# Patient Record
Sex: FEMALE | Race: OTHER | Employment: OTHER | URBAN - METROPOLITAN AREA
[De-identification: names, ages, dates, MRNs, and addresses within clinical notes are randomized per-mention and may not be internally consistent; named-entity substitution may affect disease eponyms.]

---

## 2017-02-02 ENCOUNTER — APPOINTMENT (OUTPATIENT)
Dept: GENERAL RADIOLOGY | Age: 82
End: 2017-02-02
Attending: PHYSICIAN ASSISTANT
Payer: MEDICARE

## 2017-02-02 ENCOUNTER — HOSPITAL ENCOUNTER (EMERGENCY)
Age: 82
Discharge: HOME | End: 2017-02-02
Attending: EMERGENCY MEDICINE
Payer: MEDICARE

## 2017-02-02 VITALS
RESPIRATION RATE: 18 BRPM | OXYGEN SATURATION: 98 % | SYSTOLIC BLOOD PRESSURE: 167 MMHG | HEART RATE: 62 BPM | HEIGHT: 61 IN | WEIGHT: 120 LBS | TEMPERATURE: 97.8 F | DIASTOLIC BLOOD PRESSURE: 74 MMHG | BODY MASS INDEX: 22.66 KG/M2

## 2017-02-02 DIAGNOSIS — J06.9 ACUTE UPPER RESPIRATORY INFECTION: Primary | ICD-10-CM

## 2017-02-02 LAB
ALBUMIN SERPL BCP-MCNC: 3.1 G/DL (ref 3.5–5)
ALBUMIN/GLOB SERPL: 0.8 {RATIO} (ref 1.1–2.2)
ALP SERPL-CCNC: 95 U/L (ref 45–117)
ALT SERPL-CCNC: 23 U/L (ref 12–78)
ANION GAP BLD CALC-SCNC: 7 MMOL/L (ref 5–15)
APPEARANCE UR: CLEAR
AST SERPL W P-5'-P-CCNC: 23 U/L (ref 15–37)
BACTERIA URNS QL MICRO: NEGATIVE /HPF
BASOPHILS # BLD AUTO: 0 K/UL (ref 0–0.1)
BASOPHILS # BLD: 1 % (ref 0–1)
BILIRUB SERPL-MCNC: 0.3 MG/DL (ref 0.2–1)
BILIRUB UR QL: NEGATIVE
BUN SERPL-MCNC: 15 MG/DL (ref 6–20)
BUN/CREAT SERPL: 18 (ref 12–20)
CALCIUM SERPL-MCNC: 10.2 MG/DL (ref 8.5–10.1)
CHLORIDE SERPL-SCNC: 105 MMOL/L (ref 97–108)
CO2 SERPL-SCNC: 28 MMOL/L (ref 21–32)
COLOR UR: ABNORMAL
CREAT SERPL-MCNC: 0.85 MG/DL (ref 0.55–1.02)
EOSINOPHIL # BLD: 0.1 K/UL (ref 0–0.4)
EOSINOPHIL NFR BLD: 1 % (ref 0–7)
EPITH CASTS URNS QL MICRO: ABNORMAL /LPF
ERYTHROCYTE [DISTWIDTH] IN BLOOD BY AUTOMATED COUNT: 13.8 % (ref 11.5–14.5)
FLUAV AG NPH QL IA: NEGATIVE
FLUBV AG NOSE QL IA: NEGATIVE
GLOBULIN SER CALC-MCNC: 4.1 G/DL (ref 2–4)
GLUCOSE SERPL-MCNC: 101 MG/DL (ref 65–100)
GLUCOSE UR STRIP.AUTO-MCNC: NEGATIVE MG/DL
HCT VFR BLD AUTO: 41.2 % (ref 35–47)
HGB BLD-MCNC: 12.8 G/DL (ref 11.5–16)
HGB UR QL STRIP: ABNORMAL
HYALINE CASTS URNS QL MICRO: ABNORMAL /LPF (ref 0–5)
KETONES UR QL STRIP.AUTO: NEGATIVE MG/DL
LEUKOCYTE ESTERASE UR QL STRIP.AUTO: NEGATIVE
LYMPHOCYTES # BLD AUTO: 35 % (ref 12–49)
LYMPHOCYTES # BLD: 1.4 K/UL (ref 0.8–3.5)
MCH RBC QN AUTO: 27 PG (ref 26–34)
MCHC RBC AUTO-ENTMCNC: 31.1 G/DL (ref 30–36.5)
MCV RBC AUTO: 86.9 FL (ref 80–99)
MONOCYTES # BLD: 0.5 K/UL (ref 0–1)
MONOCYTES NFR BLD AUTO: 11 % (ref 5–13)
NEUTS SEG # BLD: 2.2 K/UL (ref 1.8–8)
NEUTS SEG NFR BLD AUTO: 52 % (ref 32–75)
NITRITE UR QL STRIP.AUTO: NEGATIVE
PH UR STRIP: 7 [PH] (ref 5–8)
PLATELET # BLD AUTO: 122 K/UL (ref 150–400)
POTASSIUM SERPL-SCNC: 3.6 MMOL/L (ref 3.5–5.1)
PROT SERPL-MCNC: 7.2 G/DL (ref 6.4–8.2)
PROT UR STRIP-MCNC: NEGATIVE MG/DL
RBC # BLD AUTO: 4.74 M/UL (ref 3.8–5.2)
RBC #/AREA URNS HPF: ABNORMAL /HPF (ref 0–5)
SODIUM SERPL-SCNC: 140 MMOL/L (ref 136–145)
SP GR UR REFRACTOMETRY: 1.01 (ref 1–1.03)
UA: UC IF INDICATED,UAUC: ABNORMAL
UROBILINOGEN UR QL STRIP.AUTO: 0.2 EU/DL (ref 0.2–1)
WBC # BLD AUTO: 4.2 K/UL (ref 3.6–11)
WBC URNS QL MICRO: ABNORMAL /HPF (ref 0–4)

## 2017-02-02 PROCEDURE — 96361 HYDRATE IV INFUSION ADD-ON: CPT

## 2017-02-02 PROCEDURE — 81001 URINALYSIS AUTO W/SCOPE: CPT | Performed by: EMERGENCY MEDICINE

## 2017-02-02 PROCEDURE — 87804 INFLUENZA ASSAY W/OPTIC: CPT | Performed by: EMERGENCY MEDICINE

## 2017-02-02 PROCEDURE — 85025 COMPLETE CBC W/AUTO DIFF WBC: CPT | Performed by: PHYSICIAN ASSISTANT

## 2017-02-02 PROCEDURE — 74011250636 HC RX REV CODE- 250/636: Performed by: PHYSICIAN ASSISTANT

## 2017-02-02 PROCEDURE — 36415 COLL VENOUS BLD VENIPUNCTURE: CPT | Performed by: PHYSICIAN ASSISTANT

## 2017-02-02 PROCEDURE — 71020 XR CHEST PA LAT: CPT

## 2017-02-02 PROCEDURE — 80053 COMPREHEN METABOLIC PANEL: CPT | Performed by: PHYSICIAN ASSISTANT

## 2017-02-02 PROCEDURE — 96374 THER/PROPH/DIAG INJ IV PUSH: CPT

## 2017-02-02 PROCEDURE — 99284 EMERGENCY DEPT VISIT MOD MDM: CPT

## 2017-02-02 RX ORDER — KETOROLAC TROMETHAMINE 30 MG/ML
10 INJECTION, SOLUTION INTRAMUSCULAR; INTRAVENOUS
Status: COMPLETED | OUTPATIENT
Start: 2017-02-02 | End: 2017-02-02

## 2017-02-02 RX ORDER — SODIUM CHLORIDE 9 MG/ML
1000 INJECTION, SOLUTION INTRAVENOUS ONCE
Status: COMPLETED | OUTPATIENT
Start: 2017-02-02 | End: 2017-02-02

## 2017-02-02 RX ADMIN — KETOROLAC TROMETHAMINE 10 MG: 30 INJECTION, SOLUTION INTRAMUSCULAR at 13:55

## 2017-02-02 RX ADMIN — SODIUM CHLORIDE 1000 ML/HR: 900 INJECTION, SOLUTION INTRAVENOUS at 12:20

## 2017-02-02 NOTE — ED PROVIDER NOTES
HPI Comments: 80 y.o. female with past medical history significant for stroke, diabetes, rheumatoid arthritis, HTN, joint swelling, cherry angioma, and vertigo who presents with chief complaint of cough. For the past 4 days, patient has complained of fever, generalized body aches, cough productive of phlegm, rhinorrhea, and \"runny eyes. \"  Secondary to cough, patient has also complained of a \"lump\" in her throat. Per daughter, patient has been taking DM cough syrup and ASA while drinking hot tea, but without any relief. After contacting her PCP this morning, patient was advised to be seen in the ED. Per daughter, patient exhibits swelling to the left shoulder due to h/o rheumatoid arthritis -- \"it's been really bad this season. \"  Patient is not currently on any pain medications or immunosuppressants for her rheumatoid arthritis. Daughter denies any recent travel. Patient has received her flu shot this year. Patient denies any vomiting or diarrhea. There are no other acute medical concerns at this time. Social hx: denies tobacco smoking; no known ill contacts  PCP: Nik Myers MD    Note written by Dario Eden, as dictated by Amberly Emmanuel PA-C 11:37 AM    The history is provided by the patient and a relative. Past Medical History:   Diagnosis Date    Bunion     Cherry angioma     Diabetes ([address]     Diabetes mellitus ([address]     HTN (hypertension)     Joint pain     Joint swelling     Rheumatoid arthritis(714.0) ([address]     Stroke ([address]     Thyroid disorder      problems    Vertigo      BPPV       Past Surgical History:   Procedure Laterality Date    Hx orthopaedic       bunions removed x 2    Hx  section           Family History:   Problem Relation Age of Onset    Stroke Father        Social History     Social History    Marital status:      Spouse name: N/A    Number of children: N/A    Years of education: N/A     Occupational History    Not on file. Social History Main Topics    Smoking status: Never Smoker    Smokeless tobacco: Never Used    Alcohol use No    Drug use: No    Sexual activity: No     Other Topics Concern    Not on file     Social History Narrative         ALLERGIES: Shellfish containing products    Review of Systems   Constitutional: Positive for fever. Negative for chills and fatigue. HENT: Positive for congestion, rhinorrhea and sore throat. Negative for ear pain, facial swelling and sneezing. Eyes: Positive for discharge. Negative for pain and itching. Respiratory: Positive for cough. Negative for chest tightness and shortness of breath. Cardiovascular: Negative. Negative for chest pain and leg swelling. Gastrointestinal: Negative. Negative for abdominal distention, abdominal pain, constipation, diarrhea, nausea and vomiting. Genitourinary: Negative for difficulty urinating, frequency and urgency. Musculoskeletal: Positive for arthralgias and myalgias. Negative for back pain, joint swelling, neck pain and neck stiffness. Skin: Negative for color change and rash. Neurological: Negative for dizziness, numbness and headaches. Psychiatric/Behavioral: Negative for confusion and decreased concentration. All other systems reviewed and are negative. Vitals:    02/02/17 1118   BP: 180/73   Pulse: 71   Resp: 16   Temp: 97.8 °F (36.6 °C)   SpO2: 100%   Weight: 54.4 kg (120 lb)   Height: 5' 1\" (1.549 m)            Physical Exam   Constitutional: She is oriented to person, place, and time. She appears well-developed and well-nourished. No distress. Elderly  female in NAD   HENT:   Head: Normocephalic and atraumatic. Right Ear: Tympanic membrane, external ear and ear canal normal.   Left Ear: Tympanic membrane, external ear and ear canal normal.   Nose: Nose normal.   Mouth/Throat: Uvula is midline, oropharynx is clear and moist and mucous membranes are normal. She has dentures. No oropharyngeal exudate. Eyes: Conjunctivae and EOM are normal. Pupils are equal, round, and reactive to light. Right eye exhibits no discharge. Left eye exhibits no discharge. No scleral icterus. Neck: Normal range of motion. Neck supple. Cardiovascular: Normal rate and regular rhythm. Exam reveals no gallop and no friction rub. No murmur heard. Pulmonary/Chest: Effort normal and breath sounds normal. She has no wheezes. She has no rales. Abdominal: Soft. Bowel sounds are normal. She exhibits no distension. There is no tenderness. There is no rebound and no guarding. Lymphadenopathy:     She has cervical adenopathy (shotty anterior rt >lt). Neurological: She is alert and oriented to person, place, and time. No cranial nerve deficit. Coordination normal.   Skin: Skin is warm and dry. She is not diaphoretic. Psychiatric: She has a normal mood and affect. Her behavior is normal.   Nursing note and vitals reviewed. MDM  Number of Diagnoses or Management Options  Acute upper respiratory infection:   Diagnosis management comments: 79 yo female with cough, runny nose, eye drainage, myalgias x 5 days. Appears non-toxic on exam. Afebrile with clear lungs on auscultation. Will check CBC, CMP, rapid flu, UA and xray chest. [place], [place]         Amount and/or Complexity of Data Reviewed  Clinical lab tests: ordered and reviewed  Tests in the radiology section of CPT®: ordered and reviewed      ED Course       Procedures  Progress note    Labs and imaging reviewed. Pt feeling slightly better. Suspect viral resp illness. [place], [place]    Patient's results have been reviewed with them. Patient and/or family have verbally conveyed their understanding and agreement of the patient's signs, symptoms, diagnosis, treatment and prognosis and additionally agree to follow up as recommended or return to the Emergency Room should their condition change prior to follow-up.   Discharge instructions have also been provided to the patient with some educational information regarding their diagnosis as well a list of reasons why they would want to return to the ER prior to their follow-up appointment should their condition change. [facility], [address]    A/P  URI: Push fluids. Rest. Tylenol 1 gram every 8 hrs for body aches. Benadryl 25 mg every 6 hrs for cough and runny eyes. Return for any new or worsening.  Amberly DUNBAR [facility], [address]

## 2017-02-02 NOTE — DISCHARGE INSTRUCTIONS
We hope that we have addressed all of your medical concerns. The examination and treatment you received in the Emergency Department were for an emergent problem and were not intended as complete care. It is important that you follow up with your healthcare provider(s) for ongoing care. If your symptoms worsen or do not improve as expected, and you are unable to reach your usual health care provider(s), you should return to the Emergency Department. Today's healthcare is undergoing tremendous change, and patient satisfaction surveys are one of the many tools to assess the quality of medical care. You may receive a survey from the [employer] regarding your experience in the Emergency Department. I hope that your experience has been completely positive, particularly the medical care that I provided. As such, please participate in the survey; anything less than excellent does not meet my expectations or intentions. [facility] and [facility] participate in nationally recognized quality of care measures. If your blood pressure is greater than 120/80, as reported below, we urge that you seek medical care to address the potential of high blood pressure, commonly known as hypertension. Hypertension can be hereditary or can be caused by certain medical conditions, pain, stress, or \"white coat syndrome. \"       Please make an appointment with your health care provider(s) for follow up of your Emergency Department visit. VITALS:   Patient Vitals for the past 8 hrs:   Temp Pulse Resp BP SpO2   02/02/17 1345 - - - 159/74 99 %   02/02/17 1330 - - - 174/68 98 %   02/02/17 1300 - - - 168/65 99 %   02/02/17 1230 - - - 159/59 98 %   02/02/17 1215 - - - - 98 %   02/02/17 1145 - - - 146/64 98 %   02/02/17 1131 - - - 163/67 99 %   02/02/17 1118 97.8 °F (36.6 °C) 71 16 180/73 100 %          Thank you for allowing us to provide you with medical care today. We realize that you have many choices for your emergency care needs. Please choose us in the future for any continued health care needs. Regards,           Amberly DUNBAR. [facility], [address] [facility].   Office: [phone]            Recent Results (from the past 24 hour(s))   CBC WITH AUTOMATED DIFF    Collection Time: 02/02/17 11:52 AM   Result Value Ref Range    WBC 4.2 3.6 - 11.0 K/uL    RBC 4.74 3.80 - 5.20 M/uL    HGB 12.8 11.5 - 16.0 g/dL    HCT 41.2 35.0 - 47.0 %    MCV 86.9 80.0 - 99.0 FL    MCH 27.0 26.0 - 34.0 PG    MCHC 31.1 30.0 - 36.5 g/dL    RDW 13.8 11.5 - 14.5 %    PLATELET 489 (L) 880 - 400 K/uL    NEUTROPHILS 52 32 - 75 %    LYMPHOCYTES 35 12 - 49 %    MONOCYTES 11 5 - 13 %    EOSINOPHILS 1 0 - 7 %    BASOPHILS 1 0 - 1 %    ABS. NEUTROPHILS 2.2 1.8 - 8.0 K/UL    ABS. LYMPHOCYTES 1.4 0.8 - 3.5 K/UL    ABS. MONOCYTES 0.5 0.0 - 1.0 K/UL    ABS. EOSINOPHILS 0.1 0.0 - 0.4 K/UL    ABS. BASOPHILS 0.0 0.0 - 0.1 K/UL   METABOLIC PANEL, COMPREHENSIVE    Collection Time: 02/02/17 11:52 AM   Result Value Ref Range    Sodium 140 136 - 145 mmol/L    Potassium 3.6 3.5 - 5.1 mmol/L    Chloride 105 97 - 108 mmol/L    CO2 28 21 - 32 mmol/L    Anion gap 7 5 - 15 mmol/L    Glucose 101 (H) 65 - 100 mg/dL    BUN 15 6 - 20 MG/DL    Creatinine 0.85 0.55 - 1.02 MG/DL    BUN/Creatinine ratio 18 12 - 20      GFR est AA >60 >60 ml/min/1.73m2    GFR est non-AA >60 >60 ml/min/1.73m2    Calcium 10.2 (H) 8.5 - 10.1 MG/DL    Bilirubin, total 0.3 0.2 - 1.0 MG/DL    ALT (SGPT) 23 12 - 78 U/L    AST (SGOT) 23 15 - 37 U/L    Alk.  phosphatase 95 45 - 117 U/L    Protein, total 7.2 6.4 - 8.2 g/dL    Albumin 3.1 (L) 3.5 - 5.0 g/dL    Globulin 4.1 (H) 2.0 - 4.0 g/dL    A-G Ratio 0.8 (L) 1.1 - 2.2     INFLUENZA A & B AG (RAPID TEST)    Collection Time: 02/02/17 11:56 AM   Result Value Ref Range    Influenza A Antigen NEGATIVE  NEG      Influenza B Antigen NEGATIVE  NEG     URINALYSIS W/ REFLEX CULTURE    Collection Time: 02/02/17  1:44 PM   Result Value Ref Range    Color YELLOW/STRAW      Appearance CLEAR CLEAR      Specific gravity 1.007 1.003 - 1.030      pH (UA) 7.0 5.0 - 8.0      Protein NEGATIVE  NEG mg/dL    Glucose NEGATIVE  NEG mg/dL    Ketone NEGATIVE  NEG mg/dL    Bilirubin NEGATIVE  NEG      Blood TRACE (A) NEG      Urobilinogen 0.2 0.2 - 1.0 EU/dL    Nitrites NEGATIVE  NEG      Leukocyte Esterase NEGATIVE  NEG      WBC 0-4 0 - 4 /hpf    RBC 0-5 0 - 5 /hpf    Epithelial cells FEW FEW /lpf    Bacteria NEGATIVE  NEG /hpf    UA:UC IF INDICATED CULTURE NOT INDICATED BY UA RESULT CNI      Hyaline cast 0-2 0 - 5 /lpf       Xr Chest Pa Lat    Result Date: 2/2/2017  Exam:  2 view chest Indication: Cough and body aches, possible flu. COMPARISON: 7/12/2016 PA and lateral views demonstrate normal heart size. There is atherosclerotic change of the aorta. There is no acute process in the lung fields. The osseous structures are unremarkable. Impression: No acute process. No pneumonia            Upper Respiratory Infection (Cold): Care Instructions  Your Care Instructions    An upper respiratory infection, or URI, is an infection of the nose, sinuses, or throat. URIs are spread by coughs, sneezes, and direct contact. The common cold is the most frequent kind of URI. The flu and sinus infections are other kinds of URIs. Almost all URIs are caused by viruses. Antibiotics won't cure them. But you can treat most infections with home care. This may include drinking lots of fluids and taking over-the-counter pain medicine. You will probably feel better in 4 to 10 days. The doctor has checked you carefully, but problems can develop later. If you notice any problems or new symptoms, get medical treatment right away. Follow-up care is a key part of your treatment and safety. Be sure to make and go to all appointments, and call your doctor if you are having problems.  It's also a good idea to know your test results and keep a list of the medicines you take. How can you care for yourself at home? · To prevent dehydration, drink plenty of fluids, enough so that your urine is light yellow or clear like water. Choose water and other caffeine-free clear liquids until you feel better. If you have kidney, heart, or liver disease and have to limit fluids, talk with your doctor before you increase the amount of fluids you drink. · Take an over-the-counter pain medicine, such as acetaminophen (Tylenol), ibuprofen (Advil, Motrin), or naproxen (Aleve). Read and follow all instructions on the label. · Before you use cough and cold medicines, check the label. These medicines may not be safe for young children or for people with certain health problems. · Be careful when taking over-the-counter cold or flu medicines and Tylenol at the same time. Many of these medicines have acetaminophen, which is Tylenol. Read the labels to make sure that you are not taking more than the recommended dose. Too much acetaminophen (Tylenol) can be harmful. · Get plenty of rest.  · Do not smoke or allow others to smoke around you. If you need help quitting, talk to your doctor about stop-smoking programs and medicines. These can increase your chances of quitting for good. When should you call for help? Call 911 anytime you think you may need emergency care. For example, call if:  · You have severe trouble breathing. Call your doctor now or seek immediate medical care if:  · You seem to be getting much sicker. · You have new or worse trouble breathing. · You have a new or higher fever. · You have a new rash. Watch closely for changes in your health, and be sure to contact your doctor if:  · You have a new symptom, such as a sore throat, an earache, or sinus pain. · You cough more deeply or more often, especially if you notice more mucus or a change in the color of your mucus. · You do not get better as expected. Where can you learn more?   Go to http://jennifer-pablo.info/. Enter N853 in the search box to learn more about \"Upper Respiratory Infection (Cold): Care Instructions. \"  Current as of: June 30, 2016  Content Version: 11.1  © 7376-2484 [employer]. Care instructions adapted under license by [employer] (which disclaims liability or warranty for this information). If you have questions about a medical condition or this instruction, always ask your healthcare professional. [address] any warranty or liability for your use of this information.

## 2017-02-02 NOTE — ED NOTES
Assumed care of pt. Bed locked and in low position with call bell within reach. Using AIDET-Introduced self as Primary RN and plan discussed with pt with understanding was verbalized. Pt denies additional complaints at this time. White board updated with this nurse's name. Patient advised that medical information will be discussed and it is their own responsibility to tell nurse if such conversation should not take place in the presence of visitors. Pt verbalizes understanding. Pt's daughter at bedside.

## 2017-02-02 NOTE — ED NOTES
Pt discharged by provider. Pt escorted off the unit with a w/c and in NAD. Home with her daughter who will be driving.

## 2017-02-11 ENCOUNTER — APPOINTMENT (OUTPATIENT)
Dept: GENERAL RADIOLOGY | Age: 82
End: 2017-02-11
Attending: NURSE PRACTITIONER
Payer: MEDICARE

## 2017-02-11 ENCOUNTER — HOSPITAL ENCOUNTER (EMERGENCY)
Age: 82
Discharge: HOME | End: 2017-02-11
Attending: EMERGENCY MEDICINE
Payer: MEDICARE

## 2017-02-11 VITALS
TEMPERATURE: 98 F | OXYGEN SATURATION: 99 % | RESPIRATION RATE: 16 BRPM | SYSTOLIC BLOOD PRESSURE: 188 MMHG | BODY MASS INDEX: 22.66 KG/M2 | HEART RATE: 70 BPM | DIASTOLIC BLOOD PRESSURE: 71 MMHG | WEIGHT: 120 LBS | HEIGHT: 61 IN

## 2017-02-11 DIAGNOSIS — J20.9 ACUTE BRONCHITIS, UNSPECIFIED ORGANISM: Primary | ICD-10-CM

## 2017-02-11 DIAGNOSIS — R05.9 COUGH: ICD-10-CM

## 2017-02-11 LAB
ALBUMIN SERPL BCP-MCNC: 3.2 G/DL (ref 3.5–5)
ALBUMIN/GLOB SERPL: 0.8 {RATIO} (ref 1.1–2.2)
ALP SERPL-CCNC: 98 U/L (ref 45–117)
ALT SERPL-CCNC: 22 U/L (ref 12–78)
ANION GAP BLD CALC-SCNC: 6 MMOL/L (ref 5–15)
APPEARANCE UR: CLEAR
AST SERPL W P-5'-P-CCNC: 22 U/L (ref 15–37)
BACTERIA URNS QL MICRO: NEGATIVE /HPF
BASOPHILS # BLD AUTO: 0 K/UL (ref 0–0.1)
BASOPHILS # BLD: 0 % (ref 0–1)
BILIRUB SERPL-MCNC: 0.4 MG/DL (ref 0.2–1)
BILIRUB UR QL: NEGATIVE
BUN SERPL-MCNC: 22 MG/DL (ref 6–20)
BUN/CREAT SERPL: 24 (ref 12–20)
CALCIUM SERPL-MCNC: 9.6 MG/DL (ref 8.5–10.1)
CHLORIDE SERPL-SCNC: 104 MMOL/L (ref 97–108)
CO2 SERPL-SCNC: 29 MMOL/L (ref 21–32)
COLOR UR: ABNORMAL
CREAT SERPL-MCNC: 0.9 MG/DL (ref 0.55–1.02)
EOSINOPHIL # BLD: 0.1 K/UL (ref 0–0.4)
EOSINOPHIL NFR BLD: 2 % (ref 0–7)
EPITH CASTS URNS QL MICRO: ABNORMAL /LPF
ERYTHROCYTE [DISTWIDTH] IN BLOOD BY AUTOMATED COUNT: 13.7 % (ref 11.5–14.5)
FLUAV AG NPH QL IA: NEGATIVE
FLUBV AG NOSE QL IA: NEGATIVE
GLOBULIN SER CALC-MCNC: 4 G/DL (ref 2–4)
GLUCOSE SERPL-MCNC: 100 MG/DL (ref 65–100)
GLUCOSE UR STRIP.AUTO-MCNC: NEGATIVE MG/DL
HCT VFR BLD AUTO: 39.5 % (ref 35–47)
HGB BLD-MCNC: 12.5 G/DL (ref 11.5–16)
HGB UR QL STRIP: ABNORMAL
KETONES UR QL STRIP.AUTO: NEGATIVE MG/DL
LACTATE SERPL-SCNC: 1.3 MMOL/L (ref 0.4–2)
LEUKOCYTE ESTERASE UR QL STRIP.AUTO: NEGATIVE
LYMPHOCYTES # BLD AUTO: 18 % (ref 12–49)
LYMPHOCYTES # BLD: 1 K/UL (ref 0.8–3.5)
MCH RBC QN AUTO: 27.2 PG (ref 26–34)
MCHC RBC AUTO-ENTMCNC: 31.6 G/DL (ref 30–36.5)
MCV RBC AUTO: 85.9 FL (ref 80–99)
MONOCYTES # BLD: 0.4 K/UL (ref 0–1)
MONOCYTES NFR BLD AUTO: 6 % (ref 5–13)
NEUTS SEG # BLD: 4 K/UL (ref 1.8–8)
NEUTS SEG NFR BLD AUTO: 74 % (ref 32–75)
NITRITE UR QL STRIP.AUTO: NEGATIVE
PH UR STRIP: 7 [PH] (ref 5–8)
PLATELET # BLD AUTO: 128 K/UL (ref 150–400)
POTASSIUM SERPL-SCNC: 3.7 MMOL/L (ref 3.5–5.1)
PROT SERPL-MCNC: 7.2 G/DL (ref 6.4–8.2)
PROT UR STRIP-MCNC: NEGATIVE MG/DL
RBC # BLD AUTO: 4.6 M/UL (ref 3.8–5.2)
RBC #/AREA URNS HPF: ABNORMAL /HPF (ref 0–5)
SODIUM SERPL-SCNC: 139 MMOL/L (ref 136–145)
SP GR UR REFRACTOMETRY: 1.01 (ref 1–1.03)
UROBILINOGEN UR QL STRIP.AUTO: 0.2 EU/DL (ref 0.2–1)
WBC # BLD AUTO: 5.4 K/UL (ref 3.6–11)
WBC URNS QL MICRO: ABNORMAL /HPF (ref 0–4)

## 2017-02-11 PROCEDURE — 80053 COMPREHEN METABOLIC PANEL: CPT | Performed by: NURSE PRACTITIONER

## 2017-02-11 PROCEDURE — 99284 EMERGENCY DEPT VISIT MOD MDM: CPT

## 2017-02-11 PROCEDURE — 81001 URINALYSIS AUTO W/SCOPE: CPT | Performed by: NURSE PRACTITIONER

## 2017-02-11 PROCEDURE — 74011250636 HC RX REV CODE- 250/636: Performed by: NURSE PRACTITIONER

## 2017-02-11 PROCEDURE — 36415 COLL VENOUS BLD VENIPUNCTURE: CPT | Performed by: NURSE PRACTITIONER

## 2017-02-11 PROCEDURE — 87804 INFLUENZA ASSAY W/OPTIC: CPT | Performed by: NURSE PRACTITIONER

## 2017-02-11 PROCEDURE — 85025 COMPLETE CBC W/AUTO DIFF WBC: CPT | Performed by: NURSE PRACTITIONER

## 2017-02-11 PROCEDURE — 71020 XR CHEST PA LAT: CPT

## 2017-02-11 PROCEDURE — 83605 ASSAY OF LACTIC ACID: CPT | Performed by: NURSE PRACTITIONER

## 2017-02-11 PROCEDURE — 96360 HYDRATION IV INFUSION INIT: CPT

## 2017-02-11 RX ORDER — SODIUM CHLORIDE 0.9 % (FLUSH) 0.9 %
5-10 SYRINGE (ML) INJECTION AS NEEDED
Status: DISCONTINUED | OUTPATIENT
Start: 2017-02-11 | End: 2017-02-11 | Stop reason: HOSPADM

## 2017-02-11 RX ORDER — CODEINE PHOSPHATE AND GUAIFENESIN 10; 100 MG/5ML; MG/5ML
5 SOLUTION ORAL
Qty: 118 ML | Refills: 0 | Status: SHIPPED | OUTPATIENT
Start: 2017-02-11 | End: 2019-08-14

## 2017-02-11 RX ORDER — ALBUTEROL SULFATE 90 UG/1
1 AEROSOL, METERED RESPIRATORY (INHALATION)
Qty: 1 INHALER | Refills: 0 | Status: SHIPPED | OUTPATIENT
Start: 2017-02-11 | End: 2020-11-30

## 2017-02-11 RX ORDER — PREDNISONE 50 MG/1
50 TABLET ORAL DAILY
Qty: 5 TAB | Refills: 0 | Status: SHIPPED | OUTPATIENT
Start: 2017-02-11 | End: 2017-02-16

## 2017-02-11 RX ORDER — DOXYCYCLINE HYCLATE 100 MG
100 TABLET ORAL 2 TIMES DAILY
Qty: 14 TAB | Refills: 0 | Status: SHIPPED | OUTPATIENT
Start: 2017-02-11 | End: 2017-02-18

## 2017-02-11 RX ORDER — SODIUM CHLORIDE 0.9 % (FLUSH) 0.9 %
5-10 SYRINGE (ML) INJECTION EVERY 8 HOURS
Status: DISCONTINUED | OUTPATIENT
Start: 2017-02-11 | End: 2017-02-11 | Stop reason: HOSPADM

## 2017-02-11 RX ADMIN — Medication 10 ML: at 10:43

## 2017-02-11 RX ADMIN — SODIUM CHLORIDE 1000 ML: 900 INJECTION, SOLUTION INTRAVENOUS at 10:43

## 2017-02-11 NOTE — ED TRIAGE NOTES
Patient arrived with c/o productive cough with white phelgm and fever, was here last week and diagnosed with URI.   Tylenol given 8 am.

## 2017-02-11 NOTE — DISCHARGE INSTRUCTIONS
Bronchitis: Care Instructions  Your Care Instructions    Bronchitis is inflammation of the bronchial tubes, which carry air to the lungs. The tubes swell and produce mucus, or phlegm. The mucus and inflamed bronchial tubes make you cough. You may have trouble breathing. Most cases of bronchitis are caused by viruses like those that cause colds. Antibiotics usually do not help and they may be harmful. Bronchitis usually develops rapidly and lasts about 2 to 3 weeks in otherwise healthy people. Follow-up care is a key part of your treatment and safety. Be sure to make and go to all appointments, and call your doctor if you are having problems. It's also a good idea to know your test results and keep a list of the medicines you take. How can you care for yourself at home? · Take all medicines exactly as prescribed. Call your doctor if you think you are having a problem with your medicine. · Get some extra rest.  · Take an over-the-counter pain medicine, such as acetaminophen (Tylenol), ibuprofen (Advil, Motrin), or naproxen (Aleve) to reduce fever and relieve body aches. Read and follow all instructions on the label. · Do not take two or more pain medicines at the same time unless the doctor told you to. Many pain medicines have acetaminophen, which is Tylenol. Too much acetaminophen (Tylenol) can be harmful. · Take an over-the-counter cough medicine that contains dextromethorphan to help quiet a dry, hacking cough so that you can sleep. Avoid cough medicines that have more than one active ingredient. Read and follow all instructions on the label. · Breathe moist air from a humidifier, hot shower, or sink filled with hot water. The heat and moisture will thin mucus so you can cough it out. · Do not smoke. Smoking can make bronchitis worse. If you need help quitting, talk to your doctor about stop-smoking programs and medicines. These can increase your chances of quitting for good.   When should you call for help? Call 911 anytime you think you may need emergency care. For example, call if:  · You have severe trouble breathing. Call your doctor now or seek immediate medical care if:  · You have new or worse trouble breathing. · You cough up dark brown or bloody mucus (sputum). · You have a new or higher fever. · You have a new rash. Watch closely for changes in your health, and be sure to contact your doctor if:  · You cough more deeply or more often, especially if you notice more mucus or a change in the color of your mucus. · You are not getting better as expected. Where can you learn more? Go to http://jennifer-pablo.info/. Enter H333 in the search box to learn more about \"Bronchitis: Care Instructions. \"  Current as of: May 23, 2016  Content Version: 11.1  © 2290-3646 [employer]. Care instructions adapted under license by [employer] (which disclaims liability or warranty for this information). If you have questions about a medical condition or this instruction, always ask your healthcare professional. [address] any warranty or liability for your use of this information. We hope that we have addressed all of your medical concerns. The examination and treatment you received in the Emergency Department were for an emergent problem and were not intended as complete care. It is important that you follow up with your healthcare provider(s) for ongoing care. If your symptoms worsen or do not improve as expected, and you are unable to reach your usual health care provider(s), you should return to the Emergency Department. Today's healthcare is undergoing tremendous change, and patient satisfaction surveys are one of the many tools to assess the quality of medical care. You may receive a survey from the [employer] regarding your experience in the Emergency Department.   I hope that your experience has been completely positive, particularly the medical care that I provided. As such, please participate in the survey; anything less than excellent does not meet my expectations or intentions. [address] [place] [address] and [employer] participate in nationally recognized quality of care measures. If your blood pressure is greater than 120/80, as reported below, we urge that you seek medical care to address the potential of high blood pressure, commonly known as hypertension. Hypertension can be hereditary or can be caused by certain medical conditions, pain, stress, or \"white coat syndrome. \"       Please make an appointment with your health care provider(s) for follow up of your Emergency Department visit. VITALS:   Patient Vitals for the past 8 hrs:   Temp Pulse Resp BP SpO2   02/11/17 1130 - - - 188/71 99 %   02/11/17 1046 - - - 168/72 99 %   02/11/17 0950 98 °F (36.7 °C) 70 16 165/76 99 %          Thank you for allowing us to provide you with medical care today. We realize that you have many choices for your emergency care needs. Please choose us in the future for any continued health care needs. Azeb Cervantes NP    [address] [facility].   Office: [phone]            Recent Results (from the past 24 hour(s))   CBC WITH AUTOMATED DIFF    Collection Time: 02/11/17 10:09 AM   Result Value Ref Range    WBC 5.4 3.6 - 11.0 K/uL    RBC 4.60 3.80 - 5.20 M/uL    HGB 12.5 11.5 - 16.0 g/dL    HCT 39.5 35.0 - 47.0 %    MCV 85.9 80.0 - 99.0 FL    MCH 27.2 26.0 - 34.0 PG    MCHC 31.6 30.0 - 36.5 g/dL    RDW 13.7 11.5 - 14.5 %    PLATELET 009 (L) 319 - 400 K/uL    NEUTROPHILS 74 32 - 75 %    LYMPHOCYTES 18 12 - 49 %    MONOCYTES 6 5 - 13 %    EOSINOPHILS 2 0 - 7 %    BASOPHILS 0 0 - 1 %    ABS. NEUTROPHILS 4.0 1.8 - 8.0 K/UL    ABS. LYMPHOCYTES 1.0 0.8 - 3.5 K/UL    ABS. MONOCYTES 0.4 0.0 - 1.0 K/UL    ABS. EOSINOPHILS 0.1 0.0 - 0.4 K/UL    ABS.  BASOPHILS 0.0 0.0 - 0.1 K/UL   METABOLIC PANEL, COMPREHENSIVE    Collection Time: 02/11/17 10:09 AM   Result Value Ref Range    Sodium 139 136 - 145 mmol/L    Potassium 3.7 3.5 - 5.1 mmol/L    Chloride 104 97 - 108 mmol/L    CO2 29 21 - 32 mmol/L    Anion gap 6 5 - 15 mmol/L    Glucose 100 65 - 100 mg/dL    BUN 22 (H) 6 - 20 MG/DL    Creatinine 0.90 0.55 - 1.02 MG/DL    BUN/Creatinine ratio 24 (H) 12 - 20      GFR est AA >60 >60 ml/min/1.73m2    GFR est non-AA 59 (L) >60 ml/min/1.73m2    Calcium 9.6 8.5 - 10.1 MG/DL    Bilirubin, total 0.4 0.2 - 1.0 MG/DL    ALT (SGPT) 22 12 - 78 U/L    AST (SGOT) 22 15 - 37 U/L    Alk. phosphatase 98 45 - 117 U/L    Protein, total 7.2 6.4 - 8.2 g/dL    Albumin 3.2 (L) 3.5 - 5.0 g/dL    Globulin 4.0 2.0 - 4.0 g/dL    A-G Ratio 0.8 (L) 1.1 - 2.2     LACTIC ACID, PLASMA    Collection Time: 02/11/17 10:09 AM   Result Value Ref Range    Lactic acid 1.3 0.4 - 2.0 MMOL/L   INFLUENZA A & B AG (RAPID TEST)    Collection Time: 02/11/17 10:12 AM   Result Value Ref Range    Influenza A Antigen NEGATIVE  NEG      Influenza B Antigen NEGATIVE  NEG     URINALYSIS W/MICROSCOPIC    Collection Time: 02/11/17 11:12 AM   Result Value Ref Range    Color YELLOW/STRAW      Appearance CLEAR CLEAR      Specific gravity 1.006 1.003 - 1.030      pH (UA) 7.0 5.0 - 8.0      Protein NEGATIVE  NEG mg/dL    Glucose NEGATIVE  NEG mg/dL    Ketone NEGATIVE  NEG mg/dL    Bilirubin NEGATIVE  NEG      Blood TRACE (A) NEG      Urobilinogen 0.2 0.2 - 1.0 EU/dL    Nitrites NEGATIVE  NEG      Leukocyte Esterase NEGATIVE  NEG      WBC 0-4 0 - 4 /hpf    RBC 0-5 0 - 5 /hpf    Epithelial cells FEW FEW /lpf    Bacteria NEGATIVE  NEG /hpf       Xr Chest Pa Lat    Result Date: 2/11/2017  EXAM:  XR CHEST PA LAT INDICATION:  fever cough COMPARISON:  2/2/2017 FINDINGS: PA and lateral radiographs of the chest demonstrate clear lungs. There is borderline cardiomegaly with left ventricular prominence and an uncoiled, atherosclerotic aorta. There is no vascular congestion or pleural fluid. Osteopenia is present in the spine. IMPRESSION: No acute process.

## 2017-02-11 NOTE — ED PROVIDER NOTES
HPI Comments: 80 y.o. female with past medical history significant for diabetes, arthralgias, RA, bunion, cherry angioma, stroke, vertigo, HTN, scoliosis, and thyroid problems who presents from home with chief complaint of cough. Pt reports to the ED c/o a productive cough that has persisted for ~2 weeks. Pt was seen on 02/02/2017, at which time she had an unremarkable UA was diagnosed with an URI based on CXR and discharged home with no medications. Pt was initially bringing up green/yellow sputum, but her condition has worsened over the past 24 hours and began bringing up clear/white sputum. Pt's daughter reports that her temperature was 101 this morning, but it dropped back down to 98 after giving pt some Tylenol. Pt has also been experiencing diaphoresis resulting in chills, congestion, rhinorrhea, sleep disturbance, and vertigo (has Hx of chronically intermittent vertigo). Pt received her flu shot and has been taking a diabetic OTC cough syrup. Pt has been seen by her PCP recently and has been taking her regular medications as directed. Pt is primarily independent although she does live with her daughter, who is not currently sick. Pt has no Hx of asthma. Pt denies experiencing any CP, nausea, vomiting, diarrhea, loss of appetite, dysuria, hematuria, or other pleuritic pain. There are no other acute medical concerns at this time. PCP: To Adam MD    Note written by Jermaine Khan, as dictated by Marty Conte NP 10:00 AM      The history is provided by the patient and a relative. The history is limited by a language barrier. A  was used (Daughter translated).         Past Medical History:   Diagnosis Date    Bunion     Cherry angioma     Diabetes ([facility]     Diabetes mellitus ([facility]     HTN (hypertension)     Joint pain     Joint swelling     Rheumatoid arthritis(714.0)     Scoliosis     Stroke (HCC)     Thyroid disorder      problems    Vertigo      BPPV Past Surgical History:   Procedure Laterality Date    Hx orthopaedic       bunions removed x 2    Hx  section           Family History:   Problem Relation Age of Onset    Stroke Father        Social History     Social History    Marital status:      Spouse name: N/A    Number of children: N/A    Years of education: N/A     Occupational History    Not on file. Social History Main Topics    Smoking status: Never Smoker    Smokeless tobacco: Never Used    Alcohol use No    Drug use: No    Sexual activity: No     Other Topics Concern    Not on file     Social History Narrative         ALLERGIES: Shellfish containing products    Review of Systems   Constitutional: Positive for chills, diaphoresis and fever. Negative for appetite change. HENT: Positive for congestion and rhinorrhea. Negative for ear pain and sore throat. Eyes: Negative for photophobia and pain. Respiratory: Positive for cough. Negative for chest tightness and shortness of breath. Cardiovascular: Negative for chest pain and leg swelling. Gastrointestinal: Negative for abdominal pain, diarrhea, nausea and vomiting. Genitourinary: Negative for dysuria, flank pain and hematuria. Musculoskeletal: Negative for back pain and neck pain. Skin: Negative for rash and wound. Neurological: Positive for dizziness. Negative for light-headedness and headaches. Psychiatric/Behavioral: Positive for sleep disturbance. All other systems reviewed and are negative. Vitals:    17 0950   BP: 165/76   Pulse: 70   Resp: 16   Temp: 98 °F (36.7 °C)   SpO2: 99%   Weight: 54.4 kg (120 lb)   Height: 5' 1\" (1.549 m)            Physical Exam   Constitutional: She is oriented to person, place, and time. She appears well-developed and well-nourished. No distress. HENT:   Head: Normocephalic.    Right Ear: External ear normal.   Left Ear: External ear normal.   Mouth/Throat: Oropharynx is clear and moist. No oropharyngeal exudate. Eyes: Conjunctivae and EOM are normal. Pupils are equal, round, and reactive to light. Right eye exhibits no discharge. Left eye exhibits no discharge. No scleral icterus. Neck: Normal range of motion. Neck supple. No JVD present. No tracheal deviation present. No thyromegaly present. Cardiovascular: Normal rate, regular rhythm, normal heart sounds, intact distal pulses and normal pulses. PMI is not displaced. Exam reveals no gallop and no friction rub. No murmur heard. Pulmonary/Chest: Effort normal and breath sounds normal. No accessory muscle usage or stridor. No respiratory distress. She has no decreased breath sounds. She has no wheezes. She has no rhonchi. She has no rales. She exhibits no tenderness. Abdominal: Soft. Bowel sounds are normal. She exhibits no distension and no mass. There is no hepatosplenomegaly. There is no tenderness. There is no rigidity, no rebound, no guarding, no CVA tenderness, no tenderness at McBurney's point and negative Arce's sign. Musculoskeletal: Normal range of motion. She exhibits no edema or tenderness. Lymphadenopathy:     She has no cervical adenopathy. Neurological: She is alert and oriented to person, place, and time. She has normal strength. She displays normal reflexes. No cranial nerve deficit or sensory deficit. Coordination normal. GCS eye subscore is 4. GCS verbal subscore is 5. GCS motor subscore is 6. Skin: Skin is warm and dry. No rash noted. She is not diaphoretic. No erythema. No pallor. Psychiatric: She has a normal mood and affect. Her behavior is normal. Judgment and thought content normal.   Nursing note and vitals reviewed.        MDM  Number of Diagnoses or Management Options  Acute bronchitis, unspecified organism:   Cough:   Diagnosis management comments:    * Routine laboratory data and UA    * IVF   * CXR       Amount and/or Complexity of Data Reviewed  Clinical lab tests: ordered and reviewed  Tests in the radiology section of CPT®: ordered and reviewed  Discussion of test results with the performing providers: yes  Review and summarize past medical records: yes  Discuss the patient with other providers: yes    Risk of Complications, Morbidity, and/or Mortality  General comments:    - stable, ambulatory pt in NAD    Patient Progress  Patient progress: stable    ED Course       Procedures      1200 PM  Pt has been reevaluated. There are no new complaints, changes, or physical findings at this time. Medications have been reviewed w/ pt and/or family. Pt and/or family's questions have been answered. Pt and/or family expressed good understanding of the dx/tx/rx and is in agreement with plan of care. Pt instructed and agreed to f/u w/ PCP and to return to ED upon further deterioration. Pt is ready for discharge. LABORATORY TESTS:  Recent Results (from the past 12 hour(s))   CBC WITH AUTOMATED DIFF    Collection Time: 02/11/17 10:09 AM   Result Value Ref Range    WBC 5.4 3.6 - 11.0 K/uL    RBC 4.60 3.80 - 5.20 M/uL    HGB 12.5 11.5 - 16.0 g/dL    HCT 39.5 35.0 - 47.0 %    MCV 85.9 80.0 - 99.0 FL    MCH 27.2 26.0 - 34.0 PG    MCHC 31.6 30.0 - 36.5 g/dL    RDW 13.7 11.5 - 14.5 %    PLATELET 959 (L) 324 - 400 K/uL    NEUTROPHILS 74 32 - 75 %    LYMPHOCYTES 18 12 - 49 %    MONOCYTES 6 5 - 13 %    EOSINOPHILS 2 0 - 7 %    BASOPHILS 0 0 - 1 %    ABS. NEUTROPHILS 4.0 1.8 - 8.0 K/UL    ABS. LYMPHOCYTES 1.0 0.8 - 3.5 K/UL    ABS. MONOCYTES 0.4 0.0 - 1.0 K/UL    ABS. EOSINOPHILS 0.1 0.0 - 0.4 K/UL    ABS.  BASOPHILS 0.0 0.0 - 0.1 K/UL   METABOLIC PANEL, COMPREHENSIVE    Collection Time: 02/11/17 10:09 AM   Result Value Ref Range    Sodium 139 136 - 145 mmol/L    Potassium 3.7 3.5 - 5.1 mmol/L    Chloride 104 97 - 108 mmol/L    CO2 29 21 - 32 mmol/L    Anion gap 6 5 - 15 mmol/L    Glucose 100 65 - 100 mg/dL    BUN 22 (H) 6 - 20 MG/DL    Creatinine 0.90 0.55 - 1.02 MG/DL    BUN/Creatinine ratio 24 (H) 12 - 20      GFR est AA >60 >60 ml/min/1.73m2    GFR est non-AA 59 (L) >60 ml/min/1.73m2    Calcium 9.6 8.5 - 10.1 MG/DL    Bilirubin, total 0.4 0.2 - 1.0 MG/DL    ALT (SGPT) 22 12 - 78 U/L    AST (SGOT) 22 15 - 37 U/L    Alk. phosphatase 98 45 - 117 U/L    Protein, total 7.2 6.4 - 8.2 g/dL    Albumin 3.2 (L) 3.5 - 5.0 g/dL    Globulin 4.0 2.0 - 4.0 g/dL    A-G Ratio 0.8 (L) 1.1 - 2.2     LACTIC ACID, PLASMA    Collection Time: 02/11/17 10:09 AM   Result Value Ref Range    Lactic acid 1.3 0.4 - 2.0 MMOL/L   INFLUENZA A & B AG (RAPID TEST)    Collection Time: 02/11/17 10:12 AM   Result Value Ref Range    Influenza A Antigen NEGATIVE  NEG      Influenza B Antigen NEGATIVE  NEG     URINALYSIS W/MICROSCOPIC    Collection Time: 02/11/17 11:12 AM   Result Value Ref Range    Color YELLOW/STRAW      Appearance CLEAR CLEAR      Specific gravity 1.006 1.003 - 1.030      pH (UA) 7.0 5.0 - 8.0      Protein NEGATIVE  NEG mg/dL    Glucose NEGATIVE  NEG mg/dL    Ketone NEGATIVE  NEG mg/dL    Bilirubin NEGATIVE  NEG      Blood TRACE (A) NEG      Urobilinogen 0.2 0.2 - 1.0 EU/dL    Nitrites NEGATIVE  NEG      Leukocyte Esterase NEGATIVE  NEG      WBC 0-4 0 - 4 /hpf    RBC 0-5 0 - 5 /hpf    Epithelial cells FEW FEW /lpf    Bacteria NEGATIVE  NEG /hpf       IMAGING RESULTS:  XR CHEST PA LAT   Final Result        Xr Chest Pa Lat    Result Date: 2/11/2017  EXAM:  XR CHEST PA LAT INDICATION:  fever cough COMPARISON:  2/2/2017 FINDINGS: PA and lateral radiographs of the chest demonstrate clear lungs. There is borderline cardiomegaly with left ventricular prominence and an uncoiled, atherosclerotic aorta. There is no vascular congestion or pleural fluid. Osteopenia is present in the spine. IMPRESSION: No acute process.          MEDICATIONS GIVEN:  Medications   sodium chloride (NS) flush 5-10 mL ( IntraVENous Canceled Entry 2/11/17 1400)   sodium chloride (NS) flush 5-10 mL (10 mL IntraVENous Given 2/11/17 1043)   sodium chloride 0.9 % bolus infusion 1,000 mL (0 mL IntraVENous IV Completed 2/11/17 5474)       IMPRESSION:  1. Acute bronchitis, unspecified organism    2. Cough        PLAN:  1. Current Discharge Medication List      START taking these medications    Details   doxycycline (VIBRA-TABS) 100 mg tablet Take 1 Tab by mouth two (2) times a day for 7 days. Qty: 14 Tab, Refills: 0      predniSONE (DELTASONE) 50 mg tablet Take 1 Tab by mouth daily for 5 days. Qty: 5 Tab, Refills: 0      guaiFENesin-codeine (ROBITUSSIN AC) 100-10 mg/5 mL solution Take 5 mL by mouth nightly as needed for Cough. Max Daily Amount: 5 mL. Qty: 118 mL, Refills: 0      albuterol (PROVENTIL HFA, VENTOLIN HFA, PROAIR HFA) 90 mcg/actuation inhaler Take 1 Puff by inhalation every four (4) hours as needed for Wheezing. Qty: 1 Inhaler, Refills: 0         CONTINUE these medications which have NOT CHANGED    Details   traMADol (ULTRAM) 50 mg tablet Take 1 Tab by mouth every six (6) hours as needed for Pain. Max Daily Amount: 200 mg. Qty: 10 Tab, Refills: 0      losartan (COZAAR) 50 mg tablet Take 50 mg by mouth daily. Cholecalciferol, Vitamin D3, 3,000 unit tab Take 3,000 Units by mouth daily. LORazepam (ATIVAN) 0.5 mg tablet Take 0.5 mg by mouth nightly. levothyroxine (SYNTHROID) 88 mcg tablet TAKE ONE TABLET BY MOUTH EVERY DAY  Qty: 30 tablet, Refills: 0      pantoprazole (PROTONIX) 40 mg tablet Take 1 Tab by mouth daily. Qty: 30 Tab, Refills: 1    Associated Diagnoses: Elevated cholesterol      Lancets (ONE TOUCH DELICA) misc by Does Not Apply route three (3) times daily. Qty: 1 Package, Refills: 11    Associated Diagnoses: Diabetes mellitus ([address]      glucose blood VI test strips (ONE TOUCH ULTRA TEST) strip by Does Not Apply route three (3) times daily. Qty: 1 Package, Refills: 11    Associated Diagnoses: Diabetes mellitus ([address]      aspirin 81 mg chewable tablet Take 81 mg by mouth daily.              2.   Follow-up Information     Follow up With Details Comments Contact Giuseppe Francois MD In 2 days  [address] [facility] [address]  [phone]          3.  Supportive care     Return to ED if worse       Jeanette Barrett NP  1:13 PM

## 2017-03-21 ENCOUNTER — HOSPITAL ENCOUNTER (EMERGENCY)
Age: 82
Discharge: HOME | End: 2017-03-21
Attending: EMERGENCY MEDICINE
Payer: MEDICARE

## 2017-03-21 ENCOUNTER — APPOINTMENT (OUTPATIENT)
Dept: GENERAL RADIOLOGY | Age: 82
End: 2017-03-21
Attending: EMERGENCY MEDICINE
Payer: MEDICARE

## 2017-03-21 VITALS
OXYGEN SATURATION: 98 % | HEART RATE: 71 BPM | HEIGHT: 61 IN | WEIGHT: 117 LBS | BODY MASS INDEX: 22.09 KG/M2 | RESPIRATION RATE: 18 BRPM | SYSTOLIC BLOOD PRESSURE: 198 MMHG | TEMPERATURE: 98.5 F | DIASTOLIC BLOOD PRESSURE: 85 MMHG

## 2017-03-21 DIAGNOSIS — M25.562 ACUTE PAIN OF LEFT KNEE: ICD-10-CM

## 2017-03-21 DIAGNOSIS — I82.5Z2 CHRONIC DEEP VEIN THROMBOSIS (DVT) OF DISTAL VEIN OF LEFT LOWER EXTREMITY (HCC): Primary | ICD-10-CM

## 2017-03-21 PROCEDURE — 73562 X-RAY EXAM OF KNEE 3: CPT

## 2017-03-21 PROCEDURE — 74011250637 HC RX REV CODE- 250/637: Performed by: EMERGENCY MEDICINE

## 2017-03-21 PROCEDURE — 99283 EMERGENCY DEPT VISIT LOW MDM: CPT

## 2017-03-21 PROCEDURE — 93971 EXTREMITY STUDY: CPT

## 2017-03-21 RX ORDER — IBUPROFEN 600 MG/1
600 TABLET ORAL
Status: COMPLETED | OUTPATIENT
Start: 2017-03-21 | End: 2017-03-21

## 2017-03-21 RX ADMIN — IBUPROFEN 600 MG: 600 TABLET, FILM COATED ORAL at 18:29

## 2017-03-21 NOTE — PROGRESS NOTES
Left LE venous duplex completed. Verbal results given to Dr. Blake Thomas at 0033. He acknowledged results. Final results to follow.

## 2017-03-21 NOTE — ED PROVIDER NOTES
HPI Comments: 80 y.o. female with past medical history significant for stroke, RA, DM, thyroid disorder, cherry angioma, HTN, scoliosis, vertigo, joint swelling and joint pain who presents from home accompanied by her daughter with chief complaint of leg swelling. Pt states outer left knee pain and swelling onset 3 days ago. Pt states she took half of a Motrin with some pain relief. Pt denies injuring the knee. Pt denies foot pain, fever, chills, nausea, vomiting, diarrhea, and constipation. There are no other acute medical concerns at this time. Social hx: denies tobacco use; denies EtOH use; denies illicit drug use  PCP: Genoveva Buckley MD    Note written by Dario Harris, as dictated by Ryan Moore MD 6:09 PM      The history is provided by the patient and a relative. Past Medical History:   Diagnosis Date    Bunion     Cherry angioma     Diabetes ([address]     Diabetes mellitus ([address]     HTN (hypertension)     Joint pain     Joint swelling     Rheumatoid arthritis(714.0)     Scoliosis     Stroke ([address]     Thyroid disorder     problems    Vertigo     BPPV       Past Surgical History:   Procedure Laterality Date    HX  SECTION      HX ORTHOPAEDIC      bunions removed x 2         Family History:   Problem Relation Age of Onset    Stroke Father        Social History     Social History    Marital status:      Spouse name: N/A    Number of children: N/A    Years of education: N/A     Occupational History    Not on file. Social History Main Topics    Smoking status: Never Smoker    Smokeless tobacco: Never Used    Alcohol use No    Drug use: No    Sexual activity: No     Other Topics Concern    Not on file     Social History Narrative         ALLERGIES: Shellfish containing products    Review of Systems   Constitutional: Negative for chills and fever. Gastrointestinal: Negative for constipation, diarrhea, nausea and vomiting.    Musculoskeletal:        Left knee pain   All other systems reviewed and are negative. Vitals:    03/21/17 1750   BP: 198/85   Pulse: 71   Resp: 18   Temp: 98.5 °F (36.9 °C)   SpO2: 98%   Weight: 53.1 kg (117 lb)   Height: 5' 1\" (1.549 m)            Physical Exam   Constitutional: She is oriented to person, place, and time. She appears well-developed and well-nourished. NAD, AxOx4, speaking in complete  Pakistani sentences, daughter translating     HENT:   Head: Normocephalic and atraumatic. Nose: Nose normal.   Mouth/Throat: Oropharynx is clear and moist.   Eyes: Conjunctivae are normal. Pupils are equal, round, and reactive to light. Right eye exhibits no discharge. No scleral icterus. Neck: Normal range of motion. Neck supple. No JVD present. No tracheal deviation present. Cardiovascular: Normal rate, regular rhythm, normal heart sounds and intact distal pulses. Exam reveals no gallop and no friction rub. No murmur heard. Pulmonary/Chest: Effort normal and breath sounds normal. No respiratory distress. She has no wheezes. She has no rales. She exhibits no tenderness. Abdominal: Soft. Bowel sounds are normal. There is no tenderness. There is no rebound and no guarding. nttp     Genitourinary: No vaginal discharge found. Genitourinary Comments: Pt denies urinary/ vaginal complaints   Musculoskeletal: Normal range of motion. She exhibits tenderness. She exhibits no edema or deformity. L knee - noted skin intact/ no swelling/ redness/ no joint laxity w/ ROM/ min ttp lat aspect; pt has distal motor/ CV/ Sensation grossly intact    Neurological: She is alert and oriented to person, place, and time. She has normal reflexes. No cranial nerve deficit. She exhibits normal muscle tone. Coordination normal.   Skin: Skin is warm and dry. No rash noted. No erythema. No pallor. Psychiatric: She has a normal mood and affect. Her behavior is normal. Thought content normal.   Nursing note and vitals reviewed.        MDM  ED Course Procedures         Pt/ daughter told of chronic dvt/ on asa already/ agree w/ plans; Flora Castillo's  results have been reviewed with her. She has been counseled regarding her diagnosis. She verbally conveys understanding and agreement of the signs, symptoms, diagnosis, treatment and prognosis and additionally agrees to Call/ Arrange follow up as recommended with Dr. Levon Lucas MD in 24 - 48 hours. She also agrees with the care-plan and conveys that all of her questions have been answered. I have also put together some discharge instructions for her that include: 1) educational information regarding their diagnosis, 2) how to care for their diagnosis at home, as well a 3) list of reasons why they would want to return to the ED prior to their follow-up appointment, should their condition change or for concerns.

## 2017-03-21 NOTE — ED NOTES
Bedside shift change report given to Tatiana Hyman (oncoming nurse) by Santhosh Childress (offgoing nurse). Report included the following information SBAR, Kardex, ED Summary and MAR.

## 2017-03-21 NOTE — Clinical Note
Thank you for allowing us to provide you with medical care today. We realize that you have many choices for your emergency care needs. We thank you for choosing [facility]. Please choose us in the future for any continued health care needs. We hope we addressed all of your medical concerns. We strive to provide excellent quality care in the Emergency Department. Anything less than excellent does not meet our expectations. The exam and treatment you received in the Emergency Department  were for an emergent problem and are not intended as complete care. It is important that you follow up with a doctor, nurse practitioner, or [address] [place] [address] assistant for ongoing care. If your symptoms worsen or you do not improve as expected and you are un able to reach your usual health care provider, you should return to the Emergency Department. We are available 24 hours a day. Take this sheet with you when you go to your follow-up visit. If you have any problem arranging the follow-up visit, co ntact the Emergency Department immediately. Make an appointment your family doctor for follow up of this visit. Return to the ER if you are unable to be seen in a timely manner.

## 2017-03-21 NOTE — ED NOTES
Assumed care of patient from [place] and [place], [facility]. Introduced self to patient and daughter. Call light placed within reach. Pt denies further needs at this time.

## 2017-03-21 NOTE — ED TRIAGE NOTES
Pt noted for the last 2-3 days having left leg swelling. Points to pain just below knee that she states radiates down leg. No injury. No recent periods of travel. No redness visualized.

## 2017-03-21 NOTE — DISCHARGE INSTRUCTIONS
Knee Pain or Injury: Care Instructions  Your Care Instructions    Injuries are a common cause of knee problems. Sudden (acute) injuries may be caused by a direct blow to the knee. They can also be caused by abnormal twisting, bending, or falling on the knee. Pain, bruising, or swelling may be severe, and may start within minutes of the injury. Overuse is another cause of knee pain. Other causes are climbing stairs, kneeling, and other activities that use the knee. Everyday wear and tear, especially as you get older, also can cause knee pain. Rest, along with home treatment, often relieves pain and allows your knee to heal. If you have a serious knee injury, you may need tests and treatment. Follow-up care is a key part of your treatment and safety. Be sure to make and go to all appointments, and call your doctor if you are having problems. It's also a good idea to know your test results and keep a list of the medicines you take. How can you care for yourself at home? · Be safe with medicines. Read and follow all instructions on the label. ¨ If the doctor gave you a prescription medicine for pain, take it as prescribed. ¨ If you are not taking a prescription pain medicine, ask your doctor if you can take an over-the-counter medicine. · Rest and protect your knee. Take a break from any activity that may cause pain. · Put ice or a cold pack on your knee for 10 to 20 minutes at a time. Put a thin cloth between the ice and your skin. · Prop up a sore knee on a pillow when you ice it or anytime you sit or lie down for the next 3 days. Try to keep it above the level of your heart. This will help reduce swelling. · If your knee is not swollen, you can put moist heat, a heating pad, or a warm cloth on your knee. · If your doctor recommends an elastic bandage, sleeve, or other type of support for your knee, wear it as directed.   · Follow your doctor's instructions about how much weight you can put on your leg. Use a cane, crutches, or a walker as instructed. · Follow your doctor's instructions about activity during your healing process. If you can do mild exercise, slowly increase your activity. · Reach and stay at a healthy weight. Extra weight can strain the joints, especially the knees and hips, and make the pain worse. Losing even a few pounds may help. When should you call for help? Call 911 anytime you think you may need emergency care. For example, call if:  · You have symptoms of a blood clot in your lung (called a pulmonary embolism). These may include:  ¨ Sudden chest pain. ¨ Trouble breathing. ¨ Coughing up blood. Call your doctor now or seek immediate medical care if:  · You have severe or increasing pain. · Your leg or foot turns cold or changes color. · You cannot stand or put weight on your knee. · Your knee looks twisted or bent out of shape. · You cannot move your knee. · You have signs of infection, such as:  ¨ Increased pain, swelling, warmth, or redness. ¨ Red streaks leading from the knee. ¨ Pus draining from a place on your knee. ¨ A fever. · You have signs of a blood clot in your leg (called a deep vein thrombosis), such as:  ¨ Pain in your calf, back of the knee, thigh, or groin. ¨ Redness and swelling in your leg or groin. Watch closely for changes in your health, and be sure to contact your doctor if:  · You have tingling, weakness, or numbness in your knee. · You have any new symptoms, such as swelling. · You have bruises from a knee injury that last longer than 2 weeks. · You do not get better as expected. Where can you learn more? Go to http://jennifer-pablo.info/. Enter K195 in the search box to learn more about \"Knee Pain or Injury: Care Instructions. \"  Current as of: May 27, 2016  Content Version: 11.1  © 4589-8864 [employer].  Care instructions adapted under license by [employer] (which disclaims liability or warranty for this information). If you have questions about a medical condition or this instruction, always ask your healthcare professional. [address] any warranty or liability for your use of this information. Learning About Deep Vein Thrombosis  What is deep vein thrombosis? A deep vein thrombosis (DVT) is a blood clot in certain veins of the legs, pelvis, or arms. The clot is usually in the legs. DVT may damage the vein and cause the area to ache, swell, and change color. DVT also can lead to sores. DVT in these veins needs to be treated because the clots can get bigger, break loose, and travel through the bloodstream to the lungs. A blood clot in a lung can cause death. Blood clots can form in the veins when you are not active for a long period of time. For example, they can form if you need to stay in bed because of a health problem or must sit for a long time on an airplane or in a car. Surgery or an injury can damage your blood vessels and cause a clot to form. Cancer also can cause DVT. And some people have blood that clots too easily, which is a problem that may run in families. A risk factor is something that makes you more likely to develop a disease. Here are some major risk factors for DVT:  · You have surgery. · You have to stay in bed for more than 3 days (such as in the hospital). · Your blood is likely to clot because of an injury, cancer, or inherited condition. Here are some minor risk factors for DVT:  · You take birth control hormones. · You are pregnant. · You are in a car or airplane for a long trip. What are the symptoms? Symptoms of DVT may include:  · Swelling in the affected area. · Redness and warmth in the affected area. · Pain or tenderness. You may have pain only when you touch the affected area or when you stand or walk. If your doctor thinks you may have DVT, you will probably have an ultrasound test. You may have other tests as well.   How can you prevent DVT? · Exercise your lower leg muscles to help blood flow in your legs. Point your toes up toward your head so the calves of your legs are stretched, then relax and repeat. This is a good exercise to do when you are sitting for long periods of time. · Get out of bed as soon as you can after an illness or surgery. If you need to stay in bed, do the leg exercise noted above every hour when you are awake. · Use special stockings called compression stockings. These stockings are tight at the feet with a gradually looser fit on the leg. Many doctors recommend that you wear compression stockings during a journey longer than 8 hours. · Take breaks when you are on long trips. Stop the car and walk around. On long airplane flights, walk up and down the aisle hourly, flex and point your feet every 20 minutes while sitting, and drink plenty of water. · Take blood-thinning medicines after some types of surgery if your doctor recommends it. Blood thinners also may be used if you are likely to develop clots. How is DVT treated? Treatment for DVT usually involves taking blood thinners. These medicines are given through a vein (intravenously, or IV) or as a pill. Talk with your doctor about which medicine is right for you. Your doctor also may suggest that you prop up or elevate your leg when possible, take walks, and wear compression stockings. These measures may help reduce the pain and swelling that can happen with DVT. Follow-up care is a key part of your treatment and safety. Be sure to make and go to all appointments, and call your doctor if you are having problems. It's also a good idea to know your test results and keep a list of the medicines you take. Where can you learn more? Go to http://jennifer-pablo.info/. Enter I839 in the search box to learn more about \"Learning About Deep Vein Thrombosis. \"  Current as of: June 4, 2016  Content Version: 11.1  © 2783-3003 [employer]. Care instructions adapted under license by [employer] (which disclaims liability or warranty for this information). If you have questions about a medical condition or this instruction, always ask your healthcare professional. [address] any warranty or liability for your use of this information. We hope that we have addressed all of your medical concerns. The examination and treatment you received in the Emergency Department were for an emergent problem and were not intended as complete care. It is important that you follow up with your healthcare provider(s) for ongoing care. If your symptoms worsen or do not improve as expected, and you are unable to reach your usual health care provider(s), you should return to the Emergency Department. Today's healthcare is undergoing tremendous change, and patient satisfaction surveys are one of the many tools to assess the quality of medical care. You may receive a survey from the [facility] regarding your experience in the Emergency Department. I hope that your experience has been completely positive, particularly the medical care that I provided. As such, please participate in the survey; anything less than excellent does not meet my expectations or intentions. [address] [facility] and [address] [place] [address] participate in nationally recognized quality of care measures. If your blood pressure is greater than 120/80, as reported below, we urge that you seek medical care to address the potential of high blood pressure, commonly known as hypertension. Hypertension can be hereditary or can be caused by certain medical conditions, pain, stress, or \"white coat syndrome. \"       Please make an appointment with your health care provider(s) for follow up of your Emergency Department visit.        VITALS:   Patient Vitals for the past 8 hrs:   Temp Pulse Resp BP SpO2   03/21/17 1750 98.5 °F (36.9 °C) 71 18 198/85 98 %          Thank you for allowing us to provide you with medical care today. We realize that you have many choices for your emergency care needs. Please choose us in the future for any continued health care needs. Gina Ruiz, [address] [facility] [address]: [phone]            No results found for this or any previous visit (from the past 24 hour(s)). Xr Knee Lt 3 V    Result Date: 3/21/2017  EXAM:  XR KNEE LT 3 V INDICATION:   Trauma. Additional history: 2-3 days of left leg swelling and pain below the knee which radiates down the leg. No trauma. COMPARISON: None. FINDINGS: Three views of the left knee demonstrate no visible fracture or malalignment. Chondrocalcinosis in both medial and lateral compartments. Degenerative change in the patellofemoral compartment. There is no effusion. IMPRESSION:  1. No visible fracture. 2. Chondrocalcinosis and degenerative change in the patellofemoral compartment.

## 2017-03-22 NOTE — PROCEDURES
[facility]risa [facility]  *** FINAL REPORT ***    Name: Les Coppola  MRN: [id number]    Outpatient  : 12 Oct 1930  HIS Order #: [id number]  [address] [facility] Visit #: [id number]  Date: 21 Mar 2017    TYPE OF TEST: Peripheral Venous Testing    REASON FOR TEST  Pain in limb    Left Leg:-  Deep venous thrombosis:           Yes  Proximal extent of thrombus:      Peroneal  Superficial venous thrombosis:    No  Deep venous insufficiency:        No  Superficial venous insufficiency: No      INTERPRETATION/FINDINGS  PROCEDURE:  LEFT LOWER EXTREMITY VENOUS DUPLEX . Evaluation of lower  extremity veins with ultrasound (B-mode imaging, pulsed Doppler, color   Doppler). Includes the common femoral, deep femoral, femoral,  popliteal, posterior tibial, peroneal, and great saphenous veins. Other veins, for example the gastrocnemius and soleal veins, may also  be visualized. FINDINGS: In the left lower extremity 1 of 2 paired peroneal veins  were seen with partial compression and abnormal color fill. Thrombus  appears chronic. Gray scale and color flow duplex images of the  remaining veins in the left lower extremity demonstrate normal  compressibility, spontaneous and augmented flow profiles, and absence  of filling defects throughout the deep and superficial veins in the  left lower extremity. CONCLUSION: Left lower extremity venous duplex positive for chronic  deep vein thrombosis involving 1 of 2 paired peroneal veins. Right  common femoral vein is thrombus free. ADDITIONAL COMMENTS    I have personally reviewed the data relevant to the interpretation of  this  study. TECHNOLOGIST: Laurence Veliz. Josiah  Signed: 2017 08:07 PM    PHYSICIAN: Idalia Faustin.  Kayden Acevedo MD  Signed: 2017 08:41 AM

## 2017-10-08 ENCOUNTER — APPOINTMENT (OUTPATIENT)
Dept: GENERAL RADIOLOGY | Age: 82
End: 2017-10-08
Attending: EMERGENCY MEDICINE
Payer: MEDICARE

## 2017-10-08 ENCOUNTER — HOSPITAL ENCOUNTER (EMERGENCY)
Age: 82
Discharge: HOME | End: 2017-10-08
Attending: EMERGENCY MEDICINE
Payer: MEDICARE

## 2017-10-08 VITALS
BODY MASS INDEX: 20.77 KG/M2 | TEMPERATURE: 98 F | HEART RATE: 64 BPM | WEIGHT: 110 LBS | OXYGEN SATURATION: 96 % | RESPIRATION RATE: 18 BRPM | DIASTOLIC BLOOD PRESSURE: 66 MMHG | SYSTOLIC BLOOD PRESSURE: 148 MMHG | HEIGHT: 61 IN

## 2017-10-08 DIAGNOSIS — M79.672 LEFT FOOT PAIN: Primary | ICD-10-CM

## 2017-10-08 DIAGNOSIS — L03.116 CELLULITIS OF LEFT LOWER EXTREMITY: ICD-10-CM

## 2017-10-08 LAB
GLUCOSE BLD STRIP.AUTO-MCNC: 94 MG/DL (ref 65–100)
SERVICE CMNT-IMP: NORMAL

## 2017-10-08 PROCEDURE — 77030036687 HC SHOE PSTOP S2SG -A

## 2017-10-08 PROCEDURE — 82962 GLUCOSE BLOOD TEST: CPT

## 2017-10-08 PROCEDURE — 74011250637 HC RX REV CODE- 250/637: Performed by: EMERGENCY MEDICINE

## 2017-10-08 PROCEDURE — 99284 EMERGENCY DEPT VISIT MOD MDM: CPT

## 2017-10-08 PROCEDURE — 73630 X-RAY EXAM OF FOOT: CPT

## 2017-10-08 RX ORDER — HYDROCODONE BITARTRATE AND ACETAMINOPHEN 5; 325 MG/1; MG/1
1 TABLET ORAL
Status: COMPLETED | OUTPATIENT
Start: 2017-10-08 | End: 2017-10-08

## 2017-10-08 RX ORDER — HYDROCODONE BITARTRATE AND ACETAMINOPHEN 5; 325 MG/1; MG/1
0.5 TABLET ORAL
Qty: 6 TAB | Refills: 0 | Status: SHIPPED | OUTPATIENT
Start: 2017-10-08 | End: 2019-08-14

## 2017-10-08 RX ORDER — CEPHALEXIN 500 MG/1
500 CAPSULE ORAL 4 TIMES DAILY
Qty: 20 CAP | Refills: 0 | Status: SHIPPED | OUTPATIENT
Start: 2017-10-08 | End: 2017-10-13

## 2017-10-08 RX ADMIN — HYDROCODONE BITARTRATE AND ACETAMINOPHEN 1 TABLET: 5; 325 TABLET ORAL at 11:17

## 2017-10-08 NOTE — ED TRIAGE NOTES
Pt c/o left foot pain with redness and swelling x 1 week. States felt worse  yesterday with difficulty walking due to pain. Pt denies any known trauma or injury.

## 2017-10-08 NOTE — DISCHARGE INSTRUCTIONS
Foot Pain: Care Instructions  Your Care Instructions  Foot injuries that cause pain and swelling are fairly common. Almost all sports or home repair projects can cause a misstep that ends up as foot pain. Normal wear and tear, especially as you get older, also can cause foot pain. Most minor foot injuries will heal on their own, and home treatment is usually all you need to do. If you have a severe injury, you may need tests and treatment. Follow-up care is a key part of your treatment and safety. Be sure to make and go to all appointments, and call your doctor if you are having problems. Its also a good idea to know your test results and keep a list of the medicines you take. How can you care for yourself at home? · Take pain medicines exactly as directed. ¨ If the doctor gave you a prescription medicine for pain, take it as prescribed. ¨ If you are not taking a prescription pain medicine, ask your doctor if you can take an over-the-counter medicine. · Rest and protect your foot. Take a break from any activity that may cause pain. · Put ice or a cold pack on your foot for 10 to 20 minutes at a time. Put a thin cloth between the ice and your skin. · Prop up the sore foot on a pillow when you ice it or anytime you sit or lie down during the next 3 days. Try to keep it above the level of your heart. This will help reduce swelling. · Your doctor may recommend that you wrap your foot with an elastic bandage. Keep your foot wrapped for as long as your doctor advises. · If your doctor recommends crutches, use them as directed. · Wear roomy footwear. · As soon as pain and swelling end, begin gentle exercises of your foot. Your doctor can tell you which exercises will help. When should you call for help? Call 911 anytime you think you may need emergency care. For example, call if:  · Your foot turns pale, white, blue, or cold.   Call your doctor now or seek immediate medical care if:  · You cannot move or stand on your foot. · Your foot looks twisted or out of its normal position. · Your foot is not stable when you step down. · You have signs of infection, such as:  ¨ Increased pain, swelling, warmth, or redness. ¨ Red streaks leading from the sore area. ¨ Pus draining from a place on your foot. ¨ A fever. · Your foot is numb or tingly. Watch closely for changes in your health, and be sure to contact your doctor if:  · You do not get better as expected. · You have bruises from an injury that last longer than 2 weeks. Where can you learn more? Go to http://jennifer-pablo.info/. Enter R412 in the search box to learn more about \"Foot Pain: Care Instructions. \"  Current as of: March 21, 2017  Content Version: 11.3  © 8253-8822 [employer]. Care instructions adapted under license by [employer] (which disclaims liability or warranty for this information). If you have questions about a medical condition or this instruction, always ask your healthcare professional. [place] [address] any warranty or liability for your use of this information. Cellulitis: Care Instructions  Your Care Instructions    Cellulitis is a skin infection. It often occurs after a break in the skin from a scrape, cut, bite, or puncture, or after a rash. The doctor has checked you carefully, but problems can develop later. If you notice any problems or new symptoms, get medical treatment right away. Follow-up care is a key part of your treatment and safety. Be sure to make and go to all appointments, and call your doctor if you are having problems. It's also a good idea to know your test results and keep a list of the medicines you take. How can you care for yourself at home? · Take your antibiotics as directed. Do not stop taking them just because you feel better. You need to take the full course of antibiotics.   · Prop up the infected area on pillows to reduce pain and swelling. Try to keep the area above the level of your heart as often as you can. · If your doctor told you how to care for your wound, follow your doctor's instructions. If you did not get instructions, follow this general advice:  ¨ Wash the wound with clean water 2 times a day. Don't use hydrogen peroxide or alcohol, which can slow healing. ¨ You may cover the wound with a thin layer of petroleum jelly, such as Vaseline, and a nonstick bandage. ¨ Apply more petroleum jelly and replace the bandage as needed. · Be safe with medicines. Take pain medicines exactly as directed. ¨ If the doctor gave you a prescription medicine for pain, take it as prescribed. ¨ If you are not taking a prescription pain medicine, ask your doctor if you can take an over-the-counter medicine. To prevent cellulitis in the future  · Try to prevent cuts, scrapes, or other injuries to your skin. Cellulitis most often occurs where there is a break in the skin. · If you get a scrape, cut, mild burn, or bite, wash the wound with clean water as soon as you can to help avoid infection. Don't use hydrogen peroxide or alcohol, which can slow healing. · If you have swelling in your legs (edema), support stockings and good skin care may help prevent leg sores and cellulitis. · Take care of your feet, especially if you have diabetes or other conditions that increase the risk of infection. Wear shoes and socks. Do not go barefoot. If you have athlete's foot or other skin problems on your feet, talk to your doctor about how to treat them. When should you call for help? Call your doctor now or seek immediate medical care if:  · You have signs that your infection is getting worse, such as:  ¨ Increased pain, swelling, warmth, or redness. ¨ Red streaks leading from the area. ¨ Pus draining from the area. ¨ A fever. · You get a rash.   Watch closely for changes in your health, and be sure to contact your doctor if:  · You are not getting better after 1 day (24 hours). · You do not get better as expected. Where can you learn more? Go to http://jennifer-pablo.info/. Damian Stallings in the search box to learn more about \"Cellulitis: Care Instructions. \"  Current as of: October 13, 2016  Content Version: 11.3  © 2356-7444 [employer]. Care instructions adapted under license by [employer] (which disclaims liability or warranty for this information). If you have questions about a medical condition or this instruction, always ask your healthcare professional. [place] [address] any warranty or liability for your use of this information.

## 2017-10-08 NOTE — ED PROVIDER NOTES
HPI Comments: This is an 80-year-old female who presents with left foot pain for approximately a week. Denies injury. States his symptoms have been ongoing for the past week but for the past day has an unable to bear weight on her left foot. She denies fever, chills, malaise. She complains of pain mostly over the arch of her foot. She denies any nausea, vomiting. She also complains of pain to the left wrist which been ongoing for several months. She denies falls or injuries to the wrist.    Patient is a 80 y.o. female presenting with foot pain. The history is provided by the patient (daughter). Foot Pain    This is a new problem. The current episode started more than 1 week ago. Pertinent negatives include no back pain. Past Medical History:   Diagnosis Date    Bunion     Cherry angioma     Diabetes ([address]     Diabetes mellitus ([address]     HTN (hypertension)     Joint pain     Joint swelling     Rheumatoid arthritis(714.0) ([address]     Scoliosis     [facility]     Thyroid disorder     problems    Vertigo     BPPV       Past Surgical History:   Procedure Laterality Date    HX  SECTION      HX ORTHOPAEDIC      bunions removed x 2         Family History:   Problem Relation Age of Onset    Stroke Father        Social History     Social History    Marital status:      Spouse name: N/A    Number of children: N/A    Years of education: N/A     Occupational History    Not on file. Social History Main Topics    Smoking status: Never Smoker    Smokeless tobacco: Never Used    Alcohol use No    Drug use: No    Sexual activity: No     Other Topics Concern    Not on file     Social History Narrative         ALLERGIES: Shellfish containing products    Review of Systems   Constitutional: Negative for chills and fever. HENT: Negative for ear pain and sore throat. Eyes: Negative for pain. Respiratory: Negative for chest tightness and shortness of breath.     Cardiovascular: Negative for chest pain and leg swelling. Gastrointestinal: Negative for abdominal pain, nausea and vomiting. Genitourinary: Negative for dysuria and flank pain. Musculoskeletal: Negative for back pain. Skin: Negative for rash. Neurological: Negative for headaches. All other systems reviewed and are negative. Vitals:    10/08/17 1059   BP: 148/66   Pulse: 64   Resp: 18   Temp: 98 °F (36.7 °C)   SpO2: 96%   Weight: 49.9 kg (110 lb)   Height: 5' 1\" (1.549 m)            Physical Exam   Constitutional: She appears well-developed and well-nourished. HENT:   Head: Normocephalic and atraumatic. Mouth/Throat: Oropharynx is clear and moist.   Eyes: Conjunctivae and EOM are normal. Pupils are equal, round, and reactive to light. No scleral icterus. Neck: Neck supple. No tracheal deviation present. Cardiovascular: Normal rate, regular rhythm, normal heart sounds and intact distal pulses. Pulmonary/Chest: Effort normal and breath sounds normal. No respiratory distress. Abdominal: Soft. She exhibits no distension. There is no tenderness. There is no rebound and no guarding. Genitourinary:   Genitourinary Comments: deferred   Musculoskeletal: She exhibits no edema. Tenderness over plantar surface of left foot, mild swelling to dorsum   Neurological: She is alert. Skin: Skin is warm and dry. There is erythema (mild left foot). Psychiatric: She has a normal mood and affect. Nursing note and vitals reviewed. MDM  Number of Diagnoses or Management Options  Diagnosis management comments: Differential diagnosis includes stress fracture, plantar fasciitis, early cellulitis, less likely DVT. ED Course       Bedside US  Date/Time: 10/8/2017 11:47 AM  Consent: Verbal consent obtained. Consent given by: patient  Type of Procedure: 2 point Ultrasound for DVT. Indication: rule out DVT. Images obtained: common femoral vein, popliteal vein. Findings: all veins compressable.   Confirmation Study: Not Indicated        1:20 PM  The patient has been reevaluated. The patient is ready for discharge. The patient's signs, symptoms, diagnosis, and discharge instructions have been discussed and the patient/ family has conveyed their understanding. The patient is to follow up as recommended or return to the ED should their symptoms worsen. Plan has been discussed and the patient is in agreement. LABORATORY TESTS:  Recent Results (from the past 12 hour(s))   GLUCOSE, POC    Collection Time: 10/08/17 12:06 PM   Result Value Ref Range    Glucose (POC) 94 65 - 100 mg/dL    Performed by [facility]        IMAGING RESULTS:  XR FOOT LT MIN 3 V   Final Result        Xr Foot Lt Min 3 V    Result Date: 10/8/2017  EXAM:  XR FOOT LT MIN 3 V INDICATION:   Left foot pain with redness and swelling. COMPARISON:  None. FINDINGS:  Three views of the left foot. The bones are osteopenic. The patient has 2 screws in the proximal first metatarsal. An area of protuberance calcification from the lateral aspect of the mid first metatarsal shaft may be related to heterotopic ossification from prior surgery versus a small osteochondroma. No acute fracture or dislocation. No evidence of osteomyelitis. There are calcaneal plantar and Achilles enthesophytes. The soft tissues appear unremarkable. IMPRESSION:  No acute abnormality. MEDICATIONS GIVEN:  Medications   HYDROcodone-acetaminophen (NORCO) 5-325 mg per tablet 1 Tab (1 Tab Oral Given 10/8/17 1117)       IMPRESSION:  1. Left foot pain    2. Cellulitis of left lower extremity        PLAN:  1. Current Discharge Medication List      START taking these medications    Details   cephALEXin (KEFLEX) 500 mg capsule Take 1 Cap by mouth four (4) times daily for 5 days. Qty: 20 Cap, Refills: 0      HYDROcodone-acetaminophen (NORCO) 5-325 mg per tablet Take 0.5 Tabs by mouth every four (4) hours as needed for Pain. Max Daily Amount: 3 Tabs.   Qty: 6 Tab, Refills: 0 2.   Follow-up Information     Follow up With Details Comments Michael Bolden MD Call in 1 day  [address]  [phone]      [address] [facility] EMERGENCY DEPT  If symptoms (swelling, pain)  [address] [facility]  [phone]        3. Post-op shoe, walker    Return to ED for new or worsening symptoms       Arnaud Davey MD

## 2017-10-08 NOTE — ED NOTES
MD reviewed discharge instructions and options with patient; patient verbalized understanding. Pt. Ambulated to exit without difficulty, using walker as instructed, and in no signs of acute distress. Patient was counseled on medications prescribed at discharge and will follow up as discussed.

## 2018-04-01 ENCOUNTER — HOSPITAL ENCOUNTER (EMERGENCY)
Age: 83
Discharge: HOME | End: 2018-04-01
Attending: EMERGENCY MEDICINE
Payer: MEDICARE

## 2018-04-01 ENCOUNTER — APPOINTMENT (OUTPATIENT)
Dept: GENERAL RADIOLOGY | Age: 83
End: 2018-04-01
Attending: NURSE PRACTITIONER
Payer: MEDICARE

## 2018-04-01 VITALS
WEIGHT: 110 LBS | HEIGHT: 62 IN | RESPIRATION RATE: 20 BRPM | DIASTOLIC BLOOD PRESSURE: 74 MMHG | BODY MASS INDEX: 20.24 KG/M2 | HEART RATE: 57 BPM | TEMPERATURE: 98 F | SYSTOLIC BLOOD PRESSURE: 154 MMHG | OXYGEN SATURATION: 100 %

## 2018-04-01 DIAGNOSIS — N30.00 ACUTE CYSTITIS WITHOUT HEMATURIA: Primary | ICD-10-CM

## 2018-04-01 LAB
APPEARANCE UR: CLEAR
BACTERIA URNS QL MICRO: NEGATIVE /HPF
BILIRUB UR QL: NEGATIVE
COLOR UR: ABNORMAL
EPITH CASTS URNS QL MICRO: ABNORMAL /LPF
GLUCOSE UR STRIP.AUTO-MCNC: NEGATIVE MG/DL
HGB UR QL STRIP: ABNORMAL
HYALINE CASTS URNS QL MICRO: ABNORMAL /LPF (ref 0–5)
KETONES UR QL STRIP.AUTO: NEGATIVE MG/DL
LEUKOCYTE ESTERASE UR QL STRIP.AUTO: ABNORMAL
NITRITE UR QL STRIP.AUTO: NEGATIVE
PH UR STRIP: 7 [PH] (ref 5–8)
PROT UR STRIP-MCNC: NEGATIVE MG/DL
RBC #/AREA URNS HPF: ABNORMAL /HPF (ref 0–5)
SP GR UR REFRACTOMETRY: 1.01 (ref 1–1.03)
UR CULT HOLD, URHOLD: NORMAL
UROBILINOGEN UR QL STRIP.AUTO: 0.2 EU/DL (ref 0.2–1)
WBC URNS QL MICRO: ABNORMAL /HPF (ref 0–4)

## 2018-04-01 PROCEDURE — 99283 EMERGENCY DEPT VISIT LOW MDM: CPT

## 2018-04-01 PROCEDURE — 71046 X-RAY EXAM CHEST 2 VIEWS: CPT

## 2018-04-01 PROCEDURE — 81001 URINALYSIS AUTO W/SCOPE: CPT | Performed by: NURSE PRACTITIONER

## 2018-04-01 RX ORDER — VALSARTAN AND HYDROCHLOROTHIAZIDE 160; 12.5 MG/1; MG/1
1 TABLET, FILM COATED ORAL DAILY
COMMUNITY
End: 2019-08-14

## 2018-04-01 RX ORDER — CEPHALEXIN 500 MG/1
500 CAPSULE ORAL 4 TIMES DAILY
Qty: 28 CAP | Refills: 0 | Status: SHIPPED | OUTPATIENT
Start: 2018-04-01 | End: 2018-04-08

## 2018-04-01 NOTE — DISCHARGE INSTRUCTIONS
Urinary Tract Infection in Women: Care Instructions  Your Care Instructions    A urinary tract infection, or UTI, is a general term for an infection anywhere between the kidneys and the urethra (where urine comes out). Most UTIs are bladder infections. They often cause pain or burning when you urinate. UTIs are caused by bacteria and can be cured with antibiotics. Be sure to complete your treatment so that the infection goes away. Follow-up care is a key part of your treatment and safety. Be sure to make and go to all appointments, and call your doctor if you are having problems. It's also a good idea to know your test results and keep a list of the medicines you take. How can you care for yourself at home? · Take your antibiotics as directed. Do not stop taking them just because you feel better. You need to take the full course of antibiotics. · Drink extra water and other fluids for the next day or two. This may help wash out the bacteria that are causing the infection. (If you have kidney, heart, or liver disease and have to limit fluids, talk with your doctor before you increase your fluid intake.)  · Avoid drinks that are carbonated or have caffeine. They can irritate the bladder. · Urinate often. Try to empty your bladder each time. · To relieve pain, take a hot bath or lay a heating pad set on low over your lower belly or genital area. Never go to sleep with a heating pad in place. To prevent UTIs  · Drink plenty of water each day. This helps you urinate often, which clears bacteria from your system. (If you have kidney, heart, or liver disease and have to limit fluids, talk with your doctor before you increase your fluid intake.)  · Urinate when you need to. · Urinate right after you have sex. · Change sanitary pads often. · Avoid douches, bubble baths, feminine hygiene sprays, and other feminine hygiene products that have deodorants.   · After going to the bathroom, wipe from front to back.  When should you call for help? Call your doctor now or seek immediate medical care if:  ? · Symptoms such as fever, chills, nausea, or vomiting get worse or appear for the first time. ? · You have new pain in your back just below your rib cage. This is called flank pain. ? · There is new blood or pus in your urine. ? · You have any problems with your antibiotic medicine. ? Watch closely for changes in your health, and be sure to contact your doctor if:  ? · You are not getting better after taking an antibiotic for 2 days. ? · Your symptoms go away but then come back. Where can you learn more? Go to http://jennifer-pablo.info/. Enter Z708 in the search box to learn more about \"Urinary Tract Infection in Women: Care Instructions. \"  Current as of: May 12, 2017  Content Version: 11.4  © 7954-9089 [employer]. Care instructions adapted under license by [employer] (which disclaims liability or warranty for this information). If you have questions about a medical condition or this instruction, always ask your healthcare professional. [address] any warranty or liability for your use of this information. We hope that we have addressed all of your medical concerns. The examination and treatment you received in the Emergency Department were for an emergent problem and were not intended as complete care. It is important that you follow up with your healthcare provider(s) for ongoing care. If your symptoms worsen or do not improve as expected, and you are unable to reach your usual health care provider(s), you should return to the Emergency Department. Today's healthcare is undergoing tremendous change, and patient satisfaction surveys are one of the many tools to assess the quality of medical care. You may receive a survey from the [employer] regarding your experience in the Emergency Department.   I hope that your experience has been completely positive, particularly the medical care that I provided. As such, please participate in the survey; anything less than excellent does not meet my expectations or intentions. [facility] participate in nationally recognized quality of care measures. If your blood pressure is greater than 120/80, as reported below, we urge that you seek medical care to address the potential of high blood pressure, commonly known as hypertension. Hypertension can be hereditary or can be caused by certain medical conditions, pain, stress, or \"white coat syndrome. \"       Please make an appointment with your health care provider(s) for follow up of your Emergency Department visit. VITALS:   Patient Vitals for the past 8 hrs:   Temp Pulse Resp BP SpO2   04/01/18 1306 - (!) 57 - 154/74 100 %   04/01/18 1208 - - - - 97 %   04/01/18 1107 98 °F (36.7 °C) 61 20 130/67 97 %          Thank you for allowing us to provide you with medical care today. We realize that you have many choices for your emergency care needs. Please choose us in the future for any continued health care needs. Severiano Nevins Page, NP [address]: [phone]            Recent Results (from the past 24 hour(s))   URINALYSIS W/MICROSCOPIC    Collection Time: 04/01/18 12:05 PM   Result Value Ref Range    Color YELLOW/STRAW      Appearance CLEAR CLEAR      Specific gravity 1.012 1.003 - 1.030      pH (UA) 7.0 5.0 - 8.0      Protein NEGATIVE  NEG mg/dL    Glucose NEGATIVE  NEG mg/dL    Ketone NEGATIVE  NEG mg/dL    Bilirubin NEGATIVE  NEG      Blood TRACE (A) NEG      Urobilinogen 0.2 0.2 - 1.0 EU/dL    Nitrites NEGATIVE  NEG      Leukocyte Esterase MODERATE (A) NEG      WBC 10-20 0 - 4 /hpf    RBC 0-5 0 - 5 /hpf    Epithelial cells FEW FEW /lpf    Bacteria NEGATIVE  NEG /hpf    Hyaline cast 0-2 0 - 5 /lpf   URINE CULTURE HOLD SAMPLE    Collection Time: 04/01/18 12:05 PM   Result Value Ref Range    Urine culture hold        URINE ON HOLD IN MICROBIOLOGY DEPT FOR 3 DAYS. IF UNPRESERVED URINE IS SUBMITTED, IT CANNOT BE USED FOR ADDITIONAL TESTING AFTER 24 HRS, RECOLLECTION WILL BE REQUIRED. Xr Chest Pa Lat    Result Date: 4/1/2018  INDICATION:  cough and congestion x 3 days. SOB this am. Exam: Chest 2 views. Comparison: February 11, 2017. Findings: Cardiomediastinal silhouette is normal. Pulmonary vasculature is not engorged. No focal parenchymal opacities, effusions, or pneumothorax. Bony thorax is intact. Impression: 1.  No acute cardiopulmonary disease

## 2018-04-01 NOTE — ED PROVIDER NOTES
HPI Comments: 80-year-old female patient comes to the emergency room today with her daughter with a chief complaint of lower back pain and cough. The patient's daughter states that this morning the patient on a she complained of some lower back pain and a cough and requested to come to the emergency room to be evaluated. The daughter says that she has had nasal congestion for the past 2 weeks and an occasional cough. Patient states that she is slightly short of breath but she attributes this to being nasally congested. Patient denies any fever, chills, chest pain or abdominal pain. Patient states normal bowel and urinary habits. Rowdy Wayne MD    Past Medical History:  No date: Bunion  No date: Cherry angioma  No date: Diabetes [facility])  No date: Diabetes mellitus ([facility]  No date: HTN (hypertension)  No date: Joint pain  No date: Joint swelling  No date: Rheumatoid arthritis(714.0)  No date: Scoliosis  No date: Stroke [facility])  No date: Thyroid disorder      Comment: problems  No date: Vertigo      Comment: BPPV      The history is provided by the patient. No  was used. Past Medical History:   Diagnosis Date    Bunion     Cherry angioma     Diabetes ([address]     Diabetes mellitus ([facility]     HTN (hypertension)     Joint pain     Joint swelling     Rheumatoid arthritis(714.0)     Scoliosis     Stroke ([facility]     Thyroid disorder     problems    Vertigo     BPPV       Past Surgical History:   Procedure Laterality Date    HX  SECTION      HX ORTHOPAEDIC      bunions removed x 2         Family History:   Problem Relation Age of Onset    Stroke Father        Social History     Social History    Marital status:      Spouse name: N/A    Number of children: N/A    Years of education: N/A     Occupational History    Not on file.      Social History Main Topics    Smoking status: Never Smoker    Smokeless tobacco: Never Used    Alcohol use No    Drug use: No    Sexual activity: No     Other Topics Concern    Not on file     Social History Narrative         ALLERGIES: Shellfish containing products    Review of Systems   Constitutional: Negative. Negative for chills, diaphoresis and fever. HENT: Positive for congestion. Negative for rhinorrhea and trouble swallowing. Eyes: Negative. Respiratory: Positive for cough. Negative for shortness of breath. Cardiovascular: Negative. Gastrointestinal: Negative. Negative for abdominal pain, nausea and vomiting. Endocrine: Negative. Musculoskeletal: Positive for back pain. Negative for arthralgias, myalgias, neck pain and neck stiffness. Skin: Negative. Negative for rash. Allergic/Immunologic: Negative. Neurological: Negative. Negative for dizziness, syncope, weakness and headaches. Hematological: Negative. Psychiatric/Behavioral: Negative. Vitals:    04/01/18 1107 04/01/18 1208 04/01/18 1306   BP: 130/67  154/74   Pulse: 61  (!) 57   Resp: 20     Temp: 98 °F (36.7 °C)     SpO2: 97% 97% 100%   Weight: 49.9 kg (110 lb)     Height: 5' 2\" (1.575 m)              Physical Exam   Constitutional: She is oriented to person, place, and time. Vital signs are normal. She appears well-developed and well-nourished. Non-toxic appearance. She does not have a sickly appearance. She does not appear ill. HENT:   Head: Normocephalic and atraumatic. Eyes: Conjunctivae, EOM and lids are normal. Pupils are equal, round, and reactive to light. Neck: Trachea normal, normal range of motion and full passive range of motion without pain. Neck supple. Cardiovascular: Normal rate, regular rhythm, normal heart sounds and normal pulses. Pulmonary/Chest: Effort normal and breath sounds normal. She has no decreased breath sounds. She has no wheezes. She has no rhonchi. She has no rales. Abdominal: Soft. Normal appearance and bowel sounds are normal. There is no tenderness. There is no CVA tenderness. No hernia. Musculoskeletal: Normal range of motion. Lumbar back: She exhibits pain. Back:    Neurological: She is alert and oriented to person, place, and time. She has normal strength. GCS eye subscore is 4. GCS verbal subscore is 5. GCS motor subscore is 6. Skin: Skin is warm, dry and intact. Psychiatric: She has a normal mood and affect. Her speech is normal and behavior is normal. Judgment and thought content normal. Cognition and memory are normal.   Nursing note and vitals reviewed. MDM  Number of Diagnoses or Management Options  Acute cystitis without hematuria: new and requires workup     Amount and/or Complexity of Data Reviewed  Tests in the radiology section of CPT®: ordered  Discuss the patient with other providers: yes [facility])    Risk of Complications, Morbidity, and/or Mortality  Presenting problems: moderate  Diagnostic procedures: low  Management options: low    Patient Progress  Patient progress: improved        ED Course       Procedures    LABORATORY TESTS:  Recent Results (from the past 12 hour(s))   URINALYSIS W/MICROSCOPIC    Collection Time: 04/01/18 12:05 PM   Result Value Ref Range    Color YELLOW/STRAW      Appearance CLEAR CLEAR      Specific gravity 1.012 1.003 - 1.030      pH (UA) 7.0 5.0 - 8.0      Protein NEGATIVE  NEG mg/dL    Glucose NEGATIVE  NEG mg/dL    Ketone NEGATIVE  NEG mg/dL    Bilirubin NEGATIVE  NEG      Blood TRACE (A) NEG      Urobilinogen 0.2 0.2 - 1.0 EU/dL    Nitrites NEGATIVE  NEG      Leukocyte Esterase MODERATE (A) NEG      WBC 10-20 0 - 4 /hpf    RBC 0-5 0 - 5 /hpf    Epithelial cells FEW FEW /lpf    Bacteria NEGATIVE  NEG /hpf    Hyaline cast 0-2 0 - 5 /lpf   URINE CULTURE HOLD SAMPLE    Collection Time: 04/01/18 12:05 PM   Result Value Ref Range    Urine culture hold        URINE ON HOLD IN MICROBIOLOGY DEPT FOR 3 DAYS. IF UNPRESERVED URINE IS SUBMITTED, IT CANNOT BE USED FOR ADDITIONAL TESTING AFTER 24 HRS, RECOLLECTION WILL BE REQUIRED. IMAGING RESULTS:    CT Results  (Last 48 hours)    None        PFT Results  (Last 48 hours)    None        Echo Results  (Last 48 hours)    None        CXR Results  (Last 48 hours)               04/01/18 1205  XR CHEST PA LAT Final result    Impression:  Impression:   1. No acute cardiopulmonary disease           Narrative:  INDICATION:  cough and congestion x 3 days. SOB this am.        Exam: Chest 2 views. Comparison: February 11, 2017. Findings: Cardiomediastinal silhouette is normal. Pulmonary vasculature is not   engorged. No focal parenchymal opacities, effusions, or pneumothorax. Bony   thorax is intact. VENOUS DOPPLER results  (Last 48 hours)    None          1:17 PM  I have independently reviewed the patient's xray and have compared my findings with the interpretation from the radiologist.          MEDICATIONS GIVEN:  Medications - No data to display    IMPRESSION:  1. Acute cystitis without hematuria        PLAN:  1. Keflex 500 mg PO   2. F/U with PCP  Return to ED if worse    Discharge Note  1:10 PM  The patient is ready for discharge. The patient's signs, symptoms, diagnosis, and discharge instructions have been discussed and the patient has conveyed their understanding. The patient is to follow up as recommended or return to the ER should their symptoms worsen. Plan has been discussed and the patient is in agreement. Cristiane Stark Pagé FNP-BC.

## 2018-04-01 NOTE — ED TRIAGE NOTES
\"This morning we were ready to go to [facility] and she said her back hurt and it hurts to breathe. \" Patient says she is a little short of breath. She has had nasal congestion for a couple of weeks with intermittent coughing. Denies fever.

## 2018-05-17 ENCOUNTER — HOSPITAL ENCOUNTER (OUTPATIENT)
Dept: MAMMOGRAPHY | Age: 83
Discharge: HOME | End: 2018-05-17
Attending: INTERNAL MEDICINE
Payer: MEDICARE

## 2018-05-17 DIAGNOSIS — M81.0 OSTEOPOROSIS: ICD-10-CM

## 2018-05-17 PROCEDURE — 77080 DXA BONE DENSITY AXIAL: CPT

## 2018-11-25 ENCOUNTER — HOSPITAL ENCOUNTER (EMERGENCY)
Age: 83
Discharge: HOME | End: 2018-11-25
Attending: EMERGENCY MEDICINE
Payer: MEDICARE

## 2018-11-25 ENCOUNTER — APPOINTMENT (OUTPATIENT)
Dept: GENERAL RADIOLOGY | Age: 83
End: 2018-11-25
Attending: EMERGENCY MEDICINE
Payer: MEDICARE

## 2018-11-25 VITALS
SYSTOLIC BLOOD PRESSURE: 162 MMHG | HEIGHT: 61 IN | OXYGEN SATURATION: 100 % | WEIGHT: 125 LBS | BODY MASS INDEX: 23.6 KG/M2 | RESPIRATION RATE: 18 BRPM | TEMPERATURE: 98.6 F | HEART RATE: 74 BPM | DIASTOLIC BLOOD PRESSURE: 64 MMHG

## 2018-11-25 DIAGNOSIS — R68.83 CHILLS (WITHOUT FEVER): Primary | ICD-10-CM

## 2018-11-25 LAB
ALBUMIN SERPL-MCNC: 3.7 G/DL (ref 3.5–5)
ALBUMIN/GLOB SERPL: 0.8 {RATIO} (ref 1.1–2.2)
ALP SERPL-CCNC: 105 U/L (ref 45–117)
ALT SERPL-CCNC: 16 U/L (ref 12–78)
ANION GAP SERPL CALC-SCNC: 11 MMOL/L (ref 5–15)
APPEARANCE UR: CLEAR
AST SERPL-CCNC: 25 U/L (ref 15–37)
BACTERIA URNS QL MICRO: NEGATIVE /HPF
BASOPHILS # BLD: 0 K/UL (ref 0–0.1)
BASOPHILS NFR BLD: 0 % (ref 0–1)
BILIRUB SERPL-MCNC: 0.5 MG/DL (ref 0.2–1)
BILIRUB UR QL: NEGATIVE
BUN SERPL-MCNC: 22 MG/DL (ref 6–20)
BUN/CREAT SERPL: 22 (ref 12–20)
CALCIUM SERPL-MCNC: 10.5 MG/DL (ref 8.5–10.1)
CHLORIDE SERPL-SCNC: 99 MMOL/L (ref 97–108)
CO2 SERPL-SCNC: 29 MMOL/L (ref 21–32)
COLOR UR: NORMAL
CREAT SERPL-MCNC: 1 MG/DL (ref 0.55–1.02)
DIFFERENTIAL METHOD BLD: ABNORMAL
EOSINOPHIL # BLD: 0 K/UL (ref 0–0.4)
EOSINOPHIL NFR BLD: 0 % (ref 0–7)
EPITH CASTS URNS QL MICRO: NORMAL /LPF
ERYTHROCYTE [DISTWIDTH] IN BLOOD BY AUTOMATED COUNT: 13.2 % (ref 11.5–14.5)
FLUAV AG NPH QL IA: NEGATIVE
FLUBV AG NOSE QL IA: NEGATIVE
GLOBULIN SER CALC-MCNC: 4.4 G/DL (ref 2–4)
GLUCOSE SERPL-MCNC: 112 MG/DL (ref 65–100)
GLUCOSE UR STRIP.AUTO-MCNC: NEGATIVE MG/DL
HCT VFR BLD AUTO: 39.3 % (ref 35–47)
HGB BLD-MCNC: 12.7 G/DL (ref 11.5–16)
HGB UR QL STRIP: NEGATIVE
IMM GRANULOCYTES # BLD: 0 K/UL (ref 0–0.04)
IMM GRANULOCYTES NFR BLD AUTO: 0 % (ref 0–0.5)
KETONES UR QL STRIP.AUTO: NEGATIVE MG/DL
LACTATE BLD-SCNC: 1.1 MMOL/L (ref 0.4–2)
LEUKOCYTE ESTERASE UR QL STRIP.AUTO: NEGATIVE
LYMPHOCYTES # BLD: 1.2 K/UL (ref 0.8–3.5)
LYMPHOCYTES NFR BLD: 10 % (ref 12–49)
MCH RBC QN AUTO: 27.9 PG (ref 26–34)
MCHC RBC AUTO-ENTMCNC: 32.3 G/DL (ref 30–36.5)
MCV RBC AUTO: 86.4 FL (ref 80–99)
MONOCYTES # BLD: 0.9 K/UL (ref 0–1)
MONOCYTES NFR BLD: 8 % (ref 5–13)
NEUTS SEG # BLD: 9.3 K/UL (ref 1.8–8)
NEUTS SEG NFR BLD: 81 % (ref 32–75)
NITRITE UR QL STRIP.AUTO: NEGATIVE
NRBC # BLD: 0 K/UL (ref 0–0.01)
NRBC BLD-RTO: 0 PER 100 WBC
PH UR STRIP: 7.5 [PH] (ref 5–8)
PLATELET # BLD AUTO: 142 K/UL (ref 150–400)
PMV BLD AUTO: 10.6 FL (ref 8.9–12.9)
POTASSIUM SERPL-SCNC: 3.9 MMOL/L (ref 3.5–5.1)
PROT SERPL-MCNC: 8.1 G/DL (ref 6.4–8.2)
PROT UR STRIP-MCNC: NEGATIVE MG/DL
RBC # BLD AUTO: 4.55 M/UL (ref 3.8–5.2)
RBC #/AREA URNS HPF: NORMAL /HPF (ref 0–5)
SODIUM SERPL-SCNC: 139 MMOL/L (ref 136–145)
SP GR UR REFRACTOMETRY: 1.02 (ref 1–1.03)
UR CULT HOLD, URHOLD: NORMAL
UROBILINOGEN UR QL STRIP.AUTO: 0.2 EU/DL (ref 0.2–1)
WBC # BLD AUTO: 11.5 K/UL (ref 3.6–11)
WBC URNS QL MICRO: NORMAL /HPF (ref 0–4)

## 2018-11-25 PROCEDURE — 81001 URINALYSIS AUTO W/SCOPE: CPT

## 2018-11-25 PROCEDURE — 80053 COMPREHEN METABOLIC PANEL: CPT

## 2018-11-25 PROCEDURE — 36415 COLL VENOUS BLD VENIPUNCTURE: CPT

## 2018-11-25 PROCEDURE — 83605 ASSAY OF LACTIC ACID: CPT

## 2018-11-25 PROCEDURE — 87804 INFLUENZA ASSAY W/OPTIC: CPT

## 2018-11-25 PROCEDURE — 93005 ELECTROCARDIOGRAM TRACING: CPT

## 2018-11-25 PROCEDURE — 71046 X-RAY EXAM CHEST 2 VIEWS: CPT

## 2018-11-25 PROCEDURE — 85025 COMPLETE CBC W/AUTO DIFF WBC: CPT

## 2018-11-25 PROCEDURE — 99283 EMERGENCY DEPT VISIT LOW MDM: CPT

## 2018-11-25 NOTE — ED PROVIDER NOTES
80 y.o. female with past medical history significant for DM, rheumatoid arthritis, HTN, and stroke who presents with chief complaint of body aches. Pt complains of shaking in her hands that started about 1 hour ago with associated generalized weakness, sore throat, and feeling cold all over. Pt denies any recent illness. Pt denies chest pain, nausea, vomiting, diarrhea, rash, cough, or SOB. There are no other acute medical concerns at this time. Social hx: Never Smoker. Denies EtOH Use. PCP: Elver Booker MD 
 
Note written by Kendal Wayne. Oneil Oppenheim, as dictated by Soco Timmons MD 3:10 PM 
 
 
 
The history is provided by the patient and a relative. Past Medical History:  
Diagnosis Date  Bunion  Cherry angioma  Diabetes ([address]  Diabetes mellitus ([address]  HTN (hypertension)  Joint pain  Joint swelling  Rheumatoid arthritis(714.0)  Scoliosis  Stroke ([address]  Thyroid disorder   
 problems  Vertigo BPPV Past Surgical History:  
Procedure Laterality Date  HX  SECTION    
 HX ORTHOPAEDIC    
 bunions removed x 2 Family History:  
Problem Relation Age of Onset  Stroke Father Social History Socioeconomic History  Marital status:  Spouse name: Not on file  Number of children: Not on file  Years of education: Not on file  Highest education level: Not on file Social Needs  Financial resource strain: Not on file  Food insecurity - worry: Not on file  Food insecurity - inability: Not on file  Transportation needs - medical: Not on file  Transportation needs - non-medical: Not on file Occupational History  Not on file Tobacco Use  Smoking status: Never Smoker  Smokeless tobacco: Never Used Substance and Sexual Activity  Alcohol use: No  
 Drug use: No  
 Sexual activity: No  
Other Topics Concern  Not on file Social History Narrative  Not on file ALLERGIES: Shellfish containing products Review of Systems Constitutional: Positive for chills. Negative for activity change, appetite change and fever. HENT: Positive for sore throat. Negative for congestion and rhinorrhea. Respiratory: Negative for shortness of breath. Cardiovascular: Negative for chest pain and leg swelling. Gastrointestinal: Negative for abdominal pain, diarrhea, nausea and vomiting. Genitourinary: Negative for dysuria, vaginal bleeding and vaginal discharge. Musculoskeletal: Positive for myalgias. Negative for arthralgias. Neurological: Positive for weakness. Negative for dizziness. Psychiatric/Behavioral: Negative for confusion. All other systems reviewed and are negative. There were no vitals filed for this visit. Physical Exam  
Constitutional: She is oriented to person, place, and time. She appears well-developed. HENT:  
Head: Normocephalic and atraumatic. Right Ear: External ear normal.  
Left Ear: External ear normal.  
Nose: Nose normal.  
Mouth/Throat: Oropharynx is clear and moist. No oropharyngeal exudate. Eyes: Conjunctivae, EOM and lids are normal. Right eye exhibits no discharge. Left eye exhibits no discharge. Neck: Normal range of motion. No tracheal deviation present. No thyromegaly present. Cardiovascular: Normal rate, regular rhythm, normal heart sounds and intact distal pulses. Pulmonary/Chest: Effort normal and breath sounds normal.  
Abdominal: Soft. Normal appearance and bowel sounds are normal.  
Musculoskeletal: Normal range of motion. Neurological: She is alert and oriented to person, place, and time. Skin: Skin is warm and dry. Psychiatric: She has a normal mood and affect. Judgment normal.  
  
 
MDM Number of Diagnoses or Management Options Chills (without fever):  
Diagnosis management comments: Assesment/Plan- 80 y.o. Patient presents with: 
Chills differential includes: flu, viral illness, UTI. Labs and imaging reviewed with no acute findings. Patient is well appearing, afebrile and tolerating PO. Raul Martinez Recommend PCP follow up. Patient educated on reasons to return to the ED. Procedures

## 2018-11-25 NOTE — DISCHARGE INSTRUCTIONS
We hope that we have addressed all of your medical concerns. The examination and treatment you received in the Emergency Department were for an emergent problem and were not intended as complete care. It is important that you follow up with your healthcare provider(s) for ongoing care. If your symptoms worsen or do not improve as expected, and you are unable to reach your usual health care provider(s), you should return to the Emergency Department. Today's healthcare is undergoing tremendous change, and patient satisfaction surveys are one of the many tools to assess the quality of medical care. You may receive a survey from the [employer] regarding your experience in the Emergency Department. I hope that your experience has been completely positive, particularly the medical care that I provided. As such, please participate in the survey; anything less than excellent does not meet my expectations or intentions. [facility] and [address] [place] [address] participate in nationally recognized quality of care measures. If your blood pressure is greater than 120/80, as reported below, we urge that you seek medical care to address the potential of high blood pressure, commonly known as hypertension. Hypertension can be hereditary or can be caused by certain medical conditions, pain, stress, or \"white coat syndrome. \"       Please make an appointment with your health care provider(s) for follow up of your Emergency Department visit. VITALS:   Patient Vitals for the past 8 hrs:   Temp Pulse Resp BP SpO2   11/25/18 1512 98.6 °F (37 °C) 99 18 170/72 100 %          Thank you for allowing us to provide you with medical care today. We realize that you have many choices for your emergency care needs. Please choose us in the future for any continued health care needs. Di Amador  [address] [place] [address], [address] [place] [address].   Office: [phone]            Recent Results (from the past 24 hour(s))   EKG, 12 LEAD, INITIAL    Collection Time: 11/25/18  3:21 PM   Result Value Ref Range    Ventricular Rate 94 BPM    Atrial Rate 94 BPM    P-R Interval 228 ms    QRS Duration 78 ms    Q-T Interval 372 ms    QTC Calculation (Bezet) 465 ms    Calculated P Axis 38 degrees    Calculated R Axis -9 degrees    Calculated T Axis -4 degrees    Diagnosis       Sinus rhythm with 1st degree AV block  Otherwise normal ECG  When compared with ECG of 12-JUL-2016 07:25,  No significant change was found     CBC WITH AUTOMATED DIFF    Collection Time: 11/25/18  3:35 PM   Result Value Ref Range    WBC 11.5 (H) 3.6 - 11.0 K/uL    RBC 4.55 3.80 - 5.20 M/uL    HGB 12.7 11.5 - 16.0 g/dL    HCT 39.3 35.0 - 47.0 %    MCV 86.4 80.0 - 99.0 FL    MCH 27.9 26.0 - 34.0 PG    MCHC 32.3 30.0 - 36.5 g/dL    RDW 13.2 11.5 - 14.5 %    PLATELET 860 (L) 431 - 400 K/uL    MPV 10.6 8.9 - 12.9 FL    NRBC 0.0 0  WBC    ABSOLUTE NRBC 0.00 0.00 - 0.01 K/uL    NEUTROPHILS 81 (H) 32 - 75 %    LYMPHOCYTES 10 (L) 12 - 49 %    MONOCYTES 8 5 - 13 %    EOSINOPHILS 0 0 - 7 %    BASOPHILS 0 0 - 1 %    IMMATURE GRANULOCYTES 0 0.0 - 0.5 %    ABS. NEUTROPHILS 9.3 (H) 1.8 - 8.0 K/UL    ABS. LYMPHOCYTES 1.2 0.8 - 3.5 K/UL    ABS. MONOCYTES 0.9 0.0 - 1.0 K/UL    ABS. EOSINOPHILS 0.0 0.0 - 0.4 K/UL    ABS. BASOPHILS 0.0 0.0 - 0.1 K/UL    ABS. IMM.  GRANS. 0.0 0.00 - 0.04 K/UL    DF AUTOMATED     METABOLIC PANEL, COMPREHENSIVE    Collection Time: 11/25/18  3:35 PM   Result Value Ref Range    Sodium 139 136 - 145 mmol/L    Potassium 3.9 3.5 - 5.1 mmol/L    Chloride 99 97 - 108 mmol/L    CO2 29 21 - 32 mmol/L    Anion gap 11 5 - 15 mmol/L    Glucose 112 (H) 65 - 100 mg/dL    BUN 22 (H) 6 - 20 MG/DL    Creatinine 1.00 0.55 - 1.02 MG/DL    BUN/Creatinine ratio 22 (H) 12 - 20      GFR est AA >60 >60 ml/min/1.73m2    GFR est non-AA 52 (L) >60 ml/min/1.73m2    Calcium 10.5 (H) 8.5 - 10.1 MG/DL    Bilirubin, total 0.5 0.2 - 1.0 MG/DL    ALT (SGPT) 16 12 - 78 U/L    AST (SGOT) 25 15 - 37 U/L    Alk. phosphatase 105 45 - 117 U/L    Protein, total 8.1 6.4 - 8.2 g/dL    Albumin 3.7 3.5 - 5.0 g/dL    Globulin 4.4 (H) 2.0 - 4.0 g/dL    A-G Ratio 0.8 (L) 1.1 - 2.2     URINALYSIS W/MICROSCOPIC    Collection Time: 11/25/18  3:35 PM   Result Value Ref Range    Color YELLOW/STRAW      Appearance CLEAR CLEAR      Specific gravity 1.017 1.003 - 1.030      pH (UA) 7.5 5.0 - 8.0      Protein NEGATIVE  NEG mg/dL    Glucose NEGATIVE  NEG mg/dL    Ketone NEGATIVE  NEG mg/dL    Bilirubin NEGATIVE  NEG      Blood NEGATIVE  NEG      Urobilinogen 0.2 0.2 - 1.0 EU/dL    Nitrites NEGATIVE  NEG      Leukocyte Esterase NEGATIVE  NEG      WBC 0-4 0 - 4 /hpf    RBC 0-5 0 - 5 /hpf    Epithelial cells FEW FEW /lpf    Bacteria NEGATIVE  NEG /hpf   URINE CULTURE HOLD SAMPLE    Collection Time: 11/25/18  3:35 PM   Result Value Ref Range    Urine culture hold        URINE ON HOLD IN MICROBIOLOGY DEPT FOR 3 DAYS. IF UNPRESERVED URINE IS SUBMITTED, IT CANNOT BE USED FOR ADDITIONAL TESTING AFTER 24 HRS, RECOLLECTION WILL BE REQUIRED. POC LACTIC ACID    Collection Time: 11/25/18  3:36 PM   Result Value Ref Range    Lactic Acid (POC) 1.10 0.40 - 2.00 mmol/L   INFLUENZA A & B AG (RAPID TEST)    Collection Time: 11/25/18  4:22 PM   Result Value Ref Range    Influenza A Antigen NEGATIVE  NEG      Influenza B Antigen NEGATIVE  NEG         Xr Chest Pa Lat    Result Date: 11/25/2018  Chest PA and lateral History: Congestion Comparison: 4/1/2018 Findings: The lungs are mildly hyperexpanded. No focal consolidation, pleural effusion, or pneumothorax. The cardiomediastinal silhouette is unremarkable. The bones are osteopenic. Mild multilevel spondylosis is seen in the thoracic spine. Degenerative changes are seen in the shoulders. Impression: No acute cardiopulmonary process.

## 2018-11-26 LAB
ATRIAL RATE: 94 BPM
CALCULATED P AXIS, ECG09: 38 DEGREES
CALCULATED R AXIS, ECG10: -9 DEGREES
CALCULATED T AXIS, ECG11: -4 DEGREES
DIAGNOSIS, 93000: NORMAL
P-R INTERVAL, ECG05: 228 MS
Q-T INTERVAL, ECG07: 372 MS
QRS DURATION, ECG06: 78 MS
QTC CALCULATION (BEZET), ECG08: 465 MS
VENTRICULAR RATE, ECG03: 94 BPM

## 2018-12-19 ENCOUNTER — HOSPITAL ENCOUNTER (EMERGENCY)
Age: 83
Discharge: HOME | End: 2018-12-19
Attending: EMERGENCY MEDICINE | Admitting: EMERGENCY MEDICINE
Payer: MEDICARE

## 2018-12-19 ENCOUNTER — APPOINTMENT (OUTPATIENT)
Dept: GENERAL RADIOLOGY | Age: 83
End: 2018-12-19
Attending: EMERGENCY MEDICINE
Payer: MEDICARE

## 2018-12-19 VITALS
OXYGEN SATURATION: 99 % | DIASTOLIC BLOOD PRESSURE: 84 MMHG | HEART RATE: 77 BPM | SYSTOLIC BLOOD PRESSURE: 195 MMHG | HEIGHT: 62 IN | TEMPERATURE: 97.5 F | BODY MASS INDEX: 20.8 KG/M2 | RESPIRATION RATE: 20 BRPM | WEIGHT: 113 LBS

## 2018-12-19 DIAGNOSIS — M25.512 ACUTE PAIN OF LEFT SHOULDER: Primary | ICD-10-CM

## 2018-12-19 PROCEDURE — 73030 X-RAY EXAM OF SHOULDER: CPT

## 2018-12-19 PROCEDURE — A4565 SLINGS: HCPCS

## 2018-12-19 PROCEDURE — 93005 ELECTROCARDIOGRAM TRACING: CPT

## 2018-12-19 PROCEDURE — 74011250637 HC RX REV CODE- 250/637: Performed by: EMERGENCY MEDICINE

## 2018-12-19 PROCEDURE — 99283 EMERGENCY DEPT VISIT LOW MDM: CPT

## 2018-12-19 RX ORDER — TRAMADOL HYDROCHLORIDE 50 MG/1
50 TABLET ORAL
Status: COMPLETED | OUTPATIENT
Start: 2018-12-19 | End: 2018-12-19

## 2018-12-19 RX ORDER — HYDROCODONE BITARTRATE AND ACETAMINOPHEN 5; 325 MG/1; MG/1
0.5 TABLET ORAL
Qty: 10 TAB | Refills: 0 | Status: SHIPPED | OUTPATIENT
Start: 2018-12-19 | End: 2019-08-14

## 2018-12-19 RX ORDER — IBUPROFEN 400 MG/1
400 TABLET ORAL
Status: COMPLETED | OUTPATIENT
Start: 2018-12-19 | End: 2018-12-19

## 2018-12-19 RX ORDER — IBUPROFEN 400 MG/1
400 TABLET ORAL
Qty: 20 TAB | Refills: 0 | Status: SHIPPED | OUTPATIENT
Start: 2018-12-19 | End: 2019-08-16

## 2018-12-19 RX ADMIN — TRAMADOL HYDROCHLORIDE 50 MG: 50 TABLET, FILM COATED ORAL at 16:18

## 2018-12-19 RX ADMIN — IBUPROFEN 400 MG: 400 TABLET, FILM COATED ORAL at 16:18

## 2018-12-19 NOTE — DISCHARGE INSTRUCTIONS
We hope that we have addressed all of your medical concerns. The examination and treatment you received in the Emergency Department were for an emergent problem and were not intended as complete care. It is important that you follow up with your healthcare provider(s) for ongoing care. If your symptoms worsen or do not improve as expected, and you are unable to reach your usual health care provider(s), you should return to the Emergency Department. Today's healthcare is undergoing tremendous change, and patient satisfaction surveys are one of the many tools to assess the quality of medical care. You may receive a survey from the [employer] regarding your experience in the Emergency Department. I hope that your experience has been completely positive, particularly the medical care that I provided. As such, please participate in the survey; anything less than excellent does not meet my expectations or intentions. [facility] and [address] [place] [address] participate in nationally recognized quality of care measures. If your blood pressure is greater than 120/80, as reported below, we urge that you seek medical care to address the potential of high blood pressure, commonly known as hypertension. Hypertension can be hereditary or can be caused by certain medical conditions, pain, stress, or \"white coat syndrome. \"       Please make an appointment with your health care provider(s) for follow up of your Emergency Department visit. VITALS:   Patient Vitals for the past 8 hrs:   Temp Pulse Resp BP SpO2   12/19/18 1602 97.5 °F (36.4 °C) 77 20 195/84 99 %          Thank you for allowing us to provide you with medical care today. We realize that you have many choices for your emergency care needs. Please choose us in the future for any continued health care needs. Elsy Colbert, [facility]. Office: [phone]            Recent Results (from the past 24 hour(s))   EKG, 12 LEAD, INITIAL    Collection Time: 12/19/18  4:11 PM   Result Value Ref Range    Ventricular Rate 79 BPM    Atrial Rate 79 BPM    P-R Interval 228 ms    QRS Duration 84 ms    Q-T Interval 408 ms    QTC Calculation (Bezet) 467 ms    Calculated P Axis 73 degrees    Calculated R Axis -19 degrees    Calculated T Axis -4 degrees    Diagnosis       Sinus rhythm with 1st degree AV block with premature atrial complexes in a   pattern of bigeminy  Otherwise normal ECG  When compared with ECG of 25-NOV-2018 15:21,  premature atrial complexes are now present         Xr Shoulder Lt Ap/lat Min 2 V    Result Date: 12/19/2018  EXAM: XR SHOULDER LT AP/LAT MIN 2 V INDICATION: left arm pain. COMPARISON: None. FINDINGS: Three views of the left shoulder demonstrate no fracture, dislocation or other acute abnormality. There is a elevation of the humeral head, severe DJD of the [facility] joint, spurring and narrowing of the scapulohumeral joint, chronic deformity of the tuberosity views adjacent soft tissue swelling likely joint effusion. Mild osteopenia. IMPRESSION: No acute abnormality. Prominent DJD          Musculoskeletal Pain: Care Instructions  Your Care Instructions  Different problems with the bones, muscles, nerves, ligaments, and tendons in the body can cause pain. One or more areas of your body may ache or burn. Or they may feel tired, stiff, or sore. The medical term for this type of pain is musculoskeletal pain. It can have many different causes. Sometimes the pain is caused by an injury such as a strain or sprain. Or you might have pain from using one part of your body in the same way over and over again. This is called overuse. In some cases, the cause of the pain is another health problem such as arthritis or fibromyalgia. The doctor will examine you and ask you questions about your health to help find the cause of your pain.  Blood tests or imaging tests like an X-ray may also be helpful. But sometimes doctors can't find a cause of the pain. Treatment depends on your symptoms and the cause of the pain, if known. The doctor has checked you carefully, but problems can develop later. If you notice any problems or new symptoms, get medical treatment right away. Follow-up care is a key part of your treatment and safety. Be sure to make and go to all appointments, and call your doctor if you are having problems. It's also a good idea to know your test results and keep a list of the medicines you take. How can you care for yourself at home? · Rest until you feel better. · Do not do anything that makes the pain worse. Return to exercise gradually if you feel better and your doctor says it's okay. · Be safe with medicines. Read and follow all instructions on the label. ¨ If the doctor gave you a prescription medicine for pain, take it as prescribed. ¨ If you are not taking a prescription pain medicine, ask your doctor if you can take an over-the-counter medicine. · Put ice or a cold pack on the area for 10 to 20 minutes at a time to ease pain. Put a thin cloth between the ice and your skin. When should you call for help? Call your doctor now or seek immediate medical care if:  · You have new pain, or your pain gets worse. · You have new symptoms such as a fever, a rash, or chills. Watch closely for changes in your health, and be sure to contact your doctor if:  · You do not get better as expected. Where can you learn more? Go to [employer]be  Enter Q624 in the search box to learn more about \"Musculoskeletal Pain: Care Instructions. \"   © 1912-0792 [employer]. Care instructions adapted under license by [facility] [employer] (which disclaims liability or warranty for this information).  This care instruction is for use with your licensed healthcare professional. If you have questions about a medical condition or this instruction, always ask your healthcare professional. [address] any warranty or liability for your use of this information. Content Version: 81.3.891463; Current as of: November 20, 2015             Shoulder Pain: Care Instructions  Your Care Instructions    You can hurt your shoulder by using it too much during an activity, such as fishing or baseball. It can also happen as part of the everyday wear and tear of getting older. Shoulder injuries can be slow to heal, but your shoulder should get better with time. Your doctor may recommend a sling to rest your shoulder. If you have injured your shoulder, you may need testing and treatment. Follow-up care is a key part of your treatment and safety. Be sure to make and go to all appointments, and call your doctor if you are having problems. It's also a good idea to know your test results and keep a list of the medicines you take. How can you care for yourself at home? · Take pain medicines exactly as directed. ? If the doctor gave you a prescription medicine for pain, take it as prescribed. ? If you are not taking a prescription pain medicine, ask your doctor if you can take an over-the-counter medicine. ? Do not take two or more pain medicines at the same time unless the doctor told you to. Many pain medicines contain acetaminophen, which is Tylenol. Too much acetaminophen (Tylenol) can be harmful. · If your doctor recommends that you wear a sling, use it as directed. Do not take it off before your doctor tells you to. · Put ice or a cold pack on the sore area for 10 to 20 minutes at a time. Put a thin cloth between the ice and your skin. · If there is no swelling, you can put moist heat, a heating pad, or a warm cloth on your shoulder. Some doctors suggest alternating between hot and cold. · Rest your shoulder for a few days.  If your doctor recommends it, you can then begin gentle exercise of the shoulder, but do not lift anything heavy. When should you call for help? Call 911 anytime you think you may need emergency care. For example, call if:    · You have chest pain or pressure. This may occur with:  ? Sweating. ? Shortness of breath. ? Nausea or vomiting. ? Pain that spreads from the chest to the neck, jaw, or one or both shoulders or arms. ? Dizziness or lightheadedness. ? A fast or uneven pulse. After calling 911, chew 1 adult-strength aspirin. Wait for an ambulance. Do not try to drive yourself.     · Your arm or hand is cool or pale or changes color.    Call your doctor now or seek immediate medical care if:    · You have signs of infection, such as:  ? Increased pain, swelling, warmth, or redness in your shoulder. ? Red streaks leading from a place on your shoulder. ? Pus draining from an area of your shoulder. ? Swollen lymph nodes in your neck, armpits, or groin. ? A fever.    Watch closely for changes in your health, and be sure to contact your doctor if:    · You cannot use your shoulder.     · Your shoulder does not get better as expected. Where can you learn more? Go to http://jennifer-pablo.info/. Enter F065 in the search box to learn more about \"Shoulder Pain: Care Instructions. \"  Current as of: November 29, 2017  Content Version: 11.8  © 9910-9692 [employer]. Care instructions adapted under license by [employer] (which disclaims liability or warranty for this information). If you have questions about a medical condition or this instruction, always ask your healthcare professional. [place] [address] any warranty or liability for your use of this information.

## 2018-12-19 NOTE — ED NOTES
Sling applied and tightened to patient comfort level. Discharge instructions reviewed by provider. Patient discharged ambulatory in care of daughter.

## 2018-12-19 NOTE — ED TRIAGE NOTES
Patient arrives with severe pain to L arm after flu shot today; states it radiates to neck and down arm.

## 2018-12-19 NOTE — ED PROVIDER NOTES
80 y.o. female with past medical history significant for DM, joint pain, thyroid disorder, RA, bunion, cherry angioma, stroke, HTN, vertigo, and scoliosis who presents from private vehicle with chief complaint of arm pain. Pt reports severe left arm pain and neck pain since receiving a flu vaccination at 1200 today. There are no other acute medical concerns at this time. PCP: Jorge Painter MD    Note written by Dario Jonas, as dictated by Darleen Crockett, DO 4:05 PM                 Past Medical History:   Diagnosis Date    Bunion     Cherry angioma     [facility]     Diabetes mellitus ([facility]     HTN (hypertension)     Joint pain     Joint swelling     Rheumatoid arthritis(714.0)     Scoliosis     Stroke ([facility]     Thyroid disorder     problems    Vertigo     BPPV       Past Surgical History:   Procedure Laterality Date    HX  SECTION      HX ORTHOPAEDIC      bunions removed x 2         Family History:   Problem Relation Age of Onset    Stroke Father        Social History     Socioeconomic History    Marital status:      Spouse name: Not on file    Number of children: Not on file    Years of education: Not on file    Highest education level: Not on file   Social Needs    Financial resource strain: Not on file    Food insecurity - worry: Not on file    Food insecurity - inability: Not on file   [employer] needs - medical: Not on file   [employer] needs - non-medical: Not on file   Occupational History    Not on file   Tobacco Use    Smoking status: Never Smoker    Smokeless tobacco: Never Used   Substance and Sexual Activity    Alcohol use: No    Drug use: No    Sexual activity: No   Other Topics Concern    Not on file   Social History Narrative    Not on file     ALLERGIES: Shellfish containing products    Review of Systems   Constitutional: Negative for appetite change, chills, fever and unexpected weight change.    HENT: Negative for ear pain, hearing loss, rhinorrhea and trouble swallowing. Eyes: Negative for pain and visual disturbance. Respiratory: Negative for cough, chest tightness and shortness of breath. Cardiovascular: Negative for chest pain and palpitations. Gastrointestinal: Negative for abdominal distention, abdominal pain, blood in stool and vomiting. Genitourinary: Negative for dysuria, hematuria and urgency. Musculoskeletal: Positive for myalgias and neck pain. Negative for back pain. Skin: Negative for rash. Neurological: Negative for dizziness, syncope, weakness and numbness. Psychiatric/Behavioral: Negative for confusion and suicidal ideas. All other systems reviewed and are negative. Vitals:    12/19/18 1602   BP: 195/84   Pulse: 77   Resp: 20   Temp: 97.5 °F (36.4 °C)   SpO2: 99%   Weight: 51.3 kg (113 lb)   Height: 5' 2\" (1.575 m)            Physical Exam   Constitutional: She is oriented to person, place, and time. She appears well-developed and well-nourished. No distress. HENT:   Head: Normocephalic and atraumatic. Right Ear: External ear normal.   Left Ear: External ear normal.   Nose: Nose normal.   Mouth/Throat: Oropharynx is clear and moist. No oropharyngeal exudate. Eyes: Conjunctivae and EOM are normal. Pupils are equal, round, and reactive to light. Right eye exhibits no discharge. Left eye exhibits no discharge. No scleral icterus. Neck: Normal range of motion. Neck supple. No JVD present. No tracheal deviation present. Cardiovascular: Normal rate, regular rhythm, normal heart sounds and intact distal pulses. Exam reveals no gallop and no friction rub. No murmur heard. Pulmonary/Chest: Effort normal and breath sounds normal. No stridor. No respiratory distress. She has no decreased breath sounds. She has no wheezes. She has no rhonchi. She has no rales. She exhibits no tenderness. Abdominal: Soft. Bowel sounds are normal. She exhibits no distension. There is no tenderness. There is no rebound and no guarding. Musculoskeletal: Normal range of motion. She exhibits tenderness. She exhibits no edema. Left shoulder: She exhibits tenderness. Left anterior shoulder tenderness. No evidence of cellulitis, erythema or edema. Neurological: She is alert and oriented to person, place, and time. She has normal strength and normal reflexes. She displays normal reflexes. No cranial nerve deficit or sensory deficit. She exhibits normal muscle tone. Coordination normal. GCS eye subscore is 4. GCS verbal subscore is 5. GCS motor subscore is 6. Skin: Skin is warm and dry. Capillary refill takes less than 2 seconds. No rash noted. She is not diaphoretic. No erythema. No pallor. Psychiatric: She has a normal mood and affect. Her behavior is normal. Judgment and thought content normal.   Nursing note and vitals reviewed. Note written by Dario Wilder, as dictated by No att. providers found 4:05 PM    MDM  Number of Diagnoses or Management Options  Acute pain of left shoulder:      Amount and/or Complexity of Data Reviewed  Tests in the radiology section of CPT®: ordered and reviewed  Tests in the medicine section of CPT®: ordered and reviewed  Independent visualization of images, tracings, or specimens: yes (ekg)    Risk of Complications, Morbidity, and/or Mortality  Presenting problems: moderate  Diagnostic procedures: low  Management options: moderate    Patient Progress  Patient progress: stable         Procedures    Chief Complaint   Patient presents with    Arm Pain       The patient's presenting problems have been discussed, and they are in agreement with the care plan formulated and outlined with them. I have encouraged them to ask questions as they arise throughout their visit.     MEDICATIONS GIVEN:  Medications   ibuprofen (MOTRIN) tablet 400 mg (400 mg Oral Given 12/19/18 1618)   traMADol (ULTRAM) tablet 50 mg (50 mg Oral Given 12/19/18 1618)       LABS REVIEWED:  Recent Results (from the past 24 hour(s))   EKG, 12 LEAD, INITIAL    Collection Time: 12/19/18  4:11 PM   Result Value Ref Range    Ventricular Rate 79 BPM    Atrial Rate 79 BPM    P-R Interval 228 ms    QRS Duration 84 ms    Q-T Interval 408 ms    QTC Calculation (Bezet) 467 ms    Calculated P Axis 73 degrees    Calculated R Axis -19 degrees    Calculated T Axis -4 degrees    Diagnosis       Sinus rhythm with 1st degree AV block with premature atrial complexes in a   pattern of bigeminy  Otherwise normal ECG  When compared with ECG of 25-NOV-2018 15:21,  premature atrial complexes are now present         VITAL SIGNS:  Patient Vitals for the past 12 hrs:   Temp Pulse Resp BP SpO2   12/19/18 1602 97.5 °F (36.4 °C) 77 20 195/84 99 %       RADIOLOGY RESULTS:  The following have been ordered and reviewed:  Xr Shoulder Lt Ap/lat Min 2 V    Result Date: 12/19/2018  EXAM: XR SHOULDER LT AP/LAT MIN 2 V INDICATION: left arm pain. COMPARISON: None. FINDINGS: Three views of the left shoulder demonstrate no fracture, dislocation or other acute abnormality. There is a elevation of the humeral head, severe DJD of the [facility] joint, spurring and narrowing of the scapulohumeral joint, chronic deformity of the tuberosity views adjacent soft tissue swelling likely joint effusion. Mild osteopenia. IMPRESSION: No acute abnormality. Prominent DJD      ED EKG interpretation:  Rhythm: normal sinus rhythm, 1st degree block and PAC's; and regular . Rate (approx.): 79; Axis: normal; P wave: normal; QRS interval: normal ; ST/T wave: normal; Other findings: borderline ekg. This EKG was interpreted by Jono Vee DO, ED Provider. PROGRESS NOTES:  Discussed results and plan with patient and family. Patient will be discharged home with PCP follow up. Patient instructed to return to the emergency room for any worsening symptoms or any other concerns. DIAGNOSIS:    1.  Acute pain of left shoulder        PLAN:  Follow-up Information Follow up With Specialties Details Why Prosper Hartman MD Internal Medicine Schedule an appointment as soon as possible for a visit  [place] [id number]  [phone]      [facility] EMERGENCY DEPT Emergency Medicine  If symptoms worsen [address] [facility]  [phone]        Discharge Medication List as of 12/19/2018  5:28 PM      START taking these medications    Details   !! HYDROcodone-acetaminophen (NORCO) 5-325 mg per tablet Take 0.5 Tabs by mouth every six (6) hours as needed for Pain. Max Daily Amount: 2 Tabs., Print, Disp-10 Tab, R-0      ibuprofen (MOTRIN) 400 mg tablet Take 1 Tab by mouth every six (6) hours as needed for Pain., Print, Disp-20 Tab, R-0       !! - Potential duplicate medications found. Please discuss with provider. CONTINUE these medications which have NOT CHANGED    Details   valsartan-hydroCHLOROthiazide (DIOVAN-HCT) 160-12.5 mg per tablet Take 1 Tab by mouth daily. , Historical Med      !! HYDROcodone-acetaminophen (NORCO) 5-325 mg per tablet Take 0.5 Tabs by mouth every four (4) hours as needed for Pain. Max Daily Amount: 3 Tabs., Print, Disp-6 Tab, R-0      guaiFENesin-codeine (ROBITUSSIN AC) 100-10 mg/5 mL solution Take 5 mL by mouth nightly as needed for Cough. Max Daily Amount: 5 mL. , Print, Disp-118 mL, R-0      albuterol (PROVENTIL HFA, VENTOLIN HFA, PROAIR HFA) 90 mcg/actuation inhaler Take 1 Puff by inhalation every four (4) hours as needed for Wheezing., Print, Disp-1 Inhaler, R-0      traMADol (ULTRAM) 50 mg tablet Take 1 Tab by mouth every six (6) hours as needed for Pain. Max Daily Amount: 200 mg., Print, Disp-10 Tab, R-0      losartan (COZAAR) 50 mg tablet Take 50 mg by mouth daily. , Historical Med      Cholecalciferol, Vitamin D3, 3,000 unit tab Take 3,000 Units by mouth daily. , Historical Med      LORazepam (ATIVAN) 0.5 mg tablet Take 0.5 mg by mouth nightly., Historical Med levothyroxine (SYNTHROID) 88 mcg tablet TAKE ONE TABLET BY MOUTH EVERY DAY, Normal, Disp-30 tablet, R-0      pantoprazole (PROTONIX) 40 mg tablet Take 1 Tab by mouth daily. , Normal, Disp-30 Tab, R-1      Lancets (ONE TOUCH DELICA) misc by Does Not Apply route three (3) times daily. , Normal, Disp-1 Package, R-11      glucose blood VI test strips (ONE TOUCH ULTRA TEST) strip by Does Not Apply route three (3) times daily. , Normal, Disp-1 Package, R-11      aspirin 81 mg chewable tablet Take 81 mg by mouth daily. , Historical Med       !! - Potential duplicate medications found. Please discuss with provider. ED COURSE: The patient's hospital course has been uncomplicated.

## 2018-12-20 LAB
ATRIAL RATE: 79 BPM
CALCULATED P AXIS, ECG09: 73 DEGREES
CALCULATED R AXIS, ECG10: -19 DEGREES
CALCULATED T AXIS, ECG11: -4 DEGREES
DIAGNOSIS, 93000: NORMAL
P-R INTERVAL, ECG05: 228 MS
Q-T INTERVAL, ECG07: 408 MS
QRS DURATION, ECG06: 84 MS
QTC CALCULATION (BEZET), ECG08: 467 MS
VENTRICULAR RATE, ECG03: 79 BPM

## 2019-01-29 ENCOUNTER — HOSPITAL ENCOUNTER (OUTPATIENT)
Dept: MAMMOGRAPHY | Age: 84
Discharge: HOME | End: 2019-01-29
Attending: INTERNAL MEDICINE

## 2019-01-29 DIAGNOSIS — M81.0 OSTEOPOROSIS: ICD-10-CM

## 2019-08-14 ENCOUNTER — APPOINTMENT (OUTPATIENT)
Dept: MRI IMAGING | Age: 84
DRG: 065 | End: 2019-08-14
Attending: INTERNAL MEDICINE
Payer: MEDICARE

## 2019-08-14 ENCOUNTER — APPOINTMENT (OUTPATIENT)
Dept: CT IMAGING | Age: 84
DRG: 065 | End: 2019-08-14
Attending: EMERGENCY MEDICINE
Payer: MEDICARE

## 2019-08-14 ENCOUNTER — APPOINTMENT (OUTPATIENT)
Dept: GENERAL RADIOLOGY | Age: 84
DRG: 065 | End: 2019-08-14
Attending: EMERGENCY MEDICINE
Payer: MEDICARE

## 2019-08-14 ENCOUNTER — HOSPITAL ENCOUNTER (INPATIENT)
Age: 84
LOS: 1 days | Discharge: HOME HEALTH | DRG: 065 | End: 2019-08-16
Attending: EMERGENCY MEDICINE | Admitting: INTERNAL MEDICINE
Payer: MEDICARE

## 2019-08-14 DIAGNOSIS — I63.9 ACUTE ISCHEMIC STROKE (HCC): Primary | ICD-10-CM

## 2019-08-14 DIAGNOSIS — I63.30 CEREBROVASCULAR ACCIDENT (CVA) DUE TO THROMBOSIS OF CEREBRAL ARTERY (HCC): ICD-10-CM

## 2019-08-14 PROBLEM — R53.1 LEFT-SIDED WEAKNESS: Status: ACTIVE | Noted: 2019-08-14

## 2019-08-14 PROBLEM — E87.6 HYPOKALEMIA: Status: ACTIVE | Noted: 2019-08-14

## 2019-08-14 PROBLEM — R77.8 ELEVATED TROPONIN: Status: ACTIVE | Noted: 2019-08-14

## 2019-08-14 LAB
ALBUMIN SERPL-MCNC: 3.4 G/DL (ref 3.5–5)
ALBUMIN/GLOB SERPL: 0.8 {RATIO} (ref 1.1–2.2)
ALP SERPL-CCNC: 136 U/L (ref 45–117)
ALT SERPL-CCNC: 20 U/L (ref 12–78)
ANION GAP SERPL CALC-SCNC: 5 MMOL/L (ref 5–15)
APTT PPP: 23.9 SEC (ref 22.1–32)
AST SERPL-CCNC: 23 U/L (ref 15–37)
BASOPHILS # BLD: 0 K/UL (ref 0–0.1)
BASOPHILS NFR BLD: 0 % (ref 0–1)
BILIRUB SERPL-MCNC: 0.2 MG/DL (ref 0.2–1)
BUN SERPL-MCNC: 25 MG/DL (ref 6–20)
BUN/CREAT SERPL: 25 (ref 12–20)
CALCIUM SERPL-MCNC: 10.2 MG/DL (ref 8.5–10.1)
CHLORIDE SERPL-SCNC: 103 MMOL/L (ref 97–108)
CK SERPL-CCNC: 111 U/L (ref 26–192)
CO2 SERPL-SCNC: 33 MMOL/L (ref 21–32)
COMMENT, HOLDF: NORMAL
CREAT SERPL-MCNC: 1.01 MG/DL (ref 0.55–1.02)
DIFFERENTIAL METHOD BLD: ABNORMAL
EOSINOPHIL # BLD: 0.1 K/UL (ref 0–0.4)
EOSINOPHIL NFR BLD: 2 % (ref 0–7)
ERYTHROCYTE [DISTWIDTH] IN BLOOD BY AUTOMATED COUNT: 14.3 % (ref 11.5–14.5)
GLOBULIN SER CALC-MCNC: 4.2 G/DL (ref 2–4)
GLUCOSE BLD STRIP.AUTO-MCNC: 120 MG/DL (ref 65–100)
GLUCOSE BLD STRIP.AUTO-MCNC: 138 MG/DL (ref 65–100)
GLUCOSE SERPL-MCNC: 110 MG/DL (ref 65–100)
HCT VFR BLD AUTO: 41.8 % (ref 35–47)
HGB BLD-MCNC: 13.1 G/DL (ref 11.5–16)
IMM GRANULOCYTES # BLD AUTO: 0 K/UL (ref 0–0.04)
IMM GRANULOCYTES NFR BLD AUTO: 0 % (ref 0–0.5)
INR PPP: 1.1 (ref 0.9–1.1)
LYMPHOCYTES # BLD: 2.1 K/UL (ref 0.8–3.5)
LYMPHOCYTES NFR BLD: 39 % (ref 12–49)
MCH RBC QN AUTO: 27.3 PG (ref 26–34)
MCHC RBC AUTO-ENTMCNC: 31.3 G/DL (ref 30–36.5)
MCV RBC AUTO: 87.1 FL (ref 80–99)
MONOCYTES # BLD: 0.6 K/UL (ref 0–1)
MONOCYTES NFR BLD: 12 % (ref 5–13)
NEUTS SEG # BLD: 2.5 K/UL (ref 1.8–8)
NEUTS SEG NFR BLD: 47 % (ref 32–75)
NRBC # BLD: 0 K/UL (ref 0–0.01)
NRBC BLD-RTO: 0 PER 100 WBC
PLATELET # BLD AUTO: 141 K/UL (ref 150–400)
PMV BLD AUTO: 10.1 FL (ref 8.9–12.9)
POTASSIUM SERPL-SCNC: 3.2 MMOL/L (ref 3.5–5.1)
PROT SERPL-MCNC: 7.6 G/DL (ref 6.4–8.2)
PROTHROMBIN TIME: 11 SEC (ref 9–11.1)
RBC # BLD AUTO: 4.8 M/UL (ref 3.8–5.2)
SAMPLES BEING HELD,HOLD: NORMAL
SERVICE CMNT-IMP: ABNORMAL
SERVICE CMNT-IMP: ABNORMAL
SODIUM SERPL-SCNC: 141 MMOL/L (ref 136–145)
THERAPEUTIC RANGE,PTTT: NORMAL SECS (ref 58–77)
TROPONIN I SERPL-MCNC: 0.35 NG/ML
WBC # BLD AUTO: 5.4 K/UL (ref 3.6–11)

## 2019-08-14 PROCEDURE — 71045 X-RAY EXAM CHEST 1 VIEW: CPT

## 2019-08-14 PROCEDURE — 74011636320 HC RX REV CODE- 636/320: Performed by: RADIOLOGY

## 2019-08-14 PROCEDURE — 85025 COMPLETE CBC W/AUTO DIFF WBC: CPT

## 2019-08-14 PROCEDURE — 70450 CT HEAD/BRAIN W/O DYE: CPT

## 2019-08-14 PROCEDURE — 70551 MRI BRAIN STEM W/O DYE: CPT

## 2019-08-14 PROCEDURE — 84484 ASSAY OF TROPONIN QUANT: CPT

## 2019-08-14 PROCEDURE — 94761 N-INVAS EAR/PLS OXIMETRY MLT: CPT

## 2019-08-14 PROCEDURE — 83036 HEMOGLOBIN GLYCOSYLATED A1C: CPT

## 2019-08-14 PROCEDURE — 99218 HC RM OBSERVATION: CPT

## 2019-08-14 PROCEDURE — 82550 ASSAY OF CK (CPK): CPT

## 2019-08-14 PROCEDURE — 82962 GLUCOSE BLOOD TEST: CPT

## 2019-08-14 PROCEDURE — 96374 THER/PROPH/DIAG INJ IV PUSH: CPT

## 2019-08-14 PROCEDURE — 74011250636 HC RX REV CODE- 250/636: Performed by: EMERGENCY MEDICINE

## 2019-08-14 PROCEDURE — 74011250637 HC RX REV CODE- 250/637: Performed by: EMERGENCY MEDICINE

## 2019-08-14 PROCEDURE — 74011250636 HC RX REV CODE- 250/636: Performed by: INTERNAL MEDICINE

## 2019-08-14 PROCEDURE — 36415 COLL VENOUS BLD VENIPUNCTURE: CPT

## 2019-08-14 PROCEDURE — 85610 PROTHROMBIN TIME: CPT

## 2019-08-14 PROCEDURE — 85730 THROMBOPLASTIN TIME PARTIAL: CPT

## 2019-08-14 PROCEDURE — 80053 COMPREHEN METABOLIC PANEL: CPT

## 2019-08-14 PROCEDURE — 93005 ELECTROCARDIOGRAM TRACING: CPT

## 2019-08-14 PROCEDURE — 70496 CT ANGIOGRAPHY HEAD: CPT

## 2019-08-14 PROCEDURE — 80061 LIPID PANEL: CPT

## 2019-08-14 PROCEDURE — 99285 EMERGENCY DEPT VISIT HI MDM: CPT

## 2019-08-14 RX ORDER — LOSARTAN POTASSIUM AND HYDROCHLOROTHIAZIDE 25; 100 MG/1; MG/1
1 TABLET ORAL DAILY
COMMUNITY
End: 2019-08-16

## 2019-08-14 RX ORDER — SODIUM CHLORIDE AND POTASSIUM CHLORIDE .9; .15 G/100ML; G/100ML
SOLUTION INTRAVENOUS CONTINUOUS
Status: DISCONTINUED | OUTPATIENT
Start: 2019-08-14 | End: 2019-08-16

## 2019-08-14 RX ORDER — ACETAMINOPHEN 650 MG/1
650 SUPPOSITORY RECTAL
Status: DISCONTINUED | OUTPATIENT
Start: 2019-08-14 | End: 2019-08-16 | Stop reason: HOSPADM

## 2019-08-14 RX ORDER — LABETALOL HCL 20 MG/4 ML
10 SYRINGE (ML) INTRAVENOUS
Status: COMPLETED | OUTPATIENT
Start: 2019-08-14 | End: 2019-08-14

## 2019-08-14 RX ORDER — MELATONIN
3000 DAILY
Status: DISCONTINUED | OUTPATIENT
Start: 2019-08-15 | End: 2019-08-16 | Stop reason: HOSPADM

## 2019-08-14 RX ORDER — HYDROXYZINE HYDROCHLORIDE 10 MG/1
10 TABLET, FILM COATED ORAL
COMMUNITY
End: 2020-11-30

## 2019-08-14 RX ORDER — ACETAMINOPHEN 325 MG/1
650 TABLET ORAL
Status: DISCONTINUED | OUTPATIENT
Start: 2019-08-14 | End: 2019-08-16 | Stop reason: HOSPADM

## 2019-08-14 RX ORDER — SODIUM CHLORIDE 0.9 % (FLUSH) 0.9 %
5-40 SYRINGE (ML) INJECTION AS NEEDED
Status: DISCONTINUED | OUTPATIENT
Start: 2019-08-14 | End: 2019-08-16 | Stop reason: HOSPADM

## 2019-08-14 RX ORDER — GUAIFENESIN 100 MG/5ML
325 LIQUID (ML) ORAL
Status: COMPLETED | OUTPATIENT
Start: 2019-08-14 | End: 2019-08-14

## 2019-08-14 RX ORDER — SODIUM CHLORIDE 0.9 % (FLUSH) 0.9 %
5-40 SYRINGE (ML) INJECTION EVERY 8 HOURS
Status: DISCONTINUED | OUTPATIENT
Start: 2019-08-14 | End: 2019-08-16 | Stop reason: HOSPADM

## 2019-08-14 RX ORDER — HYDROCHLOROTHIAZIDE 25 MG/1
25 TABLET ORAL DAILY
Status: DISCONTINUED | OUTPATIENT
Start: 2019-08-15 | End: 2019-08-15

## 2019-08-14 RX ORDER — HYDROXYZINE HYDROCHLORIDE 10 MG/1
10 TABLET, FILM COATED ORAL
Status: DISCONTINUED | OUTPATIENT
Start: 2019-08-14 | End: 2019-08-16 | Stop reason: HOSPADM

## 2019-08-14 RX ORDER — DICLOFENAC SODIUM 10 MG/G
2 GEL TOPICAL
COMMUNITY
End: 2020-02-22 | Stop reason: SDUPTHER

## 2019-08-14 RX ORDER — DEXTROSE MONOHYDRATE 100 MG/ML
125-250 INJECTION, SOLUTION INTRAVENOUS AS NEEDED
Status: DISCONTINUED | OUTPATIENT
Start: 2019-08-14 | End: 2019-08-16 | Stop reason: HOSPADM

## 2019-08-14 RX ORDER — LABETALOL HCL 20 MG/4 ML
10 SYRINGE (ML) INTRAVENOUS
Status: DISCONTINUED | OUTPATIENT
Start: 2019-08-14 | End: 2019-08-15

## 2019-08-14 RX ORDER — LOSARTAN POTASSIUM 50 MG/1
100 TABLET ORAL DAILY
Status: DISCONTINUED | OUTPATIENT
Start: 2019-08-15 | End: 2019-08-16 | Stop reason: HOSPADM

## 2019-08-14 RX ORDER — DICLOFENAC SODIUM 10 MG/G
2 GEL TOPICAL
Status: DISCONTINUED | OUTPATIENT
Start: 2019-08-14 | End: 2019-08-16 | Stop reason: HOSPADM

## 2019-08-14 RX ORDER — GUAIFENESIN 100 MG/5ML
81 LIQUID (ML) ORAL DAILY
Status: DISCONTINUED | OUTPATIENT
Start: 2019-08-15 | End: 2019-08-16 | Stop reason: HOSPADM

## 2019-08-14 RX ORDER — ONDANSETRON 2 MG/ML
4 INJECTION INTRAMUSCULAR; INTRAVENOUS
Status: DISCONTINUED | OUTPATIENT
Start: 2019-08-14 | End: 2019-08-16 | Stop reason: HOSPADM

## 2019-08-14 RX ORDER — MAGNESIUM SULFATE 100 %
4 CRYSTALS MISCELLANEOUS AS NEEDED
Status: DISCONTINUED | OUTPATIENT
Start: 2019-08-14 | End: 2019-08-16 | Stop reason: HOSPADM

## 2019-08-14 RX ORDER — LEVOTHYROXINE SODIUM 88 UG/1
88 TABLET ORAL
Status: DISCONTINUED | OUTPATIENT
Start: 2019-08-15 | End: 2019-08-16 | Stop reason: HOSPADM

## 2019-08-14 RX ORDER — PANTOPRAZOLE SODIUM 40 MG/1
40 TABLET, DELAYED RELEASE ORAL DAILY
Status: DISCONTINUED | OUTPATIENT
Start: 2019-08-15 | End: 2019-08-16 | Stop reason: HOSPADM

## 2019-08-14 RX ORDER — SODIUM CHLORIDE 9 MG/ML
50 INJECTION, SOLUTION INTRAVENOUS ONCE
Status: DISPENSED | OUTPATIENT
Start: 2019-08-14 | End: 2019-08-15

## 2019-08-14 RX ORDER — ALBUTEROL SULFATE 1.25 MG/3ML
1.25 SOLUTION RESPIRATORY (INHALATION)
Status: DISCONTINUED | OUTPATIENT
Start: 2019-08-14 | End: 2019-08-16 | Stop reason: HOSPADM

## 2019-08-14 RX ORDER — INSULIN LISPRO 100 [IU]/ML
INJECTION, SOLUTION INTRAVENOUS; SUBCUTANEOUS
Status: DISCONTINUED | OUTPATIENT
Start: 2019-08-14 | End: 2019-08-16 | Stop reason: HOSPADM

## 2019-08-14 RX ADMIN — SODIUM CHLORIDE AND POTASSIUM CHLORIDE: 9; 1.49 INJECTION, SOLUTION INTRAVENOUS at 23:15

## 2019-08-14 RX ADMIN — Medication 10 ML: at 23:16

## 2019-08-14 RX ADMIN — LABETALOL 20 MG/4 ML (5 MG/ML) INTRAVENOUS SYRINGE 10 MG: at 20:12

## 2019-08-14 RX ADMIN — IOPAMIDOL 100 ML: 755 INJECTION, SOLUTION INTRAVENOUS at 20:32

## 2019-08-14 RX ADMIN — ASPIRIN 81 MG 324 MG: 81 TABLET ORAL at 23:15

## 2019-08-14 NOTE — ED PROVIDER NOTES
80 y.o. female with past medical history significant for HTN, DM, and stroke who presents via private vehicle from a park accompanied by family members with chief complaint of extremity weakness. Per family members, patient was at the park ~20 minutes PTA and got into the car with them to leave when she c/o acute onset left-sided weakness. No prolonged heat exposure. Patient presents with left-sided deficits, slurred speech, and is drooling. There are no other acute medical concerns at this time.     Social hx: Never tobacco smoker; Denies EtOH use; Denies illicit drug use  PCP: Drew Ma MD    Note written by Dario Zhu, as dictated by Carloz Bailey MD 7:55 PM           Past Medical History:   Diagnosis Date    Bunion     Cherry angioma     Diabetes ([address]     Diabetes mellitus ([address]     HTN (hypertension)     Joint pain     Joint swelling     Rheumatoid arthritis(714.0)     Scoliosis     Stroke ([address]     Thyroid disorder     problems    Vertigo     BPPV       Past Surgical History:   Procedure Laterality Date    HX  SECTION      HX ORTHOPAEDIC      bunions removed x 2         Family History:   Problem Relation Age of Onset    Stroke Father        Social History     Socioeconomic History    Marital status:      Spouse name: Not on file    Number of children: Not on file    Years of education: Not on file    Highest education level: Not on file   Occupational History    Not on file   Social Needs    Financial resource strain: Not on file    Food insecurity:     Worry: Not on file     Inability: Not on file    Transportation needs:     Medical: Not on file     Non-medical: Not on file   Tobacco Use    Smoking status: Never Smoker    Smokeless tobacco: Never Used   Substance and Sexual Activity    Alcohol use: No    Drug use: No    Sexual activity: Never   Lifestyle    Physical activity:     Days per week: Not on file     Minutes per session: Not on file    Stress: Not on file   Relationships    Social connections:     Talks on phone: Not on file     Gets together: Not on file     Attends Islam service: Not on file     Active member of club or organization: Not on file     Attends meetings of clubs or organizations: Not on file     Relationship status: Not on file    Intimate partner violence:     Fear of current or ex partner: Not on file     Emotionally abused: Not on file     Physically abused: Not on file     Forced sexual activity: Not on file   Other Topics Concern    Not on file   Social History Narrative    Not on file         ALLERGIES: Shellfish containing products    Review of Systems   Constitutional: Negative for chills and fever. HENT: Negative for rhinorrhea and sore throat. Respiratory: Negative for cough and shortness of breath. Cardiovascular: Negative for chest pain. Gastrointestinal: Negative for abdominal pain, diarrhea, nausea and vomiting. Genitourinary: Negative for dysuria and urgency. Musculoskeletal: Negative for arthralgias and back pain. Skin: Negative for rash. Neurological: Positive for speech difficulty and weakness (left-sided). Negative for dizziness and light-headedness. All other systems reviewed and are negative. There were no vitals filed for this visit. Physical Exam     Vital signs reviewed. Nursing notes reviewed.     Const:  No acute distress, well developed, well nourished  Head:  Atraumatic, normocephalic  Eyes:  PERRL, conjunctiva normal, no scleral icterus  Neck:  Supple, trachea midline  Cardiovascular:  RRR, no murmurs, no gallops, no rubs  Resp:  No resp distress, no increased work of breathing, no wheezes, no rhonchi, no rales,  Abd:  Soft, non-tender, non-distended, no rebound, no guarding, no CVA tenderness  MSK:  No pedal edema, normal ROM  Neuro:  Left sided facial droop, 3 out of 5 strength in left upper extremity and left lower extremity, 5 out of 5 right-sided strength, slurred speech, no visual field preference  Skin:  Warm, dry, intact  Psych: normal mood and affect, behavior is normal, judgement and thought content is normal    Note written by Dario Mendieta, as dictated by Bella Morillo MD7:55 PM    MDM  Number of Diagnoses or Management Options  Acute ischemic stroke [facility]):      Amount and/or Complexity of Data Reviewed  Clinical lab tests: ordered and reviewed  Tests in the radiology section of CPT®: reviewed and ordered  Review and summarize past medical records: yes    Critical Care  Total time providing critical care: 30-74 minutes (35 minutes)    Patient Progress  Patient progress: stable          Patient is an 49-year-old female who comes in with symptoms concerning for acute stroke. She presented with near flaccid left side. However, she improved significantly in the ER. By the time the neurologist saw her, her left side was almost back to baseline. She continued to have left-sided facial droop. Neurology did not recommend TPA based on her rapid improvement of symptoms.   Patient to be evaluated for admission by the hospitalist.      Procedures

## 2019-08-14 NOTE — ED TRIAGE NOTES
RN to vehicle to assess pt. Pt weak and unable to stand. Pt has left sided deficits and is drooling. Onset 20 min prior to arrival.  in triage. Code S level 1 code called from triage.

## 2019-08-15 ENCOUNTER — APPOINTMENT (OUTPATIENT)
Dept: NON INVASIVE DIAGNOSTICS | Age: 84
DRG: 065 | End: 2019-08-15
Attending: INTERNAL MEDICINE
Payer: MEDICARE

## 2019-08-15 LAB
ANION GAP SERPL CALC-SCNC: 7 MMOL/L (ref 5–15)
ATRIAL RATE: 73 BPM
BASOPHILS # BLD: 0 K/UL (ref 0–0.1)
BASOPHILS NFR BLD: 1 % (ref 0–1)
BUN SERPL-MCNC: 20 MG/DL (ref 6–20)
BUN/CREAT SERPL: 24 (ref 12–20)
CALCIUM SERPL-MCNC: 9.8 MG/DL (ref 8.5–10.1)
CALCULATED P AXIS, ECG09: 75 DEGREES
CALCULATED R AXIS, ECG10: -20 DEGREES
CALCULATED T AXIS, ECG11: 0 DEGREES
CHLORIDE SERPL-SCNC: 104 MMOL/L (ref 97–108)
CHOLEST SERPL-MCNC: 174 MG/DL
CK SERPL-CCNC: 83 U/L (ref 26–192)
CK SERPL-CCNC: 86 U/L (ref 26–192)
CO2 SERPL-SCNC: 29 MMOL/L (ref 21–32)
CREAT SERPL-MCNC: 0.85 MG/DL (ref 0.55–1.02)
DIAGNOSIS, 93000: NORMAL
DIFFERENTIAL METHOD BLD: ABNORMAL
ECHO AO ARCH DIAM: 2.02 CM
ECHO AO ASC DIAM: 3.19 CM
ECHO AO ROOT DIAM: 2.87 CM
ECHO AV AREA PEAK VELOCITY: 0.9 CM2
ECHO AV AREA/BSA PEAK VELOCITY: 0.6 CM2/M2
ECHO AV PEAK GRADIENT: 15.4 MMHG
ECHO AV PEAK VELOCITY: 196.41 CM/S
ECHO AV REGURGITANT PHT: 1220.4 CM
ECHO IVC SNIFF: 1.13 CM
ECHO LA AREA 4C: 17.7 CM2
ECHO LA MAJOR AXIS: 3.52 CM
ECHO LA TO AORTIC ROOT RATIO: 1.23
ECHO LA VOL 2C: 56.77 ML (ref 22–52)
ECHO LA VOL 4C: 42.67 ML (ref 22–52)
ECHO LA VOL BP: 49.7 ML (ref 22–52)
ECHO LA VOL/BSA BIPLANE: 33.13 ML/M2 (ref 16–28)
ECHO LA VOLUME INDEX A2C: 37.84 ML/M2 (ref 16–28)
ECHO LA VOLUME INDEX A4C: 28.44 ML/M2 (ref 16–28)
ECHO LV E' LATERAL VELOCITY: 5.21 CENTIMETER/SECOND
ECHO LV E' SEPTAL VELOCITY: 4.57 CENTIMETER/SECOND
ECHO LV INTERNAL DIMENSION DIASTOLIC: 3.36 CM (ref 3.9–5.3)
ECHO LV INTERNAL DIMENSION SYSTOLIC: 1.82 CM
ECHO LV IVSD: 1.08 CM (ref 0.6–0.9)
ECHO LV MASS 2D: 98.4 G (ref 67–162)
ECHO LV MASS INDEX 2D: 65.6 G/M2 (ref 43–95)
ECHO LV POSTERIOR WALL DIASTOLIC: 0.82 CM (ref 0.6–0.9)
ECHO LVOT DIAM: 1.43 CM
ECHO LVOT PEAK GRADIENT: 5.4 MMHG
ECHO LVOT PEAK VELOCITY: 116.28 CM/S
ECHO MV A VELOCITY: 106.18 CM/S
ECHO MV AREA PHT: 1.5 CM2
ECHO MV E DECELERATION TIME (DT): 464.4 MS
ECHO MV E VELOCITY: 119.16 CM/S
ECHO MV E/A RATIO: 1.12
ECHO MV MAX VELOCITY: 143.64 CM/S
ECHO MV MEAN GRADIENT: 3.3 MMHG
ECHO MV PEAK GRADIENT: 8.3 MMHG
ECHO MV PRESSURE HALF TIME (PHT): 145.1 MS
ECHO MV VTI: 54.62 CM
ECHO PV MAX VELOCITY: 100.1 CM/S
ECHO PV PEAK GRADIENT: 4 MMHG
ECHO RV INTERNAL DIMENSION: 3.06 CM
ECHO RV TAPSE: 2.83 CM (ref 1.5–2)
ECHO TV REGURGITANT MAX VELOCITY: 275.79 CM/S
ECHO TV REGURGITANT PEAK GRADIENT: 30.4 MMHG
EOSINOPHIL # BLD: 0.1 K/UL (ref 0–0.4)
EOSINOPHIL NFR BLD: 1 % (ref 0–7)
ERYTHROCYTE [DISTWIDTH] IN BLOOD BY AUTOMATED COUNT: 14.2 % (ref 11.5–14.5)
EST. AVERAGE GLUCOSE BLD GHB EST-MCNC: 134 MG/DL
GLUCOSE BLD STRIP.AUTO-MCNC: 122 MG/DL (ref 65–100)
GLUCOSE BLD STRIP.AUTO-MCNC: 130 MG/DL (ref 65–100)
GLUCOSE BLD STRIP.AUTO-MCNC: 93 MG/DL (ref 65–100)
GLUCOSE BLD STRIP.AUTO-MCNC: 94 MG/DL (ref 65–100)
GLUCOSE SERPL-MCNC: 106 MG/DL (ref 65–100)
HBA1C MFR BLD: 6.3 % (ref 4.2–6.3)
HCT VFR BLD AUTO: 40.6 % (ref 35–47)
HDLC SERPL-MCNC: 72 MG/DL
HDLC SERPL: 2.4 {RATIO} (ref 0–5)
HGB BLD-MCNC: 12.6 G/DL (ref 11.5–16)
IMM GRANULOCYTES # BLD AUTO: 0 K/UL (ref 0–0.04)
IMM GRANULOCYTES NFR BLD AUTO: 0 % (ref 0–0.5)
LDLC SERPL CALC-MCNC: 88.2 MG/DL (ref 0–100)
LIPID PROFILE,FLP: NORMAL
LYMPHOCYTES # BLD: 2.4 K/UL (ref 0.8–3.5)
LYMPHOCYTES NFR BLD: 38 % (ref 12–49)
MCH RBC QN AUTO: 26.5 PG (ref 26–34)
MCHC RBC AUTO-ENTMCNC: 31 G/DL (ref 30–36.5)
MCV RBC AUTO: 85.3 FL (ref 80–99)
MONOCYTES # BLD: 0.5 K/UL (ref 0–1)
MONOCYTES NFR BLD: 8 % (ref 5–13)
NEUTS SEG # BLD: 3.3 K/UL (ref 1.8–8)
NEUTS SEG NFR BLD: 52 % (ref 32–75)
NRBC # BLD: 0 K/UL (ref 0–0.01)
NRBC BLD-RTO: 0 PER 100 WBC
P-R INTERVAL, ECG05: 258 MS
PISA AR MAX VEL: 447.34 CM/S
PLATELET # BLD AUTO: 131 K/UL (ref 150–400)
PMV BLD AUTO: 10.7 FL (ref 8.9–12.9)
POTASSIUM SERPL-SCNC: 3.3 MMOL/L (ref 3.5–5.1)
Q-T INTERVAL, ECG07: 424 MS
QRS DURATION, ECG06: 84 MS
QTC CALCULATION (BEZET), ECG08: 467 MS
RBC # BLD AUTO: 4.76 M/UL (ref 3.8–5.2)
SERVICE CMNT-IMP: ABNORMAL
SERVICE CMNT-IMP: ABNORMAL
SERVICE CMNT-IMP: NORMAL
SERVICE CMNT-IMP: NORMAL
SODIUM SERPL-SCNC: 140 MMOL/L (ref 136–145)
TRIGL SERPL-MCNC: 69 MG/DL (ref ?–150)
TROPONIN I SERPL-MCNC: 0.15 NG/ML
TROPONIN I SERPL-MCNC: 0.24 NG/ML
VENTRICULAR RATE, ECG03: 73 BPM
VLDLC SERPL CALC-MCNC: 13.8 MG/DL
WBC # BLD AUTO: 6.4 K/UL (ref 3.6–11)

## 2019-08-15 PROCEDURE — 84484 ASSAY OF TROPONIN QUANT: CPT

## 2019-08-15 PROCEDURE — 82962 GLUCOSE BLOOD TEST: CPT

## 2019-08-15 PROCEDURE — 97116 GAIT TRAINING THERAPY: CPT

## 2019-08-15 PROCEDURE — 97165 OT EVAL LOW COMPLEX 30 MIN: CPT

## 2019-08-15 PROCEDURE — 93306 TTE W/DOPPLER COMPLETE: CPT

## 2019-08-15 PROCEDURE — 80061 LIPID PANEL: CPT

## 2019-08-15 PROCEDURE — 74011250636 HC RX REV CODE- 250/636: Performed by: INTERNAL MEDICINE

## 2019-08-15 PROCEDURE — 74011250637 HC RX REV CODE- 250/637: Performed by: INTERNAL MEDICINE

## 2019-08-15 PROCEDURE — 74011250637 HC RX REV CODE- 250/637: Performed by: PSYCHIATRY & NEUROLOGY

## 2019-08-15 PROCEDURE — 80048 BASIC METABOLIC PNL TOTAL CA: CPT

## 2019-08-15 PROCEDURE — 82550 ASSAY OF CK (CPK): CPT

## 2019-08-15 PROCEDURE — 85025 COMPLETE CBC W/AUTO DIFF WBC: CPT

## 2019-08-15 PROCEDURE — 65660000000 HC RM CCU STEPDOWN

## 2019-08-15 PROCEDURE — 99218 HC RM OBSERVATION: CPT

## 2019-08-15 PROCEDURE — 92610 EVALUATE SWALLOWING FUNCTION: CPT

## 2019-08-15 PROCEDURE — 97535 SELF CARE MNGMENT TRAINING: CPT

## 2019-08-15 PROCEDURE — 36415 COLL VENOUS BLD VENIPUNCTURE: CPT

## 2019-08-15 RX ORDER — ATORVASTATIN CALCIUM 10 MG/1
10 TABLET, FILM COATED ORAL
Status: DISCONTINUED | OUTPATIENT
Start: 2019-08-15 | End: 2019-08-16 | Stop reason: HOSPADM

## 2019-08-15 RX ORDER — HYDRALAZINE HYDROCHLORIDE 20 MG/ML
10 INJECTION INTRAMUSCULAR; INTRAVENOUS
Status: DISCONTINUED | OUTPATIENT
Start: 2019-08-15 | End: 2019-08-16 | Stop reason: HOSPADM

## 2019-08-15 RX ORDER — HYDRALAZINE HYDROCHLORIDE 20 MG/ML
10 INJECTION INTRAMUSCULAR; INTRAVENOUS
Status: DISCONTINUED | OUTPATIENT
Start: 2019-08-15 | End: 2019-08-15

## 2019-08-15 RX ADMIN — Medication 10 ML: at 06:05

## 2019-08-15 RX ADMIN — SODIUM CHLORIDE AND POTASSIUM CHLORIDE: 9; 1.49 INJECTION, SOLUTION INTRAVENOUS at 14:35

## 2019-08-15 RX ADMIN — HYDRALAZINE HYDROCHLORIDE 10 MG: 20 INJECTION INTRAMUSCULAR; INTRAVENOUS at 11:55

## 2019-08-15 RX ADMIN — ATORVASTATIN CALCIUM 10 MG: 10 TABLET, FILM COATED ORAL at 21:25

## 2019-08-15 RX ADMIN — LOSARTAN POTASSIUM 100 MG: 50 TABLET, FILM COATED ORAL at 09:54

## 2019-08-15 RX ADMIN — Medication 10 ML: at 21:28

## 2019-08-15 RX ADMIN — ASPIRIN 81 MG 81 MG: 81 TABLET ORAL at 09:54

## 2019-08-15 RX ADMIN — VITAMIN D, TAB 1000IU (100/BT) 3000 UNITS: 25 TAB at 09:53

## 2019-08-15 RX ADMIN — PANTOPRAZOLE SODIUM 40 MG: 40 TABLET, DELAYED RELEASE ORAL at 09:54

## 2019-08-15 RX ADMIN — HYDRALAZINE HYDROCHLORIDE 10 MG: 20 INJECTION INTRAMUSCULAR; INTRAVENOUS at 17:58

## 2019-08-15 RX ADMIN — HYDROXYZINE HYDROCHLORIDE 10 MG: 10 TABLET, FILM COATED ORAL at 21:25

## 2019-08-15 RX ADMIN — LEVOTHYROXINE SODIUM 88 MCG: 88 TABLET ORAL at 09:53

## 2019-08-15 NOTE — PROGRESS NOTES
Bedside and Verbal shift change report given to JENNIFER Lee (oncoming nurse) by Deedee Judd RN (offgoing nurse). Report included the following information SBAR, Kardex, ED Summary and Recent Results.

## 2019-08-15 NOTE — ED NOTES
Pt Throughput: Charge Nurse on 5th made aware of patient's bed assignment, room 520. Johanny Ibarra RN  Emergency Dept Charge RN.

## 2019-08-15 NOTE — PROGRESS NOTES
Problem: Self Care Deficits Care Plan (Adult)  Goal: *Acute Goals and Plan of Care (Insert Text)  Description    FUNCTIONAL STATUS PRIOR TO ADMISSION: Patient was independent and active without use of DME.    HOME SUPPORT: The patient lived with daughter but did not require assist.    Occupational Therapy Goals  Initiated 8/15/2019  1. Patient will perform lower body dressing with modified independence within 7 day(s). 2.  Patient will perform grooming, standing at sink, with modified independence within 7 day(s). 3.  Patient will perform bathing with modified independence within 7 day(s). 4.  Patient will perform toilet transfers with modified independence within 7 day(s). 5.  Patient will perform all aspects of toileting with modified independence within 7 day(s). 6.  Patient will participate in upper extremity therapeutic exercise/activities with modified independence for 10 minutes within 7 day(s). Outcome: Progressing Towards Goal   OCCUPATIONAL THERAPY EVALUATION  Patient: Neeta De Leon (60 y.o. female)  Date: 8/15/2019  Primary Diagnosis: Left-sided weakness [R53.1]  CVA (cerebral vascular accident) [facility]) [I63.9]        Precautions:   Fall    ASSESSMENT  Based on the objective data described below, the patient presents with decreased balance, functional mobility, slurred speech and weakness. MRI revealed confirmed right posterior lobe infarct. Patient demonstrates good UE strength and coordination, though noted deficits to both L and R shoulders which patient reports are baseline. Patient with compromised vision secondary to need for additional surgery to L eye, but without plan to pursue. Patient able to perform transfers with CGA, patient moving cautiously throughout. Patient educated on BE FAST protocol, and both patient and daughter indicated understanding. Current Level of Function Impacting Discharge (ADLs/self-care): CGA for standing/transfers     Functional Outcome Measure:   The patient scored 61/66 on the [address] UE Motor Assessment outcome measure which is indicative of mild UE deficits, however issues noted likely premorbid. Other factors to consider for discharge: patient independent at baseline     Patient will benefit from skilled therapy intervention to address the above noted impairments. PLAN :  Recommendations and Planned Interventions: self care training, functional mobility training, therapeutic exercise, balance training, therapeutic activities, endurance activities, patient education, home safety training and family training/education    Frequency/Duration: Patient will be followed by occupational therapy 5 times a week to address goals. Recommendation for discharge: (in order for the patient to meet his/her long term goals)  Occupational therapy at least 2 days/week in the home     This discharge recommendation:  Has not yet been discussed the attending provider and/or case management    Equipment recommendations for successful discharge (if) home: none       SUBJECTIVE:   Patient stated I feel kind of tired.     OBJECTIVE DATA SUMMARY:   HISTORY:   Past Medical History:   Diagnosis Date    Bunion     Cherry angioma     Diabetes ([address]     Diabetes mellitus ([address]     HTN (hypertension)     Joint pain     Joint swelling     Rheumatoid arthritis(714.0)     Scoliosis     Stroke ([address]     Thyroid disorder     problems    Vertigo     BPPV     Past Surgical History:   Procedure Laterality Date    HX  SECTION      HX ORTHOPAEDIC      bunions removed x 2       Expanded or extensive additional review of patient history:     Home Situation  Home Environment: Private residence  # Steps to Enter: 4  Rails to Enter: Yes  One/Two Story Residence: One story  Living Alone: No  Support Systems: Family member(s)  Patient Expects to be Discharged to[de-identified] Private residence  Current DME Used/Available at Home: None  Tub or Shower Type: Shower    Hand dominance: Right    EXAMINATION OF PERFORMANCE DEFICITS:  Cognitive/Behavioral Status:  Neurologic State: Alert  Orientation Level: Oriented to person;Oriented to place  Cognition: Follows commands  Perception: (becker reports glasses are at home. patient reports L vision issues. daughter says she has some premoribid issues with L vision)  Perseveration: No perseveration noted  Safety/Judgement: Lack of insight into deficits    Skin: intact as seen    Edema: none noted     Hearing:       Vision/Perceptual:                                     Range of Motion:  AROM: Within functional limits  PROM: Within functional limits                      Strength:  Strength: Within functional limits                Coordination:  Coordination: Within functional limits            Tone & Sensation:    Tone: Normal  Sensation: Intact                      Balance:  Sitting: Intact  Standing: Intact    Functional Mobility and Transfers for ADLs:  Bed Mobility:  Supine to Sit: Independent  Sit to Supine: Independent    Transfers:  Sit to Stand: Modified independent  Stand to Sit: Modified independent  Bed to Chair: Supervision  Bathroom Mobility: Contact guard assistance  Toilet Transfer : Contact guard assistance    ADL Assessment:  Feeding: Supervision    Oral Facial Hygiene/Grooming: Supervision    Bathing: Contact guard assistance    Upper Body Dressing: Stand-by assistance    Lower Body Dressing: Contact guard assistance; Additional time    Toileting: Contact guard assistance                ADL Intervention and task modifications:                                     Cognitive Retraining  Safety/Judgement: Lack of insight into deficits    Therapeutic Exercise:     Functional Measure:     Fugl-Smith Assessment of Motor Recovery after Stroke:     Reflex Activity  Flexors/Biceps/Fingers: Can be elicited  Extensors/Triceps: Can be elicited  Reflex Subtotal: 4    Volitional Movement Within Synergies  Shoulder Retraction: Full  Shoulder Elevation: Partial(limited R shoulder, L shoulder injury)  Shoulder Abduction (90 degrees): Partial(limited L side, baseline)  Shoulder External Rotation: Full  Elbow Flexion: Full  Forearm Supination: Full  Shoulder Adduction/Internal Rotation: Full  Elbow Extension: Full  Forearm Pronation: Full  Subtotal: 16    Volitional Movement Mixing Synergies  Hand to Lumbar Spine: Full  Shoulder Flexion (0-90 degrees): Full  Pronation-Supination: Full  Subtotal: 6    Volitional Movement With Little or No Synergy  Shoulder Abduction (0-90 degrees): Full  Shoulder Flexion ( degrees): Partial  Pronation/Supination: Full  Subtotal : 5    Normal Reflex Activity  Biceps, Triceps, Finger Flexors: Full  Subtotal : 2    Upper Extremity Total   Upper Extremity Total: 33    Wrist  Stability at 15 Degree Dorsiflexion: Full  Repeated Dorsiflexion/ Volar Flexion: Full  Stability at 15 Degree Dorsiflexion: Full  Repeated Dorsiflexion/ Volar Flexion: Full  Circumduction: Full  Wrist Total: 10    Hand  Mass Flexion: Full  Mass Extension: Full  Grasp A: Full  Grasp B: Partial  Grasp C: Full  Grasp D: Full  Grasp E: Full  Hand Total: 13    Coordination/Speed  Tremor: None  Dysmetria: None  Time: <1s  Coordination/Speed Total : 6    Total A-D  Total A-D (Motor Function): 62/66         This is a reliable/valid measure of arm function after a neurological event. It has established value to characterize functional status and for measuring spontaneous and therapy-induced recovery; tests proximal and distal motor functions. Fugl-Smith Assessment - UE scores recorded between five and 30 days post neurologic event can be used to predict UE recovery at six months post neurologic event. Severe = 0-21 points   Moderately Severe = 22-33 points   Moderate = 34-47 points   Mild = 48-66 points  JEFRY Gomez, MARY Alaniz, & RINA Sauer (1992). Measurement of motor recovery after stroke: Outcome assessment and sample size requirements. Stroke, 23, pp. 1223-9205.   --------------------------------------------------------------------------------------------------------------------------------------------------------------------  MCID:  Stroke:   Grover Lefort et al, 2001; n = 171; mean age 79 (6) years; assessed within 16 (12) days of stroke, Acute Stroke)  FMA Motor Scores from Admission to Discharge   10 point increase in FMA Upper Extremity = 1.5 change in discharge FIM   10 point increase in FMA Lower Extremity = 1.9 change in discharge FIM  MDC:   Stroke:   Lucila Sultana et al, 2008, n = 14, mean age = 59.9 (14.6) years, assessed on average 14 (6.5) months post stroke, Chronic Stroke)   FMA = 5.2 points for the Upper Extremity portion of the assessment             Occupational Therapy Evaluation Charge Determination   History Examination Decision-Making   LOW Complexity : Brief history review  LOW Complexity : 1-3 performance deficits relating to physical, cognitive , or psychosocial skils that result in activity limitations and / or participation restrictions  LOW Complexity : No comorbidities that affect functional and no verbal or physical assistance needed to complete eval tasks       Based on the above components, the patient evaluation is determined to be of the following complexity level: LOW   Pain Rating:      Activity Tolerance:   Good  Please refer to the flowsheet for vital signs taken during this treatment. After treatment patient left in no apparent distress:    Supine in bed, Bed / chair alarm activated, Caregiver / family present and Side rails x 3    COMMUNICATION/EDUCATION:   The patients plan of care was discussed with: Physical Therapist and Registered Nurse. Home safety education was provided and the patient/caregiver indicated understanding., Patient/family have participated as able in goal setting and plan of care. and Patient/family agree to work toward stated goals and plan of care.     This patients plan of care is appropriate for delegation to JERARDO.     Thank you for this referral.  Frances De Dios, OTR/L  Time Calculation: 23 mins

## 2019-08-15 NOTE — PROGRESS NOTES
Problem: Dysphagia (Adult)  Goal: *Acute Goals and Plan of Care (Insert Text)  Description  Swallowing goals initiated 8-15-19:  1) tolerate regular diet, thins without s/s aspiration of oral holding by 8-19-19   Outcome: Progressing Towards Goal   SPEECH [address] EVALUATION  Patient: Alex Hogan [de-identified]80 y.o. female)  Date: 8/15/2019  Primary Diagnosis: Left-sided weakness [R53.1]  CVA (cerebral vascular accident) ([address] [I63.9]        Precautions: aspiration       ASSESSMENT :  Based on the objective data described below, the patient presents with mild-moderate oral dysphagia and mild pharyngeal dysphagia. Evaluated complicated by reduced dentitia. Her main aspiration risk may be mild oral residue with intermittently reduced awareness. No overt dysarthria on the limited English phrases that she spoke. Daughter reports she is talking well today [place] Admitted with L weakness, dysarthria. L drooling. MRI:  moderate acute vs subacute infarct in R temporal/parietal areas. PMH: CVA, HTN, DM, R/A, scoliosis, thyroid disesase, BPPV . Patient will benefit from skilled intervention to address the above impairments. Patients rehabilitation potential is considered to be Good  Factors which may influence rehabilitation potential include:   ? None noted  ? Mental ability/status  ? Medical condition  ? Home/family situation and support systems  ? Safety awareness  ? Pain tolerance/management  ? Other:      PLAN :  Recommendations and Planned Interventions:  Obtain her dentures as soon as possible. Continue soft, moist foods, thin liquids. Watch for L oral residue. Frequency/Duration: Patient will be followed by speech-language pathology 3 times a week to address goals. Discharge Recommendations:  To Be Determined     SUBJECTIVE:   Patient admitted to L oral residue, but had to be asked about it.    OBJECTIVE:     Past Medical History:   Diagnosis Date    Bunion     Cherry angioma     Di[facility] [address]     Diabetes mellitus ([facility]     HTN (hypertension)     Joint pain     Joint swelling     Rheumatoid arthritis(714.0)     Scoliosis     Stroke ([facility]     Thyroid disorder     problems    Vertigo     BPPV     Past Surgical History:   Procedure Laterality Date    HX  SECTION      HX ORTHOPAEDIC      bunions removed x 2     Prior Level of Function/Home Situation: lives with daughter     Diet prior to admission: regualr, thins  Current Diet:  regular, thins. She passed the STAND   Cognitive and Communication Status:  Neurologic State: Alert  Orientation Level: Oriented to person, Oriented to place  Cognition: Follows commands  Perception: (duaghter reports glasses are at home. patient reports L vision issues. daughter says she has some premoribid issues with L vision)  Perseveration: No perseveration noted  Safety/Judgement: Lack of insight into deficits  Oral Assessment:  Oral Assessment  Labial: No impairment  Dentition: Edentulous(has dentures-at home. daughter to bring)  Oral Hygiene: WFL  Lingual: (mildly slow movement)  Velum: No impairment  Mandible: No impairment  P.O. Trials:  Patient Position: upright in bed  Vocal quality prior to P.O.: No impairment(no overt dysarthria noted. pateint speaking mostly [place]-from [facility])  Consistency Presented: Thin liquid; Solid;Puree  How Presented: Self-fed/presented;SLP-fed/presented;Spoon;Straw   ORAL PHASE:   Unable to bite due to lack of dentitia  Reduced chew due to lack of dentitia  Moderate L oral residue that she cleared 80% of . Cues to clear the rest. Instructed daughter about this as an aspiration risk  PHARYNGEAL PHASE:   Cough x1 on first sip of water. Timing WFL  Mildly decreased strength at 80years old. No h/o dysphagia.      She eats mostly vegetarian, but will eat lunchmeat-no trky NOMS:   The NOMS functional outcome measure was used to quantify this patient's level of swallowing impairment. Based on the NOMS, the patient was determined to be at level 5 for swallow function       NOMS Swallowing Levels:  Level 1 (CN): NPO  Level 2 (CM): NPO but takes consistency in therapy  Level 3 (CL): Takes less than 50% of nutrition p.o. and continues with nonoral feedings; and/or safe with mod cues; and/or max diet restriction  Level 4 (CK): Safe swallow but needs mod cues; and/or mod diet restriction; and/or still requires some nonoral feeding/supplements  Level 5 (CJ): Safe swallow with min diet restriction; and/or needs min cues  Level 6 (CI): Independent with p.o.; rare cues; usually self cues; may need to avoid some foods or needs extra time  Level 7 ([address] [place] [address]): Independent for all p.o.  MAYRA. (2003). National Outcomes Measurement System (NOMS): Adult Speech-Language Pathology User's Guide. Pain:  Pain Scale 1: Numeric (0 - 10)  Pain Intensity 1: 0     After treatment:   ?            Patient left in no apparent distress sitting up in chair  ? Patient left in no apparent distress in bed  ? Call bell left within reach  ? Nursing notified  ? Caregiver present  ? Bed alarm activated    COMMUNICATION/EDUCATION:   The patients plan of care including recommendations, planned interventions, and recommended diet changes were discussed with: Registered Nurse. Patient was educated regarding Her deficit(s) of dysphagia as this relates to Her diagnosis of CVA. She demonstrated Fair understanding as evidenced by discussion. Daughter present and understood. .  ?            Posted safety precautions in patient's room. ? Patient/family have participated as able in goal setting and plan of care. ?            Patient/family agree to work toward stated goals and plan of care.   ?            Patient understands intent and goals of therapy, but is neutral about his/her participation. ? Patient is unable to participate in goal setting and plan of care.     Thank you for this referral.  ANDRE Sun  Time Calculation: 15 mins

## 2019-08-15 NOTE — PROGRESS NOTES
[facility]: MED RECONCILIATION    Medications added:     Voltaren gel 2 gm applied to knees PRN OA pain  Hydroxyzine 10 mg PO QHS PRN for itching caused by Vazquez Hernandez of her medications\" - this is prescribed 1/2 tab twice daily, but it makes her sleepy so she only takes it at bedtime    Medications removed:    Norco PRN  Lorazepam PRN  Tramadol PRN    Medications adjusted:    Losartan/HCTZ 100/25 mg PO daily (instead of plain losartan 50 mg daily)    Information obtained from: patient's family, Rx query    Significant PMH/Disease States:   Past Medical History:   Diagnosis Date    Bunion     Cherry angioma     Di[address]     Diabetes mellitus ([facility]     HTN (hypertension)     Joint pain     Joint swelling     Rheumatoid arthritis(714.0)     Scoliosis     Stroke [facility])     Thyroid disorder     problems    Vertigo     BPPV     Chief Complaint for this Admission:   Chief Complaint   Patient presents with    Extremity Weakness     Allergies: Shellfish containing products    Prior to Admission Medications:     Medication Documentation Review Audit       Reviewed by IZZY JordanD (Pharmacist) on 08/14/19 at 2246      Medication Sig Documenting Provider Last Dose Status Taking? albuterol (PROVENTIL HFA, VENTOLIN HFA, PROAIR HFA) 90 mcg/actuation inhaler Take 1 Puff by inhalation every four (4) hours as needed for Wheezing. Rose Trimble NP  Active Yes   aspirin 81 mg chewable tablet Take 81 mg by mouth daily. Provider, Historical 8/14/2019 am Active Yes   Cholecalciferol, Vitamin D3, 3,000 unit tab Take 3,000 Units by mouth daily. Provider, Historical 8/14/2019 am Active Yes   diclofenac (VOLTAREN) 1 % gel Apply 2 g to affected area two (2) times daily as needed. Apply to knees for pain   Indications: osteoarthritis of the knee Provider, Historical  Active Yes   glucose blood VI test strips (ONE TOUCH ULTRA TEST) strip by Does Not Apply route three (3) times daily.  Oksana Anderson MD  Active Yes   hydrOXYzine HCl (ATARAX) 10 mg tablet Take 10 mg by mouth nightly as needed for Itching. Provider, Historical 8/13/2019 pm Active Yes   ibuprofen (MOTRIN) 400 mg tablet Take 1 Tab by mouth every six (6) hours as needed for Pain. Kailey Villaseñor DO  Active Yes   Lancets (ONE [facility]) misc by Does Not Apply route three (3) times daily. Kuldip Donis MD  Active Yes   levothyroxine (SYNTHROID) 88 mcg tablet TAKE ONE TABLET BY MOUTH EVERY DAY Ruth Pickering MD 8/14/2019 am Active Yes   losartan-hydroCHLOROthiazide (HYZAAR) 100-25 mg per tablet Take 1 Tab by mouth daily. Provider, Historical 8/14/2019 am Active Yes   pantoprazole (PROTONIX) 40 mg tablet Take 1 Tab by mouth daily. Mert De Paz MD 8/14/2019 am Active Yes                Thank you for the consult,  Chavez HOPKINS, Valentin [facility]

## 2019-08-15 NOTE — ED NOTES
Bedside and Verbal shift change report given to Leila Stephenson RN (oncoming nurse) by Madelin Mcintyre RN (offgoing nurse). Report included the following information SBAR, Kardex, ED Summary, MAR, Accordion and Recent Results.

## 2019-08-15 NOTE — PROGRESS NOTES
Chart reviewed, case discussed with Dr. Nickolas Walker. Agree with plan of care. Pt admitted with stroke and has been evaluated by neuro.  She is on aspirin

## 2019-08-15 NOTE — PROGRESS NOTES
8/15/2019  10:24 AM  Case management note    Reason for Admission:   Acute CVA  Patient had been with daughter outside while she ran, upon return her mom had glazed look and slurred speech. Prior patient ambulated through home and daughters assisted with some ADL's  Patient has history of DM and HTN  [employer] [facility] [place]                RRAT Score:     34             Resources/supports as identified by patient/family:   Lives with 2 daughters                Top Challenges facing patient (as identified by patient/family and CM): Finances/Medication cost?      Patient has Medicare and Medicaid              Transportation?  family              Support system or lack thereof? Daughters                      Living arrangements? Lives with daughters in 1 story home with 1 step, patient uses cane for travel outside of home           Self-care/ADLs/Cognition? Patient unable to all self care, poor health cognition          Current Advanced Directive/Advance Care Plan:  DNR, no advance directive and no DNR on file                          Plan for utilizing home health:    Has not used HH in past                 Transition of Care Plan:               1. Home with family assistance  2. PCP follow up  3. Neuro follow up  4. Long term planning  5. Advance directive planning  6.  CM to follow until discharge       Care Management Interventions  PCP Verified by CM: Yes(dr. Eugenia Singh no nn)  Mode of Transport at Discharge: Self  Transition of Care Consult (CM Consult): Discharge Planning  Current Support Network: [address] discussed with Pt/Family/Caregiver: Yes  Discharge Location  Discharge Placement: Home with family assistance   [place], [place]

## 2019-08-15 NOTE — H&P
[address]  [phone]    Admission History and Physical      NAME:  Dwain Omer   :   10/12/1930   MRN:  [id number]     PCP:  Jorge Painter MD     Date/Time:  8/15/2019         Subjective:     CHIEF COMPLAINT: Per daughter, Kalani Woodard was weak, had slurred speech and was weak\"      HISTORY OF PRESENT ILLNESS:     Ms. Frances Murphy is a 80 y.o.  female with PMH of HTN, DM admitted for concerns for CVA. CODE STROKE called upon arrival to the ED due to L sided weakness and drooling/slurred speech. Teleneuro evaluated but pt's weakness began to resolve so tPA held. Upon my eval symptoms completely resolved. Per daughter, they were at the park and her mother was in the sun on a bench waiting for her daughter to finish running. When she returned, her mother appeared \"glazed\" with changes in speech and L sided strength. Past Medical History:   Diagnosis Date    Bunion     Cherry angioma     Diabetes ([address]     Diabetes mellitus ([address]     HTN (hypertension)     Joint pain     Joint swelling     Rheumatoid arthritis(714.0)     Scoliosis     Stroke ([address]     Thyroid disorder     problems    Vertigo     BPPV        Past Surgical History:   Procedure Laterality Date    HX  SECTION      HX ORTHOPAEDIC      bunions removed x 2       Social History     Tobacco Use    Smoking status: Never Smoker    Smokeless tobacco: Never Used   Substance Use Topics    Alcohol use: No        Family History   Problem Relation Age of Onset    Stroke Father         Allergies   Allergen Reactions    Shellfish Containing Products Swelling        Prior to Admission medications    Medication Sig Start Date End Date Taking? Authorizing Provider   losartan-hydroCHLOROthiazide (HYZAAR) 100-25 mg per tablet Take 1 Tab by mouth daily.    Yes Provider, Historical   diclofenac (VOLTAREN) 1 % gel Apply 2 g to affected area two (2) times daily as needed. Apply to knees for pain   Indications: osteoarthritis of the knee   Yes Provider, Historical   hydrOXYzine HCl (ATARAX) 10 mg tablet Take 10 mg by mouth nightly as needed for Itching. Yes Provider, Historical   ibuprofen (MOTRIN) 400 mg tablet Take 1 Tab by mouth every six (6) hours as needed for Pain. 12/19/18  Yes Charles Pastor, DO   albuterol (PROVENTIL HFA, VENTOLIN HFA, PROAIR HFA) 90 mcg/actuation inhaler Take 1 Puff by inhalation every four (4) hours as needed for Wheezing. 2/11/17  Yes Betito Mcwilliams, SERGIO   Cholecalciferol, Vitamin D3, 3,000 unit tab Take 3,000 Units by mouth daily. Yes Provider, Historical   levothyroxine (SYNTHROID) 88 mcg tablet TAKE ONE TABLET BY MOUTH EVERY DAY 9/8/14  Yes Francia Can MD   pantoprazole (PROTONIX) 40 mg tablet Take 1 Tab by mouth daily. 7/7/14  Yes Ovidio Oden MD   Lancets ([facility]) misc by Does Not Apply route three (3) times daily. 4/8/14  Yes Petra Hartmann MD   glucose blood VI test strips (ONE TOUCH ULTRA TEST) strip by Does Not Apply route three (3) times daily. 3/24/14  Yes Petra Hartmann MD   aspirin 81 mg chewable tablet Take 81 mg by mouth daily.      Yes Provider, Historical         Review of Systems:  (bold if positive, if negative)    Gen:  Eyes:  ENT:  CVS:  Pulm:  GI:    :    MS:  Skin:  Psych:  Endo:    Hem:  Renal:    Neuro:     Weakness, change in speech        Objective:      VITALS:    Vital signs reviewed; most recent are:    Visit Vitals  /77   Pulse (!) 58   Resp 25   Wt 51.3 kg (113 lb 1.5 oz)   SpO2 96%   BMI 20.69 kg/m²     SpO2 Readings from Last 6 Encounters:   08/15/19 96%   12/19/18 99%   11/25/18 100%   04/01/18 100%   10/08/17 96%   03/21/17 98%        No intake or output data in the 24 hours ending 08/15/19 0136         Exam:     Physical Exam:    Gen:  Well-developed, well-nourished, in no acute distress  HEENT:  Pink conjunctivae, PERRL, hearing intact to voice, moist mucous membranes  Neck:  Supple, without masses, thyroid non-tender  Resp:  No accessory muscle use, clear breath sounds without wheezes rales or rhonchi  Card:  No murmurs, normal S1, S2 without thrills, bruits or peripheral edema  Abd:  Soft, non-tender, non-distended, normoactive bowel sounds are present, no palpable organomegaly  Lymph:  No cervical adenopathy  Musc:  No cyanosis or clubbing  Skin:  No rashes or ulcers, skin turgor is good  Neuro:  Cranial nerves 3-12 are grossly intact, mild L sided facial asymmetry. Mild LUE weakness   Psych:  Alert with good insight. Oriented to person, place, and time  Thin female. Arabic speaking      Labs:    Recent Labs     08/14/19 2018   WBC 5.4   HGB 13.1   HCT 41.8   *     Recent Labs     08/14/19 2018      K 3.2*      CO2 33*   *   BUN 25*   CREA 1.01   CA 10.2*   ALB 3.4*   SGOT 23   ALT 20     No components found for: GLPOC  No results for input(s): PH, PCO2, PO2, HCO3, FIO2 in the last 72 hours. Recent Labs     08/14/19 2018   INR 1.1     Head CT => no acute process   Head CTA =>   CT was performed of the head and neck. Common carotid bifurcations demonstrate  slight plaquing. The left vertebral artery is slightly larger than the right in  the neck. There are calcifications within the tonsils with a 8mm low density  left tonsil most likely inflammatory but is nonspecific and correlation would be  helpful.     Intracranial internal carotid arteries are patent. There is a dominant left  vertebral artery. No aneurysm or thrombus identified. Right posterior cerebral artery arises from the anterior circulation. Head MRI =>   MRI of the brain demonstrates acute cortical infarction involving superior right  temporal lobe and inferior right parietal lobe. There is prominence of the  ventricles and sulci.  There are moderate changes small vessel disease  periventricular white matter        Assessment/Plan:    Left-sided weakness (8/14/2019) - 2/2 CVA as above   -continue ASA   -lipid panel and AIc pending   -permissive HTN  -check TTE   -neurology   -PT/OT/speech eval   -likely will need the addition of plavix       HTN (hypertension) (7/15/2011) - permissive HTN for now   -optimization of BP after 24-48 hours       CVA (cerebral vascular accident) ([facility] (7/15/2011)      Overview: 2002  -with recurrence; see above       Diabetes mellitus, type 2 ([facility] (7/15/2011) - per daughter, no longer active   -A1c pending       Hypothyroidism (7/15/2011)  -on levothyroxine       Hyperlipidemia (8/15/2011)  -check LDL; statin if LDL NOT at goal of < 70      Elevated troponin (8/14/2019) - likely 2/2 acute CVA as did not have CP/SOB/N/V  -trend CE's   -TTE      Hypokalemia (8/14/2019)  -replete     Surrogate decision maker: Daughter, pt is DNR     Total time spent with patient: [address] [facility] discussed with: Patient and Family    Discussed:  Code Status, Care Plan and D/C Planning    Prophylaxis:  SCD's    Probable Disposition:  Home w/Family           ___________________________________________________    Attending Physician: Mohini Alejandra MD

## 2019-08-15 NOTE — PROGRESS NOTES
0720 - Bedside and Verbal shift change report given to richar villarreal (oncoming nurse) by Kendal Gamez (offgoing nurse). Report included the following information SBAR, Kardex, Intake/Output, MAR, Recent Results and Cardiac Rhythm NSR.   1527 - report called to 5th floor at this time.

## 2019-08-15 NOTE — PROGRESS NOTES
Problem: Mobility Impaired (Adult and Pediatric)  Goal: *Acute Goals and Plan of Care (Insert Text)  Description  FUNCTIONAL STATUS PRIOR TO ADMISSION: Patient was independent and active without use of DME.    HOME SUPPORT PRIOR TO ADMISSION: The patient lived with daughters but did not require assist.    Physical Therapy Goals  Initiated 8/15/2019  1. Patient will move from supine to sit and sit to supine  in bed with independence within 7 day(s). 2.  Patient will transfer from bed to chair and chair to bed with modified independence using the least restrictive device within 7 day(s). 3.  Patient will perform sit to stand with modified independence within 7 day(s). 4.  Patient will ambulate with modified independence for 250 feet with the least restrictive device within 7 day(s). 5.  Patient will ascend/descend 4 stairs with 1 handrail(s) with modified independence within 7 day(s). Outcome: Progressing Towards Goal  Note:   PHYSICAL THERAPY EVALUATION  Patient: Alex Hogan [de-identified]80 y.o. female)  Date: 8/15/2019  Primary Diagnosis: Left-sided weakness [R53.1]  CVA (cerebral vascular accident) [facility]) [I63.9]        Precautions:   Fall      ASSESSMENT  Based on the objective data described below, the patient presents with decreased balance and endurance following admission for weakness and slurred speech and now with a MRI confirmed right posterior lobe infarct. Patient with symmetrical strength for UE and LE, sensation and vision are intact without deficits. Patient was able to ambulate in hallway with CGA, slow rafa but no loss of balance. Patient lives with her daughters who are available at discharge and recommend supervision for safety. Current Level of Function Impacting Discharge (mobility/balance): slow and guarded ambulation    Functional Outcome Measure: The patient scored 42/56 on the VASQUES outcome measure which is indicative of low fall risk.       Other factors to consider for discharge: was very active prior to admission performs all the housework and cooking at the house with her daughters     Patient will benefit from skilled therapy intervention to address the above noted impairments. PLAN :  Recommendations and Planned Interventions: bed mobility training, transfer training, gait training, therapeutic exercises, neuromuscular re-education and therapeutic activities      Frequency/Duration: Patient will be followed by physical therapy:  5 times a week to address goals. Recommendation for discharge: (in order for the patient to meet his/her long term goals)  Physical therapy at least 2 days/week in the home     This discharge recommendation:  A follow-up discussion with the attending provider and/or case management is planned    Equipment recommendations for successful discharge (if) home: none         SUBJECTIVE:   Patient stated i am doing better.     OBJECTIVE DATA SUMMARY:   HISTORY:    Past Medical History:   Diagnosis Date    Bunion     Cherry angioma     Diabetes ([facility]     Diabetes mellitus ([facility]     HTN (hypertension)     Joint pain     Joint swelling     Rheumatoid arthritis(714.0)     Scoliosis     Stroke ([facility]     Thyroid disorder     problems    Vertigo     BPPV     Past Surgical History:   Procedure Laterality Date    HX  SECTION      HX ORTHOPAEDIC      bunions removed x 2       Personal factors and/or comorbidities impacting plan of care: see below    Home Situation  Home Environment: Private residence  # Steps to Enter: 4  Rails to Enter: Yes  One/Two Story Residence: One story  Living Alone: No  Support Systems: Family member(s)  Patient Expects to be Discharged to[de-identified] Private residence    EXAMINATION/PRESENTATION/DECISION MAKING:   Vitals:    08/15/19 1022 08/15/19 1100 08/15/19 1111 08/15/19 1130   BP: 193/78 175/75 175/75 190/67   BP 1 Location: Right arm      BP Patient Position: Post activity      Pulse: 66 63  (!) 53   Resp:  18  13   Temp:    97.2 °F (36.2 °C)   SpO2: 99% 99%  99%   Weight:   51.3 kg (113 lb 1.5 oz)    Height:   5' 2\" (1.575 m)        Critical Behavior:  Neurologic State: Alert  Orientation Level: Oriented to person, Oriented to place  Cognition: Follows commands  Safety/Judgement: Lack of insight into deficits  Hearing:     Skin:  all exposed intact  Edema: none noted  Range Of Motion:  AROM: Within functional limits           PROM: Within functional limits           Strength:    Strength:  Within functional limits                    Tone & Sensation:   Tone: Normal              Sensation: Intact               Coordination:  Coordination: Within functional limits  Vision:      Functional Mobility:  Bed Mobility:     Supine to Sit: Independent  Sit to Supine: Independent     Transfers:  Sit to Stand: Modified independent  Stand to Sit: Modified independent        Bed to Chair: Supervision              Balance:   Sitting: Intact  Standing: Intact  Ambulation/Gait Training:  Distance (ft): 200 Feet (ft)  Assistive Device: Gait belt  Ambulation - Level of Assistance: Contact guard assistance     Gait Description (WDL): Exceptions to WDL             Functional Measure:  Alexander Balance Test:    Sitting to Standing: 3  Standing Unsupported: 4  Sitting with Back Unsupported: 4  Standing to Sittin  Transfers: 4  Standing Unsupported with Eyes Closed: 3  Standing Unsupported with Feet Together: 1  Reach Forward with Outstretched Arm: 4   Object: 4  Turn to Look Over Shoulders: 4  Turn 360 Degrees: 1  Alternate Foot on Step/Stool: 4  Standing Unsupported One Foot in Front: 1  Stand on One Le  Total: 42         56=Maximum possible score;   0-20=High fall risk  21-40=Moderate fall risk   41-56=Low fall risk               Physical Therapy Evaluation Charge Determination   History Examination Presentation Decision-Making   HIGH Complexity :3+ comorbidities / personal factors will impact the outcome/ POC  LOW Complexity : 1-2 Standardized tests and measures addressing body structure, function, activity limitation and / or participation in recreation  LOW Complexity : Stable, uncomplicated  Other outcome measures VASQUES  LOW       Based on the above components, the patient evaluation is determined to be of the following complexity level: LOW     Pain Rating:  None reported    Activity Tolerance:   Good  Please refer to the flowsheet for vital signs taken during this treatment. After treatment patient left in no apparent distress:   Supine in bed, Call bell within reach, Bed / chair alarm activated and Caregiver / family present    COMMUNICATION/EDUCATION:   The patients plan of care was discussed with: Registered Nurse. Fall prevention education was provided and the patient/caregiver indicated understanding., Patient/family have participated as able in goal setting and plan of care. and Patient/family agree to work toward stated goals and plan of care.     Thank you for this referral.  Too Alvarez, PT, DPT   Time Calculation: 20 mins

## 2019-08-15 NOTE — PROGRESS NOTES
Shift Summary    TRANSFER - IN REPORT:    Verbal report received from Shon Conn RN(name) on Jillian Jewell  being received from ED(unit) for routine progression of care      Report consisted of patients Situation, Background, Assessment and   Recommendations(SBAR). Information from the following report(s) SBAR, Kardex, ED Summary, MAR, Recent Results and Cardiac Rhythm   was reviewed with the receiving nurse. Opportunity for questions and clarification was provided. Assessment completed upon patients arrival to unit and care assumed. Primary Nurse Carmen Cooney RN and Amberly Horn RN performed a dual skin assessment on this patient No impairment noted  August score is 20      Stroke Education provided to patient, caregiver / Daughter and the following topics were discussed    1. Patients personal risk factors for stroke are hypertension, hyperlipidemia, diabetes mellitus and prior stroke    2. Warning signs of Stroke:        * Sudden numbness or weakness of the face, arm or leg, especially on one side of          The body            * Sudden confusion, trouble speaking or understanding        * Sudden trouble seeing in one or both eyes        * Sudden trouble walking, dizziness, loss of balance or coordination        * Sudden severe headache with no known cause      3. Importance of activation Emergency Medical Services ( 9-1-1 ) immediately if experience any warning signs of stroke. 4. Be sure and schedule a follow-up appointment with your primary care doctor or any specialists as instructed. 5. You must take medicine every day to treat your risk factors for stroke. Be sure to take your medicines exactly as your doctor tells you: no more, no less. Know what your medicines are for , what they do. Anti-thrombotics /anticoagulants can help prevent strokes. You are taking the following medicine(s)  Aspirin     6.   Smoking and second-hand smoke greatly increase your risk of stroke, cardiovascular disease and death. Smoking history never    7. Information provided was [facility] Stroke Education Binder    8. Documentation of teaching completed in Patient Education Activity and on Care Plan with teaching response noted? yes      Bedside and Verbal shift change report given to [facility] (oncoming nurse) by CHERELLE Morris RN (offgoing nurse). Report included the following information SBAR, Kardex, MAR, Recent Results and Cardiac Rhythm  .

## 2019-08-15 NOTE — ED NOTES
Left sided weakness resolved during tele neuro assessment, decision not to give TPA made by tele neurology and Dr. Jean Burt.

## 2019-08-15 NOTE — ED NOTES
Assumed pt care. Pt ambulated to bathroom with 1 assist. Denies pain, SOB or weakness at this time. Pt A&O x4.

## 2019-08-15 NOTE — ROUTINE PROCESS
TRANSFER - OUT REPORT:    Verbal report given to joshua(name) on Cindy Kiser  being transferred to medical(unit) for routine progression of care       Report consisted of patients Situation, Background, Assessment and   Recommendations(SBAR). Information from the following report(s) SBAR, Kardex, ED Summary, Intake/Output, MAR, Recent Results and Cardiac Rhythm NSR was reviewed with the receiving nurse. Lines:   Peripheral IV 08/14/19 Left Antecubital (Active)   Site Assessment Clean, dry, & intact 8/15/2019  1:00 AM   Phlebitis Assessment 0 8/15/2019  1:00 AM   Infiltration Assessment 0 8/15/2019  1:00 AM   Dressing Status Clean, dry, & intact 8/15/2019  1:00 AM   Dressing Type Transparent 8/15/2019  1:00 AM   Hub Color/Line Status Pink; Infusing 8/15/2019  1:00 AM        Opportunity for questions and clarification was provided.       Patient transported with:   [employer]

## 2019-08-15 NOTE — CONSULTS
[facility]: [id number] [address] Neurology  [place] [address]  [phone]        Name:   Stacey Gardner   Medical record #: [id number]  Admission Date: 8/14/2019     Who Consulted: Dr. Brisa Sierra    Reason for Consult:  Left sided weakness/ dysarthria / CVA rule out. HISTORY OF PRESENT ILLNESS:     This is a 80 y.o. female who is admitted for L sided weakness. The Neurology Service is asked to evaluate for CVA/TIA rule out. PMHx of HTN, DM, and stroke. Pt noted to have had L sided weakness  and slurred speech yesterday around 7 pm. Pt was seating in the park when symptoms started. Family noted the changes and brought pt to ED. Code stroke called. She improved significantly in the ER. Tele-Neurology did not recommend TPA based on her rapid improvement of symptoms. Neuro-imaging:     CT Head:   FINDINGS:  There is mild prominence of the ventricles and sulci. There are mild changes  small vessel disease periventricular white matter. Somewhat the prior study. No  hemorrhage mass or acute infarction is identified. Bony structures are intact.     IMPRESSION  IMPRESSION: No acute intracranial process is identified.       CTA Head and Neck:   NECK:     The origins of the great vessels are patent. Both vertebral arteries are patent. Both carotid arteries are patent with only mild bifurcation calcification. .  There is no significant stenosis using NASCET criteria.     Head:     Major intracranial vessels are patent. No large vessel occlusion or intracranial  aneurysm there is fetal origin of right posterior cerebral artery. . No evidence  for intracranial hemorrhage or acute stroke. The underlying brain is  unremarkable.        IMPRESSION  IMPRESSION: No acute abnormality. Negative CTA head and neck    MRI Brain:   FINDINGS:       Ventricles:  Midline, no hydrocephalus.     Brain Parenchyma/Brainstem:  Patchy moderate chronic white matter disease  throughout the supratentorial brain and logan. Several remote infarctions left  cerebellum. Moderate-sized area of acute cortical and subcortical infarction in  anterior right parietal lobe in the posterior MCA territory. Intracranial Hemorrhage:  No acute hemorrhage. Punctate foci of susceptibility  artifact posterior right temporal lobe, inferior right occipital lobe suggesting  prior microhemorrhage. Basal Cisterns:  Normal.   Flow Voids:  Normal.  Additional Comments:  N/A.     IMPRESSION  IMPRESSION:  Moderate sized acute cortical and subcortical infarction right posterior MCA  territory involving the anterior right parietal lobe. Background of white matter disease. Care Plan discussed with:  Patient x   Family    RN    Care Manager    Consultant/Specialist:         Thank you for allowing the Neurology Service the pleasure of participating in the care of your patient. This patient will be discussed with my collaborating care team physician Dr Marylen Poet and he may have further recommendations regarding this patient's care    Jacinto Closs, MD    ====================    Attending Attestation:         NEUROLOGY ATTENDING ADDENDUM:    August 15, 2019    Pt personally seen and examined. Chart reviewed. Agree with Dr Zac Toribio (PGY-3, Family Practice) history, exam and  A/P with changes/additions. Pt primarily speaks [place] Daughter is at bedside and gives history. They had gone out of the house, pt was sitting in car and suddenly slumped over to left side and appeared confused. Daughter brought her to ER. Pt had left sided weakness and aphasia that significantly improved in ER. Tele-neurologist rated NIH as 2 and due to rapid improvement, did not recommend tPA. On questioning, patient says she was not taking aspirin regularly.    She was not on any cholesterol mediation PTA  LDL is 88 (above goal of < 70 in setting of TIA/ CVA)    MRI Brain shows a small-moderate sized, wedge/ strip of acute diffusion abnormality in the anteior right parietal region, from the cortex down through the subcortical region, consistent with acute ischemic stroke. Patient Vitals for the past 4 hrs:   Temp Pulse Resp BP SpO2   08/15/19 1200  66 16 159/67 98 %   08/15/19 1130 97.2 °F (36.2 °C) (!) 53 13 190/67 99 %   08/15/19 1111    175/75    08/15/19 1100  63 18 175/75 99 %   08/15/19 1022  66  193/78 99 %   08/15/19 1009  62  176/78 98 %         Exam   General: awake, resting, mild left lower facial weakness. CN: EOMI, Face symmetric, Facial sensation intact bilaterally, Hearing grossly normal, Language normal (no aphasia, no dysarthria), Tongue Midline, Shrug symmetric. Motor: RUE/ RLE 5/ 5; LUE 5/5, LLE 5/5. Sensory: intact LT, vibration in all extremities  Cerebellar: normal FNF bilaterally  DTRs: 1+  Gait: not examined    Impression/ Plan    80 y.o. female with moderate sized cortical and subcortical acute ischemic stroke in right anterior parietal region    D/w pt-daughter she will need to take ASA daily to reduce chance of future TIA/ CVA; started during this admission. Will go with single anti-platelet therapy in this older patient (ie not Plavix + ASA). Added Lipitor 10 mg QHS to get LDL to goal (< 70). Continue permissive HTN until 24 hrs after onset of symptoms (which was around 730 PM yesterday); after that can start gradually lowering BP to normal goals. PT feels pt can do outpatient/ home PT.  TTE pending. Will f/u tomorrow if remains admitted. Thank you for the consultation. Signed By: Talia Yee MD     August 15, 2019             Assessment/Plan:     1. CVA/TIA rule out:  Hx of CVA 2002. Left sided weakness started yesterday. MRI brain noted acute cortical infarction involving superior right temporal lobe and inferior right parietal lobe. · Permissive HTN for 24 hours  · Neurochecks:  Every 4 hours   · Labs pending lipid panel and A1C  · ECHO  · Was already on ASA 81. Need to add plavix    2.   Mobility: · Has been/not been OOB. · PT/OT to eval for rehab    3. Diet:    · Does need SLP    4. VTE prophylaxis  · Per the primary team     Review of Systems: 10 point ROS was performed. Pertinent positives listed in HPI. Negative ROS is as follows. Pt denies: angina, palpitations, paresthesias, weakness, vision loss, slurred speech, aphasia, confusion, fever, chills, falls, headache, diplopia, back pain, neck pain, prior episodes of vertigo, hallucinations, new medications or dosage changes. PHYSICAL EXAM:     Visit Vitals  /67   Pulse 66   Temp 97.2 °F (36.2 °C)   Resp 16   Ht 5' 2\" (1.575 m)   Wt 51.3 kg (113 lb 1.5 oz)   SpO2 98%   BMI 20.69 kg/m²          General:   L side facial droop. Alert, cooperative, no acute distress. NEUROLOGICAL EXAM:     Mental Status: Oriented to time, place and person. Fully attentive. No aphasia. Full fund of knowledge. Normal recent and remote memory. Cranial Nerves:   Visual Fields:  normal in all quadrants in both eyes. EOM: no nystagmus. Left facial droop, no ptosis Facial sensation:  intact to LT on both sides. Hearing:  normal.       Language:  no dysarthria, no aphasia, normal fluency, normal repetition. Tongue: midline. Soft palate: not examined  SCMs: normal, symmetric. Reflexes:   LUExt: 1+/ 4                 RUExt: 1+/ 4  LLExt: 1+/4                  RLExt: 1+/ 4          Sensory:   LT and Temp intact in all extremities            Cerebellar:  No resting, no postural tremors, normal finger nose finger. No pronator drift                            Motor:           LUExt: 3/ 5               RUExt: 5/ 5                                              LLExt: 3/ 5                RLExt: 5/ 5        Gait:   Not tested        Allergies: Allergies   Allergen Reactions    Shellfish Containing Products Swelling       Outpatient Meds  No current facility-administered medications on file prior to encounter.       Current Outpatient Medications on File Prior to Encounter   Medication Sig Dispense Refill    losartan-hydroCHLOROthiazide (HYZAAR) 100-25 mg per tablet Take 1 Tab by mouth daily.  diclofenac (VOLTAREN) 1 % gel Apply 2 g to affected area two (2) times daily as needed. Apply to knees for pain   Indications: osteoarthritis of the knee      hydrOXYzine HCl (ATARAX) 10 mg tablet Take 10 mg by mouth nightly as needed for Itching.  ibuprofen (MOTRIN) 400 mg tablet Take 1 Tab by mouth every six (6) hours as needed for Pain. 20 Tab 0    albuterol (PROVENTIL HFA, VENTOLIN HFA, PROAIR HFA) 90 mcg/actuation inhaler Take 1 Puff by inhalation every four (4) hours as needed for Wheezing. 1 Inhaler 0    Cholecalciferol, Vitamin D3, 3,000 unit tab Take 3,000 Units by mouth daily.  levothyroxine (SYNTHROID) 88 mcg tablet TAKE ONE TABLET BY MOUTH EVERY DAY 30 tablet 0    pantoprazole (PROTONIX) 40 mg tablet Take 1 Tab by mouth daily. 30 Tab 1    Lancets (ONE TOUCH DELICA) misc by Does Not Apply route three (3) times daily. 1 Package 11    glucose blood VI test strips (ONE TOUCH ULTRA TEST) strip by Does Not Apply route three (3) times daily. 1 Package 11    aspirin 81 mg chewable tablet Take 81 mg by mouth daily.            Inpatient Meds    Current Facility-Administered Medications   Medication Dose Route Frequency Provider Last Rate Last Dose    hydrALAZINE (APRESOLINE) 20 mg/mL injection 10 mg  10 mg IntraVENous Q6H PRN Shilpi Do MD   10 mg at 08/15/19 1155    atorvastatin (LIPITOR) tablet 10 mg  10 mg Oral QHS Ladonna Cano MD        sodium chloride (NS) flush 5-40 mL  5-40 mL IntraVENous Q8H Zee Iqbal MD   10 mL at 08/15/19 5456    sodium chloride (NS) flush 5-40 mL  5-40 mL IntraVENous PRN Zee Iqbal MD        0.9% sodium chloride with KCl 20 mEq/L infusion   IntraVENous CONTINUOUS Melvi Aguirre MD 75 mL/hr at 08/14/19 2315      sodium chloride (NS) flush 5-40 mL  5-40 mL IntraVENous Q8H [place] Boards MD AJ   10 mL at 08/15/19 0605    sodium chloride (NS) flush 5-40 mL  5-40 mL IntraVENous PRN Debo Do MD        acetaminophen (TYLENOL) tablet 650 mg  650 mg Oral Q4H PRN Debo Do MD        Or    acetaminophen (TYLENOL) solution 650 mg  650 mg Per NG tube Q4H PRN Debo Do MD        Or    acetaminophen (TYLENOL) suppository 650 mg  650 mg Rectal Q4H PRN Uriel Bates MD        aspirin chewable tablet 81 mg  81 mg Oral DAILY Uriel Bates MD   81 mg at 08/15/19 2576    glucose chewable tablet 16 g  4 Tab Oral PRN Debo Do MD        glucagon (GLUCAGEN) injection 1 mg  1 mg IntraMUSCular PRN Uriel Bates MD        dextrose 10% infusion 125-250 mL  125-250 mL IntraVENous PAGEN Uriel Bates MD        insulin lispro (HUMALOG) injection   SubCUTAneous AC&HS Uriel Bates MD   Stopped at 08/14/19 2200    ondansetron (ZOFRAN) injection 4 mg  4 mg IntraVENous Q6H PRN Debo Do MD        albuterol (ACCUNEB) nebulizer solution 1.25 mg  1.25 mg Nebulization Q6H PRAJ Bates MD        cholecalciferol (VITAMIN D3) tablet 3,000 Units  3,000 Units Oral DAILY Uriel Bates MD   3,000 Units at 08/15/19 0586    diclofenac (VOLTAREN) 1 % topical gel 2 g  2 g Topical BID PRN Uriel Bates MD        hydrOXYzine HCl (ATARAX) tablet 10 mg  10 mg Oral QHS PRN Uriel Bates MD        levothyroxine (SYNTHROID) tablet 88 mcg  88 mcg Oral 6am Uriel Bates MD   88 mcg at 08/15/19 2253    pantoprazole (PROTONIX) tablet 40 mg  40 mg Oral DAILY Uriel Bates MD   40 mg at 08/15/19 0726    losartan (COZAAR) tablet 100 mg  100 mg Oral DAILY Uriel Bates MD   100 mg at 08/15/19 6071     Current Outpatient Medications   Medication Sig Dispense Refill    losartan-hydroCHLOROthiazide (HYZAAR) 100-25 mg per tablet Take 1 Tab by mouth daily.       diclofenac (VOLTAREN) 1 % gel Apply 2 g to affected area two (2) times daily as needed. Apply to knees for pain   Indications: osteoarthritis of the knee      hydrOXYzine HCl (ATARAX) 10 mg tablet Take 10 mg by mouth nightly as needed for Itching.  ibuprofen (MOTRIN) 400 mg tablet Take 1 Tab by mouth every six (6) hours as needed for Pain. 20 Tab 0    albuterol (PROVENTIL HFA, VENTOLIN HFA, PROAIR HFA) 90 mcg/actuation inhaler Take 1 Puff by inhalation every four (4) hours as needed for Wheezing. 1 Inhaler 0    Cholecalciferol, Vitamin D3, 3,000 unit tab Take 3,000 Units by mouth daily.  levothyroxine (SYNTHROID) 88 mcg tablet TAKE ONE TABLET BY MOUTH EVERY DAY 30 tablet 0    pantoprazole (PROTONIX) 40 mg tablet Take 1 Tab by mouth daily. 30 Tab 1    Lancets (ONE TOUCH DELICA) misc by Does Not Apply route three (3) times daily. 1 Package 11    glucose blood VI test strips (ONE TOUCH ULTRA TEST) strip by Does Not Apply route three (3) times daily. 1 Package 11    aspirin 81 mg chewable tablet Take 81 mg by mouth daily. Past Medical History:   Diagnosis Date    Bunion     Cherry angioma     Diabetes ([address]     Diabetes mellitus ([address]     HTN (hypertension)     Joint pain     Joint swelling     Rheumatoid arthritis(714.0)     Scoliosis     Stroke ([address]     Thyroid disorder     problems    Vertigo     BPPV       Past Surgical History:   Procedure Laterality Date    HX  SECTION      HX ORTHOPAEDIC      bunions removed x 2       family history includes Stroke in her father. reports that she has never smoked. She has never used smokeless tobacco. She reports that she does not drink alcohol or use drugs.                Lab Results (last 24 hrs)  Recent Results (from the past 24 hour(s))   GLUCOSE, POC    Collection Time: 19  7:56 PM   Result Value Ref Range    Glucose (POC) 138 (H) 65 - 100 mg/dL    Performed by Haseeb Buck    CBC WITH AUTOMATED DIFF    Collection Time: 19 8:18 PM   Result Value Ref Range    WBC 5.4 3.6 - 11.0 K/uL    RBC 4.80 3.80 - 5.20 M/uL    HGB 13.1 11.5 - 16.0 g/dL    HCT 41.8 35.0 - 47.0 %    MCV 87.1 80.0 - 99.0 FL    MCH 27.3 26.0 - 34.0 PG    MCHC 31.3 30.0 - 36.5 g/dL    RDW 14.3 11.5 - 14.5 %    PLATELET 671 (L) 332 - 400 K/uL    MPV 10.1 8.9 - 12.9 FL    NRBC 0.0 0  WBC    ABSOLUTE NRBC 0.00 0.00 - 0.01 K/uL    NEUTROPHILS 47 32 - 75 %    LYMPHOCYTES 39 12 - 49 %    MONOCYTES 12 5 - 13 %    EOSINOPHILS 2 0 - 7 %    BASOPHILS 0 0 - 1 %    IMMATURE GRANULOCYTES 0 0.0 - 0.5 %    ABS. NEUTROPHILS 2.5 1.8 - 8.0 K/UL    ABS. LYMPHOCYTES 2.1 0.8 - 3.5 K/UL    ABS. MONOCYTES 0.6 0.0 - 1.0 K/UL    ABS. EOSINOPHILS 0.1 0.0 - 0.4 K/UL    ABS. BASOPHILS 0.0 0.0 - 0.1 K/UL    ABS. IMM. GRANS. 0.0 0.00 - 0.04 K/UL    DF AUTOMATED     METABOLIC PANEL, COMPREHENSIVE    Collection Time: 08/14/19  8:18 PM   Result Value Ref Range    Sodium 141 136 - 145 mmol/L    Potassium 3.2 (L) 3.5 - 5.1 mmol/L    Chloride 103 97 - 108 mmol/L    CO2 33 (H) 21 - 32 mmol/L    Anion gap 5 5 - 15 mmol/L    Glucose 110 (H) 65 - 100 mg/dL    BUN 25 (H) 6 - 20 MG/DL    Creatinine 1.01 0.55 - 1.02 MG/DL    BUN/Creatinine ratio 25 (H) 12 - 20      GFR est AA >60 >60 ml/min/1.73m2    GFR est non-AA 52 (L) >60 ml/min/1.73m2    Calcium 10.2 (H) 8.5 - 10.1 MG/DL    Bilirubin, total 0.2 0.2 - 1.0 MG/DL    ALT (SGPT) 20 12 - 78 U/L    AST (SGOT) 23 15 - 37 U/L    Alk.  phosphatase 136 (H) 45 - 117 U/L    Protein, total 7.6 6.4 - 8.2 g/dL    Albumin 3.4 (L) 3.5 - 5.0 g/dL    Globulin 4.2 (H) 2.0 - 4.0 g/dL    A-G Ratio 0.8 (L) 1.1 - 2.2     TROPONIN I    Collection Time: 08/14/19  8:18 PM   Result Value Ref Range    Troponin-I, Qt. 0.35 (H) <0.05 ng/mL   CK W/ REFLX CKMB    Collection Time: 08/14/19  8:18 PM   Result Value Ref Range     26 - 192 U/L   PTT    Collection Time: 08/14/19  8:18 PM   Result Value Ref Range    aPTT 23.9 22.1 - 32.0 sec    aPTT, therapeutic range     58.0 - 77.0 SECS   PROTHROMBIN TIME + INR    Collection Time: 08/14/19  8:18 PM   Result Value Ref Range    INR 1.1 0.9 - 1.1      Prothrombin time 11.0 9.0 - 11.1 sec   HEMOGLOBIN A1C WITH EAG    Collection Time: 08/14/19  8:18 PM   Result Value Ref Range    Hemoglobin A1c 6.3 4.2 - 6.3 %    Est. average glucose 134 mg/dL   SAMPLES BEING HELD    Collection Time: 08/14/19  8:30 PM   Result Value Ref Range    SAMPLES BEING HELD RED,GOLD     COMMENT        Add-on orders for these samples will be processed based on acceptable specimen integrity and analyte stability, which may vary by analyte.    EKG, 12 LEAD, INITIAL    Collection Time: 08/14/19  8:42 PM   Result Value Ref Range    Ventricular Rate 73 BPM    Atrial Rate 73 BPM    P-R Interval 258 ms    QRS Duration 84 ms    Q-T Interval 424 ms    QTC Calculation (Bezet) 467 ms    Calculated P Axis 75 degrees    Calculated R Axis -20 degrees    Calculated T Axis 0 degrees    Diagnosis       Sinus rhythm with 1st degree AV block  Anterior infarct , age undetermined  Abnormal ECG  When compared with ECG of 19-DEC-2018 16:11,  premature atrial complexes are no longer present     GLUCOSE, POC    Collection Time: 08/14/19 11:22 PM   Result Value Ref Range    Glucose (POC) 120 (H) 65 - 100 mg/dL    Performed by Bud Quintanilla Ei    TROPONIN I    Collection Time: 08/15/19  3:21 AM   Result Value Ref Range    Troponin-I, Qt. 0.24 (H) <0.05 ng/mL   CK W/ REFLX CKMB    Collection Time: 08/15/19  3:21 AM   Result Value Ref Range    CK 86 26 - 192 U/L   CBC WITH AUTOMATED DIFF    Collection Time: 08/15/19  3:21 AM   Result Value Ref Range    WBC 6.4 3.6 - 11.0 K/uL    RBC 4.76 3.80 - 5.20 M/uL    HGB 12.6 11.5 - 16.0 g/dL    HCT 40.6 35.0 - 47.0 %    MCV 85.3 80.0 - 99.0 FL    MCH 26.5 26.0 - 34.0 PG    MCHC 31.0 30.0 - 36.5 g/dL    RDW 14.2 11.5 - 14.5 %    PLATELET 244 (L) 047 - 400 K/uL    MPV 10.7 8.9 - 12.9 FL    NRBC 0.0 0  WBC    ABSOLUTE NRBC 0.00 0.00 - 0.01 K/uL    NEUTROPHILS 52 32 - 75 %    LYMPHOCYTES 38 12 - 49 %    MONOCYTES 8 5 - 13 %    EOSINOPHILS 1 0 - 7 %    BASOPHILS 1 0 - 1 %    IMMATURE GRANULOCYTES 0 0.0 - 0.5 %    ABS. NEUTROPHILS 3.3 1.8 - 8.0 K/UL    ABS. LYMPHOCYTES 2.4 0.8 - 3.5 K/UL    ABS. MONOCYTES 0.5 0.0 - 1.0 K/UL    ABS. EOSINOPHILS 0.1 0.0 - 0.4 K/UL    ABS. BASOPHILS 0.0 0.0 - 0.1 K/UL    ABS. IMM.  GRANS. 0.0 0.00 - 0.04 K/UL    DF AUTOMATED     METABOLIC PANEL, BASIC    Collection Time: 08/15/19  3:21 AM   Result Value Ref Range    Sodium 140 136 - 145 mmol/L    Potassium 3.3 (L) 3.5 - 5.1 mmol/L    Chloride 104 97 - 108 mmol/L    CO2 29 21 - 32 mmol/L    Anion gap 7 5 - 15 mmol/L    Glucose 106 (H) 65 - 100 mg/dL    BUN 20 6 - 20 MG/DL    Creatinine 0.85 0.55 - 1.02 MG/DL    BUN/Creatinine ratio 24 (H) 12 - 20      GFR est AA >60 >60 ml/min/1.73m2    GFR est non-AA >60 >60 ml/min/1.73m2    Calcium 9.8 8.5 - 10.1 MG/DL   LIPID PANEL    Collection Time: 08/15/19  3:21 AM   Result Value Ref Range    LIPID PROFILE          Cholesterol, total 174 <200 MG/DL    Triglyceride 69 <150 MG/DL    HDL Cholesterol 72 MG/DL    LDL, calculated 88.2 0 - 100 MG/DL    VLDL, calculated 13.8 MG/DL    CHOL/HDL Ratio 2.4 0.0 - 5.0     GLUCOSE, POC    Collection Time: 08/15/19  8:12 AM   Result Value Ref Range    Glucose (POC) 93 65 - 100 mg/dL    Performed by Karena Vitale    TROPONIN I    Collection Time: 08/15/19  9:44 AM   Result Value Ref Range    Troponin-I, Qt. 0.15 (H) <0.05 ng/mL   CK W/ REFLX CKMB    Collection Time: 08/15/19  9:44 AM   Result Value Ref Range    CK 83 26 - 192 U/L   GLUCOSE, POC    Collection Time: 08/15/19 12:06 PM   Result Value Ref Range    Glucose (POC) 94 65 - 100 mg/dL    Performed by Karena Vitale    ECHO ADULT COMPLETE    Collection Time: 08/15/19  1:22 PM   Result Value Ref Range    LA Volume 53.31 22 - 52 mL    LV E' Lateral Velocity 5.21 centimeter/second    LV E' Septal Velocity 4.57 centimeter/second    Tapse 2.83 (A) 1.5 - 2.0 cm    Ao Root D 2.87 cm

## 2019-08-16 VITALS
HEART RATE: 66 BPM | WEIGHT: 113.1 LBS | BODY MASS INDEX: 20.81 KG/M2 | RESPIRATION RATE: 17 BRPM | SYSTOLIC BLOOD PRESSURE: 143 MMHG | DIASTOLIC BLOOD PRESSURE: 70 MMHG | TEMPERATURE: 98.4 F | OXYGEN SATURATION: 99 % | HEIGHT: 62 IN

## 2019-08-16 LAB
ANION GAP SERPL CALC-SCNC: 6 MMOL/L (ref 5–15)
BUN SERPL-MCNC: 14 MG/DL (ref 6–20)
BUN/CREAT SERPL: 16 (ref 12–20)
CALCIUM SERPL-MCNC: 9.6 MG/DL (ref 8.5–10.1)
CHLORIDE SERPL-SCNC: 108 MMOL/L (ref 97–108)
CO2 SERPL-SCNC: 27 MMOL/L (ref 21–32)
CREAT SERPL-MCNC: 0.85 MG/DL (ref 0.55–1.02)
ERYTHROCYTE [DISTWIDTH] IN BLOOD BY AUTOMATED COUNT: 14.4 % (ref 11.5–14.5)
GLUCOSE BLD STRIP.AUTO-MCNC: 101 MG/DL (ref 65–100)
GLUCOSE BLD STRIP.AUTO-MCNC: 109 MG/DL (ref 65–100)
GLUCOSE BLD STRIP.AUTO-MCNC: 116 MG/DL (ref 65–100)
GLUCOSE SERPL-MCNC: 101 MG/DL (ref 65–100)
HCT VFR BLD AUTO: 42.9 % (ref 35–47)
HGB BLD-MCNC: 13.4 G/DL (ref 11.5–16)
MAGNESIUM SERPL-MCNC: 2 MG/DL (ref 1.6–2.4)
MCH RBC QN AUTO: 26.7 PG (ref 26–34)
MCHC RBC AUTO-ENTMCNC: 31.2 G/DL (ref 30–36.5)
MCV RBC AUTO: 85.5 FL (ref 80–99)
NRBC # BLD: 0 K/UL (ref 0–0.01)
NRBC BLD-RTO: 0 PER 100 WBC
PHOSPHATE SERPL-MCNC: 1.8 MG/DL (ref 2.6–4.7)
PLATELET # BLD AUTO: 145 K/UL (ref 150–400)
PMV BLD AUTO: 10.6 FL (ref 8.9–12.9)
POTASSIUM SERPL-SCNC: 3.5 MMOL/L (ref 3.5–5.1)
RBC # BLD AUTO: 5.02 M/UL (ref 3.8–5.2)
SERVICE CMNT-IMP: ABNORMAL
SODIUM SERPL-SCNC: 141 MMOL/L (ref 136–145)
WBC # BLD AUTO: 6.3 K/UL (ref 3.6–11)

## 2019-08-16 PROCEDURE — 74011250637 HC RX REV CODE- 250/637: Performed by: INTERNAL MEDICINE

## 2019-08-16 PROCEDURE — 74011250636 HC RX REV CODE- 250/636: Performed by: INTERNAL MEDICINE

## 2019-08-16 PROCEDURE — 84100 ASSAY OF PHOSPHORUS: CPT

## 2019-08-16 PROCEDURE — 74011000250 HC RX REV CODE- 250: Performed by: INTERNAL MEDICINE

## 2019-08-16 PROCEDURE — 80048 BASIC METABOLIC PNL TOTAL CA: CPT

## 2019-08-16 PROCEDURE — 94760 N-INVAS EAR/PLS OXIMETRY 1: CPT

## 2019-08-16 PROCEDURE — 85027 COMPLETE CBC AUTOMATED: CPT

## 2019-08-16 PROCEDURE — 82962 GLUCOSE BLOOD TEST: CPT

## 2019-08-16 PROCEDURE — 36415 COLL VENOUS BLD VENIPUNCTURE: CPT

## 2019-08-16 PROCEDURE — 83735 ASSAY OF MAGNESIUM: CPT

## 2019-08-16 RX ORDER — ATORVASTATIN CALCIUM 10 MG/1
10 TABLET, FILM COATED ORAL
Qty: 30 TAB | Refills: 1 | Status: SHIPPED | OUTPATIENT
Start: 2019-08-16 | End: 2020-12-18

## 2019-08-16 RX ORDER — AMLODIPINE BESYLATE 5 MG/1
5 TABLET ORAL DAILY
Status: DISCONTINUED | OUTPATIENT
Start: 2019-08-16 | End: 2019-08-16 | Stop reason: HOSPADM

## 2019-08-16 RX ORDER — LOSARTAN POTASSIUM 100 MG/1
100 TABLET ORAL DAILY
Qty: 30 TAB | Refills: 1 | Status: SHIPPED | OUTPATIENT
Start: 2019-08-17 | End: 2020-11-30

## 2019-08-16 RX ORDER — AMLODIPINE BESYLATE 5 MG/1
5 TABLET ORAL DAILY
Qty: 30 TAB | Refills: 1 | Status: SHIPPED | OUTPATIENT
Start: 2019-08-16 | End: 2020-11-17 | Stop reason: ALTCHOICE

## 2019-08-16 RX ORDER — HYDROXYZINE HYDROCHLORIDE 10 MG/1
10 TABLET, FILM COATED ORAL ONCE
Status: COMPLETED | OUTPATIENT
Start: 2019-08-16 | End: 2019-08-16

## 2019-08-16 RX ADMIN — SODIUM CHLORIDE AND POTASSIUM CHLORIDE: 9; 1.49 INJECTION, SOLUTION INTRAVENOUS at 05:46

## 2019-08-16 RX ADMIN — PANTOPRAZOLE SODIUM 40 MG: 40 TABLET, DELAYED RELEASE ORAL at 09:46

## 2019-08-16 RX ADMIN — HYDROXYZINE HYDROCHLORIDE 10 MG: 10 TABLET, FILM COATED ORAL at 03:39

## 2019-08-16 RX ADMIN — LOSARTAN POTASSIUM 100 MG: 50 TABLET, FILM COATED ORAL at 09:46

## 2019-08-16 RX ADMIN — LEVOTHYROXINE SODIUM 88 MCG: 88 TABLET ORAL at 05:45

## 2019-08-16 RX ADMIN — AMLODIPINE BESYLATE 5 MG: 5 TABLET ORAL at 12:37

## 2019-08-16 RX ADMIN — ASPIRIN 81 MG 81 MG: 81 TABLET ORAL at 09:46

## 2019-08-16 RX ADMIN — VITAMIN D, TAB 1000IU (100/BT) 3000 UNITS: 25 TAB at 09:46

## 2019-08-16 NOTE — PROGRESS NOTES
8/16/2019   CARE MANAGEMENT NOTE:  CM reviewed EMR and received handoff from ER . Pt was admitted with dx of CVA. Reportedly, pt resides with her two dtrs. Dtr, Shavonnebear Amezquita ([phone]) is the primary family contact. Transition Plan of Care  1. PT/OT/ST evals complete. Pt ambulated 200 feet and home health was recommended. 2.  CM received home health orders for PT/OT. A list of homecare providers was given and a choice letter was signed. A referral was made to [facility] and they have accepted. 3.  Dtr will transport pt home. CM will continue to follow pt until discharge.   Tito

## 2019-08-16 NOTE — PROGRESS NOTES
[facility]: [id number] [address] Neurology  [place] [address]  [phone]        Name:   Placido Agee   Medical record #: [id number]  Admission Date: 8/14/2019   Who Consulted: Dr. Audie Pak  Reason for Consult:    Left sided weakness/ dysarthria / CVA rule out. Subjective:   Overnight events:  Complained of itching overnight                Objective:       Attending Attestation:        ==============================================================    Attending Addendum    Pt personally seen and examined. Chart reviewed. Daughter in room. Pt sitting up in bed, eating lunch. Daughter says pt doing well. No speech difficulty, no facial droop, has been ambulating with PT. Patient Vitals for the past 4 hrs:   Temp Pulse Resp BP SpO2   08/16/19 1031 99.3 °F (37.4 °C) 73 17 142/72 98 %         Impression/ Plan    80 y.o. female with moderate sized cortical and subcortical acute ischemic stroke in right anterior parietal region  No residual symptoms. Started on ASA 81 mg/ daily and Lipitor 10mg QHS. Case Management helping to arrange home PT. Pt to be discharged later today. Follow up in Neurology Clinic 2-3 week after discharge (NP April Scar entered instructions in [place] Jeovany Scott MD  August 16, 2019           Assessment/Plan:      1. Acute Ischemic stroke:     · Permissive HTN for 24 hours  · Neurochecks:  Every 4 hours   · Continue ASA 81. Need to add plavix    Stroke work up  · A1C:  6.3  · LDL:  88.2  · TTE:  The estimated ejection fraction is 56 - 60%. No regional wall motion abnormality noted. · MRI:  Moderate sized acute cortical and subcortical infarction right posterior MCA territory involving the anterior right parietal lobe.   · Carotid vascular imaging:    Risk factors for stroke include:  DM, HTN, CAD, HLD, Tobacco use, Excessive ETOH, physical inactivity, JULIO  · Discussed with patient    · Discussed signs/symptoms of stroke and when to call 911  · Smoking cessation education if appropriate         2. Mobility:   · Has been/not been OOB. · PT/OT to eval for rehab     3. Diet:    · Does need SLP     4. VTE prophylaxis  · Per the primary team               Care Plan discussed with:  Patient x   Family    RN    Care Manager    Consultant/Specialist:         Thank you for allowing the Neurology Service the pleasure of participating in the care of your patient. This patient will be discussed with my collaborating care team physician,  Dr. Kortney Schultz and he may have further recommendations regarding this patient's care    Evelyn Vides, [facility]  ====================      General:   L side facial droop. Alert, cooperative, no acute distress. NEUROLOGICAL EXAM:     Mental Status: Oriented to time, place and person. Fully attentive. No aphasia. Full fund of knowledge. Normal recent and remote memory.      Cranial Nerves:   Visual Fields:  normal in all quadrants in both eyes. EOM: no nystagmus. Left facial droop, no ptosis Facial sensation:  intact to LT on both sides. Hearing:  normal.       Language:  no dysarthria, no aphasia, normal fluency, normal repetition. Tongue: midline. Soft palate: not examined  SCMs: normal, symmetric.          Reflexes:   LUExt: 1+/ 4                 RUExt: 1+/ 4  LLExt: 1+/4                  RLExt: 1+/ 4          Sensory:   LT and Temp intact in all extremities            Cerebellar:  No resting, no postural tremors, normal finger nose finger.   No pronator drift                            Motor:           LUExt: 3/ 5               RUExt: 5/ 5                                              LLExt: 3/ 5                RLExt: 5/ 5        Gait:   Not tested          Current Facility-Administered Medications   Medication Dose Route Frequency Provider Last Rate Last Dose    potassium phosphate 30 mmol in 0.9% sodium chloride 250 mL infusion   IntraVENous ONCE Do, Mario Ellis MD   Stopped at 08/16/19 0933    amLODIPine (NORVASC) tablet 5 mg  5 mg Oral DAILY William Puente MD        hydrALAZINE (APRESOLINE) 20 mg/mL injection 10 mg  10 mg IntraVENous Q6H PRN Conchita Do MD   10 mg at 08/15/19 1758    atorvastatin (LIPITOR) tablet 10 mg  10 mg Oral QHS Karol Fulton MD   10 mg at 08/15/19 2125    sodium chloride (NS) flush 5-40 mL  5-40 mL IntraVENous Q8H Oliver Rome MD   10 mL at 08/15/19 2128    sodium chloride (NS) flush 5-40 mL  5-40 mL IntraVENous PRN Oliver Rome MD        sodium chloride (NS) flush 5-40 mL  5-40 mL IntraVENous Q8H Shoshana Escobar MD   10 mL at 08/15/19 2128    sodium chloride (NS) flush 5-40 mL  5-40 mL IntraVENous PRN Shoshana Escobar MD        acetaminophen (TYLENOL) tablet 650 mg  650 mg Oral Q4H PRN Shoshana Escobar MD        Or    acetaminophen (TYLENOL) solution 650 mg  650 mg Per NG tube Q4H PRN Shoshana Escobar MD        Or    acetaminophen (TYLENOL) suppository 650 mg  650 mg Rectal Q4H PRN Shoshana Escobar MD        aspirin chewable tablet 81 mg  81 mg Oral DAILY Shoshana Escobar MD   81 mg at 08/16/19 0946    glucose chewable tablet 16 g  4 Tab Oral PRN Shoshana Escobar MD        glucagon (GLUCAGEN) injection 1 mg  1 mg IntraMUSCular PRN Shoshana Escobar MD        dextrose 10% infusion 125-250 mL  125-250 mL IntraVENous PRN Shoshana Escobar MD        insulin lispro (HUMALOG) injection   SubCUTAneous AC&HS Shoshana Escobar MD   Stopped at 08/14/19 2200    ondansetron (ZOFRAN) injection 4 mg  4 mg IntraVENous Q6H PRN Shoshana Escobar MD        albuterol (ACCUNEB) nebulizer solution 1.25 mg  1.25 mg Nebulization Q6H PRN Shoshana Escobar MD        cholecalciferol (VITAMIN D3) tablet 3,000 Units  3,000 Units Oral DAILY Shoshana Escobar MD   3,000 Units at 08/16/19 0946    diclofenac (VOLTAREN) 1 % topical gel 2 g  2 g Topical BID PRN Shoshana Escobar MD        hydrOXYzine HCl (ATARAX) tablet 10 mg  10 mg Oral QHS PRN Melvi Aguirre MD   10 mg at 08/15/19 2125    levothyroxine (SYNTHROID) tablet 88 mcg  88 mcg Oral 6am Shilpi Do MD   88 mcg at 08/16/19 0545    pantoprazole (PROTONIX) tablet 40 mg  40 mg Oral DAILY Melvi Aguirre MD   40 mg at 08/16/19 0946    losartan (COZAAR) tablet 100 mg  100 mg Oral DAILY Melvi Aguirre MD   100 mg at 08/16/19 0946       Recent Results (from the past 24 hour(s))   ECHO ADULT COMPLETE    Collection Time: 08/15/19  1:22 PM   Result Value Ref Range    LA Volume 49.7 22 - 52 mL    LV E' Lateral Velocity 5.21 centimeter/second    LV E' Septal Velocity 4.57 centimeter/second    Tapse 2.83 (A) 1.5 - 2.0 cm    Ao Root D 2.87 cm    LA Vol Index 33.13 16 - 28 ml/m2    AO ASC D 3.19 cm    AO ARCH D 2.02 cm    Aortic Valve Systolic Peak Velocity 332.00 cm/s    Aortic Valve Area by Continuity of Peak Velocity 0.9 cm2    AoV PG 15.4 mmHg    LVIDd 3.36 (A) 3.9 - 5.3 cm    LVPWd 0.82 0.6 - 0.9 cm    LVIDs 1.82 cm    IVSd 1.08 (A) 0.6 - 0.9 cm    LVOT d 1.43 cm    LVOT Peak Velocity 116.28 cm/s    LVOT Peak Gradient 5.4 mmHg    MVA (PHT) 1.5 cm2    MV A Gabino 106.18 cm/s    MV E Gabino 119.16 cm/s    MV E/A 1.12     MV Mean Gradient 3.3 mmHg    Mitral Valve Annulus Velocity Time Integral 54.62 cm    Left Atrium to Aortic Root Ratio 1.23     RVIDd 3.06 cm    Inferior Vena Cava Diameter Sniffing 1.13 cm    LA Vol 4C 42.67 22 - 52 mL    LA Vol 2C 56.77 (A) 22 - 52 mL    LA Area 4C 17.7 cm2    LV Mass AL 98.4 67 - 162 g    LV Mass AL Index 65.6 43 - 95 g/m2    MV Peak Gradient 8.3 mmHg    Mitral Valve E Wave Deceleration Time 464.4 ms    Mitral Valve Pressure Half-time 145.1 ms    Left Atrium Major Axis 3.52 cm    Triscuspid Valve Regurgitation Peak Gradient 30.4 mmHg    Aortic Regurgitant Pressure Half-time 1,220.4 cm    Pulmonic Valve Max Velocity 100.10 cm/s    Mitral Valve Max Velocity 143.64 cm/s    TR Max Velocity 275.79 cm/s    LA Vol Index 37.84 16 - 28 ml/m2    LA Vol Index 28.44 16 - 28 ml/m2    JESSICA/BSA Pk Gabino 0.6 cm2/m2    AR Max Gabino 447.34 cm/s    PV peak gradient 4.0 mmHg   GLUCOSE, POC    Collection Time: 08/15/19  4:26 PM   Result Value Ref Range    Glucose (POC) 122 (H) 65 - 100 mg/dL    Performed by Mil Hoffman (PCT)    GLUCOSE, POC    Collection Time: 08/15/19  9:13 PM   Result Value Ref Range    Glucose (POC) 130 (H) 65 - 100 mg/dL    Performed by [facility] (PCT)    CBC W/O DIFF    Collection Time: 08/16/19  3:37 AM   Result Value Ref Range    WBC 6.3 3.6 - 11.0 K/uL    RBC 5.02 3.80 - 5.20 M/uL    HGB 13.4 11.5 - 16.0 g/dL    HCT 42.9 35.0 - 47.0 %    MCV 85.5 80.0 - 99.0 FL    MCH 26.7 26.0 - 34.0 PG    MCHC 31.2 30.0 - 36.5 g/dL    RDW 14.4 11.5 - 14.5 %    PLATELET 991 (L) 717 - 400 K/uL    MPV 10.6 8.9 - 12.9 FL    NRBC 0.0 0  WBC    ABSOLUTE NRBC 0.00 0.00 - 8.91 K/uL   METABOLIC PANEL, BASIC    Collection Time: 08/16/19  3:37 AM   Result Value Ref Range    Sodium 141 136 - 145 mmol/L    Potassium 3.5 3.5 - 5.1 mmol/L    Chloride 108 97 - 108 mmol/L    CO2 27 21 - 32 mmol/L    Anion gap 6 5 - 15 mmol/L    Glucose 101 (H) 65 - 100 mg/dL    BUN 14 6 - 20 MG/DL    Creatinine 0.85 0.55 - 1.02 MG/DL    BUN/Creatinine ratio 16 12 - 20      GFR est AA >60 >60 ml/min/1.73m2    GFR est non-AA >60 >60 ml/min/1.73m2    Calcium 9.6 8.5 - 10.1 MG/DL   PHOSPHORUS    Collection Time: 08/16/19  3:37 AM   Result Value Ref Range    Phosphorus 1.8 (L) 2.6 - 4.7 MG/DL   MAGNESIUM    Collection Time: 08/16/19  3:37 AM   Result Value Ref Range    Magnesium 2.0 1.6 - 2.4 mg/dL   GLUCOSE, POC    Collection Time: 08/16/19  6:47 AM   Result Value Ref Range    Glucose (POC) 101 (H) 65 - 100 mg/dL    Performed by [facility] ([facility])    GLUCOSE, POC    Collection Time: 08/16/19 11:56 AM   Result Value Ref Range    Glucose (POC) 109 (H) 65 - 100 mg/dL    Performed by Coco Oneill (PCT)        Visit Vitals  /72 (BP 1 Location: Right arm, BP Patient Position: At rest)   Pulse 73   Temp 99.3 °F (37.4 °C)   Resp 17   Ht 5' 2\" (1.575 m)   Wt 51.3 kg (113 lb 1.5 oz)   SpO2 98%   BMI 20.69 kg/m²      O2 Device: Room air    Temp (24hrs), Av.5 °F (36.9 °C), Min:97.8 °F (36.6 °C), Max:99.3 °F (37.4 °C)    No intake/output data recorded. No intake/output data recorded.

## 2019-08-16 NOTE — PROGRESS NOTES
Pt c/o continued itching and difficulty sleeping due to anxiety. Received hydroxyzine at 9:30pm. Called Dr. Latrice Cannon, who ok'd additional dose of hydroxyzine. Order entered. Will administer.

## 2019-08-16 NOTE — PROGRESS NOTES
Discharge instructions and prescriptions reviewed with the patient and daughter and all questions answered. Discharged home with family via wheelchair.

## 2019-08-16 NOTE — DISCHARGE INSTRUCTIONS
HOSPITALIST DISCHARGE INSTRUCTIONS  NAME: Joey Gallardo   :  10/12/1930   MRN:  [id number]     Date/Time:  2019 10:17 AM    ADMIT DATE: 2019     DISCHARGE DATE: 2019     ADMITTING DIAGNOSIS:  Left sided weakness from a stroke    DISCHARGE DIAGNOSIS:  same    MEDICATIONS:  See after visit summary       · It is important that you take the medication exactly as they are prescribed. · Keep your medication in the bottles provided by the pharmacist and keep a list of the medication names, dosages, and times to be taken in your wallet. · Do not take other medications without consulting your doctor     Pain Management: per above medications    What to do at Home    Recommended diet:  Low fat, Low cholesterol    Recommended activity: Activity as tolerated    1) Return to the hospital if you feel worse    2) If you experience any of the following symptoms then please call your primary care physician or return to the emergency room if you cannot get hold of your doctor:  Fever, chills, nausea, vomiting, diarrhea, change in mentation, falling, bleeding, shortness of breath, chest pain, severe headache, severe abdominal pain,     3) follow up with your doctors    4) Note the medication changes    Follow Up: Follow-up Information     Follow up With Specialties Details Why Contact Info    Cielo Lara NP Nurse Practitioner In 2 weeks [facility] [place] up [facility]  \[facility]\"" 53      Gege Mariee MD Internal Medicine Schedule an appointment as soon as possible for a visit in 1 week  [facility] [address]  [phone]              Information obtained by :  I understand that if any problems occur once I am at home I am to contact my physician. I understand and acknowledge receipt of the instructions indicated above. Physician's or R.N.'s Signature                                                                  Date/Time                                                                                                                                              Patient or Representative Signature                                                          Date/Time    Patient Education        Ataque cerebral: Instrucciones de cuidado - [ Stroke: Care Instructions ]  Instrucciones de Esha Snooks gatica tenido un ataque cerebral. Eso significa que el flujo de oralia a paul radha de olvera cerebro estuvo obstruido rosa maria algún tiempo, lo que daña las neuronas de walter radha del cerebro. Es posible que la parte del cuerpo controlada por walter radha del cerebro ahora no funcione lilly. El cerebro es un órgano asombroso que, en cierta [place], puede curarse a sí mismo. El ataque cerebral que tuvo dañó parte de olvera cerebro. Sin embargo, otras zonas del cerebro podrían asumir, de alguna [facility], las funciones de las zonas [facility]. Usted ya gatica comenzado Gino. Olvera médico hablará con usted sobre lo que puede hacer para prevenir otro ataque cerebral. La presión arterial micheal, el colesterol alto y la diabetes son factores de riesgo de ataque cerebral. Si tiene alguna de estas afecciones, colabore con olvera médico para asegurarse de que estén bajo control. Otros factores de riesgo de ataque cerebral incluyen tener sobrepeso, fumar y no hacer ejercicio en forma regular. Irse a olvera hogar puede ser difícil para usted y [address] [facility]. Cuanto más trate de hacer por olvera propia cuenta, mejor. Recuerde compa las cosas con calma. La atención de seguimiento es paul parte clave de olvera tratamiento y seguridad. Asegúrese de hacer y acudir a todas las citas, y llame a olvera médico si está teniendo problemas.  También es paul buena idea saber los resultados de ashley exámenes y mantener paul lista de los medicamentos que tone.  ¿Cómo puede cuidarse en el hogar?    · Si reyes médico se lo recomienda, únase a un programa de rehabilitación de ataque cerebral. La terapia del habla, la fisioterapia y la terapia ocupacional le pueden ayudar a bañarse, vestirse, comer y hacer otras tareas básicas de la alina diaria.     · No conduzca hasta que reyes médico lo autorice.     · Es normal sentirse loi o deprimido después de un ataque cerebral. Si estos sentimientos gaming mucho, hable con reyes médico.     · Si está teniendo problemas para controlar la [place], vaya al baño de [employer] regular, incluyendo al [employer] y al [place]. También, limite los líquidos después de la xavier.     · Si está estreñido, jared abundantes líquidos, los suficientes juan manuel para que reyes orina sea de color amarillo jim o transparente juan manuel el agua. Si tiene [facility] Financial, del corazón o del hígado y tiene que [employer] líquidos, hable con reyes médico antes de aumentar reyes consumo. Establezca paul hora regular para usar el inodoro. Si continúa teniendo estreñimiento, reyes médico podría sugerirle utilizar un agente formador de frandy, juan manuel el Metamucil, o un ablandador de heces, un laxante o un enema. Medicamentos    · [employer] medicamentos maverick juan manuel le fueron indicados. Llame a reyes médico si gayle estar teniendo un problema con reyes medicamento. Es posible que esté tomando varios medicamentos. Los inhibidores de la enzima convertidora de la angiotensina (ECA), los bloqueadores de los receptores de la angiotensina II (ARB, por ashley siglas en inglés), los betabloqueantes, los diuréticos y los bloqueadores de los gray de calcio controlan reyes presión arterial. Las estatinas ayudan a reducir el colesterol.  Reyes médico puede también [employer] para la depresión, el dolor, los problemas del sueño, la ansiedad o la agitación.     · Si reyes médico le ha dado un anticoagulante para prevenir otro ataque cerebral, asegúrese de que reciba instrucciones sobre cómo compa olvera medicamento de forma dickens. Los anticoagulantes pueden causar problemas de sangrado graves.     · No tome ningún medicamento de venta rosi ni productos herbarios sin hablar donna con olvera médico.     · Si tone píldoras anticonceptivas o terapia hormonal, hable con olvera médico para oliver si son adecuadas para usted.    Para miembros de la jorge y cuidadores    · Patrick que el hogar sea un lugar seguro. Organice paul habitación para que olvera ser Benuel How no tenga que subir escaleras. Asegúrese de que el baño esté en el mismo piso. Quite [place] y los muebles que puedan causar caídas. Asegúrese de que la iluminación sea buena. Ponga agarraderas y sri en tinas y duchas.     · [place]verigüe lo que olvera ser Carine Mary y aquello en lo que necesita ayuda. Evite hacer cosas por olvera ser querido que él pueda hacer por sí mismo. Ayúdele a aprender y poner en práctica ashley nuevas habilidades.     · Visite a olvera ser Benuel How y hable con él con frecuencia. Trate de [employer] ambos disfruten, juan manuel jugar cartas o juegos de solis. Manténgase en contacto con los amigos de olvera ser Benuel How. Anímelos a que lo visiten.     · Cuídese. No intente hacer todo por sí solo. Pídale ayuda a los demás miembros de la jorge. Coma lilly, descanse lo suficiente y tómese tiempo para hacer las cosas que disfruta. No olvide acudir a ashley propias consultas médicas y compa ashley medicamentos con regularidad. Salga de olvera casa con la mayor frecuencia posible. Únase a un renu de apoyo local. Averigüe si reúne los requisitos para las visitas de servicios médicos a domicilio para ayudar con la rehabilitación o los centros de atención diurna para adultos. ¿Cuándo debe pedir ayuda? Llame al 911 en cualquier momento que considere que necesita atención de [place].  Por ejemplo, llame si:    · Tiene señales de otro ataque cerebral. Entre estas se encuentran:  ? Entumecimiento, hormigueo, debilitamiento o pérdida de movimiento repentinos en la mel, el brazo o la pierna, sobre todo si ocurre en un solo lado del cuerpo. ? Cambios repentinos en la visión. ? Dificultad repentina para hablar. ? Confusión repentina o dificultad para comprender frases sencillas. ? Problemas repentinos para caminar o mantener el equilibrio. ? Dolor de [place] intenso y repentino, distinto a los george de kinga anteriores. Llame al 911 aun si estos síntomas desaparecen en pocos minutos.    Llame a olvera médico ahora mismo o busque atención médica inmediata si:    · Tiene nuevos síntomas que puedan estar relacionados con olvera ataque cerebral, juan manuel caídas o dificultad para tragar.   Kristi Tanna especial atención a los cambios en olvera lindsay y asegúrese de comunicarse con olvera médico si tiene algún problema. ¿Dónde puede encontrar más información en inglés? Aundra Alken a [email or web address] Tristan Del Real [id number] en la búsqueda para aprender más acerca de \"Ataque cerebral: Instrucciones de cuidado - [ Stroke: Care Instructions ]. \"  Revisado: 26 septiembre, 2018  Versión del contenido: 12.1  © 1545-5889 Healthwise, Incorporated. Las instrucciones de cuidado fueron adaptadas bajo licencia por [employer] (which disclaims liability or warranty for this information). Si usted tiene [place] afección médica o sobre estas instrucciones, siempre pregunte a olvera profesional de lindsay. Healthwise, Incorporated niega toda garantía o responsabilidad por olvera uso de esta información. Ataque cerebral: Instrucciones de cuidado - [ Stroke: Care Instructions ]  Instrucciones de cuidado    Usted ha tenido un ataque cerebral. Eso significa que el flujo de oralia a paul radha de olvera cerebro estuvo obstruido rosa maria algún tiempo, lo que daña las neuronas de walter radha del cerebro. Es posible que la parte del cuerpo controlada por walter radha del cerebro ahora no funcione lilly. El cerebro es un órgano asombroso que, en cierta [place], puede curarse a sí mismo.  El ataque cerebral que tuvo dañó parte de olvera cerebro. Sin embargo, otras zonas del cerebro podrían asumir, de alguna Annie, las funciones de las zonas [facility]. Usted ya gatica comenzado [facility]. Olvera médico hablará con usted sobre lo que puede hacer para prevenir otro ataque cerebral. La presión arterial micheal, el colesterol alto y la diabetes son factores de riesgo de ataque cerebral. Si tiene alguna de estas afecciones, colabore con olvera médico para asegurarse de que estén bajo control. Otros factores de riesgo de ataque cerebral incluyen tener sobrepeso, fumar y no hacer ejercicio en forma regular. Irse a olvera hogar puede ser difícil para usted y [address] [place]. Cuanto más trate de hacer por olvera propia cuenta, mejor. Recuerde compa las cosas con calma. La atención de seguimiento es paul parte clave de olvera tratamiento y seguridad. Asegúrese de hacer y acudir a todas las citas, y llame a olvera médico si está teniendo problemas. También es paul buena idea saber los resultados de ashley exámenes y mantener paul lista de los medicamentos que tone. ¿Cómo puede cuidarse en el hogar?    · Si olvera médico se lo recomienda, únase a un programa de rehabilitación de ataque cerebral. La terapia del habla, la fisioterapia y la terapia ocupacional le pueden ayudar a bañarse, vestirse, comer y hacer otras tareas básicas de la alina diaria.     · No conduzca hasta que olvera médico lo autorice.     · Es normal sentirse loi o deprimido después de un ataque cerebral. Si estos sentimientos gaming mucho, hable con olvera médico.     · Si está teniendo problemas para controlar la [facility], vaya al baño de Annie regular, incluyendo al [employer] y al [place]. También, limite los líquidos después de la xavier.     · Si está estreñido, jared abundantes líquidos, los suficientes juan manuel para que olvera orina sea de color amarillo jim o transparente juan manuel el agua.  Si tiene [facility] Financial, del corazón o del hígado y tiene que [place]'s líquidos, hable con reyes médico antes de aumentar reyes consumo. Establezca paul hora regular para usar el inodoro. Si continúa teniendo estreñimiento, reyes médico podría sugerirle utilizar un agente formador de frandy, juan manuel el Metamucil, o un ablandador de heces, un laxante o un enema. Medicamentos    · [employer] medicamentos maverick juan manuel le fueron indicados. Llame a reyes médico si gayle estar teniendo un problema con reyes medicamento. Es posible que esté tomando varios medicamentos. Los inhibidores de la enzima convertidora de la angiotensina (ECA), los bloqueadores de los receptores de la angiotensina II (ARB, por ashley siglas en inglés), los betabloqueantes, los diuréticos y los bloqueadores de los gray de calcio controlan reyes presión arterial. Las estatinas ayudan a reducir el colesterol. Reyes médico puede también [employer] para la depresión, el dolor, los problemas del sueño, la ansiedad o la agitación.     · Si reyes médico le ha dado un anticoagulante para prevenir otro ataque cerebral, asegúrese de que reciba instrucciones sobre cómo compa reyes medicamento de forma dickens. Los anticoagulantes pueden causar problemas de sangrado graves.     · No tome ningún medicamento de venta rosi ni productos herbarios sin hablar donna con reyes médico.     · Si tone píldoras anticonceptivas o terapia hormonal, hable con reyes médico para oliver si son adecuadas para usted.    Para miembros de la jorge y cuidadores    · [place] que el hogar sea un lugar seguro. Organice paul habitación para que reyes ser Idalia Sleigh no tenga que subir escaleras. Asegúrese de que el baño esté en el mismo piso. Quite David Clap y los muebles que puedan causar caídas. Asegúrese de que la iluminación sea buena. Ponga agarraderas y sri en tinas y duchas.     · Averig[place]e lo que reyes ser [place] y aquello en lo que necesita ayuda. Evite hacer cosas por reyes ser querido que él pueda hacer por sí mismo.  Ayúdele a aprender y poner en práctica ashley nuevas habilidades.     · Visite a olvera ser Mic Lockridge y hable con él con frecuencia. Trate de [employer] ambos disfruten, juan manuel jugar cartas o juegos de solis. Manténgase en contacto con los amigos de olvera ser Mic Lockridge. Anímelos a que lo visiten.     · Cuídese. No intente hacer todo por sí solo. Pídale ayuda a los demás miembros de la jorge. Coma lilly, descanse lo suficiente y tómese tiempo para hacer las cosas que disfruta. No olvide acudir a ashley propias consultas médicas y compa ashley medicamentos con regularidad. Salga de olvera casa con la mayor frecuencia posible. Únase a un renu de apoyo local. Averigüe si reúne los requisitos para las visitas de servicios médicos a domicilio para ayudar con la rehabilitación o los centros de atención diurna para adultos. ¿Cuándo debe pedir ayuda? Llame al 911 en cualquier momento que considere que necesita atención de [place]. Por ejemplo, llame si:    · Tiene señales de otro ataque cerebral. Entre estas se encuentran:  ? Entumecimiento, hormigueo, debilitamiento o pérdida de movimiento repentinos en la mel, el brazo o la pierna, sobre todo si ocurre en un solo lado del cuerpo. ? Cambios repentinos en la visión. ? Dificultad repentina para hablar. ? Confusión repentina o dificultad para comprender frases sencillas. ? Problemas repentinos para caminar o mantener el equilibrio. ? Dolor de [place] intenso y repentino, distinto a los george de kinga anteriores. Llame al 911 aun si estos síntomas desaparecen en pocos minutos.    Llame a olvera médico ahora mismo o busque atención médica inmediata si:    · Tiene nuevos síntomas que puedan estar relacionados con olvera ataque cerebral, juan manuel caídas o dificultad para tragar.   Rossy Sparrow especial atención a los cambios en olvera lindsay y asegúrese de comunicarse con olvera médico si tiene algún problema. ¿Dónde puede encontrar más información en inglés? Rossi Helm a [email or web address]   Escriba [id number] en la búsqueda para aprender [place] acerca de \"Ataque cerebral: Instrucciones de cuidado - [ Stroke: Care Instructions ]. \"  Revisado: 26 septiembre, 2018  Versión del contenido: 12.1  © 0416-7905 Healthwise, Incorporated. Las instrucciones de cuidado fueron adaptadas bajo licencia por [employer] (which disclaims liability or warranty for this information). Si usted tiene [place] afección médica o sobre estas instrucciones, siempre pregunte a olvera profesional de lindsay. [employer] niega toda garantía o responsabilidad por olvera uso de esta información.

## 2019-08-16 NOTE — PROGRESS NOTES
Problem: Falls - Risk of  Goal: *Absence of Falls  Description  Document Seamuslove Franco Fall Risk and appropriate interventions in the flowsheet.   Outcome: Progressing Towards Goal  Note:   Fall Risk Interventions:  Mobility Interventions: Bed/chair exit alarm, Patient to call before getting OOB, Communicate number of staff needed for ambulation/transfer         Medication Interventions: Bed/chair exit alarm, Patient to call before getting OOB, Teach patient to arise slowly    Elimination Interventions: Bed/chair exit alarm, Call light in reach              Problem: Patient Education: Go to Patient Education Activity  Goal: Patient/Family Education  Outcome: Progressing Towards Goal     Problem: Patient Education: Go to Patient Education Activity  Goal: Patient/Family Education  Outcome: Progressing Towards Goal     Problem: Patient Education: Go to Patient Education Activity  Goal: Patient/Family Education  Outcome: Progressing Towards Goal     Problem: Patient Education: Go to Patient Education Activity  Goal: Patient/Family Education  Outcome: Progressing Towards Goal

## 2019-08-16 NOTE — PROGRESS NOTES
Pharmacist Discharge Medication Reconciliation    Discharge Provider:  Dr. Louie Pereira       Discharge Medications:      My Medications        START taking these medications        Instructions Each Dose to Equal Morning Noon Evening Bedtime   amLODIPine 5 mg tablet  Commonly known as:  NORVASC    Your last dose was: Your next dose is: Take 1 Tab by mouth daily. 5 mg                 atorvastatin 10 mg tablet  Commonly known as:  LIPITOR    Your last dose was: Your next dose is: Take 1 Tab by mouth nightly. 10 mg                 losartan 100 mg tablet  Commonly known as:  COZAAR  Start taking on:  8/17/2019    Your last dose was: Your next dose is: Take 1 Tab by mouth daily. 100 mg                        CONTINUE taking these medications        Instructions Each Dose to Equal Morning Noon Evening Bedtime   albuterol 90 mcg/actuation inhaler  Commonly known as:  PROVENTIL HFA, VENTOLIN HFA, PROAIR HFA    Your last dose was: Your next dose is: Take 1 Puff by inhalation every four (4) hours as needed for Wheezing. 1 Puff                 aspirin 81 mg chewable tablet    Your last dose was: Your next dose is: Take 81 mg by mouth daily. 81 mg                 Cholecalciferol (Vitamin D3) 3,000 unit Tab    Your last dose was: Your next dose is: Take 3,000 Units by mouth daily. 3,000 Units                 glucose blood VI test strips strip  Commonly known as:  ASCENSIA AUTODISC VI, ONE TOUCH ULTRA TEST VI    Your last dose was: Your next dose is:          by Does Not Apply route three (3) times daily. hydrOXYzine HCl 10 mg tablet  Commonly known as:  ATARAX    Your last dose was: Your next dose is: Take 10 mg by mouth nightly as needed for Itching. 10 mg                 lancets Misc    Your last dose was: Your next dose is:          by Does Not Apply route three (3) times daily. levothyroxine 88 mcg tablet  Commonly known as:  SYNTHROID    Your last dose was: Your next dose is:          TAKE ONE TABLET BY MOUTH EVERY DAY                  pantoprazole 40 mg tablet  Commonly known as:  PROTONIX    Your last dose was: Your next dose is: Take 1 Tab by mouth daily. 40 mg                 VOLTAREN 1 % Gel  Generic drug:  diclofenac    Your last dose was: Your next dose is:          Apply 2 g to affected area two (2) times daily as needed. Apply to knees for pain   Indications: osteoarthritis of the knee   2 g                        STOP taking these medications      ibuprofen 400 mg tablet  Commonly known as:  MOTRIN        losartan-hydroCHLOROthiazide 100-25 mg per tablet  Commonly known as:  HYZAAR                  Where to Get Your Medications        Information on where to get these meds will be given to you by the nurse or doctor.     Ask your nurse or doctor about these medications  amLODIPine 5 mg tablet  atorvastatin 10 mg tablet  losartan 100 mg tablet            The patient's chart, MAR, and AVS were reviewed by   Bárbara Andres PharmD, BCPS  Contact: [phone]

## 2019-08-16 NOTE — DISCHARGE SUMMARY
[facility]  [address] [facility], [place], [address] [place] [address]  [phone]    Physician Discharge Summary     Patient ID:  Shane Mayen  [id number]  80 y.o.  10/12/1930    Admit date: 8/14/2019    Discharge date and time: 8/16/2019    Admission Diagnoses: Left-sided weakness [R53.1]  CVA (cerebral vascular accident) [facility]) [I63.9]    Discharge Diagnoses:  Principal Diagnosis CVA (cerebral vascular accident) [facility])                                            Principal Problem:    CVA (cerebral vascular accident) ([facility] [address] (7/15/2011)      Overview: 2002      Recurrence in 2019    Active Problems:    HTN (hypertension) (7/15/2011)      Diabetes mellitus, type 2 ([facility] [address] (7/15/2011)      Hypothyroidism (7/15/2011)      Hyperlipidemia (8/15/2011)      Left-sided weakness (8/14/2019)      Elevated troponin (8/14/2019)      Hypokalemia (8/14/2019)           Hospital Course:     79 yo hx of HTN, DM, CVA, presented w/ L sided weakness, acute R MCA CVA    1) Acute CVA/Left-sided weakness: MRI w/ R MCA CVA. LDL 88, echo with EF 55-60%. Neuro recommended ASA. Statin also started. CM to set up [place]    2) HTN: uncontrolled. HCTZ stopped due to risks for dehydration. Cont losartan. norvasc added     3) DM type 2: A1C 6.3. Cont diet control    4) Hypothyroid: cont synthroid     5) Elevated Trop: likely due to CVA. No ACS.   Cont ASA     PCP: Nataly Morrow MD     Consults: Neurology    Significant Diagnostic Studies: head MRI    Discharge Exam:  Physical Exam:    Gen: Elderly, frail, NAD  HEENT:  Pink conjunctivae, PERRL, hearing intact to voice, moist mucous membranes  Neck: Supple, without masses, thyroid non-tender  Resp: No accessory muscle use, clear breath sounds without wheezes rales or rhonchi  Card: No murmurs, normal S1, S2 without thrills, bruits or peripheral edema  Abd:  Soft, non-tender, non-distended, normoactive bowel sounds are present  Lymph:  No cervical or inguinal adenopathy  Musc: No cyanosis or clubbing  Skin: No rashes  Neuro:  Cranial nerves are grossly intact, mild L sided weakness  Psych:  fair insight, oriented to person, place and time, alert    Disposition: [facility]  Discharge Condition: Stable    Patient Instructions:   Current Discharge Medication List      START taking these medications    Details   amLODIPine (NORVASC) 5 mg tablet Take 1 Tab by mouth daily. Qty: 30 Tab, Refills: 1      atorvastatin (LIPITOR) 10 mg tablet Take 1 Tab by mouth nightly. Qty: 30 Tab, Refills: 1      losartan (COZAAR) 100 mg tablet Take 1 Tab by mouth daily. Qty: 30 Tab, Refills: 1         CONTINUE these medications which have NOT CHANGED    Details   diclofenac (VOLTAREN) 1 % gel Apply 2 g to affected area two (2) times daily as needed. Apply to knees for pain   Indications: osteoarthritis of the knee      hydrOXYzine HCl (ATARAX) 10 mg tablet Take 10 mg by mouth nightly as needed for Itching. albuterol (PROVENTIL HFA, VENTOLIN HFA, PROAIR HFA) 90 mcg/actuation inhaler Take 1 Puff by inhalation every four (4) hours as needed for Wheezing. Qty: 1 Inhaler, Refills: 0      Cholecalciferol, Vitamin D3, 3,000 unit tab Take 3,000 Units by mouth daily. levothyroxine (SYNTHROID) 88 mcg tablet TAKE ONE TABLET BY MOUTH EVERY DAY  Qty: 30 tablet, Refills: 0      pantoprazole (PROTONIX) 40 mg tablet Take 1 Tab by mouth daily. Qty: 30 Tab, Refills: 1    Associated Diagnoses: Elevated cholesterol      Lancets (ONE TOUCH DELICA) misc by Does Not Apply route three (3) times daily. Qty: 1 Package, Refills: 11    Associated Diagnoses: Diabetes mellitus ([address]      glucose blood VI test strips (ONE TOUCH ULTRA TEST) strip by Does Not Apply route three (3) times daily. Qty: 1 Package, Refills: 11    Associated Diagnoses: Diabetes mellitus ([address]      aspirin 81 mg chewable tablet Take 81 mg by mouth daily.            STOP taking these medications       losartan-hydroCHLOROthiazide (HYZAAR) 100-25 mg per tablet Comments:   Reason for Stopping:         ibuprofen (MOTRIN) 400 mg tablet Comments:   Reason for Stopping:             Activity: Activity as tolerated  Diet: Low fat, Low cholesterol  Wound Care: None needed    Follow-up with  Follow-up Information     Follow up With Specialties Details Why Contact Info    Placido Gustafson NP Nurse Practitioner In 2 weeks Hospital Follow up [facility] [address]  [address] [facility]      Fer Neil MD Internal Medicine Schedule an appointment as soon as possible for a visit in 1 week  [place] [address]  [phone]            Follow-up tests/labs none    Signed:  Gene Moya MD  8/16/2019  10:18 AM    I spent 34 min on discharge

## 2019-10-17 ENCOUNTER — HOSPITAL ENCOUNTER (EMERGENCY)
Age: 84
Discharge: HOME | End: 2019-10-17
Attending: EMERGENCY MEDICINE
Payer: MEDICARE

## 2019-10-17 ENCOUNTER — APPOINTMENT (OUTPATIENT)
Dept: VASCULAR SURGERY | Age: 84
End: 2019-10-17
Attending: EMERGENCY MEDICINE
Payer: MEDICARE

## 2019-10-17 ENCOUNTER — APPOINTMENT (OUTPATIENT)
Dept: GENERAL RADIOLOGY | Age: 84
End: 2019-10-17
Attending: EMERGENCY MEDICINE
Payer: MEDICARE

## 2019-10-17 VITALS
TEMPERATURE: 98.3 F | DIASTOLIC BLOOD PRESSURE: 61 MMHG | RESPIRATION RATE: 16 BRPM | BODY MASS INDEX: 19.39 KG/M2 | OXYGEN SATURATION: 98 % | WEIGHT: 106 LBS | SYSTOLIC BLOOD PRESSURE: 126 MMHG | HEART RATE: 69 BPM

## 2019-10-17 DIAGNOSIS — M17.11 ARTHRITIS OF KNEE, RIGHT: Primary | ICD-10-CM

## 2019-10-17 PROCEDURE — 99281 EMR DPT VST MAYX REQ PHY/QHP: CPT

## 2019-10-17 PROCEDURE — 93971 EXTREMITY STUDY: CPT

## 2019-10-17 PROCEDURE — 73562 X-RAY EXAM OF KNEE 3: CPT

## 2019-10-17 RX ORDER — ACETAMINOPHEN 325 MG/1
650 TABLET ORAL
Status: DISCONTINUED | OUTPATIENT
Start: 2019-10-17 | End: 2019-10-17 | Stop reason: HOSPADM

## 2019-10-17 RX ORDER — DEXTROMETHORPHAN HYDROBROMIDE, GUAIFENESIN 5; 100 MG/5ML; MG/5ML
650 LIQUID ORAL EVERY 8 HOURS
Qty: 30 TAB | Refills: 0 | OUTPATIENT
Start: 2019-10-17 | End: 2020-07-06

## 2019-10-17 NOTE — ED PROVIDER NOTES
80 y.o. female with past medical history significant for DM, thyroid disorder, DM, RA, stroke, vertigo, HTN, and scoliosis who presents from private vehicle with chief complaint of leg pain. Pt c/o right posterior leg pain (below the knee) since awakening this morning. Pt's daughter reports leg swelling, which she noticed the other day. Pt also endorses rhinorrhea. Per daughter, Pt normally walks around the house. Pt notes taking aspirin with minimal relief. Pt denies hx of blood clots or gout. Pt denies any increased activity, anticoagulant use, recent fall, injury, MVC, fever, chest pain, or SOB. There are no other acute medical concerns at this time. PCP: Tere Bass MD    Note written by Dario Godinez, as dictated by Stephen Carlson DO 4:40 PM        The history is provided by the patient and a relative.         Past Medical History:   Diagnosis Date    Bunion     Cherry angioma     Diabetes ([address]     Diabetes mellitus ([address]     HTN (hypertension)     Joint pain     Joint swelling     Rheumatoid arthritis(714.0)     Scoliosis     Stroke ([address]     Thyroid disorder     problems    Vertigo     BPPV       Past Surgical History:   Procedure Laterality Date    HX  SECTION      HX ORTHOPAEDIC      bunions removed x 2         Family History:   Problem Relation Age of Onset    Stroke Father        Social History     Socioeconomic History    Marital status:      Spouse name: Not on file    Number of children: Not on file    Years of education: Not on file    Highest education level: Not on file   Occupational History    Not on file   Social Needs    Financial resource strain: Not on file    Food insecurity:     Worry: Not on file     Inability: Not on file    Transportation needs:     Medical: Not on file     Non-medical: Not on file   Tobacco Use    Smoking status: Never Smoker    Smokeless tobacco: Never Used   Substance and Sexual Activity    Alcohol use: No    Drug use: No    Sexual activity: Never   Lifestyle    Physical activity:     Days per week: Not on file     Minutes per session: Not on file    Stress: Not on file   Relationships    Social connections:     Talks on phone: Not on file     Gets together: Not on file     Attends Sikhism service: Not on file     Active member of club or organization: Not on file     Attends meetings of clubs or organizations: Not on file     Relationship status: Not on file    Intimate partner violence:     Fear of current or ex partner: Not on file     Emotionally abused: Not on file     Physically abused: Not on file     Forced sexual activity: Not on file   Other Topics Concern    Not on file   Social History Narrative    Not on file         ALLERGIES: Shellfish containing products    Review of Systems   Constitutional: Negative for activity change, appetite change, chills and fever. HENT: Positive for rhinorrhea. Negative for congestion, sinus pressure, sneezing and sore throat. Eyes: Negative for photophobia and visual disturbance. Respiratory: Negative for cough and shortness of breath. Cardiovascular: Negative for chest pain. Gastrointestinal: Negative for abdominal pain, blood in stool, constipation, diarrhea, nausea and vomiting. Genitourinary: Negative for difficulty urinating, dysuria, flank pain, frequency, hematuria, menstrual problem, urgency, vaginal bleeding and vaginal discharge. Musculoskeletal: Negative for arthralgias, back pain, myalgias and neck pain.        + Right leg pain. Skin: Negative for rash and wound. Neurological: Negative for syncope, weakness, numbness and headaches. Psychiatric/Behavioral: Negative for self-injury and suicidal ideas. All other systems reviewed and are negative.       Vitals:    10/17/19 1619   BP: 126/61   Pulse: 69   Resp: 16   Temp: 98.3 °F (36.8 °C)   SpO2: 98%   Weight: 48.1 kg (106 lb)            Physical Exam   Constitutional: She is oriented to person, place, and time. She appears well-developed and well-nourished. No distress. HENT:   Head: Normocephalic and atraumatic. Eyes: Pupils are equal, round, and reactive to light. Conjunctivae and EOM are normal.   Neck: Neck supple. Cardiovascular: Normal rate, regular rhythm and normal heart sounds. Pulses:       Dorsalis pedis pulses are 2+ on the right side, and 2+ on the left side. 2+ dorsalis pedis pulses. Pulmonary/Chest: Effort normal and breath sounds normal.   Abdominal: Soft. She exhibits no distension. There is no tenderness. Musculoskeletal: Normal range of motion. She exhibits tenderness. She exhibits no edema. Right knee: She exhibits normal range of motion, no LCL laxity and no MCL laxity. Tenderness found. Right upper leg: She exhibits tenderness. She exhibits no swelling. Tenderness in the right hamstring and posterior knee. FROM of joint. No ligamentus laxity. No swelling, redness, warmth. No clearly papable baker's cyst. No distal lower extremity swelling. Neurological: She is alert and oriented to person, place, and time. She has normal strength. No sensory deficit. Intact sensation. 5/5 strength. Skin: Skin is warm and dry. She is not diaphoretic. Nursing note and vitals reviewed. Note written by Dario Goidnez, as dictated by Stephen Carlson DO 4:40 PM       MDM   49-year-old female presents with right knee pain. No history of trauma nor evidence of significant swelling, redness, or signs suggestive of gout or septic arthritis. Pain is worse with weightbearing but seems to be very amenable when off her feet. Suggestive most of osteoarthritis given known history of it. X-ray was viewed by myself and read radiology showing no evidence of fracture but changes of chondrocalcinosis and a joint effusion. Lower extremity Doppler was done and was negative for any evidence of DVT.     Feel that her symptoms are likely secondary to osteoarthritis and the patient is only been taking 81 mg aspirin to alleviate her pain. She was given a dose of Tylenol in the ED and prescribed the same for further pain relief and recommended rest and gentle range of motion exercises to try to help improve her knee pain. She was recommended PCP follow-up and consider orthopedics consultation if symptomatic treatment is inadequate. This was discussed with the patient and her daughter at the bedside and they stated that they would not want to pursue surgery. I agree with this plan and return precautions were given for worsening or concerns.       Procedures

## 2019-10-17 NOTE — ED TRIAGE NOTES
Right leg pain below the knee posterior, since this morning. Unable to visualize in triage d/t long pants.

## 2019-12-27 ENCOUNTER — HOSPITAL ENCOUNTER (EMERGENCY)
Dept: GENERAL RADIOLOGY | Age: 84
Discharge: HOME | End: 2019-12-27
Attending: PHYSICIAN ASSISTANT
Payer: MEDICARE

## 2019-12-27 ENCOUNTER — APPOINTMENT (OUTPATIENT)
Dept: CT IMAGING | Age: 84
End: 2019-12-27
Attending: PHYSICIAN ASSISTANT
Payer: MEDICARE

## 2019-12-27 ENCOUNTER — HOSPITAL ENCOUNTER (EMERGENCY)
Age: 84
Discharge: HOME | End: 2019-12-27
Attending: EMERGENCY MEDICINE
Payer: MEDICARE

## 2019-12-27 VITALS
HEART RATE: 80 BPM | OXYGEN SATURATION: 100 % | RESPIRATION RATE: 16 BRPM | SYSTOLIC BLOOD PRESSURE: 166 MMHG | BODY MASS INDEX: 20.24 KG/M2 | TEMPERATURE: 97.5 F | DIASTOLIC BLOOD PRESSURE: 74 MMHG | HEIGHT: 62 IN | WEIGHT: 110 LBS
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
## 2019-12-27 LAB
ALBUMIN SERPL-MCNC: 3.3 G/DL (ref 3.5–5)
ALBUMIN/GLOB SERPL: 0.8 {RATIO} (ref 1.1–2.2)
ALP SERPL-CCNC: 120 U/L (ref 45–117)
ALT SERPL-CCNC: 32 U/L (ref 12–78)
ANION GAP SERPL CALC-SCNC: 5 MMOL/L (ref 5–15)
APPEARANCE UR: CLEAR
AST SERPL-CCNC: 33 U/L (ref 15–37)
BACTERIA URNS QL MICRO: NEGATIVE /HPF
BASOPHILS # BLD: 0 K/UL (ref 0–0.1)
BASOPHILS NFR BLD: 1 % (ref 0–1)
BILIRUB SERPL-MCNC: 0.3 MG/DL (ref 0.2–1)
BILIRUB UR QL: NEGATIVE
BUN SERPL-MCNC: 29 MG/DL (ref 6–20)
BUN/CREAT SERPL: 31 (ref 12–20)
CALCIUM SERPL-MCNC: 10.1 MG/DL (ref 8.5–10.1)
CHLORIDE SERPL-SCNC: 105 MMOL/L (ref 97–108)
CO2 SERPL-SCNC: 28 MMOL/L (ref 21–32)
COLOR UR: NORMAL
CREAT SERPL-MCNC: 0.94 MG/DL (ref 0.55–1.02)
DIFFERENTIAL METHOD BLD: ABNORMAL
EOSINOPHIL # BLD: 0.1 K/UL (ref 0–0.4)
EOSINOPHIL NFR BLD: 2 % (ref 0–7)
EPITH CASTS URNS QL MICRO: NORMAL /LPF
ERYTHROCYTE [DISTWIDTH] IN BLOOD BY AUTOMATED COUNT: 13.2 % (ref 11.5–14.5)
GLOBULIN SER CALC-MCNC: 4.2 G/DL (ref 2–4)
GLUCOSE BLD STRIP.AUTO-MCNC: 99 MG/DL (ref 65–100)
GLUCOSE SERPL-MCNC: 115 MG/DL (ref 65–100)
GLUCOSE UR STRIP.AUTO-MCNC: NEGATIVE MG/DL
HCT VFR BLD AUTO: 37.4 % (ref 35–47)
HGB BLD-MCNC: 11.9 G/DL (ref 11.5–16)
HGB UR QL STRIP: NEGATIVE
HYALINE CASTS URNS QL MICRO: NORMAL /LPF (ref 0–5)
IMM GRANULOCYTES # BLD AUTO: 0 K/UL (ref 0–0.04)
IMM GRANULOCYTES NFR BLD AUTO: 0 % (ref 0–0.5)
KETONES UR QL STRIP.AUTO: NEGATIVE MG/DL
LEUKOCYTE ESTERASE UR QL STRIP.AUTO: NEGATIVE
LYMPHOCYTES # BLD: 1.1 K/UL (ref 0.8–3.5)
LYMPHOCYTES NFR BLD: 30 % (ref 12–49)
MCH RBC QN AUTO: 28.3 PG (ref 26–34)
MCHC RBC AUTO-ENTMCNC: 31.8 G/DL (ref 30–36.5)
MCV RBC AUTO: 88.8 FL (ref 80–99)
MONOCYTES # BLD: 0.5 K/UL (ref 0–1)
MONOCYTES NFR BLD: 14 % (ref 5–13)
NEUTS SEG # BLD: 2.1 K/UL (ref 1.8–8)
NEUTS SEG NFR BLD: 54 % (ref 32–75)
NITRITE UR QL STRIP.AUTO: NEGATIVE
NRBC # BLD: 0 K/UL (ref 0–0.01)
NRBC BLD-RTO: 0 PER 100 WBC
PH UR STRIP: 6.5 [PH] (ref 5–8)
PLATELET # BLD AUTO: 144 K/UL (ref 150–400)
PMV BLD AUTO: 10 FL (ref 8.9–12.9)
POTASSIUM SERPL-SCNC: 4.6 MMOL/L (ref 3.5–5.1)
PROT SERPL-MCNC: 7.5 G/DL (ref 6.4–8.2)
PROT UR STRIP-MCNC: NEGATIVE MG/DL
RBC # BLD AUTO: 4.21 M/UL (ref 3.8–5.2)
RBC #/AREA URNS HPF: NORMAL /HPF (ref 0–5)
SERVICE CMNT-IMP: NORMAL
SODIUM SERPL-SCNC: 138 MMOL/L (ref 136–145)
SP GR UR REFRACTOMETRY: 1.01 (ref 1–1.03)
TROPONIN I SERPL-MCNC: <0.05 NG/ML
UR CULT HOLD, URHOLD: NORMAL
UROBILINOGEN UR QL STRIP.AUTO: 0.2 EU/DL (ref 0.2–1)
WBC # BLD AUTO: 3.8 K/UL (ref 3.6–11)
WBC URNS QL MICRO: NORMAL /HPF (ref 0–4)

## 2019-12-27 PROCEDURE — 99285 EMERGENCY DEPT VISIT HI MDM: CPT

## 2019-12-27 PROCEDURE — 80053 COMPREHEN METABOLIC PANEL: CPT

## 2019-12-27 PROCEDURE — 70450 CT HEAD/BRAIN W/O DYE: CPT

## 2019-12-27 PROCEDURE — 82962 GLUCOSE BLOOD TEST: CPT

## 2019-12-27 PROCEDURE — 73080 X-RAY EXAM OF ELBOW: CPT

## 2019-12-27 PROCEDURE — 85025 COMPLETE CBC W/AUTO DIFF WBC: CPT

## 2019-12-27 PROCEDURE — 81001 URINALYSIS AUTO W/SCOPE: CPT

## 2019-12-27 PROCEDURE — 93005 ELECTROCARDIOGRAM TRACING: CPT

## 2019-12-27 PROCEDURE — 36415 COLL VENOUS BLD VENIPUNCTURE: CPT

## 2019-12-27 PROCEDURE — 84484 ASSAY OF TROPONIN QUANT: CPT

## 2019-12-27 PROCEDURE — 73502 X-RAY EXAM HIP UNI 2-3 VIEWS: CPT

## 2019-12-27 NOTE — ED PROVIDER NOTES
I have evaluated the patient as the Provider in Triage. I have reviewed Her vital signs and the triage nurse assessment. I have talked with the patient and any available family and advised that I am the provider in triage and have ordered the appropriate study to initiate their work up based on the clinical presentation during my assessment. I have advised that the patient will be accommodated in the Main ED as soon as possible. I have also requested to contact the triage nurse or myself immediately if the patient experiences any changes in their condition during this brief waiting period. Note written by Dario Nicolas, as dictated by GARDENIA Gan 5:14 PM    Pt reports she became dizzy while at [employer] today and fell at around 1600, hitting her head and left elbow. Daughter says she was not able to catch pt before she fell. She is not currently anticoagulated. Daughter denies LOC. Specifically denies neck pain, chest pain, shortness of breath, fever, chills, and any other pain or symptoms. 80 y.o. female with no significant past medical history who presents from [facility] for evaluation s/p GLF. Patient was at the [facility] when she had a GLF described as \"falling backwards due to a sudden incline in the pavement at her car in the parking lot, hitting her head and left elbow,\" with no LOC. Patient presents to [facility] ED for evaluation s/p GLF. Patient endorses she was able to ambulate normally after GLF. Patient notes she is not anticoagulated. Patient denies neck pain, back pain, and SOB. There are no other acute medical concerns at this time. Social hx: No Tobacco use, No EtOH use, No illicit drug use. PCP: Franklyn Castelan MD    Note written by Dario Salcido, as dictated by Ragini Parker MD 5:14 PM     The history is provided by the patient, medical records and a relative. No  was used.         Past Medical History:   Diagnosis Date    Bunion     Cherry angioma     Diabetes ([facility]     Diabetes mellitus ([facility]     HTN (hypertension)     Joint pain     Joint swelling     Rheumatoid arthritis(714.0)     Scoliosis     Stroke (HCC)     Thyroid disorder     problems    Vertigo     BPPV       Past Surgical History:   Procedure Laterality Date    HX  SECTION      HX ORTHOPAEDIC      bunions removed x 2         Family History:   Problem Relation Age of Onset    Stroke Father        Social History     Socioeconomic History    Marital status:      Spouse name: Not on file    Number of children: Not on file    Years of education: Not on file    Highest education level: Not on file   Occupational History    Not on file   Social Needs    Financial resource strain: Not on file    Food insecurity:     Worry: Not on file     Inability: Not on file    Transportation needs:     Medical: Not on file     Non-medical: Not on file   Tobacco Use    Smoking status: Never Smoker    Smokeless tobacco: Never Used   Substance and Sexual Activity    Alcohol use: No    Drug use: No    Sexual activity: Never   Lifestyle    Physical activity:     Days per week: Not on file     Minutes per session: Not on file    Stress: Not on file   Relationships    Social connections:     Talks on phone: Not on file     Gets together: Not on file     Attends Jehovah's witness service: Not on file     Active member of club or organization: Not on file     Attends meetings of clubs or organizations: Not on file     Relationship status: Not on file    Intimate partner violence:     Fear of current or ex partner: Not on file     Emotionally abused: Not on file     Physically abused: Not on file     Forced sexual activity: Not on file   Other Topics Concern    Not on file   Social History Narrative    Not on file         ALLERGIES: Shellfish containing products    Review of Systems   Constitutional: Negative. Negative for chills and fever. HENT: Negative. Respiratory: Negative. Negative for shortness of breath. Cardiovascular: Negative. Negative for chest pain. Gastrointestinal: Negative. Negative for abdominal pain, constipation, diarrhea and vomiting. Genitourinary: Negative. Musculoskeletal: Negative. Negative for back pain, gait problem and neck pain. Neurological: Negative. Negative for dizziness, weakness, light-headedness and headaches. All other systems reviewed and are negative. There were no vitals filed for this visit. Physical Exam  Vitals signs and nursing note reviewed. Constitutional:       General: She is not in acute distress. Appearance: She is well-developed. She is not ill-appearing, toxic-appearing or diaphoretic. HENT:      Head: Normocephalic and atraumatic. Eyes:      Pupils: Pupils are equal, round, and reactive to light. Neck:      Musculoskeletal: Normal range of motion and neck supple. Cardiovascular:      Rate and Rhythm: Normal rate and regular rhythm. Pulses: Normal pulses. Heart sounds: Normal heart sounds. No murmur. Pulmonary:      Effort: Pulmonary effort is normal.      Breath sounds: Normal breath sounds. Chest:      Chest wall: No tenderness. Abdominal:      General: There is no distension. Palpations: Abdomen is soft. Tenderness: There is no tenderness. Musculoskeletal:         General: No deformity or signs of injury. Comments: 1+ pitting edema to ankles bilaterally. Normal ROM with hips, knees, and upper extremity. No tenderness at spinal processes. Skin:     General: Skin is warm and dry. Capillary Refill: Capillary refill takes less than 2 seconds. Neurological:      Mental Status: She is alert and oriented to person, place, and time.    Psychiatric:         Behavior: Behavior normal.     Note written by Dario Lawrence, as dictated by Bradley Roach MD 5:14 PM      MDM  Number of Diagnoses or Management Options  Fall, initial encounter:   Diagnosis management comments: The patient is resting comfortably and feels better, is alert, talkative, interactive and in no distress. The repeat examination is unremarkable and benign. The patient is neurologically intact, has a normal mental status and is ambulatory in the ED. The history, exam, diagnostic testing (if any) and the patient's current condition do not suggest arrhythmia, STEMI, seizure, meningitis, stroke, sepsis, subarachnoid hemorrhage, intracranial bleeding, encephalitis or other significant pathology that would warrant further testing, continued ED treatment, admission, neurological consultation, or other specialist evaluation at this point. The vital signs have been stable. The patient's condition is stable and appropriate for discharge. The patient will pursue further outpatient evaluation with the primary care physician or other designated or consulting physician as indicated in the discharge instructions. Procedures    The patient's results have been reviewed with them and/or available family. Patient and/or family verbally conveyed their understanding and agreement of the patient's signs, symptoms, diagnosis, treatment and prognosis and additionally agree to follow up as recommended in the discharge instructions or to return to the Emergency Room should their condition change prior to their follow-up appointment. The patient/family verbally agrees with the care-plan and verbally conveys that all of their questions have been answered. The discharge instructions have also been provided to the patient and/or family with some educational information regarding the patient's diagnosis as well a list of reasons why the patient would want to return to the ER prior to their follow-up appointment, should their condition change.

## 2019-12-28 NOTE — ED NOTES
Discharge given by provider. Pt verbalizes understanding. Pt is ambulatory and leaving with daughter.

## 2019-12-28 NOTE — DISCHARGE INSTRUCTIONS
Patient Education        Preventing Falls: Care Instructions  Your Care Instructions    Getting around your home safely can be a challenge if you have injuries or health problems that make it easy for you to fall. Loose rugs and furniture in walkways are among the dangers for many older people who have problems walking or who have poor eyesight. People who have conditions such as arthritis, osteoporosis, or dementia also have to be careful not to fall. You can make your home safer with a few simple measures. Follow-up care is a key part of your treatment and safety. Be sure to make and go to all appointments, and call your doctor if you are having problems. It's also a good idea to know your test results and keep a list of the medicines you take. How can you care for yourself at home? Taking care of yourself  · You may get dizzy if you do not drink enough water. To prevent dehydration, drink plenty of fluids, enough so that your urine is light yellow or clear like water. Choose water and other caffeine-free clear liquids. If you have kidney, heart, or liver disease and have to limit fluids, talk with your doctor before you increase the amount of fluids you drink. · Exercise regularly to improve your strength, muscle tone, and balance. Walk if you can. Swimming may be a good choice if you cannot walk easily. · Have your vision and hearing checked each year or any time you notice a change. If you have trouble seeing and hearing, you might not be able to avoid objects and could lose your balance. · Know the side effects of the medicines you take. Ask your doctor or pharmacist whether the medicines you take can affect your balance. Sleeping pills or sedatives can affect your balance. · Limit the amount of alcohol you drink. Alcohol can impair your balance and other senses. · Ask your doctor whether calluses or corns on your feet need to be removed.  If you wear loose-fitting shoes because of calluses or corns, you can lose your balance and fall. · Talk to your doctor if you have numbness in your feet. Preventing falls at home  · Remove raised doorway thresholds, throw rugs, and clutter. Repair loose carpet or raised areas in the floor. · Move furniture and electrical cords to keep them out of walking paths. · Use nonskid floor wax, and wipe up spills right away, especially on ceramic tile floors. · If you use a walker or cane, put rubber tips on it. If you use crutches, clean the bottoms of them regularly with an abrasive pad, such as steel wool. · Keep your house well lit, especially [place], and outside walkways. Use night-lights in areas such as hallways and bathrooms. Add extra light switches or use remote switches (such as switches that go on or off when you clap your hands) to make it easier to turn lights on if you have to get up during the night. · Install sturdy handrails on stairways. · Move items in your cabinets so that the things you use a lot are on the lower shelves (about waist level). · Keep a cordless phone and a flashlight with new batteries by your bed. If possible, put a phone in each of the main rooms of your house, or carry a cell phone in case you fall and cannot reach a phone. Or, you can wear a device around your neck or wrist. You push a button that sends a signal for help. · Wear low-heeled shoes that fit well and give your feet good support. Use footwear with nonskid soles. Check the heels and soles of your shoes for wear. Repair or replace worn heels or soles. · Do not wear socks without shoes on wood floors. · Walk on the grass when the sidewalks are slippery. If you live in an area that gets snow and ice in the winter, sprinkle salt on slippery steps and sidewalks. Preventing falls in the bath  · Install grab bars and nonskid mats inside and outside your shower or tub and near the toilet and sinks. · Use shower chairs and bath benches.   · Use a hand-held shower head that will allow you to sit while showering. · Get into a tub or shower by putting the weaker leg in first. Get out of a tub or shower with your strong side first.  · Repair loose toilet seats and consider installing a raised toilet seat to make getting on and off the toilet easier. · Keep your bathroom door unlocked while you are in the shower. Where can you learn more? Go to http://jennifer-pablo.info/. Enter [phone] [id number] [phone] in the search box to learn more about \"Preventing Falls: Care Instructions. \"  Current as of: March 16, 2018  Content Version: 11.8  © 0782-4786 [employer], [employer]. Care instructions adapted under license by [employer] (which disclaims liability or warranty for this information). If you have questions about a medical condition or this instruction, always ask your healthcare professional. [address] any warranty or liability for your use of this information.

## 2019-12-29 LAB
ATRIAL RATE: 66 BPM
CALCULATED P AXIS, ECG09: 72 DEGREES
CALCULATED R AXIS, ECG10: -13 DEGREES
CALCULATED T AXIS, ECG11: 9 DEGREES
DIAGNOSIS, 93000: NORMAL
P-R INTERVAL, ECG05: 234 MS
Q-T INTERVAL, ECG07: 422 MS
QRS DURATION, ECG06: 80 MS
QTC CALCULATION (BEZET), ECG08: 442 MS
VENTRICULAR RATE, ECG03: 66 BPM

## 2020-02-22 ENCOUNTER — HOSPITAL ENCOUNTER (EMERGENCY)
Age: 85
Discharge: HOME | End: 2020-02-22
Attending: EMERGENCY MEDICINE | Admitting: EMERGENCY MEDICINE
Payer: MEDICARE

## 2020-02-22 ENCOUNTER — HOSPITAL ENCOUNTER (EMERGENCY)
Dept: GENERAL RADIOLOGY | Age: 85
Discharge: HOME | End: 2020-02-22
Attending: EMERGENCY MEDICINE
Payer: MEDICARE

## 2020-02-22 VITALS
RESPIRATION RATE: 18 BRPM | OXYGEN SATURATION: 99 % | DIASTOLIC BLOOD PRESSURE: 73 MMHG | SYSTOLIC BLOOD PRESSURE: 137 MMHG | TEMPERATURE: 97.5 F | HEART RATE: 87 BPM

## 2020-02-22 DIAGNOSIS — M75.52 BURSITIS OF LEFT SHOULDER: Primary | ICD-10-CM

## 2020-02-22 DIAGNOSIS — M54.50 CHRONIC RIGHT-SIDED LOW BACK PAIN WITHOUT SCIATICA: ICD-10-CM

## 2020-02-22 DIAGNOSIS — G89.29 CHRONIC RIGHT-SIDED LOW BACK PAIN WITHOUT SCIATICA: ICD-10-CM

## 2020-02-22 LAB
APPEARANCE UR: CLEAR
BACTERIA URNS QL MICRO: NEGATIVE /HPF
BILIRUB UR QL: NEGATIVE
COLOR UR: NORMAL
EPITH CASTS URNS QL MICRO: NORMAL /LPF
GLUCOSE UR STRIP.AUTO-MCNC: NEGATIVE MG/DL
HGB UR QL STRIP: NEGATIVE
HYALINE CASTS URNS QL MICRO: NORMAL /LPF (ref 0–5)
KETONES UR QL STRIP.AUTO: NEGATIVE MG/DL
LEUKOCYTE ESTERASE UR QL STRIP.AUTO: NEGATIVE
NITRITE UR QL STRIP.AUTO: NEGATIVE
PH UR STRIP: 6 [PH] (ref 5–8)
PROT UR STRIP-MCNC: NEGATIVE MG/DL
RBC #/AREA URNS HPF: NORMAL /HPF (ref 0–5)
SP GR UR REFRACTOMETRY: 1.01 (ref 1–1.03)
UR CULT HOLD, URHOLD: NORMAL
UROBILINOGEN UR QL STRIP.AUTO: 0.2 EU/DL (ref 0.2–1)
WBC URNS QL MICRO: NORMAL /HPF (ref 0–4)

## 2020-02-22 PROCEDURE — 81001 URINALYSIS AUTO W/SCOPE: CPT

## 2020-02-22 PROCEDURE — 99284 EMERGENCY DEPT VISIT MOD MDM: CPT

## 2020-02-22 PROCEDURE — 73030 X-RAY EXAM OF SHOULDER: CPT

## 2020-02-22 PROCEDURE — 93005 ELECTROCARDIOGRAM TRACING: CPT

## 2020-02-22 PROCEDURE — 73502 X-RAY EXAM HIP UNI 2-3 VIEWS: CPT

## 2020-02-22 RX ORDER — DICLOFENAC SODIUM 10 MG/G
2 GEL TOPICAL
Qty: 100 G | Refills: 0 | Status: SHIPPED | OUTPATIENT
Start: 2020-02-22 | End: 2020-11-30

## 2020-02-22 NOTE — ED PROVIDER NOTES
I have evaluated the patient as the Provider in Triage. I have reviewed Her vital signs and the triage nurse assessment. I have talked with the patient and any available family and advised that I am the provider in triage and have ordered the appropriate study to initiate their work up based on the clinical presentation during my assessment. I have advised that the patient will be accommodated in the Main ED as soon as possible. I have also requested to contact the triage nurse or myself immediately if the patient experiences any changes in their condition during this brief waiting period. Note written by Dario Aguilar, as dictated by Frederick Whitaker NP 3:18 PM  ---  80 y.o. female with past medical history significant for DM, thyroid disorder, RA, cherry angioma, stroke, vertigo, HTN, and scoliosis who presents from private vehicle with chief complaint of hip pain. Pt c/o right hip pain for \"1 month\". Per daughter, Pt is able to ambulate with a limp. Pt also c/o right lower back pain and left shoulder pain. Pt's daughter states Pt's left shoulder pain is worse with movement. Pt denies dysuria or urinary frequency. There are no other acute medical concerns at this time. PCP: Agnes Oates MD    Note written by Dario Norton, as dictated by Bob Chapman MD 3:35 PM          The history is provided by the patient and a relative. No  was used.         Past Medical History:   Diagnosis Date    Bunion     Cherry angioma     Diabetes ([address]     Diabetes mellitus ([address]     HTN (hypertension)     Joint pain     Joint swelling     Rheumatoid arthritis(714.0)     Scoliosis     Stroke ([address]     Thyroid disorder     problems    Vertigo     BPPV       Past Surgical History:   Procedure Laterality Date    HX  SECTION      HX ORTHOPAEDIC      bunions removed x 2         Family History:   Problem Relation Age of Onset    Stroke Father        Social History     Socioeconomic History    Marital status:      Spouse name: Not on file    Number of children: Not on file    Years of education: Not on file    Highest education level: Not on file   Occupational History    Not on file   Social Needs    Financial resource strain: Not on file    Food insecurity:     Worry: Not on file     Inability: Not on file    Transportation needs:     Medical: Not on file     Non-medical: Not on file   Tobacco Use    Smoking status: Never Smoker    Smokeless tobacco: Never Used   Substance and Sexual Activity    Alcohol use: No    Drug use: No    Sexual activity: Never   Lifestyle    Physical activity:     Days per week: Not on file     Minutes per session: Not on file    Stress: Not on file   Relationships    Social connections:     Talks on phone: Not on file     Gets together: Not on file     Attends [employer] service: Not on file     Active member of club or organization: Not on file     Attends meetings of clubs or organizations: Not on file     Relationship status: Not on file    Intimate partner violence:     Fear of current or ex partner: Not on file     Emotionally abused: Not on file     Physically abused: Not on file     Forced sexual activity: Not on file   Other Topics Concern    Not on file   Social History Narrative    Not on file         ALLERGIES: Shellfish containing products    Review of Systems   Constitutional: Negative for chills and fever. Respiratory: Negative for shortness of breath. Cardiovascular: Negative for chest pain. Gastrointestinal: Negative for abdominal pain, constipation, diarrhea and vomiting. Musculoskeletal: Positive for arthralgias.        + Right hip pain. + Left shoulder pain. Neurological: Negative for dizziness and light-headedness. All other systems reviewed and are negative.       Vitals:    02/22/20 1458   BP: 103/60   Pulse: 87   Resp: 18   Temp: 97.5 °F (36.4 °C)   SpO2: 98%            Physical Exam  Vitals signs and nursing note reviewed. Constitutional:       Appearance: She is well-developed. HENT:      Head: Normocephalic and atraumatic. Eyes:      Pupils: Pupils are equal, round, and reactive to light. Neck:      Musculoskeletal: Normal range of motion and neck supple. Cardiovascular:      Rate and Rhythm: Normal rate and regular rhythm. Heart sounds: Normal heart sounds. Pulmonary:      Effort: Pulmonary effort is normal.      Breath sounds: Normal breath sounds. Comments: Lungs clear. Abdominal:      General: There is no distension. Palpations: Abdomen is soft. Tenderness: There is no abdominal tenderness. Comments: Abdomen non tender and non distended. No CVA tenderness. Musculoskeletal:         General: No deformity. Left shoulder: She exhibits pain. She exhibits normal range of motion. Lumbar back: She exhibits pain. She exhibits normal range of motion, no tenderness and no deformity. Comments: Right sided lower back pain at the top of pelvic rim with FROM. No tenderness, step offs, or deformities. Left shoulder has some fluid, FROM. Only pain with full abduction of shoulder. Skin:     General: Skin is warm and dry. Capillary Refill: Capillary refill takes less than 2 seconds. Neurological:      Mental Status: She is alert and oriented to person, place, and time. Psychiatric:         Behavior: Behavior normal.     Note written by Dario Richard, as dictated by Daisy Kirkpatrick MD 3:35 PM       MDM       Procedures    ED EKG interpretation:  Rhythm: Sinus rhythm with 1st degree AV block; and regular . Rate (approx.): 61 BPM; ST/T wave: No ST or T wave changes; No ectopy. Note written by Dario Richard, as dictated by Daisy Kirkpatrick MD 4:15 PM    Pt presents with an extremity injury. No evidence of fracture, dislocation, or other significant musculoskeletal injury.  Patient was discharged home with a plan for pain control as well as instructions on managing his injuries and precautions for returning to the emergency department. No evidence of compartment syndrome on evaluation. Patient will be discharged home to follow-up with primary care provider as instructed in discharge paperwork. Patient and family expressed understanding and agreed with plan.

## 2020-02-23 LAB
ATRIAL RATE: 61 BPM
CALCULATED P AXIS, ECG09: 89 DEGREES
CALCULATED R AXIS, ECG10: -45 DEGREES
CALCULATED T AXIS, ECG11: 9 DEGREES
DIAGNOSIS, 93000: NORMAL
P-R INTERVAL, ECG05: 280 MS
Q-T INTERVAL, ECG07: 420 MS
QRS DURATION, ECG06: 86 MS
QTC CALCULATION (BEZET), ECG08: 422 MS
VENTRICULAR RATE, ECG03: 61 BPM

## 2020-07-06 ENCOUNTER — APPOINTMENT (OUTPATIENT)
Dept: GENERAL RADIOLOGY | Age: 85
End: 2020-07-06
Attending: EMERGENCY MEDICINE
Payer: MEDICARE

## 2020-07-06 ENCOUNTER — HOSPITAL ENCOUNTER (EMERGENCY)
Age: 85
Discharge: HOME | End: 2020-07-06
Attending: EMERGENCY MEDICINE | Admitting: EMERGENCY MEDICINE
Payer: MEDICARE

## 2020-07-06 VITALS
SYSTOLIC BLOOD PRESSURE: 129 MMHG | RESPIRATION RATE: 16 BRPM | HEART RATE: 65 BPM | BODY MASS INDEX: 20.12 KG/M2 | TEMPERATURE: 98.6 F | WEIGHT: 110 LBS | OXYGEN SATURATION: 99 % | DIASTOLIC BLOOD PRESSURE: 57 MMHG

## 2020-07-06 DIAGNOSIS — S70.02XA CONTUSION OF LEFT HIP, INITIAL ENCOUNTER: ICD-10-CM

## 2020-07-06 DIAGNOSIS — S22.42XA CLOSED FRACTURE OF MULTIPLE RIBS OF LEFT SIDE, INITIAL ENCOUNTER: Primary | ICD-10-CM

## 2020-07-06 PROCEDURE — 99284 EMERGENCY DEPT VISIT MOD MDM: CPT

## 2020-07-06 PROCEDURE — 74011250636 HC RX REV CODE- 250/636: Performed by: EMERGENCY MEDICINE

## 2020-07-06 PROCEDURE — 96374 THER/PROPH/DIAG INJ IV PUSH: CPT

## 2020-07-06 PROCEDURE — 73502 X-RAY EXAM HIP UNI 2-3 VIEWS: CPT

## 2020-07-06 PROCEDURE — 71101 X-RAY EXAM UNILAT RIBS/CHEST: CPT

## 2020-07-06 PROCEDURE — 77030027138 HC INCENT SPIROMETER -A

## 2020-07-06 PROCEDURE — 74011250637 HC RX REV CODE- 250/637: Performed by: EMERGENCY MEDICINE

## 2020-07-06 RX ORDER — HYDROCODONE BITARTRATE AND ACETAMINOPHEN 5; 325 MG/1; MG/1
1 TABLET ORAL
Qty: 18 TAB | Refills: 0 | Status: SHIPPED | OUTPATIENT
Start: 2020-07-06 | End: 2020-07-11

## 2020-07-06 RX ORDER — ONDANSETRON 2 MG/ML
4 INJECTION INTRAMUSCULAR; INTRAVENOUS
Status: DISCONTINUED | OUTPATIENT
Start: 2020-07-06 | End: 2020-07-06 | Stop reason: HOSPADM

## 2020-07-06 RX ORDER — FENTANYL CITRATE 50 UG/ML
50 INJECTION, SOLUTION INTRAMUSCULAR; INTRAVENOUS
Status: DISCONTINUED | OUTPATIENT
Start: 2020-07-06 | End: 2020-07-06 | Stop reason: HOSPADM

## 2020-07-06 RX ORDER — IBUPROFEN 400 MG/1
400 TABLET ORAL
Qty: 20 TAB | Refills: 0 | Status: SHIPPED | OUTPATIENT
Start: 2020-07-06 | End: 2020-07-13

## 2020-07-06 RX ORDER — ACETAMINOPHEN 325 MG/1
650 TABLET ORAL
Qty: 30 TAB | Refills: 0 | Status: SHIPPED | OUTPATIENT
Start: 2020-07-06 | End: 2020-11-30

## 2020-07-06 RX ORDER — KETOROLAC TROMETHAMINE 30 MG/ML
15 INJECTION, SOLUTION INTRAMUSCULAR; INTRAVENOUS
Status: COMPLETED | OUTPATIENT
Start: 2020-07-06 | End: 2020-07-06

## 2020-07-06 RX ORDER — ACETAMINOPHEN 500 MG
1000 TABLET ORAL
Status: COMPLETED | OUTPATIENT
Start: 2020-07-06 | End: 2020-07-06

## 2020-07-06 RX ADMIN — KETOROLAC TROMETHAMINE 15 MG: 30 INJECTION, SOLUTION INTRAMUSCULAR at 11:28

## 2020-07-06 RX ADMIN — ACETAMINOPHEN 1000 MG: 500 TABLET ORAL at 11:23

## 2020-07-06 NOTE — ED PROVIDER NOTES
The history is provided by the patient and a relative. Fall   The accident occurred yesterday. Fall occurred: tripped falling onto left side. She fell from a height of 1 - 2 ft. She landed on hard floor. Point of impact: left hip and chest. Pain location: left ribs and left hip. The pain is moderate. She was ambulatory at the scene. There was no entrapment after the fall. There was no drug use involved in the accident. There was no alcohol use involved in the accident. Pertinent negatives include no abdominal pain, no extremity weakness, no loss of consciousness and no laceration. The symptoms are aggravated by ambulation and pressure on injury. She has tried nothing for the symptoms. Hip Pain    This is a new problem. The current episode started yesterday. The problem occurs constantly. The problem has been gradually worsening. The pain is moderate. She has tried nothing for the symptoms. There has been a history of trauma (fall from standing).         Past Medical History:   Diagnosis Date    Bunion     Cherry angioma     [facility]     Diabetes mellitus ([address]     HTN (hypertension)     Joint pain     Joint swelling     Rheumatoid arthritis(714.0)     Scoliosis     Stroke ([facility]     Thyroid disorder     problems    Vertigo     BPPV       Past Surgical History:   Procedure Laterality Date    HX  SECTION      HX ORTHOPAEDIC      bunions removed x 2         Family History:   Problem Relation Age of Onset    Stroke Father        Social History     Socioeconomic History    Marital status:      Spouse name: Not on file    Number of children: Not on file    Years of education: Not on file    Highest education level: Not on file   Occupational History    Not on file   Social Needs    Financial resource strain: Not on file    Food insecurity     Worry: Not on file     Inability: Not on file    Transportation needs     Medical: Not on file     Non-medical: Not on file   Tobacco Use    Smoking status: Never Smoker    Smokeless tobacco: Never Used   Substance and Sexual Activity    Alcohol use: No    Drug use: No    Sexual activity: Never   Lifestyle    Physical activity     Days per week: Not on file     Minutes per session: Not on file    Stress: Not on file   Relationships    Social connections     Talks on phone: Not on file     Gets together: Not on file     Attends Sabianism service: Not on file     Active member of club or organization: Not on file     Attends meetings of clubs or organizations: Not on file     Relationship status: Not on file    Intimate partner violence     Fear of current or ex partner: Not on file     Emotionally abused: Not on file     Physically abused: Not on file     Forced sexual activity: Not on file   Other Topics Concern    Not on file   Social History Narrative    Not on file         ALLERGIES: Shellfish containing products    Review of Systems   Gastrointestinal: Negative for abdominal pain. Musculoskeletal: Negative for extremity weakness. Neurological: Negative for loss of consciousness. All other systems reviewed and are negative. Vitals:    07/06/20 1245 07/06/20 1315 07/06/20 1345 07/06/20 1402   BP: 141/49 124/49 130/49 129/57   Pulse:       Resp:       Temp:       SpO2: 99% 99% 99%    Weight:                Physical Exam  Vitals signs and nursing note reviewed. Constitutional:       General: She is not in acute distress. Appearance: She is well-developed. HENT:      Head: Normocephalic and atraumatic. Eyes:      Conjunctiva/sclera: Conjunctivae normal.   Neck:      Musculoskeletal: Neck supple. Cardiovascular:      Rate and Rhythm: Normal rate and regular rhythm. Heart sounds: Normal heart sounds. Pulmonary:      Effort: Pulmonary effort is normal. No respiratory distress. Breath sounds: Normal breath sounds. Chest:      Chest wall: Tenderness present. No crepitus.        Abdominal:      General: There is no distension. Tenderness: There is no abdominal tenderness. There is no guarding or rebound. Musculoskeletal: Normal range of motion. General: No deformity. Left hip: She exhibits tenderness (left lateral iliac crest). Skin:     General: Skin is warm and dry. Findings: No laceration. Neurological:      Mental Status: She is alert. Cranial Nerves: No cranial nerve deficit. Psychiatric:         Behavior: Behavior normal.          MDM     80 y.o. female presents with fall onto left side by mechanical means yesterday. Significant pain of let side of chest and left lateral hip. No hip fracture noted and she was ambulatory following event. Noted rib fractures are a risk for pneumonia given advanced age. Analgesia improved, provided incentive spirometer for home care and pneumonia prevention. Analgesia at home discussed. Plan to follow up with PCP as needed and return precautions discussed for worsening or new concerning symptoms.      Procedures

## 2020-07-06 NOTE — ED TRIAGE NOTES
Left hip pain post GLF yesterday. GLF sideways onto hip. Trip/fall. Using walker daily. No LOC, no blood thinners.

## 2020-08-20 ENCOUNTER — HOSPITAL ENCOUNTER (OUTPATIENT)
Dept: GENERAL RADIOLOGY | Age: 85
Discharge: HOME | End: 2020-08-20
Payer: MEDICARE

## 2020-08-20 DIAGNOSIS — R05.9 COUGH: ICD-10-CM

## 2020-08-20 PROCEDURE — 71046 X-RAY EXAM CHEST 2 VIEWS: CPT

## 2020-11-17 ENCOUNTER — OFFICE VISIT (OUTPATIENT)
Dept: CARDIOLOGY CLINIC | Age: 85
End: 2020-11-17
Payer: MEDICARE

## 2020-11-17 VITALS
SYSTOLIC BLOOD PRESSURE: 132 MMHG | OXYGEN SATURATION: 98 % | HEART RATE: 80 BPM | WEIGHT: 105.6 LBS | DIASTOLIC BLOOD PRESSURE: 80 MMHG | HEIGHT: 62 IN | BODY MASS INDEX: 19.43 KG/M2

## 2020-11-17 DIAGNOSIS — R60.9 EDEMA, UNSPECIFIED TYPE: Primary | ICD-10-CM

## 2020-11-17 DIAGNOSIS — E78.2 MIXED HYPERLIPIDEMIA: ICD-10-CM

## 2020-11-17 DIAGNOSIS — E88.81 INSULIN RESISTANCE: ICD-10-CM

## 2020-11-17 DIAGNOSIS — R01.1 HEART MURMUR: ICD-10-CM

## 2020-11-17 DIAGNOSIS — I10 ESSENTIAL HYPERTENSION: ICD-10-CM

## 2020-11-17 DIAGNOSIS — I63.30 CEREBROVASCULAR ACCIDENT (CVA) DUE TO THROMBOSIS OF CEREBRAL ARTERY (HCC): ICD-10-CM

## 2020-11-17 PROCEDURE — 99204 OFFICE O/P NEW MOD 45 MIN: CPT | Performed by: STUDENT IN AN ORGANIZED HEALTH CARE EDUCATION/TRAINING PROGRAM

## 2020-11-17 PROCEDURE — 93005 ELECTROCARDIOGRAM TRACING: CPT | Performed by: STUDENT IN AN ORGANIZED HEALTH CARE EDUCATION/TRAINING PROGRAM

## 2020-11-17 PROCEDURE — G0463 HOSPITAL OUTPT CLINIC VISIT: HCPCS | Performed by: STUDENT IN AN ORGANIZED HEALTH CARE EDUCATION/TRAINING PROGRAM

## 2020-11-17 PROCEDURE — 93010 ELECTROCARDIOGRAM REPORT: CPT | Performed by: STUDENT IN AN ORGANIZED HEALTH CARE EDUCATION/TRAINING PROGRAM

## 2020-11-17 RX ORDER — FUROSEMIDE 20 MG/1
20 TABLET ORAL DAILY
Qty: 30 TAB | Refills: 1 | Status: SHIPPED | OUTPATIENT
Start: 2020-11-17 | End: 2021-01-26

## 2020-11-17 NOTE — LETTER
11/18/20 Patient: Mathew Resee YOB: 1930 Date of Visit: 11/17/2020 Bibiana Gonsalez MD 
[address] VIA Facsimile: [phone] Dear Bibiana Gonsalez MD, Thank you for referring Ms. Mathew Reese to [facility] for evaluation. My notes for this consultation are attached. If you have questions, please do not hesitate to call me. I look forward to following your patient along with you.  
 
 
Sincerely, 
 
Jonah Fish, DO

## 2020-11-17 NOTE — PROGRESS NOTES
Ricardo Stein is a 80 y.o. female    Chief Complaint   Patient presents with    New Patient     Edema     Left shoulder knot . Chest pain No    SOB No    Dizziness at times; vertigo     Swelling patient states swelling in both leg    Refills No    Visit Vitals  /80 (BP 1 Location: Left arm, BP Patient Position: Sitting)   Pulse 80   Ht 5' 2\" (1.575 m)   Wt 105 lb 9.6 oz (47.9 kg)   SpO2 98%   BMI 19.31 kg/m²       1. Have you been to the ER, urgent care clinic since your last visit? Hospitalized since your last visit? No    2. Have you seen or consulted any other health care providers outside of the [address] [place] [address] since your last visit? Include any pap smears or colon screening.   No

## 2020-11-18 NOTE — PROGRESS NOTES
[facility] [address], [address] [facility], [address] [place] [address]    Office [phone],QAQ [phone]           Courtney Murillo is a 80 y.o. female presents the office for evaluation of lower extremity edema. Consult requested by Annamaria Borrero MD        Assessment/Recommendations:    ICD-10-CM ICD-9-CM    1. Edema, unspecified type  R60.9 782.3 AMB POC EKG ROUTINE W/ 12 LEADS, INTER & REP      CBC W/O DIFF      METABOLIC PANEL, BASIC      NT-PRO BNP   2. Essential hypertension  I10 401.9    3. Mixed hyperlipidemia  E78.2 272.2    4. Cerebrovascular accident (CVA) due to thrombosis of cerebral artery ([facility]  I63.30 434.01    5. Insulin resistance  E88.81 277.7    6. Heart murmur  R01.1 785. 2        Lower extremity edemaleft greater than right. Acute onset over the last few days. Patient remains ambulatory, no recent prolonged periods of sedentary activity that would prompt risk for DVT. Patient's daughter is unsure of her medications but thinks she still taking amlodipine therapy. She has a robust medical list today but reports only taking 3 medicines at home. -Recommend bilateral venous duplex to evaluate for DVT  -Elevation of legs  -Discontinue amlodipine therapy  -Start low-dose Lasix 20 mg daily  -Close follow-up with echocardiogram  -Repeat CBC, BMP and proBNP    Heart murmurappears to be MR murmur. Echocardiogram for further evaluation    Hypertensionrecommend to continue losartan therapy and hold amlodipine therapy. Will have nurse call patient's daughter at home to do adequate med reconciliation    History of CVA    History of hyperlipidemia    Insulin resistancehemoglobin A1c 6.3% in 2019          Primary Care Physician- Annamaria Borrero MD    Follow-up 2 to 3 weeks with echocardiogram        Subjective:  80 y.o. presents to the office today with her daughter for evaluation of lower extremity edema.   Patient has a history of hypertension, hyperlipidemia and prior stroke. She is also limited in her ability to ambulate due to scoliosis. Patient's daughter reports over the last 2 days she has developed significant bilateral lower extremity edema. She reports it was worse than what appears in the office today. Edema shows asymmetry with left greater than right. Patient continues to walk around the house and has not had any prolonged sedentary activities that would increase her risk for DVT. She is without any chest pain, shortness of breath, orthopnea, PND. No prior history of congestive heart failure. Patient's daughter is unsure of what medication she is actually taking. She reports she is only taking 3 medicines at home on a daily basis. She does believe that she is taking amlodipine therapy. Past Medical History:   Diagnosis Date    Bunion     Cherry angioma     Diabetes [facility]     Diabetes mellitus ([facility]     HTN (hypertension)     Joint pain     Joint swelling     Rheumatoid arthritis(714.0)     Scoliosis     Stroke ([facility]     Thyroid disorder     problems    Vertigo     BPPV        Past Surgical History:   Procedure Laterality Date    HX  SECTION      HX ORTHOPAEDIC      bunions removed x 2         Current Outpatient Medications:     furosemide (LASIX) 20 mg tablet, Take 1 Tab by mouth daily. , Disp: 30 Tab, Rfl: 1    acetaminophen (TYLENOL) 325 mg tablet, Take 2 Tabs by mouth every six (6) hours as needed for Pain., Disp: 30 Tab, Rfl: 0    diclofenac (VOLTAREN) 1 % gel, Apply 2 g to affected area two (2) times daily as needed for Pain. Apply to back and shoulder for pain  Indications: osteoarthritis of the knee, Disp: 100 g, Rfl: 0    losartan (COZAAR) 100 mg tablet, Take 1 Tab by mouth daily. , Disp: 30 Tab, Rfl: 1    hydrOXYzine HCl (ATARAX) 10 mg tablet, Take 10 mg by mouth nightly as needed for Itching., Disp: , Rfl:     albuterol (PROVENTIL HFA, VENTOLIN HFA, PROAIR HFA) 90 mcg/actuation inhaler, Take 1 Puff by inhalation every four (4) hours as needed for Wheezing., Disp: 1 Inhaler, Rfl: 0    Cholecalciferol, Vitamin D3, 3,000 unit tab, Take 3,000 Units by mouth daily. , Disp: , Rfl:     levothyroxine (SYNTHROID) 88 mcg tablet, TAKE ONE TABLET BY MOUTH EVERY DAY, Disp: 30 tablet, Rfl: 0    pantoprazole (PROTONIX) 40 mg tablet, Take 1 Tab by mouth daily. , Disp: 30 Tab, Rfl: 1    Lancets (ONE TOUCH DELICA) misc, by Does Not Apply route three (3) times daily. , Disp: 1 Package, Rfl: 11    glucose blood VI test strips (ONE TOUCH ULTRA TEST) strip, by Does Not Apply route three (3) times daily. , Disp: 1 Package, Rfl: 11    aspirin 81 mg chewable tablet, Take 81 mg by mouth daily. , Disp: , Rfl:     atorvastatin (LIPITOR) 10 mg tablet, Take 1 Tab by mouth nightly., Disp: 30 Tab, Rfl: 1    Allergies   Allergen Reactions    Shellfish Containing Products Swelling        Family History   Problem Relation Age of Onset    Stroke Father        Social History     Tobacco Use    Smoking status: Never Smoker    Smokeless tobacco: Never Used   Substance Use Topics    Alcohol use: No    Drug use: No       Review of Symptoms:  Pertinent Positive:negative  Pertinent Negative:No chest pain, dyspnea on exertion, shortness of breath, orthopnea, PND    All Other systems reviewed and are negative for a Comprehensive ROS (10+)    Physical Exam    Blood pressure 132/80, pulse 80, height 5' 2\" (1.575 m), weight 105 lb 9.6 oz (47.9 kg), SpO2 98 %. Constitutional:  well-developed and well-nourished. No distress. HENT: Normocephalic. Eyes: No scleral icterus. Neck:  Neck supple. No JVD present. Pulmonary/Chest: Effort normal and breath sounds normal. No respiratory distress, wheezes or rales. Cardiovascular: Normal rate, regular rhythm, S1 S2 3 out of 6 systolic murmur with radiation to axilla  Extremities: Bilateral edema left greater than right  . Abdominal:   No abnormal distension.    Neurological:  Moving all extremities, cranial nerves appear grossly intact. Skin: Skin is not cold. Not diaphoretic. No erythema. Psychiatric:  Grossly normal mood and affect. Intact insight. Objective Data:    ECG: personally reviewed and  interpreted  11/17/2020normal sinus rhythm, inferior Q waves, anterior lateral Q waves                Lashae Luis, DO          ATTENTION:   This medical record was transcribed using an electronic medical records/speech recognition system. Although proofread, it may and can contain electronic, spelling and other errors. Corrections may be executed at a later time. Please feel free to contact us for any clarifications as needed.

## 2020-11-30 ENCOUNTER — HOSPITAL ENCOUNTER (INPATIENT)
Age: 85
LOS: 1 days | Discharge: HOME HEALTH | DRG: 308 | End: 2020-12-01
Attending: EMERGENCY MEDICINE | Admitting: INTERNAL MEDICINE
Payer: MEDICARE

## 2020-11-30 ENCOUNTER — APPOINTMENT (OUTPATIENT)
Dept: GENERAL RADIOLOGY | Age: 85
DRG: 308 | End: 2020-11-30
Attending: EMERGENCY MEDICINE
Payer: MEDICARE

## 2020-11-30 ENCOUNTER — APPOINTMENT (OUTPATIENT)
Dept: VASCULAR SURGERY | Age: 85
DRG: 308 | End: 2020-11-30
Attending: EMERGENCY MEDICINE
Payer: MEDICARE

## 2020-11-30 ENCOUNTER — APPOINTMENT (OUTPATIENT)
Dept: CT IMAGING | Age: 85
DRG: 308 | End: 2020-11-30
Attending: EMERGENCY MEDICINE
Payer: MEDICARE

## 2020-11-30 DIAGNOSIS — Z71.89 COUNSELING REGARDING ADVANCE CARE PLANNING AND GOALS OF CARE: ICD-10-CM

## 2020-11-30 DIAGNOSIS — J90 PLEURAL EFFUSION: Primary | ICD-10-CM

## 2020-11-30 DIAGNOSIS — I35.1 NONRHEUMATIC AORTIC VALVE INSUFFICIENCY: ICD-10-CM

## 2020-11-30 DIAGNOSIS — I48.91 ATRIAL FIBRILLATION, UNSPECIFIED TYPE (HCC): ICD-10-CM

## 2020-11-30 DIAGNOSIS — R79.89 ELEVATED BRAIN NATRIURETIC PEPTIDE (BNP) LEVEL: ICD-10-CM

## 2020-11-30 DIAGNOSIS — I42.9 CARDIOMYOPATHY, UNSPECIFIED TYPE (HCC): ICD-10-CM

## 2020-11-30 DIAGNOSIS — I50.33 ACUTE ON CHRONIC DIASTOLIC HEART FAILURE (HCC): ICD-10-CM

## 2020-11-30 DIAGNOSIS — R77.8 ELEVATED TROPONIN: ICD-10-CM

## 2020-11-30 DIAGNOSIS — I82.432 ACUTE DEEP VEIN THROMBOSIS (DVT) OF POPLITEAL VEIN OF LEFT LOWER EXTREMITY (HCC): ICD-10-CM

## 2020-11-30 DIAGNOSIS — R07.9 CHEST PAIN, UNSPECIFIED TYPE: ICD-10-CM

## 2020-11-30 DIAGNOSIS — Z71.89 DNR (DO NOT RESUSCITATE) DISCUSSION: ICD-10-CM

## 2020-11-30 DIAGNOSIS — I82.4Y2 ACUTE DEEP VEIN THROMBOSIS (DVT) OF PROXIMAL VEIN OF LEFT LOWER EXTREMITY (HCC): ICD-10-CM

## 2020-11-30 PROBLEM — R53.1 LEFT-SIDED WEAKNESS: Status: RESOLVED | Noted: 2019-08-14 | Resolved: 2020-11-30

## 2020-11-30 PROBLEM — E87.6 HYPOKALEMIA: Status: RESOLVED | Noted: 2019-08-14 | Resolved: 2020-11-30

## 2020-11-30 LAB
ALBUMIN SERPL-MCNC: 3.3 G/DL (ref 3.5–5)
ALBUMIN/GLOB SERPL: 0.8 {RATIO} (ref 1.1–2.2)
ALP SERPL-CCNC: 116 U/L (ref 45–117)
ALT SERPL-CCNC: 23 U/L (ref 12–78)
ANION GAP SERPL CALC-SCNC: 1 MMOL/L (ref 5–15)
APTT PPP: <20 SEC (ref 22.1–31)
AST SERPL-CCNC: 30 U/L (ref 15–37)
ATRIAL RATE: 129 BPM
BASOPHILS # BLD: 0 K/UL (ref 0–0.1)
BASOPHILS NFR BLD: 0 % (ref 0–1)
BILIRUB SERPL-MCNC: 0.5 MG/DL (ref 0.2–1)
BNP SERPL-MCNC: ABNORMAL PG/ML
BUN SERPL-MCNC: 24 MG/DL (ref 6–20)
BUN/CREAT SERPL: 29 (ref 12–20)
CALCIUM SERPL-MCNC: 9.6 MG/DL (ref 8.5–10.1)
CALCULATED R AXIS, ECG10: -40 DEGREES
CALCULATED T AXIS, ECG11: 7 DEGREES
CHLORIDE SERPL-SCNC: 101 MMOL/L (ref 97–108)
CO2 SERPL-SCNC: 32 MMOL/L (ref 21–32)
COMMENT, HOLDF: NORMAL
CREAT SERPL-MCNC: 0.84 MG/DL (ref 0.55–1.02)
D DIMER PPP FEU-MCNC: 2.63 MG/L FEU (ref 0–0.65)
DIAGNOSIS, 93000: NORMAL
DIFFERENTIAL METHOD BLD: ABNORMAL
EOSINOPHIL # BLD: 0 K/UL (ref 0–0.4)
EOSINOPHIL NFR BLD: 0 % (ref 0–7)
ERYTHROCYTE [DISTWIDTH] IN BLOOD BY AUTOMATED COUNT: 13.2 % (ref 11.5–14.5)
GLOBULIN SER CALC-MCNC: 4.1 G/DL (ref 2–4)
GLUCOSE BLD STRIP.AUTO-MCNC: 145 MG/DL (ref 65–100)
GLUCOSE SERPL-MCNC: 145 MG/DL (ref 65–100)
HCT VFR BLD AUTO: 38.8 % (ref 35–47)
HGB BLD-MCNC: 12.3 G/DL (ref 11.5–16)
IMM GRANULOCYTES # BLD AUTO: 0 K/UL (ref 0–0.04)
IMM GRANULOCYTES NFR BLD AUTO: 0 % (ref 0–0.5)
LACTATE SERPL-SCNC: 1.4 MMOL/L (ref 0.4–2)
LYMPHOCYTES # BLD: 0.8 K/UL (ref 0.8–3.5)
LYMPHOCYTES NFR BLD: 10 % (ref 12–49)
MAGNESIUM SERPL-MCNC: 2.2 MG/DL (ref 1.6–2.4)
MCH RBC QN AUTO: 28.3 PG (ref 26–34)
MCHC RBC AUTO-ENTMCNC: 31.7 G/DL (ref 30–36.5)
MCV RBC AUTO: 89.4 FL (ref 80–99)
MONOCYTES # BLD: 0.6 K/UL (ref 0–1)
MONOCYTES NFR BLD: 8 % (ref 5–13)
NEUTS SEG # BLD: 6.5 K/UL (ref 1.8–8)
NEUTS SEG NFR BLD: 82 % (ref 32–75)
NRBC # BLD: 0 K/UL (ref 0–0.01)
NRBC BLD-RTO: 0 PER 100 WBC
PLATELET # BLD AUTO: 155 K/UL (ref 150–400)
PMV BLD AUTO: 11.2 FL (ref 8.9–12.9)
POTASSIUM SERPL-SCNC: 3.9 MMOL/L (ref 3.5–5.1)
PROT SERPL-MCNC: 7.4 G/DL (ref 6.4–8.2)
Q-T INTERVAL, ECG07: 316 MS
QRS DURATION, ECG06: 74 MS
QTC CALCULATION (BEZET), ECG08: 465 MS
RBC # BLD AUTO: 4.34 M/UL (ref 3.8–5.2)
SAMPLES BEING HELD,HOLD: NORMAL
SERVICE CMNT-IMP: ABNORMAL
SODIUM SERPL-SCNC: 134 MMOL/L (ref 136–145)
THERAPEUTIC RANGE,PTTT: ABNORMAL SECS (ref 58–77)
TROPONIN I SERPL-MCNC: 0.15 NG/ML
TROPONIN I SERPL-MCNC: 0.15 NG/ML
TSH SERPL DL<=0.05 MIU/L-ACNC: 13.3 UIU/ML (ref 0.36–3.74)
VENTRICULAR RATE, ECG03: 130 BPM
WBC # BLD AUTO: 8 K/UL (ref 3.6–11)

## 2020-11-30 PROCEDURE — 36415 COLL VENOUS BLD VENIPUNCTURE: CPT

## 2020-11-30 PROCEDURE — 65660000000 HC RM CCU STEPDOWN

## 2020-11-30 PROCEDURE — 71045 X-RAY EXAM CHEST 1 VIEW: CPT

## 2020-11-30 PROCEDURE — 74011000250 HC RX REV CODE- 250: Performed by: EMERGENCY MEDICINE

## 2020-11-30 PROCEDURE — 96365 THER/PROPH/DIAG IV INF INIT: CPT

## 2020-11-30 PROCEDURE — 93005 ELECTROCARDIOGRAM TRACING: CPT

## 2020-11-30 PROCEDURE — 96375 TX/PRO/DX INJ NEW DRUG ADDON: CPT

## 2020-11-30 PROCEDURE — 80053 COMPREHEN METABOLIC PANEL: CPT

## 2020-11-30 PROCEDURE — 83880 ASSAY OF NATRIURETIC PEPTIDE: CPT

## 2020-11-30 PROCEDURE — 85379 FIBRIN DEGRADATION QUANT: CPT

## 2020-11-30 PROCEDURE — 85025 COMPLETE CBC W/AUTO DIFF WBC: CPT

## 2020-11-30 PROCEDURE — 87040 BLOOD CULTURE FOR BACTERIA: CPT

## 2020-11-30 PROCEDURE — 99223 1ST HOSP IP/OBS HIGH 75: CPT | Performed by: STUDENT IN AN ORGANIZED HEALTH CARE EDUCATION/TRAINING PROGRAM

## 2020-11-30 PROCEDURE — 99285 EMERGENCY DEPT VISIT HI MDM: CPT

## 2020-11-30 PROCEDURE — 82962 GLUCOSE BLOOD TEST: CPT

## 2020-11-30 PROCEDURE — 85730 THROMBOPLASTIN TIME PARTIAL: CPT

## 2020-11-30 PROCEDURE — 84443 ASSAY THYROID STIM HORMONE: CPT

## 2020-11-30 PROCEDURE — 84484 ASSAY OF TROPONIN QUANT: CPT

## 2020-11-30 PROCEDURE — 74011250637 HC RX REV CODE- 250/637: Performed by: INTERNAL MEDICINE

## 2020-11-30 PROCEDURE — 74011250636 HC RX REV CODE- 250/636: Performed by: INTERNAL MEDICINE

## 2020-11-30 PROCEDURE — 93970 EXTREMITY STUDY: CPT

## 2020-11-30 PROCEDURE — 74011000636 HC RX REV CODE- 636: Performed by: EMERGENCY MEDICINE

## 2020-11-30 PROCEDURE — 83605 ASSAY OF LACTIC ACID: CPT

## 2020-11-30 PROCEDURE — 71275 CT ANGIOGRAPHY CHEST: CPT

## 2020-11-30 PROCEDURE — 83735 ASSAY OF MAGNESIUM: CPT

## 2020-11-30 PROCEDURE — 99223 1ST HOSP IP/OBS HIGH 75: CPT | Performed by: INTERNAL MEDICINE

## 2020-11-30 PROCEDURE — 74011250636 HC RX REV CODE- 250/636: Performed by: EMERGENCY MEDICINE

## 2020-11-30 RX ORDER — PANTOPRAZOLE SODIUM 40 MG/1
40 TABLET, DELAYED RELEASE ORAL
Status: DISCONTINUED | OUTPATIENT
Start: 2020-12-01 | End: 2020-12-01 | Stop reason: HOSPADM

## 2020-11-30 RX ORDER — DILTIAZEM HYDROCHLORIDE 5 MG/ML
0.25 INJECTION INTRAVENOUS ONCE
Status: COMPLETED | OUTPATIENT
Start: 2020-11-30 | End: 2020-11-30

## 2020-11-30 RX ORDER — FAMOTIDINE 20 MG/1
20 TABLET, FILM COATED ORAL 2 TIMES DAILY
Status: DISCONTINUED | OUTPATIENT
Start: 2020-11-30 | End: 2020-12-01 | Stop reason: HOSPADM

## 2020-11-30 RX ORDER — POLYETHYLENE GLYCOL 3350 17 G/17G
17 POWDER, FOR SOLUTION ORAL DAILY PRN
Status: DISCONTINUED | OUTPATIENT
Start: 2020-11-30 | End: 2020-12-01 | Stop reason: HOSPADM

## 2020-11-30 RX ORDER — HEPARIN SODIUM 10000 [USP'U]/100ML
18-36 INJECTION, SOLUTION INTRAVENOUS
Status: DISCONTINUED | OUTPATIENT
Start: 2020-11-30 | End: 2020-11-30

## 2020-11-30 RX ORDER — ONDANSETRON 4 MG/1
4 TABLET, ORALLY DISINTEGRATING ORAL
Status: DISCONTINUED | OUTPATIENT
Start: 2020-11-30 | End: 2020-12-01

## 2020-11-30 RX ORDER — DEXTROSE 50 % IN WATER (D50W) INTRAVENOUS SYRINGE
25-50 AS NEEDED
Status: DISCONTINUED | OUTPATIENT
Start: 2020-11-30 | End: 2020-12-01

## 2020-11-30 RX ORDER — ACETAMINOPHEN 325 MG/1
650 TABLET ORAL
Status: DISCONTINUED | OUTPATIENT
Start: 2020-11-30 | End: 2020-12-01 | Stop reason: HOSPADM

## 2020-11-30 RX ORDER — SODIUM CHLORIDE 0.9 % (FLUSH) 0.9 %
5-40 SYRINGE (ML) INJECTION EVERY 8 HOURS
Status: DISCONTINUED | OUTPATIENT
Start: 2020-11-30 | End: 2020-12-01 | Stop reason: HOSPADM

## 2020-11-30 RX ORDER — MAGNESIUM SULFATE 100 %
4 CRYSTALS MISCELLANEOUS AS NEEDED
Status: DISCONTINUED | OUTPATIENT
Start: 2020-11-30 | End: 2020-12-01

## 2020-11-30 RX ORDER — ATORVASTATIN CALCIUM 10 MG/1
10 TABLET, FILM COATED ORAL
Status: DISCONTINUED | OUTPATIENT
Start: 2020-11-30 | End: 2020-12-01

## 2020-11-30 RX ORDER — MORPHINE SULFATE 2 MG/ML
2 INJECTION, SOLUTION INTRAMUSCULAR; INTRAVENOUS
Status: COMPLETED | OUTPATIENT
Start: 2020-11-30 | End: 2020-11-30

## 2020-11-30 RX ORDER — ENOXAPARIN SODIUM 100 MG/ML
1 INJECTION SUBCUTANEOUS EVERY 12 HOURS
Status: DISCONTINUED | OUTPATIENT
Start: 2020-11-30 | End: 2020-12-01

## 2020-11-30 RX ORDER — DILTIAZEM HYDROCHLORIDE 30 MG/1
30 TABLET, FILM COATED ORAL
Status: DISCONTINUED | OUTPATIENT
Start: 2020-11-30 | End: 2020-12-01

## 2020-11-30 RX ORDER — LEVOTHYROXINE SODIUM 75 UG/1
75 TABLET ORAL
COMMUNITY
End: 2021-03-24

## 2020-11-30 RX ORDER — HEPARIN SODIUM 5000 [USP'U]/ML
80 INJECTION, SOLUTION INTRAVENOUS; SUBCUTANEOUS ONCE
Status: COMPLETED | OUTPATIENT
Start: 2020-11-30 | End: 2020-11-30

## 2020-11-30 RX ORDER — INSULIN LISPRO 100 [IU]/ML
INJECTION, SOLUTION INTRAVENOUS; SUBCUTANEOUS
Status: DISCONTINUED | OUTPATIENT
Start: 2020-11-30 | End: 2020-12-01

## 2020-11-30 RX ORDER — ACETAMINOPHEN 650 MG/1
650 SUPPOSITORY RECTAL
Status: DISCONTINUED | OUTPATIENT
Start: 2020-11-30 | End: 2020-12-01

## 2020-11-30 RX ORDER — MORPHINE SULFATE 4 MG/ML
4 INJECTION INTRAVENOUS
Status: COMPLETED | OUTPATIENT
Start: 2020-11-30 | End: 2020-11-30

## 2020-11-30 RX ORDER — LEVOTHYROXINE SODIUM 75 UG/1
75 TABLET ORAL
Status: DISCONTINUED | OUTPATIENT
Start: 2020-12-01 | End: 2020-12-01 | Stop reason: HOSPADM

## 2020-11-30 RX ORDER — SODIUM CHLORIDE 0.9 % (FLUSH) 0.9 %
5-40 SYRINGE (ML) INJECTION AS NEEDED
Status: DISCONTINUED | OUTPATIENT
Start: 2020-11-30 | End: 2020-12-01 | Stop reason: HOSPADM

## 2020-11-30 RX ORDER — ACETAMINOPHEN 325 MG/1
325 TABLET ORAL
COMMUNITY
End: 2021-05-22

## 2020-11-30 RX ORDER — ONDANSETRON 2 MG/ML
4 INJECTION INTRAMUSCULAR; INTRAVENOUS
Status: DISCONTINUED | OUTPATIENT
Start: 2020-11-30 | End: 2020-12-01 | Stop reason: HOSPADM

## 2020-11-30 RX ADMIN — FAMOTIDINE 20 MG: 20 TABLET, FILM COATED ORAL at 22:04

## 2020-11-30 RX ADMIN — SODIUM CHLORIDE 500 ML: 900 INJECTION, SOLUTION INTRAVENOUS at 13:34

## 2020-11-30 RX ADMIN — DILTIAZEM HYDROCHLORIDE 12 MG: 5 INJECTION INTRAVENOUS at 14:47

## 2020-11-30 RX ADMIN — IOPAMIDOL 80 ML: 755 INJECTION, SOLUTION INTRAVENOUS at 14:18

## 2020-11-30 RX ADMIN — ENOXAPARIN SODIUM 50 MG: 60 INJECTION SUBCUTANEOUS at 23:40

## 2020-11-30 RX ADMIN — Medication 10 ML: at 21:56

## 2020-11-30 RX ADMIN — HEPARIN SODIUM AND DEXTROSE 18 UNITS/KG/HR: 10000; 5 INJECTION INTRAVENOUS at 13:50

## 2020-11-30 RX ADMIN — MORPHINE SULFATE 4 MG: 4 INJECTION INTRAVENOUS at 14:44

## 2020-11-30 RX ADMIN — HEPARIN SODIUM 3800 UNITS: 5000 INJECTION INTRAVENOUS; SUBCUTANEOUS at 13:31

## 2020-11-30 RX ADMIN — ATORVASTATIN CALCIUM 10 MG: 10 TABLET, FILM COATED ORAL at 21:55

## 2020-11-30 RX ADMIN — SODIUM CHLORIDE 500 ML: 900 INJECTION, SOLUTION INTRAVENOUS at 15:11

## 2020-11-30 RX ADMIN — ACETAMINOPHEN 650 MG: 325 TABLET ORAL at 22:03

## 2020-11-30 RX ADMIN — MORPHINE SULFATE 2 MG: 2 INJECTION, SOLUTION INTRAMUSCULAR; INTRAVENOUS at 13:32

## 2020-11-30 RX ADMIN — DILTIAZEM HYDROCHLORIDE 30 MG: 30 TABLET, FILM COATED ORAL at 21:55

## 2020-11-30 RX ADMIN — DILTIAZEM HYDROCHLORIDE 30 MG: 30 TABLET, FILM COATED ORAL at 16:31

## 2020-11-30 NOTE — ED NOTES
Dr. Lance Hopper at bedside: verbal orders with readback received to stop Heparin drip, do not give Morphine d/t hypotension post admin, hold Cardizem drip (has not been started) and give PO ordered, and give 500mL NS bolus.

## 2020-11-30 NOTE — ED TRIAGE NOTES
Left shoulder pain and chest pain with breath since this am. Left shoulder bursitis/ obvious inflammation in shoulder.

## 2020-11-30 NOTE — ED NOTES
Cardiology at bedside for eval. Patient resting comfortably in stretcher in low/locked position with call bell within reach and daughter at bedside.

## 2020-11-30 NOTE — ED NOTES
Patient reassessment relayed to Dr Jay Moreno; new orders received for Morphine 4mg. Doc will place order.

## 2020-11-30 NOTE — ED PROVIDER NOTES
69-year-old female with a history of diabetes, hypertension, stroke and hypothyroidism presents with a chief complaint of chest pain. Patient reports that she woke up this morning with sharp, left-sided chest pain and shortness of breath. The pain is worse with deep inspiration. She has had lower extremity swelling recently. She has no history of A. fib. She has not had a fever, cough, abdominal pain, GI or urinary symptoms.            Past Medical History:   Diagnosis Date    Bunion     Cherry angioma     Diabetes ([facility]     Diabetes mellitus ([facility]     HTN (hypertension)     Joint pain     Joint swelling     Rheumatoid arthritis(714.0)     Scoliosis     Stroke ([facility]     Thyroid disorder     problems    Vertigo     BPPV       Past Surgical History:   Procedure Laterality Date    HX  SECTION      HX ORTHOPAEDIC      bunions removed x 2         Family History:   Problem Relation Age of Onset    Stroke Father        Social History     Socioeconomic History    Marital status:      Spouse name: Not on file    Number of children: Not on file    Years of education: Not on file    Highest education level: Not on file   Occupational History    Not on file   Social Needs    Financial resource strain: Not on file    Food insecurity     Worry: Not on file     Inability: Not on file    Transportation needs     Medical: Not on file     Non-medical: Not on file   Tobacco Use    Smoking status: Never Smoker    Smokeless tobacco: Never Used   Substance and Sexual Activity    Alcohol use: No    Drug use: No    Sexual activity: Never   Lifestyle    Physical activity     Days per week: Not on file     Minutes per session: Not on file    Stress: Not on file   Relationships    Social connections     Talks on phone: Not on file     Gets together: Not on file     Attends Zoroastrianism service: Not on file     Active member of club or organization: Not on file     Attends meetings of clubs or organizations: Not on file     Relationship status: Not on file    Intimate partner violence     Fear of current or ex partner: Not on file     Emotionally abused: Not on file     Physically abused: Not on file     Forced sexual activity: Not on file   Other Topics Concern    Not on file   Social History Narrative    Not on file         ALLERGIES: Shellfish containing products    Review of Systems   Constitutional: Negative for fever. HENT: Negative for rhinorrhea. Respiratory: Positive for shortness of breath. Cardiovascular: Positive for chest pain and leg swelling. Gastrointestinal: Negative for abdominal pain. Genitourinary: Negative for dysuria. Musculoskeletal: Negative for back pain. Skin: Negative for wound. Neurological: Negative for headaches. Psychiatric/Behavioral: Negative for confusion. Vitals:    11/30/20 1204 11/30/20 1215 11/30/20 1230   BP: (!) 162/101  133/80   Pulse: (!) 126 (!) 117    Resp: 20 18    Temp: 98.5 °F (36.9 °C)     SpO2: 96% 93%    Weight: 47.6 kg (105 lb)              Physical Exam  Vitals signs and nursing note reviewed. Constitutional:       General: She is not in acute distress. Appearance: Normal appearance. She is well-developed. She is not ill-appearing, toxic-appearing or diaphoretic. HENT:      Head: Normocephalic and atraumatic. Eyes:      Extraocular Movements: Extraocular movements intact. Neck:      Musculoskeletal: Normal range of motion. Cardiovascular:      Rate and Rhythm: Tachycardia present. Rhythm irregular. Pulses: Normal pulses. Heart sounds: Normal heart sounds. Heart sounds not distant. No murmur. No systolic murmur. Pulmonary:      Effort: Pulmonary effort is normal. Tachypnea present. No respiratory distress. Breath sounds: Examination of the left-middle field reveals decreased breath sounds. Examination of the left-lower field reveals decreased breath sounds. Decreased breath sounds present.  No wheezing, rhonchi or rales. Abdominal:      General: Bowel sounds are normal. There is no distension. Palpations: Abdomen is soft. Tenderness: There is no abdominal tenderness. Musculoskeletal: Normal range of motion. Skin:     General: Skin is warm and dry. Neurological:      Mental Status: She is alert and oriented to person, place, and time. Psychiatric:         Mood and Affect: Mood normal.          MDM  Number of Diagnoses or Management Options  Acute deep vein thrombosis (DVT) of proximal vein of left lower extremity [facility]):   Chest pain, unspecified type:   Elevated brain natriuretic peptide (BNP) level:   Elevated troponin:   Pleural effusion:   Diagnosis management comments: 70-year-old female presents with chest pain. She is in A. fib with RVR at a rate of 130. She has left leg swelling concerning for DVT. Obviously I am concerned for PE. The patient will be heparinized prior to imaging studies. I will hold on Cardizem until I receive the results of the CTA. Patient was hydrated cautiously with IV fluids. Lower extremity ultrasound shows an acute left lower extremity DVT. CTA shows no evidence of pulmonary embolism. The patient was maintained on heparin and started on a Cardizem drip. Chest x-ray demonstrates a left pulmonary fusion. Patient's daughter was updated with the plan of care regarding admission. She is comfortable and agreeable. EKG shows atrial fibrillation with rapid ventricular response at a rate of 130, otherwise normal intervals, left axis deviation, no ST elevation or depression.     Perfect Serve Consult for Admission  2:30 PM    ED Room Number: ER21/21  Patient Name and age:  Sally Cook 80 y.o.  female  Working Diagnosis: Pleural effusion  (primary encounter diagnosis)  Chest pain, unspecified type  Elevated troponin  Elevated brain natriuretic peptide (BNP) level  Acute deep vein thrombosis (DVT) of proximal vein of left lower extremity [facility])  Atrial fibrillation with rapid ventricular response    COVID-19 Suspicion:  no  Sepsis present:  no  Reassessment needed: no  Code Status:  Full Code  Readmission: no  Isolation Requirements:  no  Recommended Level of Care:  telemetry  Department:[facility] - [phone]  Other: Patient heparinized, Cardizem started      Total critical care time spent exclusive of procedures:  37 minutes           Procedures

## 2020-11-30 NOTE — CONSULTS
[facility] [address]  [address] [facility], [address] [facility] [address] [facility]  Yolanda Smiths: [phone]  F: [phone]      Reason for Visit:   Delfino Gooden is a 80 y.o. female who is seen in consultation at the request of Dr. Chapis Jeffery for evaluation of DVT      Hematology / Oncology Treatment History:   n/a    History of Present Illness:     Delfino Gooden was admitted on 2020 from the ED with c/o CP. In ED found to be Afib RVR. ED imaging:  No pulmonary embolus; nodular change along the left major fisssure; unclear if pulmonary nodules or loculated fluid. Doppler preliminary result shows acute occlusive thrombus present in the left peroneal vein. ED labs Na 134  Troponin 0.15 She was admitted for further eval and management. Pt recently medicated with morphine and unable to provide hx. Resting comfortably. Daughter, Shantell at bedside. States pt lives with her other daughter and she stays with her during the day. Has had swelling to LE for some time but recently gotten worse. Unaware that had a clot in the past. Pt takes ASA 81 mg only as needed b/c her mother doesn't like to take pills.         Past Medical History:   Diagnosis Date    BPPV (benign paroxysmal positional vertigo)     DM type 2 causing vascular disease ([address]     HTN (hypertension)     Hx of completed stroke     Hyperlipidemia     Hypothyroid     Rheumatoid arthritis ([address]     Scoliosis       Past Surgical History:   Procedure Laterality Date    HX  SECTION      HX ORTHOPAEDIC      bunions removed x 2      Social History     Tobacco Use    Smoking status: Never Smoker    Smokeless tobacco: Never Used   Substance Use Topics    Alcohol use: No      Family History   Problem Relation Age of Onset    Stroke Father      Current Facility-Administered Medications   Medication Dose Route Frequency    dilTIAZem IR (CARDIZEM) tablet 30 mg  30 mg Oral AC&HS     Current Outpatient Medications   Medication Sig    levothyroxine (SYNTHROID) 75 mcg tablet Take 75 mcg by mouth Daily (before breakfast).  acetaminophen (TylenoL) 325 mg tablet Take 325 mg by mouth every six (6) hours as needed for Pain.  furosemide (LASIX) 20 mg tablet Take 1 Tab by mouth daily.  pantoprazole (PROTONIX) 40 mg tablet Take 1 Tab by mouth daily.  atorvastatin (LIPITOR) 10 mg tablet Take 1 Tab by mouth nightly. Allergies   Allergen Reactions    Shellfish Containing Products Swelling        Review of Systems: A complete review of systems was obtained, negative except as described above    Physical Exam:     Visit Vitals  /69   Pulse 93   Temp 98.5 °F (36.9 °C)   Resp 18   Wt 105 lb (47.6 kg)   SpO2 97%   BMI 19.20 kg/m²     ECOG PS: 2    Constitutional: Appears well-developed and well-nourished in no apparent distress      Mental status: Alert and awake, Oriented to person/place/time, Able to follow commands    Eyes: EOM normal, Sclera normal, No visible ocular discharge    HENT: Normocephalic, atraumatic    Mouth/Throat: Moist mucous membranes    External Ears normal    Neck: No visualized mass    Pulmonary/Chest: Respiratory effort normal, No visualized signs of difficulty breathing or respiratory distress    Musculoskeletal:  Normal range of motion of neck    Neurological: No facial asymmetry (Cranial nerve 7 motor function), No gaze palsy    Skin: No significant exanthematous lesions or discoloration noted on facial skin    Psychiatric: Normal affect, No hallucinations           Results:     Lab Results   Component Value Date/Time    WBC 8.0 11/30/2020 12:49 PM    HGB 12.3 11/30/2020 12:49 PM    HCT 38.8 11/30/2020 12:49 PM    PLATELET 720 68/83/1624 12:49 PM    MCV 89.4 11/30/2020 12:49 PM    ABS.  NEUTROPHILS 6.5 11/30/2020 12:49 PM    Hemoglobin (POC) 13.6 08/08/2012 09:53 AM    Hematocrit (POC) 40 08/08/2012 09:53 AM     Lab Results   Component Value Date/Time    Sodium 134 (L) 11/30/2020 12:49 PM    Potassium 3.9 11/30/2020 12:49 PM    Chloride 101 11/30/2020 12:49 PM    CO2 32 11/30/2020 12:49 PM    Glucose 145 (H) 11/30/2020 12:49 PM    BUN 24 (H) 11/30/2020 12:49 PM    Creatinine 0.84 11/30/2020 12:49 PM    GFR est AA >60 11/30/2020 12:49 PM    GFR est non-AA >60 11/30/2020 12:49 PM    Calcium 9.6 11/30/2020 12:49 PM    Sodium (POC) 139 08/08/2012 09:53 AM    Potassium (POC) 4.4 08/08/2012 09:53 AM    Chloride (POC) 105 08/08/2012 09:53 AM    Glucose (POC) 99 12/27/2019 08:03 PM    BUN (POC) 25 (H) 08/08/2012 09:53 AM    Creatinine (POC) 0.8 08/08/2012 09:53 AM    Calcium, ionized (POC) 1.32 08/08/2012 09:53 AM     Lab Results   Component Value Date/Time    Bilirubin, total 0.5 11/30/2020 12:49 PM    ALT (SGPT) 23 11/30/2020 12:49 PM    Alk. phosphatase 116 11/30/2020 12:49 PM    Protein, total 7.4 11/30/2020 12:49 PM    Albumin 3.3 (L) 11/30/2020 12:49 PM    Globulin 4.1 (H) 11/30/2020 12:49 PM     Lab Results   Component Value Date/Time    TSH 13.30 (H) 11/30/2020 12:49 PM    Antinuclear Antibodies Direct Negative 04/12/2012 10:17 AM    Lipase 207 06/22/2016 08:27 AM    HEP C VIRUS AB 0.1 05/02/2013 10:07 AM     Lab Results   Component Value Date/Time    INR 1.1 08/14/2019 08:18 PM    aPTT <20.0 (L) 11/30/2020 12:49 PM    D-dimer 2.63 (H) 11/30/2020 12:49 PM     Lab Results   Component Value Date/Time    TSH 13.30 (H) 11/30/2020 12:49 PM    Antinuclear Antibodies Direct Negative 04/12/2012 10:17 AM    Lipase 207 06/22/2016 08:27 AM    HEP C VIRUS AB 0.1 05/02/2013 10:07 AM     Lab Results   Component Value Date/Time    INR 1.1 08/14/2019 08:18 PM    aPTT <20.0 (L) 11/30/2020 12:49 PM    D-dimer 2.63 (H) 11/30/2020 12:49 PM       11/30 XR CHEST  IMPRESSION: Gaseous evaluated with interstitial pulmonary edema and moderate  left pleural effusion with associated atelectasis or other airspace infiltrate.     11/30 Duplex LE   Preliminary report: Acute occlusive thrombus present in the left peroneal vein.          11/30 CTA CHEST W WO CONT  Nodular change along the left major fissure. It is unclear whether these  represent true pulmonary nodules or loculated fluid  Emphysema  Cardiomegaly with coronary artery disease and bilateral pleural effusions      Assessment and Recommendations:       1. Recurrent LLE distal DVT  Preliminary report: Acute occlusive thrombus in left peroneal vein  Noted DVT on  3/2017; chronic peroneal thrombosis; at that time taking ASA daily. Ok to not treat distal thrombus and monitor with serial dopplers to eval for progression. Now with Afib she will be on anticoagulation so will defer choice to cardiology. 2. Afib with RVR/ Chest pain  Elevated troponin  Cardiology consulted: plan for full dose lovenox with plans to change to DOAC at dose reduced Eliquis; rate control with diltiazem  ECHO pending      3. Pulmonary nodules or loculated fluid  Unclear etiology, would defer to PCP to manage        This patient was seen in conjunction with Anai Jensen NP.  I personally reviewed the history and all points in the assessment and plan with the patient, and performed key points on the exam.     Will sign off, please call with questions      Signed By: Cassie Marquez MD

## 2020-11-30 NOTE — H&P
[facility]    Hospitalist Admission Note      NAME:  Ileana Johnson   :   10/12/1930   MRN:  [id number]     PCP:  Es Marte MD     Date/Time of service:  2020 2:45 PM          Subjective:     CHIEF COMPLAINT: Chest pain     HISTORY OF PRESENT ILLNESS:     Ms. Benton Quinn is a 80 y.o.  female who presented to the Emergency Department complaining of CP. She provides no reliable hx after morphine. Daughter is present to give hx. Noted today. Dull. Non exertional.  ER finds RVR afib. Also small DVT noted in distal LLE. Xray with effusions and cardiomegaly. We will admit her for management. Past Medical History:   Diagnosis Date    BPPV (benign paroxysmal positional vertigo)     DM type 2 causing vascular disease ([facility]     HTN (hypertension)     Hx of completed stroke     Hyperlipidemia     Hypothyroid     Rheumatoid arthritis ([facility] [address] [facility]     Scoliosis         Past Surgical History:   Procedure Laterality Date    HX  SECTION      HX ORTHOPAEDIC      bunions removed x 2       Social History     Tobacco Use    Smoking status: Never Smoker    Smokeless tobacco: Never Used   Substance Use Topics    Alcohol use: No        Family History   Problem Relation Age of Onset    Stroke Father       Family hx cannot be fully assessed, since the patient cannot provide information    Allergies   Allergen Reactions    Shellfish Containing Products Swelling        Prior to Admission medications    Medication Sig Start Date End Date Taking? Authorizing Provider   furosemide (LASIX) 20 mg tablet Take 1 Tab by mouth daily. 20   Clevester Holding T, DO   acetaminophen (TYLENOL) 325 mg tablet Take 2 Tabs by mouth every six (6) hours as needed for Pain. 20   Carolan Meckel, MD   diclofenac (VOLTAREN) 1 % gel Apply 2 g to affected area two (2) times daily as needed for Pain.  Apply to back and shoulder for pain  Indications: osteoarthritis of the knee 20   Vivienne Garcia MD OLU   atorvastatin (LIPITOR) 10 mg tablet Take 1 Tab by mouth nightly. 8/16/19   Chuck Puente MD   losartan (COZAAR) 100 mg tablet Take 1 Tab by mouth daily. 8/17/19   , Chuck Gifford MD   hydrOXYzine HCl (ATARAX) 10 mg tablet Take 10 mg by mouth nightly as needed for Itching. Provider, Historical   albuterol (PROVENTIL HFA, VENTOLIN HFA, PROAIR HFA) 90 mcg/actuation inhaler Take 1 Puff by inhalation every four (4) hours as needed for Wheezing. 2/11/17   Skipper Speed, NP   Cholecalciferol, Vitamin D3, 3,000 unit tab Take 3,000 Units by mouth daily. Provider, Historical   levothyroxine (SYNTHROID) 88 mcg tablet TAKE ONE TABLET BY MOUTH EVERY DAY 9/8/14   Madhavi Cao MD   pantoprazole (PROTONIX) 40 mg tablet Take 1 Tab by mouth daily. 7/7/14   Star Root MD   Lancets ([facility]) misc by Does Not Apply route three (3) times daily. 4/8/14   Day Kinney MD   glucose blood VI test strips (ONE TOUCH ULTRA TEST) strip by Does Not Apply route three (3) times daily. 3/24/14   Day Kinney MD   aspirin 81 mg chewable tablet Take 81 mg by mouth daily.       Provider, Historical       Review of Systems:  Indirect  (bold if positive, if negative)    Gen:  Eyes:  ENT:  CVS:  chest pain,Pulm:  GI:  :  MS:  Skin:  Psych:  Endo:  Hem:  Renal:  Neuro:        Objective:      VITALS:    Vital signs reviewed; most recent are:    Visit Vitals  /85   Pulse (!) 113   Temp 98.5 °F (36.9 °C)   Resp 18   Wt 47.6 kg (105 lb)   SpO2 94%   BMI 19.20 kg/m²     SpO2 Readings from Last 6 Encounters:   11/30/20 94%   11/17/20 98%   07/06/20 99%   02/22/20 99%   12/27/19 100%   10/17/19 98%            Intake/Output Summary (Last 24 hours) at 11/30/2020 1445  Last data filed at 11/30/2020 1434  Gross per 24 hour   Intake 500 ml   Output    Net 500 ml        Exam:     Physical Exam:    Gen:  Thin, frail, in no acute distress  HEENT:  Pink conjunctivae, PERRL, hearing intact to voice, moist mucous membranes  Neck:  Supple, without masses, thyroid non-tender  Resp:  No accessory muscle use, clear breath sounds without wheezes rales or rhonchi  Card:  No murmurs, irregular tachycardic S1, S2 without thrills, bruits or peripheral edema  Abd:  Soft, non-tender, non-distended, normoactive bowel sounds are present, no mass  Lymph:  No cervical or inguinal adenopathy  Musc:  No cyanosis or clubbing  Skin:  No rashes or ulcers, skin turgor is good  Neuro:  Cranial nerves are grossly intact, general motor weakness, follows commands   Psych:  Poor insight, oriented to person, place and time, alert     Labs:    Recent Labs     11/30/20  1249   WBC 8.0   HGB 12.3   HCT 38.8        Recent Labs     11/30/20  1249   *   K 3.9      CO2 32   *   BUN 24*   CREA 0.84   CA 9.6   MG 2.2   ALB 3.3*   TBILI 0.5   ALT 23     Lab Results   Component Value Date/Time    Glucose (POC) 99 12/27/2019 08:03 PM    Glucose (POC) 116 (H) 08/16/2019 04:44 PM     No results for input(s): PH, PCO2, PO2, HCO3, FIO2 in the last 72 hours. No results for input(s): INR, INREXT in the last 72 hours. All Micro Results     Procedure Component Value Units Date/Time    CULTURE, URINE [[id number]]     Order Status:  Sent Specimen:  Cath Urine     CULTURE, BLOOD, PAIRED [[id number]] Collected:  11/30/20 1340    Order Status:  Completed Specimen:  Blood Updated:  11/30/20 1347          I have reviewed previous records       Assessment and Plan:      Atrial fibrillation with RVR / HTN (hypertension) / Troponin level elevated / Elevated brain natriuretic peptide (BNP) level - POA, unclear trigger. Continue diltiazem drip but start some PO diltiazem and wean drip off. Consult cardiology. Check TSH. Check ECHO. Lovenox for now, will need anticoagulation. Stop lasix and losartan as we increase CCB. If CHF, then resume those. Chest pain - POA, unclear etiology. I do not think AMI. I do not think PE.   Likely Afib vs GERD vs strain vs imperceptible PE. Cardiology to comment. Imaging with cardiomegaly and effusions    Acute deep vein thrombosis (DVT) of popliteal vein of left lower extremity / D-dimer, elevated - POA, tiny DVT, and I think incidental to CP or presentation. Consult hematology to help consider anticoagulation choices in this elderly female. 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 SSI per protocol. Not on any long acting home meds. Check A1c. Hx of completed stroke / BPPV (benign paroxysmal positional vertigo) / Menieres disease - Holding ASA we start anticoagulation    Hypothyroidism - Continue synthroid and check TSH    Hyperlipidemia - continue atorvastatin    GERD (gastroesophageal reflux disease) - PPI    Rheumatoid arthritis / Osteopenia / Scoliosis - Fall precautions. PT/OT eval.  Tylenol prn. Hold NSAIDS after ct contrast    Diabetic retinopathy - supportive care. Telemetry reviewed:   AFIB    Risk of deterioration: high      Total time spent with patient: 72 Minutes I personally reviewed chart, notes, data and current medications in the medical record. I have personally examined and treated the patient at bedside during this period.                  Care Plan discussed with: Patient, Family, Nursing Staff and >50% of time spent in counseling and coordination of care    Discussed:  Care Plan       ___________________________________________________    Attending Physician: Edwina Valenzuela MD

## 2020-11-30 NOTE — CONSULTS
Ford Ruiz DO   [facility] [address]  Office [phone]      Date of  Admission: 11/30/2020 12:00 PM       Assessment/Plan:   · A fib RVR: start po dilt 30 mg Q 6. Change to long acting at discharge. Full dose lovenox with plans to change to [address] [place] [address] for discharge. D/ W dtr. Ck ECHO, TSH. · Elevated troponin: did have chest pain at home this am but is now gone. Cycle troponins. CTA does note extensive coronary calcifications. Consider ischemic eval if persistent chest pain. Will need to discuss with family. · Acute DVT LLE: to being full dose lovenox. · bilat pleural effusions/elevated pBNP: resume home lasix. · HTN: adding dilt , may need to lower home losartan dose. · Chest pain: trend troponins. · Probable MR:  ECHO today  · HLD: ck lipids, resume home statin. · T 2 DM: ck A1C. · DNR:           Thank you for allowing us to participate in [facility] diana care. We will be happy to follow along. Please call for any questions. Consult requested by Fuad Restrepo MD for *Atrial fibrillation with RVR [facility]) [I48.91]    Subjective:  St. ortiz is a 80 y.o.   female  with PMH of HTN, CVA, HLD,  T 2 DM, RA, hypothyroidism who presented to the ED with chief complaint of sharp L sided chest pain and SOB worse with inspiration. Also recently found to have LE edema and was started on Lasix and Norvasc was stopped. In the ED she was found to be in a fib with RVR, presumed new. Last seen in our office 2 weeks ago and was [place] [facility]. Was to have ECHO, LE dopplers and labs as OP. Dilt IV bolus was given in ED and HR is 86. No PE by CTA   D dimer 2.63         The patient denies chest pain, dependent edema, diaphoresis,  orthopnea, palpitations, PND, shortness of breath, claudication or syncope. No bleeding.      Cardiac risk factors    HTN   DM    Post menopausal female   Dyslipidemia         LABS:   Troponin:    0.15    ProBNP:   [id number] Cardiographics:   Telemetry: a fib   EKG Results     Procedure 720 Value Units Date/Time    EKG, 12 LEAD, INITIAL [[id number]] Collected:  11/30/20 1209    Order Status:  Completed Updated:  11/30/20 1242     Ventricular Rate 130 BPM      Atrial Rate 129 BPM      QRS Duration 74 ms      Q-T Interval 316 ms      QTC Calculation (Bezet) 465 ms      Calculated R Axis -40 degrees      Calculated T Axis 7 degrees      Diagnosis --     Atrial fibrillation with rapid ventricular response  Left axis deviation  Poor R-wave Progression  Abnormal ECG  Confirmed by Darvin Prather MD., Rohit Murry ([id number]) on 11/30/2020 12:42:50 PM          CT Results (most recent):  Results from [place] encounter on 11/30/20   CTA CHEST W OR W WO CONT    Narrative EXAM: CTA CHEST W OR W WO CONT    INDICATION: chest pain    COMPARISON: 7/12/2016. CONTRAST: 80 mL of Isovue-370. TECHNIQUE:   Precontrast  images were obtained to localize the volume for acquisition. Multislice helical CT arteriography was performed from the diaphragm to the  thoracic inlet during uneventful rapid bolus intravenous contrast  administration. Lung and soft tissue windows were generated. Coronal and  sagittal images were generated and 3D post processing consisting of coronal  maximum intensity images was performed. CT dose reduction was achieved through  use of a standardized protocol tailored for this examination and automatic  exposure control for dose modulation. FINDINGS:  There is moderate cardiomegaly with the left atrial enlargement and left  ventricular hypertrophy. Heavy coronary artery calcification is present. There is moderate emphysematous change. In the left lower lobe along the left  major fissure, there are new pulmonary nodular foci that measure 13 x 13 mm  (image 39) and 3.1 x 2.2 cm (image 44), with density measurement of 10-12 HU. Bilateral pleural effusions are seen.     The pulmonary arteries are well enhanced and no pulmonary emboli are identified. There is no mediastinal or hilar adenopathy or mass. The aorta enhances normally  without evidence of aneurysm or dissection. The visualized portions of the upper abdominal organs are normal.    An S-shaped scoliosis is present. Impression IMPRESSION:     Nodular change along the left major fissure. It is unclear whether these  represent true pulmonary nodules or loculated fluid  Emphysema  Cardiomegaly with coronary artery disease and bilateral pleural effusions         Cardiac Testing/ Procedures: A. Cardiac Cath/PCI:   B.ECHO/COLTON: 2019 ECHO LVEF nml. C.StressNuclear/Stress ECHO/Stress test:   D.Vascular:   2020: LE dopplers acute DVT LLE.     E. EP:   F. Miscellaneous    SOCIAL HX: independent in ADL's at home. Lives with dtr.         Patient Active Problem List    Diagnosis Date Noted    D-dimer, elevated 2020    Acute deep vein thrombosis (DVT) of popliteal vein of left lower extremity ([address] 2020    Atrial fibrillation with RVR ([address] 2020    Elevated brain natriuretic peptide (BNP) level 2020    Scoliosis     Rheumatoid arthritis ([address]     Hypothyroid     Hx of completed stroke     DM type 2 causing vascular disease ([address]     BPPV (benign paroxysmal positional vertigo)     Troponin level elevated 2019    Menieres disease 2012    Hyperlipidemia 08/15/2011    HTN (hypertension) 07/15/2011    GERD (gastroesophageal reflux disease) 07/15/2011    Osteopenia 07/15/2011    Diabetic retinopathy ([address] 07/15/2011    Hypothyroidism 07/15/2011      Past Medical History:   Diagnosis Date    BPPV (benign paroxysmal positional vertigo)     DM type 2 causing vascular disease ([address]     HTN (hypertension)     Hx of completed stroke     Hyperlipidemia     Hypothyroid     Rheumatoid arthritis ([address]     Scoliosis       Past Surgical History:   Procedure Laterality Date    HX  SECTION      HX ORTHOPAEDIC      bunions removed x 2     Allergies   Allergen Reactions    Shellfish Containing Products Swelling      Current Facility-Administered Medications   Medication Dose Route Frequency    dilTIAZem IR (CARDIZEM) tablet 30 mg  30 mg Oral AC&HS    sodium chloride 0.9 % bolus infusion 500 mL  500 mL IntraVENous ONCE     Current Outpatient Medications   Medication Sig    furosemide (LASIX) 20 mg tablet Take 1 Tab by mouth daily.  acetaminophen (TYLENOL) 325 mg tablet Take 2 Tabs by mouth every six (6) hours as needed for Pain.  diclofenac (VOLTAREN) 1 % gel Apply 2 g to affected area two (2) times daily as needed for Pain. Apply to back and shoulder for pain  Indications: osteoarthritis of the knee    atorvastatin (LIPITOR) 10 mg tablet Take 1 Tab by mouth nightly.  losartan (COZAAR) 100 mg tablet Take 1 Tab by mouth daily.  hydrOXYzine HCl (ATARAX) 10 mg tablet Take 10 mg by mouth nightly as needed for Itching.  albuterol (PROVENTIL HFA, VENTOLIN HFA, PROAIR HFA) 90 mcg/actuation inhaler Take 1 Puff by inhalation every four (4) hours as needed for Wheezing.  Cholecalciferol, Vitamin D3, 3,000 unit tab Take 3,000 Units by mouth daily.  levothyroxine (SYNTHROID) 88 mcg tablet TAKE ONE TABLET BY MOUTH EVERY DAY    pantoprazole (PROTONIX) 40 mg tablet Take 1 Tab by mouth daily.  Lancets (ONE TOUCH DELICA) misc by Does Not Apply route three (3) times daily.  glucose blood VI test strips (ONE TOUCH ULTRA TEST) strip by Does Not Apply route three (3) times daily.  aspirin 81 mg chewable tablet Take 81 mg by mouth daily. Review of Symptoms:  Constitutional: negative for fatigue  ENT: negative   Respiratory: positive for dyspnea on exertion  Gastrointestinal: negative for nausea and vomiting  Genitourinary: no dysuria, hematuria, frequency   Musculoskeletal:negative for back pain  Neurological: negative for memory problems  Other systems reviewed and negative except as above.   Social History Socioeconomic History    Marital status:      Spouse name: Not on file    Number of children: Not on file    Years of education: Not on file    Highest education level: Not on file   Tobacco Use    Smoking status: Never Smoker    Smokeless tobacco: Never Used   Substance and Sexual Activity    Alcohol use: No    Drug use: No    Sexual activity: Never     Family History   Problem Relation Age of Onset    Stroke Father          Physical Exam    Visit Vitals  BP (!) 102/57   Pulse 76   Temp 98.5 °F (36.9 °C)   Resp 13   Wt 47.6 kg (105 lb)   SpO2 93%   BMI 19.20 kg/m²     General: awake, alert, and oriented. MAEx4. No acute distress   HEENT Exam:     Normocephalic, atraumatic. Sclera anicteric . Neck: supple  Lung Exam:     Not  Dyspneic at rest . Respirations unlabored. O2 @ 93 % room air. Sat: . Lungs: No wheezes, crackles, rhonchi, or rubs heard on auscultation. Heart Exam:       PMI nondisplaced,  Rate: Rhythm:irregularly irregular, 2/6 systolic murmur. No JVD     Abdomen Exam:      soft, nontender. + Bowel sounds. No palpable masses; organomegaly. : voiding     Extremities Exam:      Atraumatic. + 1 LLE edema.      Vascular Exam:      radial, brachial, dorsalis pedis, posterior tibial, are equal and strong bilaterally     Neuro exam:  No focal deficits   Psy Exam: Normal affect, does not appear anxious or agitated        Recent Results (from the past 12 hour(s))   EKG, 12 LEAD, INITIAL    Collection Time: 11/30/20 12:09 PM   Result Value Ref Range    Ventricular Rate 130 BPM    Atrial Rate 129 BPM    QRS Duration 74 ms    Q-T Interval 316 ms    QTC Calculation (Bezet) 465 ms    Calculated R Axis -40 degrees    Calculated T Axis 7 degrees    Diagnosis       Atrial fibrillation with rapid ventricular response  Left axis deviation  Poor R-wave Progression  Abnormal ECG  Confirmed by James Malagon MD., Sixto ([id number]) on 11/30/2020 12:42:50 PM     CBC WITH AUTOMATED DIFF    Collection Time: 11/30/20 12:49 PM   Result Value Ref Range    WBC 8.0 3.6 - 11.0 K/uL    RBC 4.34 3.80 - 5.20 M/uL    HGB 12.3 11.5 - 16.0 g/dL    HCT 38.8 35.0 - 47.0 %    MCV 89.4 80.0 - 99.0 FL    MCH 28.3 26.0 - 34.0 PG    MCHC 31.7 30.0 - 36.5 g/dL    RDW 13.2 11.5 - 14.5 %    PLATELET 505 966 - 665 K/uL    MPV 11.2 8.9 - 12.9 FL    NRBC 0.0 0  WBC    ABSOLUTE NRBC 0.00 0.00 - 0.01 K/uL    NEUTROPHILS 82 (H) 32 - 75 %    LYMPHOCYTES 10 (L) 12 - 49 %    MONOCYTES 8 5 - 13 %    EOSINOPHILS 0 0 - 7 %    BASOPHILS 0 0 - 1 %    IMMATURE GRANULOCYTES 0 0.0 - 0.5 %    ABS. NEUTROPHILS 6.5 1.8 - 8.0 K/UL    ABS. LYMPHOCYTES 0.8 0.8 - 3.5 K/UL    ABS. MONOCYTES 0.6 0.0 - 1.0 K/UL    ABS. EOSINOPHILS 0.0 0.0 - 0.4 K/UL    ABS. BASOPHILS 0.0 0.0 - 0.1 K/UL    ABS. IMM. GRANS. 0.0 0.00 - 0.04 K/UL    DF AUTOMATED     D DIMER    Collection Time: 11/30/20 12:49 PM   Result Value Ref Range    D-dimer 2.63 (H) 0.00 - 0.65 mg/L FEU   METABOLIC PANEL, COMPREHENSIVE    Collection Time: 11/30/20 12:49 PM   Result Value Ref Range    Sodium 134 (L) 136 - 145 mmol/L    Potassium 3.9 3.5 - 5.1 mmol/L    Chloride 101 97 - 108 mmol/L    CO2 32 21 - 32 mmol/L    Anion gap 1 (L) 5 - 15 mmol/L    Glucose 145 (H) 65 - 100 mg/dL    BUN 24 (H) 6 - 20 MG/DL    Creatinine 0.84 0.55 - 1.02 MG/DL    BUN/Creatinine ratio 29 (H) 12 - 20      GFR est AA >60 >60 ml/min/1.73m2    GFR est non-AA >60 >60 ml/min/1.73m2    Calcium 9.6 8.5 - 10.1 MG/DL    Bilirubin, total 0.5 0.2 - 1.0 MG/DL    ALT (SGPT) 23 12 - 78 U/L    AST (SGOT) 30 15 - 37 U/L    Alk.  phosphatase 116 45 - 117 U/L    Protein, total 7.4 6.4 - 8.2 g/dL    Albumin 3.3 (L) 3.5 - 5.0 g/dL    Globulin 4.1 (H) 2.0 - 4.0 g/dL    A-G Ratio 0.8 (L) 1.1 - 2.2     NT-PRO BNP    Collection Time: 11/30/20 12:49 PM   Result Value Ref Range    NT pro-BNP 11,255 (H) <450 PG/ML   TROPONIN I    Collection Time: 11/30/20 12:49 PM   Result Value Ref Range    Troponin-I, Qt. 0.15 (H) <0.05 ng/mL   MAGNESIUM Collection Time: 11/30/20 12:49 PM   Result Value Ref Range    Magnesium 2.2 1.6 - 2.4 mg/dL   SAMPLES BEING HELD    Collection Time: 11/30/20 12:49 PM   Result Value Ref Range    SAMPLES BEING HELD 1SST,1RED,1YELLOW AND GREY TOP URINE     COMMENT        Add-on orders for these samples will be processed based on acceptable specimen integrity and analyte stability, which may vary by analyte.    PTT    Collection Time: 11/30/20 12:49 PM   Result Value Ref Range    aPTT <20.0 (L) 22.1 - 31.0 sec    aPTT, therapeutic range     58.0 - 77.0 SECS   LACTIC ACID    Collection Time: 11/30/20  1:40 PM   Result Value Ref Range    Lactic acid 1.4 0.4 - 2.0 MMOL/L       [facility]

## 2020-11-30 NOTE — ED NOTES
Verbal order to hold cardizem at this time and Dr Ruther Eisenmenger will reassess after NS bolus is complete.

## 2020-11-30 NOTE — PROGRESS NOTES
[facility]: MED RECONCILIATION    Information obtained from: [facility] DR SONAL NUNEZ [phone]     Comments:  [facility] [place] is the only pharmacy patient uses. Atorvastatin, Furosemide, Levothyroxine and Pantoprazole are the only maintenance medication recently dispensed from the pharmacy. Daughter Shantell recognizes all of the below medications except Atorvastatin. Allergies: Shellfish containing products    Prior to Admission Medications:     Medication Documentation Review Audit       Reviewed by Stan Joya PHARMD (Pharmacist) on 11/30/20 at 1703      Medication Sig Documenting Provider Last Dose Status Taking?   acetaminophen (TylenoL) 325 mg tablet Take 325 mg by mouth every six (6) hours as needed for Pain. Provider, Historical  Active Yes   atorvastatin (LIPITOR) 10 mg tablet Take 1 Tab by mouth nightly. Garth Puente MD  Active Yes   furosemide (LASIX) 20 mg tablet Take 1 Tab by mouth daily. Kinza Martin DO 11/29/2020 Active Yes   levothyroxine (SYNTHROID) 75 mcg tablet Take 75 mcg by mouth Daily (before breakfast). Provider, Historical 11/29/2020 Active Yes           pantoprazole (PROTONIX) 40 mg tablet Take 1 Tab by mouth daily.  Piotr Caraballo MD 11/29/2020 Active Yes                        [facility], PHARMD   Contact: 1978

## 2020-11-30 NOTE — ACP (ADVANCE CARE PLANNING)
[facility]  Hospitalist Group                                      Advance Care Planning Note    Name: Monico Angulo  YOB: 1930  MRN: [id number]  Admission Date: 11/30/2020 12:00 PM    Date of discussion: 11/30/2020    Active Diagnoses:    Hospital Problems  Date Reviewed: 11/18/2020           Scoliosis   Rheumatoid arthritis ([facility]   Hypothyroid   Hx of completed stroke   DM type 2 causing vascular disease (HCC)   BPPV (benign paroxysmal positional vertigo)   D-dimer, elevated   * (Principal) Acute deep vein thrombosis (DVT) of popliteal vein of left lower extremity (HCC)   Atrial fibrillation with RVR (HCC)   Elevated brain natriuretic peptide (BNP) level   Troponin level elevated   Menieres disease   Hyperlipidemia   HTN (hypertension)   GERD (gastroesophageal reflux disease)   Osteopenia   Diabetic retinopathy ([facility]   Hypothyroidism          These active diagnoses are of sufficient risk that focused discussion on advance care planning is indicated in order to allow the patient to thoughtfully consider personal goals of care, and if situations arise that prevent the ability to personally give input, to ensure appropriate representation of their personal desires for different levels and aggressiveness of care.      Discussion:     Persons present and participating in discussion: Monicoelaina AnguloChaim MD, daughter    Topics Discussed:  Patient's medical condition and diagnosis: [x] yes [  ] no   Surrogate decision maker: x] yes [  ] no   Patient's current physical function/cognitive function/frailty: [x ] yes [  ] no   Code Status: [ x ] yes [  ] no   Artificial Nutrition / Dialysis / Non-Invasive Ventilation / Blood Transfusion: [ x ] yes [  ] no  Potential Resources for home (durable medical equipment, home nursing, home O2): [ x ] yes [  ] no    Overview of Discussion: discussed Afib RVR treatment and prognosis in setting of acute and chronic DVT, DM, dementia. Daughter and her sister act as MPOA. Daughter notes the weakness and unsteady gait of patient. Sisters have discussed but not documented AD and they desire DNR status. I advised formal DDNR. Patient would not want HD or PEG. Time Spent:     Total time spent face-to-face in education and discussion: 17 minutes.      Jonah Gresham MD  Date of Service:  11/30/2020  5:35 PM

## 2020-12-01 ENCOUNTER — APPOINTMENT (OUTPATIENT)
Dept: NON INVASIVE DIAGNOSTICS | Age: 85
DRG: 308 | End: 2020-12-01
Attending: NURSE PRACTITIONER
Payer: MEDICARE

## 2020-12-01 VITALS
SYSTOLIC BLOOD PRESSURE: 119 MMHG | HEIGHT: 62 IN | RESPIRATION RATE: 18 BRPM | HEART RATE: 84 BPM | TEMPERATURE: 98.2 F | WEIGHT: 105 LBS | OXYGEN SATURATION: 93 % | BODY MASS INDEX: 19.32 KG/M2 | DIASTOLIC BLOOD PRESSURE: 59 MMHG

## 2020-12-01 LAB
ALBUMIN SERPL-MCNC: 2.6 G/DL (ref 3.5–5)
ALBUMIN/GLOB SERPL: 0.8 {RATIO} (ref 1.1–2.2)
ALP SERPL-CCNC: 87 U/L (ref 45–117)
ALT SERPL-CCNC: 16 U/L (ref 12–78)
ANION GAP SERPL CALC-SCNC: 3 MMOL/L (ref 5–15)
AST SERPL-CCNC: 16 U/L (ref 15–37)
AV R PG: 67.38 MMHG
BILIRUB SERPL-MCNC: 0.5 MG/DL (ref 0.2–1)
BUN SERPL-MCNC: 22 MG/DL (ref 6–20)
BUN/CREAT SERPL: 28 (ref 12–20)
CALCIUM SERPL-MCNC: 8.9 MG/DL (ref 8.5–10.1)
CHLORIDE SERPL-SCNC: 103 MMOL/L (ref 97–108)
CO2 SERPL-SCNC: 30 MMOL/L (ref 21–32)
CREAT SERPL-MCNC: 0.8 MG/DL (ref 0.55–1.02)
ECHO AO ROOT DIAM: 2.88 CM
ECHO AR MAX VEL PISA: 409.16 CM/S
ECHO AV AREA PEAK VELOCITY: 1.7 CM2
ECHO AV AREA VTI: 1.37 CM2
ECHO AV AREA/BSA PEAK VELOCITY: 1.2 CM2/M2
ECHO AV AREA/BSA VTI: 0.9 CM2/M2
ECHO AV MEAN GRADIENT: 8.16 MMHG
ECHO AV PEAK GRADIENT: 16.47 MMHG
ECHO AV PEAK VELOCITY: 202.9 CM/S
ECHO AV REGURGITANT PHT: 495.35 MS
ECHO AV VTI: 35.93 CM
ECHO LA MAJOR AXIS: 4.86 CM
ECHO LA MINOR AXIS: 3.34 CM
ECHO LA VOL 2C: 72.13 ML (ref 22–52)
ECHO LA VOL 4C: 90.24 ML (ref 22–52)
ECHO LA VOL BP: 93.31 ML (ref 22–52)
ECHO LA VOL/BSA BIPLANE: 64.19 ML/M2 (ref 16–28)
ECHO LA VOLUME INDEX A2C: 49.62 ML/M2 (ref 16–28)
ECHO LA VOLUME INDEX A4C: 62.08 ML/M2 (ref 16–28)
ECHO LV INTERNAL DIMENSION DIASTOLIC: 3.05 CM (ref 3.9–5.3)
ECHO LV INTERNAL DIMENSION SYSTOLIC: 2.53 CM
ECHO LV IVSD: 1.41 CM (ref 0.6–0.9)
ECHO LV MASS 2D: 138.5 G (ref 67–162)
ECHO LV MASS INDEX 2D: 95.3 G/M2 (ref 43–95)
ECHO LV POSTERIOR WALL DIASTOLIC: 1.33 CM (ref 0.6–0.9)
ECHO LVOT DIAM: 1.71 CM
ECHO LVOT PEAK GRADIENT: 6.16 MMHG
ECHO LVOT PEAK VELOCITY: 124.08 CM/S
ECHO LVOT SV: 49.2 ML
ECHO LVOT VTI: 21.35 CM
ECHO MV A VELOCITY: 44.11 CM/S
ECHO MV E DECELERATION TIME (DT): 303.52 MS
ECHO MV E VELOCITY: 163.13 CM/S
ECHO MV E/A RATIO: 3.7
ECHO PV PEAK INSTANTANEOUS GRADIENT SYSTOLIC: 4.27 MMHG
ECHO PV REGURGITANT MAX VELOCITY: 93.74 CM/S
ECHO RV INTERNAL DIMENSION: 3.61 CM
ECHO TV REGURGITANT MAX VELOCITY: 258.51 CM/S
ECHO TV REGURGITANT PEAK GRADIENT: 26.73 MMHG
ERYTHROCYTE [DISTWIDTH] IN BLOOD BY AUTOMATED COUNT: 13.2 % (ref 11.5–14.5)
EST. AVERAGE GLUCOSE BLD GHB EST-MCNC: 120 MG/DL
GLOBULIN SER CALC-MCNC: 3.4 G/DL (ref 2–4)
GLUCOSE SERPL-MCNC: 126 MG/DL (ref 65–100)
HBA1C MFR BLD: 5.8 % (ref 4–5.6)
HCT VFR BLD AUTO: 33.3 % (ref 35–47)
HGB BLD-MCNC: 10.6 G/DL (ref 11.5–16)
LVOT MG: 3.02 MMHG
MAGNESIUM SERPL-MCNC: 2 MG/DL (ref 1.6–2.4)
MCH RBC QN AUTO: 28.3 PG (ref 26–34)
MCHC RBC AUTO-ENTMCNC: 31.8 G/DL (ref 30–36.5)
MCV RBC AUTO: 88.8 FL (ref 80–99)
NRBC # BLD: 0 K/UL (ref 0–0.01)
NRBC BLD-RTO: 0 PER 100 WBC
PLATELET # BLD AUTO: 145 K/UL (ref 150–400)
PMV BLD AUTO: 10.4 FL (ref 8.9–12.9)
POTASSIUM SERPL-SCNC: 3.6 MMOL/L (ref 3.5–5.1)
PROT SERPL-MCNC: 6 G/DL (ref 6.4–8.2)
RBC # BLD AUTO: 3.75 M/UL (ref 3.8–5.2)
SODIUM SERPL-SCNC: 136 MMOL/L (ref 136–145)
TROPONIN I SERPL-MCNC: 0.14 NG/ML
WBC # BLD AUTO: 7.4 K/UL (ref 3.6–11)

## 2020-12-01 PROCEDURE — 83036 HEMOGLOBIN GLYCOSYLATED A1C: CPT

## 2020-12-01 PROCEDURE — 74011250637 HC RX REV CODE- 250/637: Performed by: INTERNAL MEDICINE

## 2020-12-01 PROCEDURE — 93306 TTE W/DOPPLER COMPLETE: CPT | Performed by: STUDENT IN AN ORGANIZED HEALTH CARE EDUCATION/TRAINING PROGRAM

## 2020-12-01 PROCEDURE — 84484 ASSAY OF TROPONIN QUANT: CPT

## 2020-12-01 PROCEDURE — 97162 PT EVAL MOD COMPLEX 30 MIN: CPT

## 2020-12-01 PROCEDURE — 74011250636 HC RX REV CODE- 250/636: Performed by: STUDENT IN AN ORGANIZED HEALTH CARE EDUCATION/TRAINING PROGRAM

## 2020-12-01 PROCEDURE — 97165 OT EVAL LOW COMPLEX 30 MIN: CPT

## 2020-12-01 PROCEDURE — 97116 GAIT TRAINING THERAPY: CPT

## 2020-12-01 PROCEDURE — 85027 COMPLETE CBC AUTOMATED: CPT

## 2020-12-01 PROCEDURE — 36415 COLL VENOUS BLD VENIPUNCTURE: CPT

## 2020-12-01 PROCEDURE — 93306 TTE W/DOPPLER COMPLETE: CPT

## 2020-12-01 PROCEDURE — 80053 COMPREHEN METABOLIC PANEL: CPT

## 2020-12-01 PROCEDURE — 83735 ASSAY OF MAGNESIUM: CPT

## 2020-12-01 PROCEDURE — 99233 SBSQ HOSP IP/OBS HIGH 50: CPT | Performed by: INTERNAL MEDICINE

## 2020-12-01 PROCEDURE — 97535 SELF CARE MNGMENT TRAINING: CPT

## 2020-12-01 PROCEDURE — 97530 THERAPEUTIC ACTIVITIES: CPT

## 2020-12-01 PROCEDURE — 74011250637 HC RX REV CODE- 250/637: Performed by: NURSE PRACTITIONER

## 2020-12-01 RX ORDER — FUROSEMIDE 10 MG/ML
20 INJECTION INTRAMUSCULAR; INTRAVENOUS ONCE
Status: COMPLETED | OUTPATIENT
Start: 2020-12-01 | End: 2020-12-01

## 2020-12-01 RX ORDER — DILTIAZEM HYDROCHLORIDE 120 MG/1
120 CAPSULE, COATED, EXTENDED RELEASE ORAL DAILY
Qty: 30 CAP | Refills: 5 | Status: SHIPPED | OUTPATIENT
Start: 2020-12-02 | End: 2020-12-20

## 2020-12-01 RX ORDER — FUROSEMIDE 20 MG/1
20 TABLET ORAL DAILY
Status: DISCONTINUED | OUTPATIENT
Start: 2020-12-01 | End: 2020-12-01

## 2020-12-01 RX ORDER — DILTIAZEM HYDROCHLORIDE 120 MG/1
120 CAPSULE, COATED, EXTENDED RELEASE ORAL DAILY
Status: DISCONTINUED | OUTPATIENT
Start: 2020-12-01 | End: 2020-12-01 | Stop reason: HOSPADM

## 2020-12-01 RX ORDER — POTASSIUM CHLORIDE 750 MG/1
40 TABLET, FILM COATED, EXTENDED RELEASE ORAL
Status: COMPLETED | OUTPATIENT
Start: 2020-12-01 | End: 2020-12-01

## 2020-12-01 RX ADMIN — DILTIAZEM HYDROCHLORIDE 120 MG: 120 CAPSULE, COATED, EXTENDED RELEASE ORAL at 10:04

## 2020-12-01 RX ADMIN — Medication 10 ML: at 06:49

## 2020-12-01 RX ADMIN — FAMOTIDINE 20 MG: 20 TABLET, FILM COATED ORAL at 10:04

## 2020-12-01 RX ADMIN — DILTIAZEM HYDROCHLORIDE 30 MG: 30 TABLET, FILM COATED ORAL at 06:46

## 2020-12-01 RX ADMIN — APIXABAN 2.5 MG: 2.5 TABLET, FILM COATED ORAL at 10:04

## 2020-12-01 RX ADMIN — POTASSIUM CHLORIDE 40 MEQ: 750 TABLET, FILM COATED, EXTENDED RELEASE ORAL at 10:04

## 2020-12-01 RX ADMIN — FUROSEMIDE 20 MG: 10 INJECTION, SOLUTION INTRAMUSCULAR; INTRAVENOUS at 10:42

## 2020-12-01 RX ADMIN — PANTOPRAZOLE SODIUM 40 MG: 40 TABLET, DELAYED RELEASE ORAL at 06:46

## 2020-12-01 RX ADMIN — LEVOTHYROXINE SODIUM 75 MCG: 0.07 TABLET ORAL at 06:46

## 2020-12-01 NOTE — PALLIATIVE CARE DISCHARGE
The Palliative Medicine team was consulted as part of your / your loved one's care in the hospital. Our team is a supportive service; we strive to relieve suffering and improve quality of life. We reviewed advance care planning information, which includes the following: 
Advance Care Planning 12/1/2020 Patient's Healthcare Decision Maker is: Legal Next of Kin Confirm Advance Directive None Patient Would Like to Complete Advance Directive No  
Does the patient have other document types Do Not Resuscitate We reviewed / discussed your code status as: DNR 
   Full Code means perform CPR in the event of cardiac arrest 
   [facility] means do NOT perform CPR in the event of cardiac arrest 
   Partial Code means you have specific preferences, please discuss with your health care team 
   No Order means this issue was not addressed / resolved during your stay You have a Durable Do Not Resuscitate Order in place, which should travel with you. When you are in a facility, this form should be placed on your chart. Once you are home, it is recommended that the [facility] form be placed in a visible location such as on the refrigerator or bedroom door. Because of the importance of this information, we are providing you with a printed copy to share with other healthcare providers after this hospitalization is complete. If any of the above information is incomplete or incorrect, please contact the Palliative Medicine team at [phone].

## 2020-12-01 NOTE — PROGRESS NOTES
Hospital follow-up PCP transitional care appointment has been scheduled with Dr. Yen Pearson for Thursday, 12/3/20 at 3:45 p.m. Pending patient discharge.   Raisa Brady, Care Management Specialist.

## 2020-12-01 NOTE — DISCHARGE INSTRUCTIONS
Patient Discharge Instructions    Reena Fernandez / [id number] : 10/12/1930    Admitted 2020 Discharged: 2020     Primary Diagnoses  Problem List as of 2020 Date Reviewed: 2020           Scoliosis   Rheumatoid arthritis ([facility]   Hypothyroid   Hx of completed stroke   DM type 2 causing vascular disease (HCC)   BPPV (benign paroxysmal positional vertigo)   D-dimer, elevated   * (Principal) Acute deep vein thrombosis (DVT) of popliteal vein of left lower extremity (HCC)   Atrial fibrillation with RVR (HCC)   Elevated brain natriuretic peptide (BNP) level   Troponin level elevated   Menieres disease   Hyperlipidemia   HTN (hypertension)   GERD (gastroesophageal reflux disease)   Osteopenia   Diabetic retinopathy ([facility]   Hypothyroidism          Take Home Medications     · It is important that you take the medication exactly as they are prescribed. · Keep your medication in the bottles provided by the pharmacist and keep a list of the medication names, dosages, and times to be taken in your wallet. · Do not take other medications without consulting your doctor. What to do at Home    Recommended diet: Cardiac Diet    Recommended activity: Activity as tolerated and PT/OT per Home Health    If you experience any bleeding or worse symptoms, please follow up with your PCP or go to the ER. Follow-up with your PCP in a few weeks        Information obtained by :  I understand that if any problems occur once I am at home I am to contact my physician. I understand and acknowledge receipt of the instructions indicated above.                                                                                                                                            Physician's or R.N.'s Signature                                                                  Date/Time Patient or Representative Signature                                                          Date/Time    [facility]/ED Visit Follow-Up Instructions/Information    You may have an in home follow up visit set up with "[facility] or may wish to contact [facility] to set-up a visit:    What are we? [facility] is an in-home urgent care service staffed with emergency trained medical teams. We come to your home in a vehicle stocked with medical supplies and technology. An ER physician is always available if needed. When? As a part of your hospital follow-up, an appointment has been/ or can be set up for us to come see you. Usually, this will be 24-72 hours after you leave the hospital or as needed. "[facility] is open 7am-9pm, 7 days a week, 365 days a year, including holidays. Why? We know that you cannot always get to your doctor after being in the hospital and that your doctor is not always available when you need them. Once your workup is complete, we'll call in your prescriptions, update your family doctor, and handle billing with your insurance so you can focus on feeling better, faster without leaving home. How much? We accept most major health insurance plans, including Medicaid, Medicare, and Medicare Advantage [address] [place] [address], [facility], and [employer]. We also accept: credit, debit, health savings account (HSA), health reimbursement account (HRA) and flexible spending account (FSA) payments. [employer] [facility]'s prices compare to conventional urgent care facilities, but we bring the care to you. How to reach us? Getting care is easy- use our mobile roberto (mChron), website ([email or web address]) or call us [phone].

## 2020-12-01 NOTE — PROGRESS NOTES
Cardiology Progress Note                              [address] [facility]. [address], [place], [address]                                 Phone [phone]; Fax [phone]        2020 8:58 AM     Admit Date:           2020  Admit Diagnosis:  Atrial fibrillation with RVR ([facility] [address] [I48.91]  :          10/12/1930   MRN:          [id number]       Impression Plan/Recommendation   1. Acute HFpEF  2. Chest pain- Ecg previous with inferior and anterior q waves. CT with coronary atherosclerosis  3. Acute below the knee DVT. Out-pt duplex not performed due to transportation issues  4. AF- new onset                 · Change lovenox to eliquis 2.5 mg BID  · Tolerating 30 mg qid of dilt - change to 120 CD  · Replete K  · Still w chest pain on inspiration- pericarditis? Echo pending  · Troponin flat, suspect d/t demand   · Will resume home lasix         Addendum- d/w Dr Angelika Gonzalez  Echo shows LVEF 50%, LV hypertrophy, ELIJAH. Mod to severe MR  Give Iv lasix x1  Resume PO on d/c  eliquis and dilt rx sent to pharmacy  appt w cardiology for next Tuesday- outpatient echo cancelled as she had one here    [id number] Ignacia Schrader for dc CV wise        No intake/output data recorded. Last 3 Recorded Weights in this Encounter    20 1204   Weight: 105 lb (47.6 kg)          1901 -  0700  In: 1000 [I.V.:1000]  Out: -     SUBJECTIVE               Evans Polio denies palpitations, irregular heart beat  Breathing comfortable and improved from yesterday   + CP w inspiration   Improved LE edema.            Current Facility-Administered Medications   Medication Dose Route Frequency    potassium chloride SR (KLOR-CON 10) tablet 40 mEq  40 mEq Oral NOW    apixaban (ELIQUIS) tablet 2.5 mg  2.5 mg Oral BID    dilTIAZem ER (CARDIZEM CD) capsule 120 mg  120 mg Oral DAILY    atorvastatin (LIPITOR) tablet 10 mg  10 mg Oral QHS    levothyroxine (SYNTHROID) tablet 75 mcg  75 mcg Oral ACB    pantoprazole (PROTONIX) tablet 40 mg  40 mg Oral ACB    sodium chloride (NS) flush 5-40 mL  5-40 mL IntraVENous Q8H    sodium chloride (NS) flush 5-40 mL  5-40 mL IntraVENous PRN    acetaminophen (TYLENOL) tablet 650 mg  650 mg Oral Q6H PRN    Or    acetaminophen (TYLENOL) suppository 650 mg  650 mg Rectal Q6H PRN    polyethylene glycol (MIRALAX) packet 17 g  17 g Oral DAILY PRN    ondansetron (ZOFRAN ODT) tablet 4 mg  4 mg Oral Q8H PRN    Or    ondansetron (ZOFRAN) injection 4 mg  4 mg IntraVENous Q6H PRN    famotidine (PEPCID) tablet 20 mg  20 mg Oral BID      OBJECTIVE               Intake/Output Summary (Last 24 hours) at 12/1/2020 0858  Last data filed at 11/30/2020 1611  Gross per 24 hour   Intake 1000 ml   Output    Net 1000 ml       Review of Systems - History obtained from the patient AS PER  HPI    Telemetry AF v rate 70-90's occ 100s    PHYSICAL EXAM        Visit Vitals  BP (!) 115/56 (BP 1 Location: Left arm, BP Patient Position: At rest)   Pulse 85   Temp 99.1 °F (37.3 °C)   Resp 16   Wt 105 lb (47.6 kg)   SpO2 93%   BMI 19.20 kg/m²       Gen: Well-developed, elderly, thin, frail in no acute distress  alert and oriented   HEENT:  Pink conjunctivae, Hearing grossly normal.No scleral icterus or conjunctival, moist mucous membranes  Neck: Supple,No JVD  Resp: No accessory muscle use, diminished bases  Card: irregular Rate,Rythm,No murmurs, rubs or gallop. No thrills.    GI:          soft, non-tender   MSK: No cyanosis or clubbing  Skin: No rashes or ulcers  Neuro:  Cranial nerves are grossly intact, moving all four extremities, no focal deficit, follows commands appropriately  LE: trace estelle edema       DATA REVIEW            Laboratory and Imaging have been reviewed by me and are notable for  Recent Labs     12/01/20  0204 11/30/20  1909 11/30/20  1249   TROIQ 0.14* 0.15* 0.15*     Recent Labs     12/01/20  0204 11/30/20  1249    134*   K 3.6 3.9   CO2 30 32 BUN 22* 24*   CREA 0.80 0.84   * 145*   MG 2.0 2.2   WBC 7.4 8.0   HGB 10.6* 12.3   HCT 33.3* 38.8   * Elpidio Wylie NP

## 2020-12-01 NOTE — ED NOTES
TRANSFER - OUT REPORT:    Verbal report given to Iris(name) on Ileana Johnson  being transferred to [facility](unit) for routine progression of care       Report consisted of patients Situation, Background, Assessment and   Recommendations(SBAR). Information from the following report(s) SBAR, ED Summary, [facility] and Recent Results was reviewed with the receiving nurse. Lines:   Peripheral IV 11/30/20 Right Antecubital (Active)   Site Assessment Clean, dry, & intact 11/30/20 1333   Phlebitis Assessment 0 11/30/20 1333   Infiltration Assessment 0 11/30/20 1333   Dressing Status Clean, dry, & intact 11/30/20 1333   Dressing Type Transparent 11/30/20 1333   Hub Color/Line Status Pink;Flushed;Patent 11/30/20 1333   Action Taken Blood drawn 11/30/20 1333       Peripheral IV 11/30/20 Left;Upper Arm (Active)   Site Assessment Clean, dry, & intact 11/30/20 1339   Phlebitis Assessment 0 11/30/20 1339   Infiltration Assessment 0 11/30/20 1339   Dressing Status Clean, dry, & intact 11/30/20 1339   Dressing Type Transparent 11/30/20 1339   Hub Color/Line Status Green;Flushed;Patent 11/30/20 1339   Action Taken Blood drawn 11/30/20 1339        Opportunity for questions and clarification was provided.       Patient transported with:   Monitor  Registered Nurse

## 2020-12-01 NOTE — CONSULTS
Palliative Medicine Consult  [place]: [phone] ([phone])    Patient Name: Ladonna Fothergill  YOB: 1930    Date of Initial Consult: 12/1/20  Reason for Consult: Care decisions  Requesting Provider: Dr. Gillian Hooker  Primary Care Physician: Dang Patel MD     SUMMARY:   Ladonna Fothergill is a 80 y.o. 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 Current medical issues leading to Palliative Medicine involvement include:  Advance care planning. TTE this admission shows grade 3 diastolic dysfunction, mod-severe AVR, mild MVR. Imaging suggestive of infiltrative cardiomyopathy. Per Dr. Loraine Box she is an unlikely candidate for a TAVR but she will be followed in the office. Oral diltiazem and lasix have been initiated. She was also started on Eliquis for her DVT. Psychosocial Hx- Born in [facility] [facility] but raised in [facility]. . She had three children but son has passed away. Her two daughters [place] and Celeste Gillis live close by to her and ensure she is cared for 24/7. At baseline she ambulates with a cane or walker around her home and is independent with all ADLs with supervision. PALLIATIVE DIAGNOSES:   1. Advance care planning discussion  2. DNR discussion  3. Acute on chronic diastolic HF  4. Infiltrative cardiomyopathy  5. Valvular disease  6. Acute LE DVT- Eliquis initiated  7. Frailty       PLAN:   1. Patient assessed with daughter Washington at bedside. Her primary language is Kinyarwanda but daughter insists patient understands English fully. 2. Pt does retain decision making capacity. She has no history of dementia but as medical information is complex has a hard time remembering what physicians are saying. 3. No AMD.  Her two daughters are NOK. 4. Reviewed her medical conditions.   Dr. Gillian Hooker joined us midway and emphasized recommendations from cardiology team.  Plan is to discharge home today on Eliquis for DVT, Cardizem for rate control and lasix for diuresis. 5. Pt worried about being fatigued and/or dizzy on new medication. Encouraged symptom monitoring and reporting to cardiology at f/u.   6. Code status- pt/daughter did not recall conversation from yesterday. Reviewed this again including recommendation for DNR. Patient expressed a desire for a peaceful death. She signed DDNR with daughter provided support. Original and copy provided to patient for display at home. 7. GOC- at this time rehospitalization would be desired. Her last was over a year ago and she has responded well to intervention this stay. 8. Dtr asked about her mother's prognosis and I shared that this is uncertain at this time. Discussed signs of decline including poor appetite, increased daytime somnolence and need for frequent rehospitalization. 9. Thank you for the opportunity to participate in the care [address]  10.  Communicated plan of care with: Palliative Avinash YOUNG [facility] Team     GOALS OF CARE / TREATMENT PREFERENCES:     GOALS OF CARE:  Patient/Health Care Proxy Stated Goals: Prolong life    TREATMENT PREFERENCES:   Code Status: DNR    Advance Care Planning:  [x] [facility] Interdisciplinary Team has updated the ACP Navigator with Health Care Decision Maker and Patient Capacity      Primary Decision MakerAlphonscuong Saminagreg - Daughter - [phone]    Primary Decision Maker: Kary Rayna - Daughter - [phone]    Advance Care Planning 12/1/2020   Patient's Healthcare Decision Maker is: Legal Next of Kin   Confirm Advance Directive None   Patient Would Like to Complete Advance Directive No   Does the patient have other document types Do Not Resuscitate       Medical Interventions: Limited additional interventions(DNR)           Other:    As far as possible, the palliative care team has discussed with patient / health care proxy about goals of care / treatment preferences for patient. HISTORY:     History obtained from:   Patient, daughter Shantell, chart    CHIEF COMPLAINT: fatigue    HPI/SUBJECTIVE:    The patient is:   [x] Verbal and participatory  [] Non-participatory due to:     81 yo woman who presented from home with acute left sided shoulder/ upper chest pain and dyspnea. Her symptoms have since abated but she is left with fatigue. Her appetite is so-so. She denies any overt discomfort. Clinical Pain Assessment (nonverbal scale for severity on nonverbal patients):   Clinical Pain Assessment  Severity: 0          Duration: for how long has pt been experiencing pain (e.g., 2 days, 1 month, years)  Frequency: how often pain is an issue (e.g., several times per day, once every few days, constant)     FUNCTIONAL ASSESSMENT:     Palliative Performance Scale (PPS):  PPS: 50       PSYCHOSOCIAL/SPIRITUAL SCREENING:     Palliative IDT has assessed this patient for cultural preferences / practices and a referral made as appropriate to needs (Cultural Services, Patient Advocacy, Ethics, etc.)    Any spiritual / Roman Catholic concerns:  [] Yes /  [x] No    Caregiver Burnout:  [] Yes /  [x] No /  [] No Caregiver Present      Anticipatory grief assessment:   [x] Normal  / [] Maladaptive       ESAS Anxiety: Anxiety: 0    ESAS Depression:          REVIEW OF SYSTEMS:     Positive and pertinent negative findings in ROS are noted above in HPI. The following systems were [x] reviewed / [] unable to be reviewed as noted in HPI  Other findings are noted below. Systems: constitutional, ears/nose/mouth/throat, respiratory, gastrointestinal, genitourinary, musculoskeletal, integumentary, neurologic, psychiatric, endocrine. Positive findings noted below.   Modified ESAS Completed by: provider   Fatigue: 6 Drowsiness: 0     Pain: 0   Anxiety: 0 Nausea: 0   Anorexia: 5 Dyspnea: 0                    PHYSICAL EXAM:     From RN flowsheet:  Wt Readings from Last 3 Encounters:   12/01/20 105 lb (47.6 kg)   11/17/20 105 lb 9.6 oz (47.9 kg)   07/06/20 110 lb (49.9 kg)     Blood pressure (!) 119/59, pulse 84, temperature 98.2 °F (36.8 °C), resp. rate 18, height 5' 2\" (1.575 m), weight 105 lb (47.6 kg), SpO2 93 %. Pain Scale 1: Numeric (0 - 10)  Pain Intensity 1: 0  Pain Onset 1: chronic  Pain Location 1: Shoulder  Pain Orientation 1: Left  Pain Description 1: Aching  Pain Intervention(s) 1: Medication (see MAR), Repositioned  Last bowel movement, if known:     Frail elderly woman sitting up in bedside chair. Appears comfortable. Sclerae anicteric. Respirations are unlabored on RA. Ext are thin. She is alert and oriented to self, place and situation. Limited insight into her medical conditions. Moves all 4. No tremor or rigidity. Not restless nor agitated.   Affect normal.         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)      Troponin level elevated (8/14/2019)      Scoliosis ()      Rheumatoid arthritis ([address] ()      Hypothyroid ()      Hx of completed stroke ()      DM type 2 causing vascular disease (HCC) ()      BPPV (benign paroxysmal positional vertigo) ()      D-dimer, elevated (11/30/2020)      Atrial fibrillation with RVR ([address] (11/30/2020)      Elevated brain natriuretic peptide (BNP) level (11/30/2020)      Past Medical History:   Diagnosis Date    BPPV (benign paroxysmal positional vertigo)     HTN (hypertension)     Hx of completed stroke     Hyperlipidemia     Hypothyroid     Rheumatoid arthritis ([address]     Scoliosis       Past Surgical History:   Procedure Laterality Date    HX  SECTION      HX ORTHOPAEDIC      bunions removed x 2      Family History   Problem Relation Age of Onset    Stroke Father       History reviewed, no pertinent family history. Social History     Tobacco Use    Smoking status: Never Smoker    Smokeless tobacco: Never Used   Substance Use Topics    Alcohol use: No     Allergies   Allergen Reactions    Shellfish Containing Products Swelling      Current Facility-Administered Medications   Medication Dose Route Frequency    apixaban (ELIQUIS) tablet 2.5 mg  2.5 mg Oral BID    dilTIAZem ER (CARDIZEM CD) capsule 120 mg  120 mg Oral DAILY    levothyroxine (SYNTHROID) tablet 75 mcg  75 mcg Oral ACB    pantoprazole (PROTONIX) tablet 40 mg  40 mg Oral ACB    sodium chloride (NS) flush 5-40 mL  5-40 mL IntraVENous Q8H    sodium chloride (NS) flush 5-40 mL  5-40 mL IntraVENous PRN    acetaminophen (TYLENOL) tablet 650 mg  650 mg Oral Q6H PRN    polyethylene glycol (MIRALAX) packet 17 g  17 g Oral DAILY PRN    ondansetron (ZOFRAN) injection 4 mg  4 mg IntraVENous Q6H PRN    famotidine (PEPCID) tablet 20 mg  20 mg Oral BID          LAB AND IMAGING FINDINGS:     Lab Results   Component Value Date/Time    WBC 7.4 2020 02:04 AM    HGB 10.6 (L) 2020 02:04 AM    PLATELET 838 (L)  02:04 AM     Lab Results   Component Value Date/Time    Sodium 136 2020 02:04 AM    Potassium 3.6 2020 02:04 AM    Chloride 103 2020 02:04 AM    CO2 30 2020 02:04 AM    BUN 22 (H) 2020 02:04 AM    Creatinine 0.80 2020 02:04 AM    Calcium 8.9 2020 02:04 AM    Magnesium 2.0 2020 02:04 AM    Phosphorus 1.8 (L) 2019 03:37 AM      Lab Results   Component Value Date/Time    Alk.  phosphatase 87 2020 02:04 AM    Protein, total 6.0 (L) 2020 02:04 AM    Albumin 2.6 (L) 2020 02:04 AM    Globulin 3.4 2020 02:04 AM     Lab Results   Component Value Date/Time    INR 1.1 08/14/2019 08:18 PM    Prothrombin time 11.0 08/14/2019 08:18 PM    aPTT <20.0 (L) 11/30/2020 12:49 PM      No results found for: IRON, FE, TIBC, IBCT, PSAT, FERR   No results found for: PH, PCO2, PO2  No components found for: [place]   Lab Results   Component Value Date/Time    CK 52 10/29/2013 10:45 AM    CK - MB 0.9 10/29/2013 10:45 AM                Total time:  60m  Counseling / coordination time, spent as noted above: 45m  > 50% counseling / coordination?: y    Prolonged service was provided for  []30 min   []75 min in face to face time in the presence of the patient, spent as noted above. Time Start:   Time End:   Note: this can only be billed with 82323 (initial) or 14876 (follow up). If multiple start / stop times, list each separately.

## 2020-12-01 NOTE — PROGRESS NOTES
TRANSFER - IN REPORT:    Verbal report received from JENNIFER davis(name) on Rima Parker  being received from ED(unit) for routine progression of care      Report consisted of patients Situation, Background, Assessment and   Recommendations(SBAR). Information from the following report(s) SBAR, Kardex, Intake/Output, MAR, Recent Results and Cardiac Rhythm Afib was reviewed with the receiving nurse. Opportunity for questions and clarification was provided. 2130 Assessment completed upon patients arrival to unit and care assumed. Daughter at bedside. CHG completed. Pt continues in afib, Ranging from 110's to 130's. PO cardizem was administered. 2330 HR in the 70's. Denies any pain at this time. 0630 Pt states she is having some pain but will request prn tylenol with her breakfast.    0730 Bedside and Verbal shift change report given to JENNIFER mcdaniel (oncoming nurse) by Tanna Enamorado RN (offgoing nurse). Report included the following information SBAR, Kardex, Intake/Output, MAR, Recent Results and Cardiac Rhythm afib.

## 2020-12-01 NOTE — PROGRESS NOTES
[facility] Pharm[facility] Dosing Services: 11/30/20    Pharmacy note for Dr Apurva Metzger regarding Lovenox dose adjustment    Wt Readings from Last 1 Encounters:   11/30/20 47.6 kg (105 lb)       Ht Readings from Last 1 Encounters:   11/17/20 157.5 cm (62\")           Previous Dose 50mg sq q 24h   Creatinine Clearance Estimated Creatinine Clearance: 33.4 mL/min (based on SCr of 0.84 mg/dL). Creatinine Lab Results   Component Value Date/Time    Creatinine 0.84 11/30/2020 12:49 PM    Creatinine (POC) 0.8 08/08/2012 09:53 AM       Platelet Lab Results   Component Value Date/Time    PLATELET 276 66/86/8789 12:49 PM      H/H Lab Results   Component Value Date/Time    HGB 12.3 11/30/2020 12:49 PM           Pharmacist made change to enoxaparin therapy based on:  [ x ] Renal function: dose changed to: 50mg sq q 12h for Cr clearance > 30ml/min and patient weight > 45kg      Pharmacy to automatically make dose adjustment for renal dysfunction (creatinine clearance less than 30 mL/min)  Pharmacy to automatically make dose adjustment for obesity (BMI greater than 40)  Pharmacy to make dose rounding adjustments per [facility] dose adjustment scale. Pharmacy to monitor patients progress. Will make dose adjustment as needed per changing renal function. Will communicate further recommendations regarding patients anticoagulation therapy with prescriber.     Lindsay Jj PHARMD . Contact information: [phone]

## 2020-12-01 NOTE — PROGRESS NOTES
Spiritual Care Assessment/Progress Note  Alexys Hassan      NAME: Marichuy Gagnon      MRN: [id number]  AGE: 80 y.o. SEX: female  Yarsanism Affiliation: Norma Felix   Language: English     12/1/2020     Total Time (in minutes): 20     Spiritual Assessment begun in [facility] [address] through conversation with:         [x]Patient        [x] Family    [] Friend(s)        Reason for Consult: Palliative Care, Initial/Spiritual Assessment     Spiritual beliefs: (Please include comment if needed)     [x] Identifies with a mila tradition:         [] Supported by a mila community:            [] Claims no spiritual orientation:           [] Seeking spiritual identity:                [] Adheres to an individual form of spirituality:           [] Not able to assess:                           Identified resources for coping:      [x] Prayer                               [] Music                  [] Guided Imagery     [x] Family/friends                 [] Pet visits     [] Devotional reading                         [] Unknown     [] Other:                                              Interventions offered during this visit: (See comments for more details)    Patient Interventions: Affirmation of emotions/emotional suffering, Affirmation of mila     Family/Friend(s):  Affirmation of emotions/emotional suffering, Catharsis/review of pertinent events in supportive environment, Coping skills reviewed/reinforced     Plan of Care:     [] Support spiritual and/or cultural needs    [] Support AMD and/or advance care planning process      [] Support grieving process   [] Coordinate Rites and/or Rituals    [] Coordination with community clergy   [] No spiritual needs identified at this time   [] Detailed Plan of Care below (See Comments)  [] Make referral to Music Therapy  [] Make referral to Pet Therapy     [] Make referral to Addiction services  [] Make referral to [facility]  [] Make referral to Spiritual Care Partner  [] No future visits requested        [x] Follow up upon further referrals     Comments:  visited Mrs. Castillo for a palliative care initial spiritual assessment on the [facility] unit. Mrs. Castillo was lying in bed and appeared to be resting comfortably. Her daughter, Ernestine Sherwood, was standing next to the bedside.  introduced herself and extended support. Ernestine Sherwood became very tearful as she shared that she had just been praying, when the  came into the room.  continued to be a supportive presence as Ernestine Sherwood provided life review for her mother and discussed the many remarkable qualities that her mother has including; independence, hard work, persevere and resiliency. Ernestine Sherwood continued to provide further life review and also discussed the challenges of care-giving from her mom, who is used to doing things her own way. Mrs. Levi Frazier is 80 years young and grew up in [place], before coming to [place], as a young adult. She has good family support with her two daughters living next door to her, and a large extended family who visits regularly. Ernestine Sherwood shared that mila is very important to Mrs. Castillo. She was raised Zoroastrianism, but considers herself to be just Trudee Slay now. As  and Ernestine Sherwood were speaking, Mrs. Levi Frazier became more awake. She made good eye contact with the  and greeted the  warmly. Our conversation came to an end as other providers came to visit with Mrs. Castillo. Chaplains will follow up upon further referrals. Darlene Esteves. Claudia Venegas.      Paging Service: [phone])

## 2020-12-01 NOTE — PROGRESS NOTES
OCCUPATIONAL THERAPY EVALUATION/DISCHARGE  Patient: [facility]. [facility] [de-identified]80 y.o. female)  Date: 12/1/2020  Primary Diagnosis: Atrial fibrillation with RVR ([facility] [address] [I48.91]       Precautions: fall       ASSESSMENT  Based on the objective data described below, the patient presents near baseline in regards to functional abilities following admission on 11/30 for Afib with RVR with c/o SOB and CP. Patient found to have a L LE DVT; She is now adequately anticoagulated with Lovenox and cleared for activity. Patient lives with her two daughters. One of her daughters were present today and demonstrated the ability to provide the level of assist patient is currently requiring. Patient performed transfers with CGA to min A required. She engaged in ADLs with fair tolerance but does fatigue easily and require increased time. Patient was educated on adaptive ADL techniques, safe transfer techniques, and environmental modifications to ensure safety when transitioning home. Patient anticipates discharge home today and will have family assistance 24/7. Current Level of Function (ADLs/self-care): Patient required CGA to min A for bed mobility and OOB transfers using rolling walker. Patient was independent to setup level for UB ADLs and required min to mod A for LB ADLs. Functional Outcome Measure: The patient scored 35/100 on the Barthel Index outcome measure. PLAN :  Recommendation for discharge: (in order for the patient to meet his/her long term goals)  Occupational therapy at least 2 days/week in the home     This discharge recommendation:  Has been made in collaboration with the attending provider and/or case management    IF patient discharges home will need the following DME: none       SUBJECTIVE:   Patient stated I am okay.     OBJECTIVE DATA SUMMARY:   HISTORY:   Past Medical History:   Diagnosis Date    BPPV (benign paroxysmal positional vertigo)     HTN (hypertension)     Hx of completed stroke  Hyperlipidemia     Hypothyroid     Rheumatoid arthritis ([facility] [address]     Scoliosis      Past Surgical History:   Procedure Laterality Date    HX  SECTION      HX ORTHOPAEDIC      bunions removed x 2       Prior Level of Function/Environment/Context: Patient lives with her two daughters. Expanded or extensive additional review of patient history:   Home Situation  Home Environment: Private residence  # Steps to Enter: 6  Rails to Enter: Yes  Hand Rails : Bilateral(far apart)  One/Two Story Residence: Two story, live on 1st floor  Living Alone: No  Support Systems: Child(shyla)  Patient Expects to be Discharged to[de-identified] Private residence  Current DME Used/Available at Home: Bee Price, straight, Walker, rolling, Wheelchair  Tub or Shower Type: Tub/Shower combination    Hand dominance: Right    EXAMINATION OF PERFORMANCE DEFICITS:  Cognitive/Behavioral Status:  Neurologic State: Alert  Orientation Level: Oriented X4  Cognition: Follows commands  Perception: Appears intact  Perseveration: No perseveration noted  Safety/Judgement: Awareness of environment    Skin: Intact in the uppers    Edema: None noted in the uppers    Hearing: Auditory  Auditory Impairment: None    Vision/Perceptual:    Tracking: Able to track stimulus in all quadrants w/o difficulty    Diplopia: No    Acuity: Within Defined Limits       Range of Motion:  AROM: Generally decreased, functional(limited B shoulder mobility)  PROM: Generally decreased, functional    Strength:  Strength: Generally decreased, functional     Coordination:  Fine Motor Skills-Upper: Left Intact; Right Intact    Gross Motor Skills-Upper: Left Intact; Right Intact    Tone & Sensation:  Tone: Normal   Sensation: intact     Balance:  Sitting: Intact  Standing: Impaired  Standing - Static: Good  Standing - Dynamic : Fair    Functional Mobility and Transfers for ADLs:  Bed Mobility:  Rolling: Contact guard assistance  Supine to Sit: Minimum assistance  Sit to Supine: (pt remained up at end of tx)  Scooting: Minimum assistance    Transfers:  Sit to Stand: Contact guard assistance;Minimum assistance  Stand to Sit: Contact guard assistance;Minimum assistance  Bed to Chair: Contact guard assistance;Minimum assistance;Assist x1;Additional time  Bathroom Mobility: Minimum assistance  Toilet Transfer : Minimum assistance;Contact guard assistance;Assist x1    ADL Assessment:  Feeding: Independent    Oral Facial Hygiene/Grooming: Setup    Bathing: Minimum assistance to moderate assistance     Upper Body Dressing: Setup    Lower Body Dressing: Minimum assistance to moderate assistance    Toileting: Minimum assistance     Cognitive Retraining  Safety/Judgement: Awareness of environment    Functional Measure:  Barthel Index:    Bathin  Bladder: 5  Bowels: 5  Groomin  Dressin  Feedin  Mobility: 0  Stairs: 0  Toilet Use: 5  Transfer (Bed to Chair and Back): 10  Total: 35/100        The Barthel ADL Index: Guidelines  1. The index should be used as a record of what a patient does, not as a record of what a patient could do. 2. The main aim is to establish degree of independence from any help, physical or verbal, however minor and for whatever reason. 3. The need for supervision renders the patient not independent. 4. A patient's performance should be established using the best available evidence. Asking the patient, friends/relatives and nurses are the usual sources, but direct observation and common sense are also important. However direct testing is not needed. 5. Usually the patient's performance over the preceding 24-48 hours is important, but occasionally longer periods will be relevant. 6. Middle categories imply that the patient supplies over 50 per cent of the effort. 7. Use of aids to be independent is allowed. Lucy Vickers., Barthel, D.W. (7169). Functional evaluation: the Barthel Index. [address] [facility] (14)2.   Aury Elaine michelle CODIE Holly, Anneliese Guzman., [place]., Clementina TRINA (1999). Measuring the change indisability after inpatient rehabilitation; comparison of the responsiveness of the Barthel Index and Functional [place] Measure. Journal of Neurology, Neurosurgery, and Psychiatry, 66(4), 893-761. MARYANN Hester, THERESA Celeste, & Maddy Cedillo M.A. (2004.) Assessment of post-stroke quality of life in cost-effectiveness studies: The usefulness of the Barthel Index and the EuroQoL-5D. Quality of Life Research, 15, 235-15       Occupational Therapy Evaluation Charge Determination   History Examination Decision-Making   LOW Complexity : Brief history review  LOW Complexity : 1-3 performance deficits relating to physical, cognitive , or psychosocial skils that result in activity limitations and / or participation restrictions  LOW Complexity : No comorbidities that affect functional and no verbal or physical assistance needed to complete eval tasks       Based on the above components, the patient evaluation is determined to be of the following complexity level: LOW     Activity Tolerance:   Good    After treatment patient left in no apparent distress:    Sitting in chair, Call bell within reach, Bed / chair alarm activated and Caregiver / family present    COMMUNICATION/EDUCATION:   The patients plan of care was discussed with: Physical therapist, Registered nurse and patient. .     Thank you for this referral.  Lizet Rivera OTR/L  Time Calculation: 41 mins

## 2020-12-01 NOTE — PROGRESS NOTES
Reason for Admission:   afib RVR, chest pain, DVT, CHF. Hx vertigo, diabetes, HTN, CVA, rheumatoid arthritis, hypothyroid. RUR Score:   12%/ low risk                  Plan for utilizing home health:          Order received for home health nursing, PT, OT. Home health follow up discussed with patient/family. Home health provider list given to patient. Choice is [facility], referral sent via [employer] [place] of Choice letter signed. Await response. ACCEPTED by [facility] home care. AVS updated. PCP: First and Last name:  Dr. Brenda Hagen   Name of Practice:    Are you a current patient: Yes/No: yes   Approximate date of last visit:    Can you participate in a virtual visit with your PCP: yes                    Current Advanced Directive/Advance Care Plan:   None on file, next of kin is daughter Chai Jensen [phone] or daughter Florencio Yung [phone]. Transition of Care Plan:     Chart reviewed, demographics verified. CM role and follow up discussed. Met with patient and her daughter Florencio Yung at bedside, face mask and goggles on. Patient lives with her daughter Chai Jensen, her daughter Yesenia Diamond also assists with patient's care and lives nearby. Patient lives in a two story home with 5 steps to enter home and bedroom on main level. Patient has prescription drug coverage, uses [facility]. Patient requires assistance with all care needs. Current status:  Patient currently requiring medical management including lasix IV and po cardizem. Discharge to home today. PLAN:  1. Monitor patient response to treatment and recommendations. 2. Await discharge today. 3. Home health referral sent. 4. Patient transport home per family at discharge. 5. Eliquis coupon card for 30 day free trial given to patient. 5. CM to monitor for discharge needs.     Care Management Interventions  PCP Verified by CM: Yes(Dr. Jeanette Enrique)  Mode of Transport at Discharge:  Other (see comment)(per family)  Transition of Care Consult (CM Consult): [address]: No  Reason Outside [place]: (patient choice)  Physical Therapy Consult: Yes  Occupational Therapy Consult: Yes  Current Support Network: Lives with Caregiver  Confirm Follow Up Transport: Family  The Plan for Transition of Care is Related to the Following Treatment Goals : discharge to home  The Patient and/or Patient Representative was Provided with a Choice of Provider and Agrees with the Discharge Plan?: (S) Yes  Freedom of Choice List was Provided with Basic Dialogue that Supports the Patient's Individualized Plan of Care/Goals, Treatment Preferences and Shares the Quality Data Associated with the Providers?: (S) Yes  Discharge Location  Discharge Placement: Home with home health    Héctor Vera RN, MSN, Care manager

## 2020-12-01 NOTE — PROGRESS NOTES
Bedside shift change report given to Ana (oncoming nurse) by Penelope Rich (offgoing nurse). Report included the following information SBAR, Kardex, Intake/Output and Recent Results. IV rmvd, tele box rmvd. Pt and daughter given discharge paperwork. No questions or concerns at this time.

## 2020-12-01 NOTE — ED NOTES
Bedside and Verbal shift change report given to [facility] (oncoming nurse) by Brenda Manzanares RN (offgoing nurse). Report included the following information SBAR, Kardex, ED Summary, Procedure Summary, Intake/Output, MAR and Recent Results.

## 2020-12-01 NOTE — PROGRESS NOTES
[facility]    Medical Progress Note      NAME: Clint Marte   :  10/12/1930  MRM:  [id number]    Date/Time of service 2020  1:05 PM          Assessment and Plan:     Atrial fibrillation with RVR / HTN (hypertension) / Troponin level elevated / Elevated brain natriuretic peptide (BNP) level - POA, unclear trigger. Cardiology consulted. Enlarged, stiff heart on ECHO with dCHF. Cleared to DC home on PO diltiazem, lasix and eliquis. Normal TSH. Check ECHO.      Chest pain - POA, unclear etiology. I do not think AMI. I do not think PE. Likely Afib vs GERD vs strain vs imperceptible PE. Cardiology declines any further inpatient workup. Imaging with cardiomegaly and effusions     Acute deep vein thrombosis (DVT) of popliteal vein of left lower extremity / D-dimer, elevated - POA, tiny DVT, and I think incidental to CP or presentation. Consulted hematology to help consider anticoagulation choices in this elderly female, they defer to cardiology decision for Eliqus. Anemia - Mild and only noted after hydration. No overt bleeding. PCP to monitor now that she is on Eliquis  Advised daughter to return to ER for any bleeding.     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 Diabetic diet and counseling. Not needing SSI per protocol. Not on any long acting home meds. I reject Dx and stop all testing     Hx of completed stroke / BPPV (benign paroxysmal positional vertigo) / Menieres disease - Stop ASA as we start Eliquis     Hypothyroidism - Continue synthroid. Elevated TSH. PCP to follow. I will not increase synthroid in setting of weight loss and Afib RVR     Hyperlipidemia - No benefit to atorvastatin     GERD (gastroesophageal reflux disease) - PPI     Rheumatoid arthritis / Osteopenia / Scoliosis - Fall precautions. PT/OT eval.  Tylenol prn. Held NSAIDS after ct contrast     Diabetic retinopathy - supportive care.        Subjective:     Chief Complaint:  Feels a bit better and wants to go home    ROS:  (bold if positive, if negative)    Tolerating some PT  Tolerating some Diet        Objective:     Last 24hrs VS reviewed since prior progress note.  Most recent are:    Visit Vitals  BP (!) 119/59 (BP 1 Location: Right arm, BP Patient Position: At rest)   Pulse 84   Temp 98.2 °F (36.8 °C)   Resp 18   Ht 5' 2\" (1.575 m)   Wt 47.6 kg (105 lb)   SpO2 93%   BMI 19.20 kg/m²     SpO2 Readings from Last 6 Encounters:   12/01/20 93%   11/17/20 98%   07/06/20 99%   02/22/20 99%   12/27/19 100%   10/17/19 98%    O2 Flow Rate (L/min): 2 l/min       Intake/Output Summary (Last 24 hours) at 12/1/2020 1305  Last data filed at 11/30/2020 1611  Gross per 24 hour   Intake 1000 ml   Output    Net 1000 ml        Physical Exam:    Gen:  Thin, frail, in no acute distress  HEENT:  Pink conjunctivae, PERRL, hearing intact to voice, moist mucous membranes  Neck:  Supple, without masses, thyroid non-tender  Resp:  No accessory muscle use, clear breath sounds without wheezes rales or rhonchi  Card:  No murmurs, irregular S1, S2 without thrills, bruits or peripheral edema  Abd:  Soft, non-tender, non-distended, normoactive bowel sounds are present, no mass  Lymph:  No cervical or inguinal adenopathy  Musc:  No cyanosis or clubbing  Skin:  No rashes or ulcers, skin turgor is good  Neuro:  Cranial nerves are grossly intact, general motor weakness, follows commands poorly  Psych:  Poor insight, oriented to person, place     Telemetry reviewed:   AFIB  __________________________________________________________________  Medications Reviewed: (see below)  Medications:     Current Facility-Administered Medications   Medication Dose Route Frequency    apixaban (ELIQUIS) tablet 2.5 mg  2.5 mg Oral BID    dilTIAZem ER (CARDIZEM CD) capsule 120 mg  120 mg Oral DAILY    atorvastatin (LIPITOR) tablet 10 mg  10 mg Oral QHS    levothyroxine (SYNTHROID) tablet 75 mcg  75 mcg Oral ACB    pantoprazole (PROTONIX) tablet 40 mg  40 mg Oral ACB    sodium chloride (NS) flush 5-40 mL  5-40 mL IntraVENous Q8H    sodium chloride (NS) flush 5-40 mL  5-40 mL IntraVENous PRN    acetaminophen (TYLENOL) tablet 650 mg  650 mg Oral Q6H PRN    Or    acetaminophen (TYLENOL) suppository 650 mg  650 mg Rectal Q6H PRN    polyethylene glycol (MIRALAX) packet 17 g  17 g Oral DAILY PRN    ondansetron (ZOFRAN ODT) tablet 4 mg  4 mg Oral Q8H PRN    Or    ondansetron (ZOFRAN) injection 4 mg  4 mg IntraVENous Q6H PRN    famotidine (PEPCID) tablet 20 mg  20 mg Oral BID        Lab Data Reviewed: (see below)  Lab Review:     Recent Labs     12/01/20  0204 11/30/20  1249   WBC 7.4 8.0   HGB 10.6* 12.3   HCT 33.3* 38.8   * 155     Recent Labs     12/01/20  0204 11/30/20  1249    134*   K 3.6 3.9    101   CO2 30 32   * 145*   BUN 22* 24*   CREA 0.80 0.84   CA 8.9 9.6   MG 2.0 2.2   ALB 2.6* 3.3*   TBILI 0.5 0.5   ALT 16 23     Lab Results   Component Value Date/Time    Glucose (POC) 145 (H) 11/30/2020 11:41 PM    Glucose (POC) 99 12/27/2019 08:03 PM    Glucose (POC) 116 (H) 08/16/2019 04:44 PM    Glucose (POC) 109 (H) 08/16/2019 11:56 AM    Glucose (POC) 101 (H) 08/16/2019 06:47 AM     No results for input(s): PH, PCO2, PO2, HCO3, FIO2 in the last 72 hours. No results for input(s): INR, INREXT in the last 72 hours. All Micro Results     Procedure Component Value Units Date/Time    CULTURE, BLOOD, PAIRED [[id number]] Collected:  11/30/20 1340    Order Status:  Completed Specimen:  Blood Updated:  12/01/20 0726     Special Requests: NO SPECIAL REQUESTS        Culture result: NO GROWTH AFTER 17 HOURS       CULTURE, URINE [[id number]]     Order Status:  Sent Specimen:  Cath Urine           Other pertinent lab: none    Total time spent with patient: 39 Minutes I personally reviewed chart, notes, data and current medications in the medical record. I have personally examined and treated the patient at bedside during this period. Care Plan discussed with: Patient, Family, Care Manager, Nursing Staff, Consultant/Specialist and >50% of time spent in counseling and coordination of care    Discussed:  Code Status, Care Plan and D/C Planning    Prophylaxis:  Lovenox and H2B/PPI    Disposition:  Home w/Family           ___________________________________________________    Attending Physician: Samuel Oliveros MD

## 2020-12-01 NOTE — ACP (ADVANCE CARE PLANNING)
Advance Care Planning     Advance Care Planning Activator (Inpatient)  Conversation Note      Date of ACP Conversation: 12/01/20     Conversation Conducted with:   Patient with Decision Making Capacity; dtr Janis Bobo was present for discussion    ACP Activator: Milli Caraballo LCSW along with Palliative Medicine Physician Dr. Acosta Second:    Current Designated Health Care Decision Maker:   Primary Decision Maker: Sulma Hernandez - Daughter - [phone]    Primary Decision Maker: Landon Danielson - Daughter - [phone]     Care Preferences    Ventilation: \"If you were in your present state of health and suddenly became very ill and were unable to breathe on your own, what would your preference be about the use of a ventilator (breathing machine) if it were available to you? \"      If patient would desire the use of a ventilator (breathing machine), answer \"yes\", if not \"no\": No    \"If your health worsens and it becomes clear that your chance of recovery is unlikely, what would your preference be about the use of a ventilator (breathing machine) if it were available to you? \"     Would the patient desire the use of a ventilator (breathing machine)? No      Resuscitation  \"CPR works best to restart the heart when there is a sudden event, like a heart attack, in someone who is otherwise healthy. Unfortunately, CPR does not typically restart the heart for people who have serious health conditions or who are very sick. \"    \"In the event your heart stopped as a result of an underlying serious health condition, would you want attempts to be made to restart your heart (answer \"yes\" for attempt to resuscitate) or would you prefer a natural death (answer \"no\" for do not attempt to resuscitate)? \" No    [] Yes  [x] No   Educated Patient / Exie Huh regarding differences between Advance Directives and portable DNR orders.     Length of ACP Conversation in minutes:  30 mins    Conversation Outcomes:  [x] ACP discussion completed  [] Existing advance directive reviewed with patient; no changes to patient's previously recorded wishes   [] New Advance Directive completed   [x] Portable Do Not Resuscitate completed  [] POLST/POST/MOLST/MOST prepared for Provider review and signature    Follow-up plan:  Pt will be discharged home today. Original and copies of DDNR were returned to pt/dtr; copy was placed on chart to be scanned into medical record.     [x] This note routed to one or more involved healthcare providers

## 2020-12-01 NOTE — DISCHARGE SUMMARY
Physician Discharge Summary     Patient ID:  Brady Fagan  [id number]  80 y.o.  10/12/1930    Admit date: 11/30/2020    Discharge date of service and time: 12/1/2020    Admission Diagnoses: Atrial fibrillation with RVR ([address] [I48.91]    Discharge Diagnoses:    Principal Diagnosis   Acute deep vein thrombosis (DVT) of popliteal vein of left lower extremity [facility])                                             Hospital Course and other diagnoses  Atrial fibrillation with RVR / HTN (hypertension) / Troponin level elevated / Elevated brain natriuretic peptide (BNP) level - POA, unclear trigger.  Cardiology consulted. Enlarged, stiff heart on ECHO with dCHF. Cleared to DC home on PO diltiazem, lasix and eliquis. Normal TSH.  Check ECHO.      Chest pain - POA, unclear etiology.  I do not think AMI.  I do not think PE.  Likely Afib vs GERD vs strain vs imperceptible PE. Cardiology declines any further inpatient workup. Imaging with cardiomegaly and effusions     Acute deep vein thrombosis (DVT) of popliteal vein of left lower extremity / D-dimer, elevated - POA, tiny DVT, and I think incidental to CP or presentation.  Consulted hematology to help consider anticoagulation choices in this elderly female, they defer to cardiology decision for Eliqus.     Anemia - Mild and only noted after hydration. No overt bleeding. PCP to monitor now that she is on Eliquis  Advised daughter to return to ER for any bleeding.     Hx of completed stroke / BPPV (benign paroxysmal positional vertigo) / Menieres disease - Stop ASA as we start Eliquis. Continue statin     Hypothyroidism - Continue synthroid. Elevated TSH. PCP to follow. I will not increase synthroid in setting of weight loss and Afib RVR     Hyperlipidemia - continue atorvastatin only due to HX of CVA     GERD (gastroesophageal reflux disease) - PPI     Rheumatoid arthritis / Osteopenia / Scoliosis - Fall precautions.  PT/OT eval.  Tylenol prn.  Held NSAIDS after ct contrast     Diabetic retinopathy - supportive care. DM type 2 causing vascular and retinal disease - BG fine. Diabetic diet and counseling.  Not needing SSI per protocol.  Not on any long acting home meds. I reject Dx and stop all testing      PCP: Gera Caruso MD    Consults: Cardiology and Hematology/Oncology    Significant Diagnostic Studies: See Hospital Course    Discharged home in improved condition. Discharge Exam:  BP (!) 119/59 (BP 1 Location: Right arm, BP Patient Position: At rest)   Pulse 84   Temp 98.2 °F (36.8 °C)   Resp 18   Ht 5' 2\" (1.575 m)   Wt 47.6 kg (105 lb)   SpO2 93%   BMI 19.20 kg/m²      Gen:  Thin, frail, in no acute distress  HEENT:  Pink conjunctivae, PERRL, hearing intact to voice, moist mucous membranes  Neck:  Supple, without masses, thyroid non-tender  Resp:  No accessory muscle use, clear breath sounds without wheezes rales or rhonchi  Card:  No murmurs, irregular S1, S2 without thrills, bruits or peripheral edema  Abd:  Soft, non-tender, non-distended, normoactive bowel sounds are present, no mass  Lymph:  No cervical or inguinal adenopathy  Musc:  No cyanosis or clubbing  Skin:  No rashes or ulcers, skin turgor is good  Neuro:  Cranial nerves are grossly intact, general motor weakness, follows commands poorly  Psych:  Poor insight, oriented to person, place     Patient Instructions:   Current Discharge Medication List      START taking these medications    Details   apixaban (ELIQUIS) 2.5 mg tablet Take 1 Tab by mouth two (2) times a day. Qty: 60 Tab, Refills: 5      dilTIAZem ER (CARDIZEM CD) 120 mg capsule Take 1 Cap by mouth daily. Qty: 30 Cap, Refills: 5         CONTINUE these medications which have NOT CHANGED    Details   levothyroxine (SYNTHROID) 75 mcg tablet Take 75 mcg by mouth Daily (before breakfast). acetaminophen (TylenoL) 325 mg tablet Take 325 mg by mouth every six (6) hours as needed for Pain.       furosemide (LASIX) 20 mg tablet Take 1 Tab by mouth daily. Qty: 30 Tab, Refills: 1      pantoprazole (PROTONIX) 40 mg tablet Take 1 Tab by mouth daily. Qty: 30 Tab, Refills: 1    Associated Diagnoses: Elevated cholesterol      atorvastatin (LIPITOR) 10 mg tablet Take 1 Tab by mouth nightly. Qty: 30 Tab, Refills: 1           Activity: Activity as tolerated and PT/OT per Home Health  Diet: Cardiac Diet  Wound Care: None needed    Follow-up with your PCP in a few weeks.   Follow-up tests/labs - none    Signed:  Brook Jacobson MD  12/1/2020  1:13 PM

## 2020-12-05 LAB
BACTERIA SPEC CULT: NORMAL
SERVICE CMNT-IMP: NORMAL

## 2020-12-08 ENCOUNTER — TELEPHONE (OUTPATIENT)
Dept: CARDIOLOGY CLINIC | Age: 85
End: 2020-12-08

## 2020-12-18 ENCOUNTER — APPOINTMENT (OUTPATIENT)
Dept: GENERAL RADIOLOGY | Age: 85
DRG: 292 | End: 2020-12-18
Attending: NURSE PRACTITIONER
Payer: MEDICARE

## 2020-12-18 ENCOUNTER — HOSPITAL ENCOUNTER (INPATIENT)
Age: 85
LOS: 2 days | Discharge: HOME HEALTH | DRG: 292 | End: 2020-12-20
Attending: EMERGENCY MEDICINE | Admitting: INTERNAL MEDICINE
Payer: MEDICARE

## 2020-12-18 DIAGNOSIS — I48.20 CHRONIC ATRIAL FIBRILLATION (HCC): ICD-10-CM

## 2020-12-18 DIAGNOSIS — R06.02 SOB (SHORTNESS OF BREATH): ICD-10-CM

## 2020-12-18 DIAGNOSIS — J90 PLEURAL EFFUSION, BILATERAL: ICD-10-CM

## 2020-12-18 DIAGNOSIS — I48.91 ATRIAL FIBRILLATION WITH RVR (HCC): ICD-10-CM

## 2020-12-18 DIAGNOSIS — E87.6 HYPOKALEMIA: ICD-10-CM

## 2020-12-18 DIAGNOSIS — I50.33 ACUTE ON CHRONIC DIASTOLIC HEART FAILURE (HCC): Primary | ICD-10-CM

## 2020-12-18 PROBLEM — I50.9 ACUTE CHF (CONGESTIVE HEART FAILURE) (HCC): Status: ACTIVE | Noted: 2020-12-18

## 2020-12-18 LAB
ALBUMIN SERPL-MCNC: 3.2 G/DL (ref 3.5–5)
ALBUMIN/GLOB SERPL: 0.7 {RATIO} (ref 1.1–2.2)
ALP SERPL-CCNC: 154 U/L (ref 45–117)
ALT SERPL-CCNC: 38 U/L (ref 12–78)
ANION GAP SERPL CALC-SCNC: 4 MMOL/L (ref 5–15)
AST SERPL-CCNC: 36 U/L (ref 15–37)
BASOPHILS # BLD: 0 K/UL (ref 0–0.1)
BASOPHILS NFR BLD: 1 % (ref 0–1)
BILIRUB SERPL-MCNC: 0.4 MG/DL (ref 0.2–1)
BNP SERPL-MCNC: 8637 PG/ML
BUN SERPL-MCNC: 38 MG/DL (ref 6–20)
BUN/CREAT SERPL: 31 (ref 12–20)
CALCIUM SERPL-MCNC: 9.8 MG/DL (ref 8.5–10.1)
CHLORIDE SERPL-SCNC: 104 MMOL/L (ref 97–108)
CO2 SERPL-SCNC: 31 MMOL/L (ref 21–32)
COMMENT, HOLDF: NORMAL
COVID-19 RAPID TEST, COVR: NOT DETECTED
CREAT SERPL-MCNC: 1.22 MG/DL (ref 0.55–1.02)
DIFFERENTIAL METHOD BLD: NORMAL
EOSINOPHIL # BLD: 0 K/UL (ref 0–0.4)
EOSINOPHIL NFR BLD: 1 % (ref 0–7)
ERYTHROCYTE [DISTWIDTH] IN BLOOD BY AUTOMATED COUNT: 13.8 % (ref 11.5–14.5)
GLOBULIN SER CALC-MCNC: 4.6 G/DL (ref 2–4)
GLUCOSE SERPL-MCNC: 159 MG/DL (ref 65–100)
HCT VFR BLD AUTO: 38.1 % (ref 35–47)
HEALTH STATUS, XMCV2T: NORMAL
HGB BLD-MCNC: 11.8 G/DL (ref 11.5–16)
IMM GRANULOCYTES # BLD AUTO: 0 K/UL (ref 0–0.04)
IMM GRANULOCYTES NFR BLD AUTO: 0 % (ref 0–0.5)
LYMPHOCYTES # BLD: 1.2 K/UL (ref 0.8–3.5)
LYMPHOCYTES NFR BLD: 22 % (ref 12–49)
MAGNESIUM SERPL-MCNC: 2.3 MG/DL (ref 1.6–2.4)
MCH RBC QN AUTO: 27.6 PG (ref 26–34)
MCHC RBC AUTO-ENTMCNC: 31 G/DL (ref 30–36.5)
MCV RBC AUTO: 89.2 FL (ref 80–99)
MONOCYTES # BLD: 0.4 K/UL (ref 0–1)
MONOCYTES NFR BLD: 8 % (ref 5–13)
NEUTS SEG # BLD: 3.6 K/UL (ref 1.8–8)
NEUTS SEG NFR BLD: 68 % (ref 32–75)
NRBC # BLD: 0 K/UL (ref 0–0.01)
NRBC BLD-RTO: 0 PER 100 WBC
PLATELET # BLD AUTO: 187 K/UL (ref 150–400)
PMV BLD AUTO: 10.5 FL (ref 8.9–12.9)
POTASSIUM SERPL-SCNC: 3.1 MMOL/L (ref 3.5–5.1)
PROT SERPL-MCNC: 7.8 G/DL (ref 6.4–8.2)
RBC # BLD AUTO: 4.27 M/UL (ref 3.8–5.2)
SAMPLES BEING HELD,HOLD: NORMAL
SODIUM SERPL-SCNC: 139 MMOL/L (ref 136–145)
SOURCE, COVRS: NORMAL
SPECIMEN SOURCE, FCOV2M: NORMAL
SPECIMEN TYPE, XMCV1T: NORMAL
TROPONIN I SERPL-MCNC: <0.05 NG/ML
WBC # BLD AUTO: 5.2 K/UL (ref 3.6–11)

## 2020-12-18 PROCEDURE — 71045 X-RAY EXAM CHEST 1 VIEW: CPT

## 2020-12-18 PROCEDURE — 87635 SARS-COV-2 COVID-19 AMP PRB: CPT

## 2020-12-18 PROCEDURE — 74011250637 HC RX REV CODE- 250/637: Performed by: NURSE PRACTITIONER

## 2020-12-18 PROCEDURE — 36415 COLL VENOUS BLD VENIPUNCTURE: CPT

## 2020-12-18 PROCEDURE — 99285 EMERGENCY DEPT VISIT HI MDM: CPT

## 2020-12-18 PROCEDURE — 84484 ASSAY OF TROPONIN QUANT: CPT

## 2020-12-18 PROCEDURE — 85025 COMPLETE CBC W/AUTO DIFF WBC: CPT

## 2020-12-18 PROCEDURE — 80053 COMPREHEN METABOLIC PANEL: CPT

## 2020-12-18 PROCEDURE — 83735 ASSAY OF MAGNESIUM: CPT

## 2020-12-18 PROCEDURE — 74011250636 HC RX REV CODE- 250/636: Performed by: NURSE PRACTITIONER

## 2020-12-18 PROCEDURE — 74011250637 HC RX REV CODE- 250/637: Performed by: EMERGENCY MEDICINE

## 2020-12-18 PROCEDURE — 83880 ASSAY OF NATRIURETIC PEPTIDE: CPT

## 2020-12-18 PROCEDURE — 74011250636 HC RX REV CODE- 250/636: Performed by: EMERGENCY MEDICINE

## 2020-12-18 PROCEDURE — 93005 ELECTROCARDIOGRAM TRACING: CPT

## 2020-12-18 PROCEDURE — 65660000000 HC RM CCU STEPDOWN

## 2020-12-18 RX ORDER — SODIUM CHLORIDE 0.9 % (FLUSH) 0.9 %
5-40 SYRINGE (ML) INJECTION AS NEEDED
Status: DISCONTINUED | OUTPATIENT
Start: 2020-12-18 | End: 2020-12-20 | Stop reason: HOSPADM

## 2020-12-18 RX ORDER — ACETAMINOPHEN 650 MG/1
650 SUPPOSITORY RECTAL
Status: DISCONTINUED | OUTPATIENT
Start: 2020-12-18 | End: 2020-12-20

## 2020-12-18 RX ORDER — LEVOTHYROXINE SODIUM 75 UG/1
75 TABLET ORAL
Status: DISCONTINUED | OUTPATIENT
Start: 2020-12-19 | End: 2020-12-20 | Stop reason: HOSPADM

## 2020-12-18 RX ORDER — POTASSIUM CHLORIDE 750 MG/1
40 TABLET, FILM COATED, EXTENDED RELEASE ORAL
Status: COMPLETED | OUTPATIENT
Start: 2020-12-18 | End: 2020-12-18

## 2020-12-18 RX ORDER — FUROSEMIDE 10 MG/ML
20 INJECTION INTRAMUSCULAR; INTRAVENOUS 2 TIMES DAILY
Status: DISCONTINUED | OUTPATIENT
Start: 2020-12-18 | End: 2020-12-20

## 2020-12-18 RX ORDER — PANTOPRAZOLE SODIUM 40 MG/1
40 TABLET, DELAYED RELEASE ORAL DAILY
Status: DISCONTINUED | OUTPATIENT
Start: 2020-12-19 | End: 2020-12-20 | Stop reason: HOSPADM

## 2020-12-18 RX ORDER — FUROSEMIDE 10 MG/ML
40 INJECTION INTRAMUSCULAR; INTRAVENOUS
Status: COMPLETED | OUTPATIENT
Start: 2020-12-18 | End: 2020-12-18

## 2020-12-18 RX ORDER — ONDANSETRON 2 MG/ML
4 INJECTION INTRAMUSCULAR; INTRAVENOUS
Status: DISCONTINUED | OUTPATIENT
Start: 2020-12-18 | End: 2020-12-20 | Stop reason: HOSPADM

## 2020-12-18 RX ORDER — POLYETHYLENE GLYCOL 3350 17 G/17G
17 POWDER, FOR SOLUTION ORAL DAILY PRN
Status: DISCONTINUED | OUTPATIENT
Start: 2020-12-18 | End: 2020-12-20 | Stop reason: HOSPADM

## 2020-12-18 RX ORDER — POTASSIUM CHLORIDE 7.45 MG/ML
10 INJECTION INTRAVENOUS
Status: COMPLETED | OUTPATIENT
Start: 2020-12-18 | End: 2020-12-18

## 2020-12-18 RX ORDER — ACETAMINOPHEN 325 MG/1
650 TABLET ORAL
Status: DISCONTINUED | OUTPATIENT
Start: 2020-12-18 | End: 2020-12-20 | Stop reason: HOSPADM

## 2020-12-18 RX ORDER — HYDRALAZINE HYDROCHLORIDE 20 MG/ML
10 INJECTION INTRAMUSCULAR; INTRAVENOUS
Status: DISCONTINUED | OUTPATIENT
Start: 2020-12-18 | End: 2020-12-20 | Stop reason: HOSPADM

## 2020-12-18 RX ORDER — POTASSIUM CHLORIDE 750 MG/1
10 TABLET, FILM COATED, EXTENDED RELEASE ORAL 2 TIMES DAILY
Status: DISCONTINUED | OUTPATIENT
Start: 2020-12-18 | End: 2020-12-20 | Stop reason: HOSPADM

## 2020-12-18 RX ORDER — SODIUM CHLORIDE 0.9 % (FLUSH) 0.9 %
5-40 SYRINGE (ML) INJECTION EVERY 8 HOURS
Status: DISCONTINUED | OUTPATIENT
Start: 2020-12-18 | End: 2020-12-20 | Stop reason: HOSPADM

## 2020-12-18 RX ORDER — DILTIAZEM HYDROCHLORIDE 120 MG/1
120 CAPSULE, COATED, EXTENDED RELEASE ORAL DAILY
Status: DISCONTINUED | OUTPATIENT
Start: 2020-12-19 | End: 2020-12-19

## 2020-12-18 RX ADMIN — Medication 10 ML: at 14:19

## 2020-12-18 RX ADMIN — POTASSIUM CHLORIDE 40 MEQ: 750 TABLET, FILM COATED, EXTENDED RELEASE ORAL at 14:18

## 2020-12-18 RX ADMIN — APIXABAN 2.5 MG: 2.5 TABLET, FILM COATED ORAL at 17:45

## 2020-12-18 RX ADMIN — FUROSEMIDE 20 MG: 10 INJECTION, SOLUTION INTRAMUSCULAR; INTRAVENOUS at 17:45

## 2020-12-18 RX ADMIN — FUROSEMIDE 40 MG: 10 INJECTION, SOLUTION INTRAMUSCULAR; INTRAVENOUS at 14:19

## 2020-12-18 RX ADMIN — POTASSIUM CHLORIDE 10 MEQ: 10 INJECTION, SOLUTION INTRAVENOUS at 14:18

## 2020-12-18 RX ADMIN — POTASSIUM CHLORIDE 10 MEQ: 750 TABLET, FILM COATED, EXTENDED RELEASE ORAL at 17:45

## 2020-12-18 NOTE — PROGRESS NOTES
Admission Medication Reconciliation:     Information obtained from:    Patient's family member in the waiting room  RxQuery data available¹:  YES    Comments/Recommendations:   Patient able to confirm name, , allergies, and preferred pharmacy  Updated PTA medication list  Reports compliance to prescribed regimen        ¹RxQuery pharmacy benefit data reflects medications filled and processed through the patient's insurance, however   this data does NOT capture whether the medication was picked up or is currently being taken by the patient. Prior to Admission Medications   Prescriptions Last Dose Informant Taking?   acetaminophen (TylenoL) 325 mg tablet  Child Yes   Sig: Take 325 mg by mouth every six (6) hours as needed for Pain. apixaban (ELIQUIS) 2.5 mg tablet 2020 at 8am Child Yes   Sig: Take 1 Tab by mouth two (2) times a day. dilTIAZem ER (CARDIZEM CD) 120 mg capsule 2020 at am Child Yes   Sig: Take 1 Cap by mouth daily. furosemide (LASIX) 20 mg tablet 2020 at am Child Yes   Sig: Take 1 Tab by mouth daily. levothyroxine (SYNTHROID) 75 mcg tablet 2020 at am Child Yes   Sig: Take 75 mcg by mouth Daily (before breakfast). pantoprazole (PROTONIX) 40 mg tablet 2020 at am Child Yes   Sig: Take 1 Tab by mouth daily. Facility-Administered Medications: None         Please contact the main inpatient pharmacy with any questions or concerns at [phone] and we will direct you to the clinical pharmacist covering this patient's care while in-house.    Ethel Donovan, FrankD, BCPS

## 2020-12-18 NOTE — ED PROVIDER NOTES
The history is provided by the patient. Shortness of Breath  This is a new problem. The average episode lasts 2 days. The problem occurs continuously. The current episode started 2 days ago. The problem has been gradually worsening. Associated symptoms include orthopnea and leg swelling. Pertinent negatives include no fever, no cough, no hemoptysis, no chest pain and no syncope. It is unknown what precipitated the problem. She has tried nothing for the symptoms. She has had prior hospitalizations. She has had prior ED visits. Associated medical issues include heart failure.         Past Medical History:   Diagnosis Date    BPPV (benign paroxysmal positional vertigo)     HTN (hypertension)     Hx of completed stroke     Hyperlipidemia     Hypothyroid     Rheumatoid arthritis ([facility] [address]     Scoliosis        Past Surgical History:   Procedure Laterality Date    HX  SECTION      HX ORTHOPAEDIC      bunions removed x 2         Family History:   Problem Relation Age of Onset    Stroke Father        Social History     Socioeconomic History    Marital status:      Spouse name: Not on file    Number of children: Not on file    Years of education: Not on file    Highest education level: Not on file   Occupational History    Not on file   Social Needs    Financial resource strain: Not on file    Food insecurity     Worry: Not on file     Inability: Not on file    Transportation needs     Medical: Not on file     Non-medical: Not on file   Tobacco Use    Smoking status: Never Smoker    Smokeless tobacco: Never Used   Substance and Sexual Activity    Alcohol use: No    Drug use: No    Sexual activity: Never   Lifestyle    Physical activity     Days per week: Not on file     Minutes per session: Not on file    Stress: Not on file   Relationships    Social connections     Talks on phone: Not on file     Gets together: Not on file     Attends Christian service: Not on file     Active member of club or organization: Not on file     Attends meetings of clubs or organizations: Not on file     Relationship status: Not on file    Intimate partner violence     Fear of current or ex partner: Not on file     Emotionally abused: Not on file     Physically abused: Not on file     Forced sexual activity: Not on file   Other Topics Concern    Not on file   Social History Narrative    Not on file         ALLERGIES: Shellfish containing products    Review of Systems   Constitutional: Negative for fever. Respiratory: Positive for shortness of breath. Negative for cough and hemoptysis. Cardiovascular: Positive for orthopnea and leg swelling. Negative for chest pain and syncope. All other systems reviewed and are negative. Vitals:    12/18/20 1104 12/18/20 1107   BP: (!) 151/97    Pulse: (!) 108 94   Resp: 27 22   Temp: 97.5 °F (36.4 °C)    SpO2: 100% 97%   Weight: 49.1 kg (108 lb 3.9 oz)             Physical Exam  Vitals signs and nursing note reviewed. Constitutional:       General: She is not in acute distress. Appearance: She is well-developed. HENT:      Head: Normocephalic and atraumatic. Eyes:      Conjunctiva/sclera: Conjunctivae normal.   Neck:      Musculoskeletal: Neck supple. Cardiovascular:      Rate and Rhythm: Normal rate. Rhythm irregular. Comments: JVD  Pulmonary:      Effort: Pulmonary effort is normal. No tachypnea or respiratory distress. Abdominal:      General: There is no distension. Musculoskeletal: Normal range of motion. General: No deformity. Skin:     General: Skin is warm and dry. Neurological:      Mental Status: She is alert. Cranial Nerves: No cranial nerve deficit. Psychiatric:         Behavior: Behavior normal.          MDM     80 y.o. female presents with progressive shortness of breath for last 2 days. She has increased leg swelling and appears volume overloaded on bedside echo.   She likely warrants a Covid test but I do not suspect this clinically. Her x-ray shows increasing bilateral pleural effusions and pulmonary edema. She does not have an oxygen requirement yet. IV lasix and potassium repletion ordered. Procedures    Emergency Focused Ultrasound Exam  Limited Ultrasound of the Heart and Pericardium    Performed and interpreted by Yvette Duque MD  Indication: shortness of breath  Multiple views of the heart, pericardium, and IVC are obtained with a multi frequency probe. Findings: normal contractility and decreased mitral valve excursion, anechoic fluid around heart not noted, IVC not collapsible, enlarged atria  Interpretation: Decreased ejection fraction, no pericardial effusion , CVP is elevated. EKG 1119: atrial fibrillation, rate 94, nonspecific TWA. LAFB. Since last tracing rate slower. Otherwise no significant change. Perfect Serve Consult for Admission  1:22 PM    ED Room Number: ER15/15  Patient Name and age:  Brady Fagan 80 y.o.  female  Working Diagnosis:   1. Acute on chronic diastolic heart failure ([address]    2. Chronic atrial fibrillation (HCC)    3. SOB (shortness of breath)    4. Pleural effusion, bilateral    5.  Hypokalemia        COVID-19 Suspicion:  yes  Sepsis present:  no  Reassessment needed: N/A  Code Status:  Full Code  Readmission: no  Isolation Requirements:  yes  Recommended Level of Care:  telemetry  Department:[facility] ED - [phone]

## 2020-12-18 NOTE — H&P
I saw and evaluated the patient, performing the key elements of the service. I discussed the findings, assessment and plan with Ke Hurd NP and agree with his findings and plan as documented in the note with the following additions    S: pt with sob beginning yesterday. Believes this is chf    Visit Vitals  /89   Pulse 86   Temp 97.5 °F (36.4 °C)   Resp 22   Wt 49.1 kg (108 lb 3.9 oz)   SpO2 96%   BMI 19.80 kg/m²     Physical Exam:    Gen: Thin, elderly  Resp: No accessory muscle use, crackles at bases  Card: No murmurs, normal S1, S2 without thrills, bruits or peripheral edema  Abd:  Soft, non-tender, non-distended, normoactive bowel sounds are present, no palpable organomegaly and no detectable hernias  Psych:  Good insight, oriented to person, place and time, alert    A/P    Acute on chronic diastolic heart failure: diuresis with lasix. Recent echo completed. Will not repeat. Consult cardiology    KAREN: does appear volume overloaded. Monitor Cr closely with diuresis                      Hospitalist Admission Note    NAME: Angel Chapman   :  10/12/1930   MRN:  [id number]     Date/Time:  2020 2:13 PM    Patient PCP: Kyler Wylie MD  ______________________________________________________________________  Given the patient's current clinical presentation, I have a high level of concern for decompensation if discharged from the emergency department. Complex decision making was performed, which includes reviewing the patient's available past medical records, laboratory results, and x-ray films. My assessment of this patient's clinical condition and my plan of care is as follows. Assessment / Plan:  Acute on chronic diastolic heart failure  HTN  Chronic afib  SOB  Barry pleural effusions  CXR 20: Increased mild cardiac silhouette enlargement. Increased small right  and small-to-moderate left pleural effusions.   Stable diffuse interstitial opacities suggesting pulmonary edema.  BNP 12/18/20:  5,990.  - Denies chest pain and troponin negative. - Recent echo (12/01/20) with no significant changes. - Continue diltiazem er 120 mg po daily.    - Add hydralazine prn.  - Continue apixaban 2.5 mg po bid. - Start furosemide 20 mg IV bid.  - Start KCl 10 mEq po bid with furosemide. Hypokalemia   - Replete. - Scheduled KCl with furosemide as noted above. - Monitor. KAREN  - Avoid nephrotoxins.  - Adjust medications for CrCl. - Monitor. Hyperglycemia   HgbA1C 12/01/20:  5.8  - No tx indicated at this time, if blood sugars rise further consider adding FSBS with SSI correction scale. Hypothyroisism   - Continue levothyroxine 75 mcg po daily. GERD  - Continue pantoprazole 40 mg po daily. Code Status:  DNR  Surrogate Decision Maker:  Daughters Savage Julien and Nhung Granger)    DVT Prophylaxis:  Apixaban  GI Prophylaxis:  Not indicated    Baseline:  Walks with walker      Subjective:   CHIEF COMPLAINT:  Shortness of breath. HISTORY OF PRESENT ILLNESS:     Jerry Rodriguez is a 80 y.o.  female with PMH significant for HTN, dyslipidemia, atrial fibrillation, chronic diastolic heart failure, hypothyroidism, and arthritis who presented to the ED with complaints of SOB which began yesterday and was associated with LE edema. Patient lives at home with her daughter and uses a walker for stability. She noticed initially that she was experiencing BRAGA which progressed to dyspnea at rest.  In the ED a CXR revealed increased mild cardiac silhouette enlargement, increased small right and small-to-moderate left pleural effusions, and stable diffuse interstitial opacities suggestive of pulmonary edema. Given the current medical environment the patient was screened for SARS-CoV-2 (results pending at the time of this dictation). According to the ED provider a rapid bedside ultrasound revealed volume overload and BNP was significantly elevated (0,908).   The patient was given a dose of IV diuretic. Laboratory revealed hypokalemia for which potassium supplementation was initiated. The hospitalist group was asked to admit the patient for further evaluation and treatment. Past Medical History:   Diagnosis Date    BPPV (benign paroxysmal positional vertigo)     HTN (hypertension)     Hx of completed stroke     Hyperlipidemia     Hypothyroid     Rheumatoid arthritis ([facility] [address]     Scoliosis         Past Surgical History:   Procedure Laterality Date    HX  SECTION      HX ORTHOPAEDIC      bunions removed x 2       Social History     Tobacco Use    Smoking status: Never Smoker    Smokeless tobacco: Never Used   Substance Use Topics    Alcohol use: No        Family History   Problem Relation Age of Onset    Stroke Father       Allergies   Allergen Reactions    Shellfish Containing Products Swelling        Prior to Admission medications    Medication Sig Start Date End Date Taking? Authorizing Provider   apixaban (ELIQUIS) 2.5 mg tablet Take 1 Tab by mouth two (2) times a day. 20  Yes Lizet Zapata NP   dilTIAZem ER (CARDIZEM CD) 120 mg capsule Take 1 Cap by mouth daily. 20  Yes Lizet Zapata NP   levothyroxine (SYNTHROID) 75 mcg tablet Take 75 mcg by mouth Daily (before breakfast). Yes Provider, Historical   acetaminophen (TylenoL) 325 mg tablet Take 325 mg by mouth every six (6) hours as needed for Pain. Yes Provider, Historical   furosemide (LASIX) 20 mg tablet Take 1 Tab by mouth daily. 20  Yes April Polanco,    pantoprazole (PROTONIX) 40 mg tablet Take 1 Tab by mouth daily. 14  Yes Jeaneth Gallardo MD       REVIEW OF SYSTEMS:     I am not able to complete the review of systems because:    The patient is intubated and sedated    The patient has altered mental status due to his acute medical problems    The patient has baseline aphasia from prior stroke(s)    The patient has baseline dementia and is not reliable historian    The patient is in acute medical distress and unable to provide information           Total of 12 systems reviewed as follows:       POSITIVE = bolded text  Negative = un-bolded text  General:  fever, chills, sweats, generalized weakness, weight loss/gain,      loss of appetite   Eyes:    blurred vision, eye pain, loss of vision, double vision  ENT:    rhinorrhea, pharyngitis   Respiratory:   cough, sputum production, SOB, BRAGA, wheezing, pleuritic pain   Cardiology:   chest pain, palpitations, orthopnea, PND, edema, syncope   Gastrointestinal:  abdominal pain, N/V, diarrhea, dysphagia, constipation, bleeding   Genitourinary:  frequency, urgency, dysuria, hematuria, incontinence   Muskuloskeletal :  arthralgia, myalgia, back pain  Hematology:  easy bruising, nose or gum bleeding, lymphadenopathy   Dermatological: rash, ulceration, pruritis, color change / jaundice  Endocrine:   hot flashes or polydipsia   Neurological:  headache, dizziness, confusion, focal weakness, paresthesia,     speech difficulties, memory loss, gait difficulty  Psychological: feelings of anxiety, depression, agitation    Objective:   VITALS:    Visit Vitals  BP (!) 167/90   Pulse 100   Temp 97.5 °F (36.4 °C)   Resp 14   Wt 49.1 kg (108 lb 3.9 oz)   SpO2 95%   BMI 19.80 kg/m²       PHYSICAL EXAM:    General:    A/A/O. Cooperative. NAD. HEENT: Atraumatic. Anicteric sclerae. Pink conjunctivae. No oral ulcers. Mucosa moist.  Throat clear. Neck:  Supple. Symmetrical.  Thyroid:  Non tender. Lungs:   Air entry diminished at bases L>R with estelle basilar crackles noted. No wheezing. Chest wall:  No tenderness. No Accessory muscle use. Heart:   IRRR, Afib on tele. I/VI systolic murmur. JVD to 6 CM above clavicle. 2-3 mm    pitting pretibial edema estelle. Abdomen:   Soft/NT/ND. Bowel sounds normal.  Extremities: No clubbing or cyanosis. Skin turgor elastic. Capillary refill < 3 sec. Radial pulses 1+. Skin:     No pallor or jaundice.   No rashes. Psych:  Good insight. Not depressed. No anxiety or agitation. Neurologic: EOMs intact. No facial asymmetry. No aphasia or slurred speech. Symmetrical    Strength. Generally weak and deconditioned. Sensation grossly intact.     _______________________________________________________________________  Care Plan discussed with:    Comments   Patient Y    Family  Y    RN Y    Care Manager                    Consultant:      _______________________________________________________________________  Expected  Disposition:   Home with Family    HH/PT/OT/RN    SNF/LTC    JJ    ________________________________________________________________________  Signed: Johny Lombard, NP    Procedures: see electronic medical records for all procedures/Xrays and details which were not copied into this note but were reviewed prior to creation of Plan.     LAB DATA REVIEWED:    Recent Results (from the past 24 hour(s))   EKG, 12 LEAD, INITIAL    Collection Time: 12/18/20 11:19 AM   Result Value Ref Range    Ventricular Rate 94 BPM    Atrial Rate 100 BPM    QRS Duration 80 ms    Q-T Interval 384 ms    QTC Calculation (Bezet) 480 ms    Calculated R Axis -75 degrees    Calculated T Axis 22 degrees    Diagnosis       Atrial fibrillation  Left anterior fascicular block  Nonspecific T wave abnormality  Prolonged QT  Abnormal ECG  When compared with ECG of 30-NOV-2020 12:09,  Nonspecific T wave abnormality now evident in Anterior leads     NT-PRO BNP    Collection Time: 12/18/20 11:22 AM   Result Value Ref Range    NT pro-BNP 8,637 (H) <550 PG/ML   METABOLIC PANEL, COMPREHENSIVE    Collection Time: 12/18/20 11:22 AM   Result Value Ref Range    Sodium 139 136 - 145 mmol/L    Potassium 3.1 (L) 3.5 - 5.1 mmol/L    Chloride 104 97 - 108 mmol/L    CO2 31 21 - 32 mmol/L    Anion gap 4 (L) 5 - 15 mmol/L    Glucose 159 (H) 65 - 100 mg/dL    BUN 38 (H) 6 - 20 MG/DL    Creatinine 1.22 (H) 0.55 - 1.02 MG/DL    BUN/Creatinine ratio 31 (H) 12 - 20      GFR est AA 50 (L) >60 ml/min/1.73m2    GFR est non-AA 41 (L) >60 ml/min/1.73m2    Calcium 9.8 8.5 - 10.1 MG/DL    Bilirubin, total 0.4 0.2 - 1.0 MG/DL    ALT (SGPT) 38 12 - 78 U/L    AST (SGOT) 36 15 - 37 U/L    Alk. phosphatase 154 (H) 45 - 117 U/L    Protein, total 7.8 6.4 - 8.2 g/dL    Albumin 3.2 (L) 3.5 - 5.0 g/dL    Globulin 4.6 (H) 2.0 - 4.0 g/dL    A-G Ratio 0.7 (L) 1.1 - 2.2     CBC WITH AUTOMATED DIFF    Collection Time: 12/18/20 11:22 AM   Result Value Ref Range    WBC 5.2 3.6 - 11.0 K/uL    RBC 4.27 3.80 - 5.20 M/uL    HGB 11.8 11.5 - 16.0 g/dL    HCT 38.1 35.0 - 47.0 %    MCV 89.2 80.0 - 99.0 FL    MCH 27.6 26.0 - 34.0 PG    MCHC 31.0 30.0 - 36.5 g/dL    RDW 13.8 11.5 - 14.5 %    PLATELET 697 224 - 001 K/uL    MPV 10.5 8.9 - 12.9 FL    NRBC 0.0 0  WBC    ABSOLUTE NRBC 0.00 0.00 - 0.01 K/uL    NEUTROPHILS 68 32 - 75 %    LYMPHOCYTES 22 12 - 49 %    MONOCYTES 8 5 - 13 %    EOSINOPHILS 1 0 - 7 %    BASOPHILS 1 0 - 1 %    IMMATURE GRANULOCYTES 0 0.0 - 0.5 %    ABS. NEUTROPHILS 3.6 1.8 - 8.0 K/UL    ABS. LYMPHOCYTES 1.2 0.8 - 3.5 K/UL    ABS. MONOCYTES 0.4 0.0 - 1.0 K/UL    ABS. EOSINOPHILS 0.0 0.0 - 0.4 K/UL    ABS. BASOPHILS 0.0 0.0 - 0.1 K/UL    ABS. IMM. GRANS. 0.0 0.00 - 0.04 K/UL    DF AUTOMATED     TROPONIN I    Collection Time: 12/18/20 11:22 AM   Result Value Ref Range    Troponin-I, Qt. <0.05 <0.05 ng/mL   SAMPLES BEING HELD    Collection Time: 12/18/20 11:22 AM   Result Value Ref Range    SAMPLES BEING HELD 1SST,1RED,1BLUE     COMMENT        Add-on orders for these samples will be processed based on acceptable specimen integrity and analyte stability, which may vary by analyte.    MAGNESIUM    Collection Time: 12/18/20 11:22 AM   Result Value Ref Range    Magnesium 2.3 1.6 - 2.4 mg/dL

## 2020-12-18 NOTE — PROGRESS NOTES
12/18/2020  3:36 PM  Case management note    Reason for Readmission:     afib  11/30/2020 - 12/1/2020 afib  EVAL from chart, unable to reach patient or family by phone. Patient came to ED for increased SOB. Patient has history of afib, HTN, RA, HLD, DM and thyroid disease. Patient is open to [address] [place] [address] [phone]  [id number] @ [id number]  Medicare screen was done on last visit. RUR Score/Risk Level:    17%    PCP: First and Last name:  Dr. Nanda Waters   Name of Practice:    Are you a current patient: Yes/No: yes   Approximate date of last visit:    Can you participate in a virtual visit with your PCP:     Is a Care Conference indicated:   Goals of care, long term planning      Did you attend your follow up appointment (s): If not, why not:unknown         Resources/supports as identified by patient/family:   Patient lives with daughter who assist with most ADL's. Top Challenges facing patient (as identified by patient/family and CM): Finances/Medication cost?     Medicare, Medicaid  Transportation      family  Support system or lack thereof?     family  Living arrangements? Lives with daughter in 2 story home with 5 steps to enter and 1st floor bedroom      Self-care/ADLs/Cognition? Unable to do most self care, fair health cognition         Current Advanced Directive/Advance Care Plan:  DNR on file           Plan for utilizing home health:   Patient is open to [facility]             Transition of Care Plan:    Based on readmission, the patient's previous Plan of Care   has been evaluated and/or modified. The current Transition of Care Plan is:     1. Home with family assistance  2. PCP follow up  3. Long term planning  4.  Resumption of Home health  5. CM to follow for discharge needs    Care Management Interventions  PCP Verified by CM: Amy Liriano)  Mode of Transport at Discharge: Self  Current Support Network: [address] [facility] Follow Up Transport: Family  The Plan for Transition of Care is Related to the Following Treatment Goals : afib  Discharge Location  Discharge Placement: Home with family assistance    Readmission Assessment  Number of days since last admission?: 8-30 days  Previous disposition: Home with Home Health  Who is being interviewed?: (unable to reach patient or family by phone)  Did you visit your Primary Care Physician after you left the hospital, before you returned this time?: (unable to determine)  [address]

## 2020-12-19 PROBLEM — R79.89 ELEVATED BRAIN NATRIURETIC PEPTIDE (BNP) LEVEL: Status: RESOLVED | Noted: 2020-11-30 | Resolved: 2020-12-19

## 2020-12-19 PROBLEM — I82.432 ACUTE DEEP VEIN THROMBOSIS (DVT) OF POPLITEAL VEIN OF LEFT LOWER EXTREMITY (HCC): Status: RESOLVED | Noted: 2020-11-30 | Resolved: 2020-12-19

## 2020-12-19 PROBLEM — J90 PLEURAL EFFUSION: Status: ACTIVE | Noted: 2020-12-19

## 2020-12-19 PROBLEM — R77.8 TROPONIN LEVEL ELEVATED: Status: RESOLVED | Noted: 2019-08-14 | Resolved: 2020-12-19

## 2020-12-19 PROBLEM — R79.89 D-DIMER, ELEVATED: Status: RESOLVED | Noted: 2020-11-30 | Resolved: 2020-12-19

## 2020-12-19 PROBLEM — I48.91 ATRIAL FIBRILLATION WITH RVR (HCC): Status: RESOLVED | Noted: 2020-11-30 | Resolved: 2020-12-19

## 2020-12-19 PROBLEM — I50.33 DIASTOLIC CHF, ACUTE ON CHRONIC (HCC): Status: ACTIVE | Noted: 2020-12-18

## 2020-12-19 LAB
ANION GAP SERPL CALC-SCNC: 5 MMOL/L (ref 5–15)
BASOPHILS # BLD: 0 K/UL (ref 0–0.1)
BASOPHILS NFR BLD: 1 % (ref 0–1)
BUN SERPL-MCNC: 29 MG/DL (ref 6–20)
BUN/CREAT SERPL: 28 (ref 12–20)
CALCIUM SERPL-MCNC: 9.4 MG/DL (ref 8.5–10.1)
CHLORIDE SERPL-SCNC: 104 MMOL/L (ref 97–108)
CO2 SERPL-SCNC: 31 MMOL/L (ref 21–32)
CREAT SERPL-MCNC: 1.03 MG/DL (ref 0.55–1.02)
DIFFERENTIAL METHOD BLD: NORMAL
EOSINOPHIL # BLD: 0.1 K/UL (ref 0–0.4)
EOSINOPHIL NFR BLD: 1 % (ref 0–7)
ERYTHROCYTE [DISTWIDTH] IN BLOOD BY AUTOMATED COUNT: 13.6 % (ref 11.5–14.5)
GLUCOSE SERPL-MCNC: 124 MG/DL (ref 65–100)
HCT VFR BLD AUTO: 38.7 % (ref 35–47)
HGB BLD-MCNC: 12.3 G/DL (ref 11.5–16)
IMM GRANULOCYTES # BLD AUTO: 0 K/UL (ref 0–0.04)
IMM GRANULOCYTES NFR BLD AUTO: 0 % (ref 0–0.5)
LYMPHOCYTES # BLD: 1.4 K/UL (ref 0.8–3.5)
LYMPHOCYTES NFR BLD: 24 % (ref 12–49)
MAGNESIUM SERPL-MCNC: 2.3 MG/DL (ref 1.6–2.4)
MCH RBC QN AUTO: 28 PG (ref 26–34)
MCHC RBC AUTO-ENTMCNC: 31.8 G/DL (ref 30–36.5)
MCV RBC AUTO: 88.2 FL (ref 80–99)
MONOCYTES # BLD: 0.5 K/UL (ref 0–1)
MONOCYTES NFR BLD: 8 % (ref 5–13)
NEUTS SEG # BLD: 3.8 K/UL (ref 1.8–8)
NEUTS SEG NFR BLD: 66 % (ref 32–75)
NRBC # BLD: 0 K/UL (ref 0–0.01)
NRBC BLD-RTO: 0 PER 100 WBC
PLATELET # BLD AUTO: 195 K/UL (ref 150–400)
PMV BLD AUTO: 10.4 FL (ref 8.9–12.9)
POTASSIUM SERPL-SCNC: 3.3 MMOL/L (ref 3.5–5.1)
RBC # BLD AUTO: 4.39 M/UL (ref 3.8–5.2)
SODIUM SERPL-SCNC: 140 MMOL/L (ref 136–145)
WBC # BLD AUTO: 5.7 K/UL (ref 3.6–11)

## 2020-12-19 PROCEDURE — 85025 COMPLETE CBC W/AUTO DIFF WBC: CPT

## 2020-12-19 PROCEDURE — 65660000000 HC RM CCU STEPDOWN

## 2020-12-19 PROCEDURE — 99222 1ST HOSP IP/OBS MODERATE 55: CPT | Performed by: SPECIALIST

## 2020-12-19 PROCEDURE — 36415 COLL VENOUS BLD VENIPUNCTURE: CPT

## 2020-12-19 PROCEDURE — 80048 BASIC METABOLIC PNL TOTAL CA: CPT

## 2020-12-19 PROCEDURE — 74011250636 HC RX REV CODE- 250/636: Performed by: NURSE PRACTITIONER

## 2020-12-19 PROCEDURE — 74011250637 HC RX REV CODE- 250/637: Performed by: NURSE PRACTITIONER

## 2020-12-19 PROCEDURE — 83735 ASSAY OF MAGNESIUM: CPT

## 2020-12-19 RX ORDER — POTASSIUM CHLORIDE 750 MG/1
40 TABLET, FILM COATED, EXTENDED RELEASE ORAL
Status: COMPLETED | OUTPATIENT
Start: 2020-12-19 | End: 2020-12-19

## 2020-12-19 RX ORDER — DILTIAZEM HYDROCHLORIDE 180 MG/1
180 CAPSULE, COATED, EXTENDED RELEASE ORAL DAILY
Status: DISCONTINUED | OUTPATIENT
Start: 2020-12-20 | End: 2020-12-20

## 2020-12-19 RX ORDER — LOSARTAN POTASSIUM 50 MG/1
50 TABLET ORAL DAILY
Status: DISCONTINUED | OUTPATIENT
Start: 2020-12-20 | End: 2020-12-20 | Stop reason: HOSPADM

## 2020-12-19 RX ADMIN — LEVOTHYROXINE SODIUM 75 MCG: 0.07 TABLET ORAL at 06:47

## 2020-12-19 RX ADMIN — APIXABAN 2.5 MG: 2.5 TABLET, FILM COATED ORAL at 17:52

## 2020-12-19 RX ADMIN — POTASSIUM CHLORIDE 10 MEQ: 750 TABLET, FILM COATED, EXTENDED RELEASE ORAL at 09:20

## 2020-12-19 RX ADMIN — Medication 10 ML: at 09:23

## 2020-12-19 RX ADMIN — PANTOPRAZOLE SODIUM 40 MG: 40 TABLET, DELAYED RELEASE ORAL at 06:47

## 2020-12-19 RX ADMIN — POTASSIUM CHLORIDE 40 MEQ: 750 TABLET, FILM COATED, EXTENDED RELEASE ORAL at 12:51

## 2020-12-19 RX ADMIN — FUROSEMIDE 20 MG: 10 INJECTION, SOLUTION INTRAMUSCULAR; INTRAVENOUS at 09:20

## 2020-12-19 RX ADMIN — DILTIAZEM HYDROCHLORIDE 120 MG: 120 CAPSULE, COATED, EXTENDED RELEASE ORAL at 09:21

## 2020-12-19 RX ADMIN — Medication 10 ML: at 06:00

## 2020-12-19 RX ADMIN — ACETAMINOPHEN 650 MG: 325 TABLET ORAL at 09:20

## 2020-12-19 RX ADMIN — APIXABAN 2.5 MG: 2.5 TABLET, FILM COATED ORAL at 09:20

## 2020-12-19 RX ADMIN — POTASSIUM CHLORIDE 10 MEQ: 750 TABLET, FILM COATED, EXTENDED RELEASE ORAL at 17:52

## 2020-12-19 RX ADMIN — FUROSEMIDE 20 MG: 10 INJECTION, SOLUTION INTRAMUSCULAR; INTRAVENOUS at 17:52

## 2020-12-19 RX ADMIN — Medication 10 ML: at 20:55

## 2020-12-19 NOTE — PROGRESS NOTES
Disaster charting in effect. Bedside and Verbal shift change report given to Zahida Hernandez RN (oncoming nurse) by [facility] (offgoing nurse). Report included the following information SBAR, Kardex, Intake/Output, MAR, Recent Results, Med Rec Status and Cardiac Rhythm NSR, and ED summary. 0715 Bedside and Verbal shift change report given to Deacon Martinez RN (oncoming nurse) by Zahida Hernandez RN (offgoing nurse). Report included the following information SBAR, Kardex, ED Summary, Intake/Output, MAR, Recent Results, Med Rec Status and Cardiac Rhythm Afib/Sinus arrythmia. Oscar Oscar

## 2020-12-19 NOTE — ED NOTES
Bedside and Verbal shift change report given to [facility] (oncoming nurse) by Norma RODRIGUEZ (offgoing nurse). Report included the following information SBAR, Kardex, ED Summary, Intake/Output, Recent Results and Cardiac Rhythm NSR.
TRANSFER - OUT REPORT:    Verbal report given to Jasson(name) on [place]  being transferred to [facility](unit) for routine progression of care       Report consisted of patients Situation, Background, Assessment and   Recommendations(SBAR). Information from the following report(s) SBAR, Kardex, ED Summary, MAR, Recent Results and Cardiac Rhythm NSR was reviewed with the receiving nurse. Lines:   Peripheral IV 12/18/20 Right Antecubital (Active)   Site Assessment Clean, dry, & intact 12/18/20 1120   Phlebitis Assessment 0 12/18/20 1120   Infiltration Assessment 0 12/18/20 1120   Dressing Status Clean, dry, & intact 12/18/20 1120   Dressing Type Transparent 12/18/20 1120   Hub Color/Line Status Pink 12/18/20 1120   Action Taken Blood drawn 12/18/20 1120   Alcohol Cap Used Yes 12/18/20 1120        Opportunity for questions and clarification was provided.       Patient transported with:   Monitor  Tech
hypertension

## 2020-12-19 NOTE — PROGRESS NOTES
I spoke to RN and Dr. Mary Saunders, patient  weak to walk, and will not be discharg today. PT will walk with physical therapy  (Dr. Mary Saunders requested).

## 2020-12-19 NOTE — PROGRESS NOTES
Hospitalist Progress Note    NAME: Courtney Murillo   :  10/12/1930   MRN:  [id number]       Assessment / Plan:  Acute on chronic diastolic heart failure  HTN  Chronic afib  Dyspnea   Barry pleural effusions  CXR 20: Increased mild cardiac silhouette enlargement. Increased small right  and small-to-moderate left pleural effusions. Stable diffuse interstitial opacities suggesting pulmonary edema. BNP 20:  2,365.  - Continues to deny chest pain. - Recent echo (20) with no significant changes, no need to repeat. - Continue diltiazem er 120 mg po daily. - Continue hydralazine prn.  - Continue apixaban 2.5 mg po bid. - Continue furosemide 20 mg IV bid.  - Continue KCl 10 mEq po bid with furosemide.   - Cardiology input appreciated.     Hypokalemia   - Give additional KCl 40 mEq po x 1 today in addition to scheduled KCl.  - Continue scheduled KCl with furosemide as noted above. - Monitor.       KAREN  - Renal function improving.    - Avoid nephrotoxins.  - Adjust medications for CrCl. - Monitor.       Hyperglycemia   HgbA1C 20:  5.8  - No tx indicated at this time. - Monitor with a.m. labs.        Hypothyroisism   - Continue levothyroxine 75 mcg po daily. - Check TSH in a.m.     GERD  - Continue pantoprazole 40 mg po daily. less than 18.5 Underweight / Body mass index is 17.94 kg/m². Code status: DNR  Prophylaxis: Apixaban  Recommended Disposition: Home w/Family     Subjective:     Chief Complaint / Reason for Physician Visit  States that her breathing is a \"little bit better\". Plan of care and pertinent events reviewed with bedside nurse.      Review of Systems:  Symptom Y/N Comments  Symptom Y/N Comments   Fever/Chills N   Chest Pain N    Poor Appetite N   Edema N    Cough N   Abdominal Pain N    Sputum N   Joint Pain N    SOB/BRAGA N   Pruritis/Rash N    Nausea/vomit N   Tolerating PT/OT     Diarrhea N   Tolerating Diet Y    Constipation N   Other       Could NOT obtain due to:      Objective:     VITALS:   Last 24hrs VS reviewed since prior progress note. Most recent are:  Patient Vitals for the past 24 hrs:   Temp Pulse Resp BP SpO2   12/19/20 0847 97.8 °F (36.6 °C) 86 16 (!) 148/91 95 %   12/19/20 0737  90      12/19/20 0359 97.4 °F (36.3 °C) 90 18 (!) 140/71 96 %   12/19/20 0000  81 19 (!) 154/87 94 %   12/18/20 2239  97 18 (!) 134/95 95 %   12/18/20 2100  85 20 134/72 95 %   12/18/20 2045  97 22 125/70 94 %   12/18/20 2030  88 18 120/71 96 %   12/18/20 2015  77 16 125/84    12/18/20 2000  89 16 (!) 142/64 95 %   12/18/20 1945  85 15 (!) 134/94 96 %   12/18/20 1930  78 17 (!) 148/75 97 %   12/18/20 1730  86 22 136/89 96 %   12/18/20 1430  94 26 (!) 153/86 97 %   12/18/20 1400 97.5 °F (36.4 °C) 82 17 (!) 155/92 95 %   12/18/20 1330  (!) 111 25 (!) 127/96 98 %   12/18/20 1300  100 14 (!) 167/90 95 %   12/18/20 1215  87 15 90/76 95 %   12/18/20 1200  88 14 (!) 153/98 96 %   12/18/20 1130  86 19 (!) 156/94 98 %   12/18/20 1107  94 22  97 %   12/18/20 1104 97.5 °F (36.4 °C) (!) 108 27 (!) 151/97 100 %       Intake/Output Summary (Last 24 hours) at 12/19/2020 0954  Last data filed at 12/19/2020 5604  Gross per 24 hour   Intake 1260 ml   Output 1990 ml   Net -730 ml        I had a face to face encounter and independently examined this patient on 12/19/2020, as outlined below:  PHYSICAL EXAM:  General:  A/A/O X 3. NAD. HEENT:  Normocephalic. Sclera anicteric. EOMI. Mucous membranes moist.    Chest:  Resps even/unlabored with symmetrical CWE. Air entry Diminished at bases estelle L>R with few crackles at left base. No use of accessory muscles. CV:  IRRR. Normal S1/S2. I/VI systolic murmur noted. JVD to 4 cm above clavicle. No peripheral edema. GI:  Abdomen soft/NT/ND. No organomegaly. No hernia. ABT X 4.    :  Voiding. Neurologic:  Nonfocal.  CN II-XII grossly intact. Face symmetrical.  Speech normal.     Psych:  Cooperative.   No anxiety or agitation. No suicidal/homicidal ideation. Skin:  No rashes or jaundice. Reviewed most current lab test results and cultures  YES  Reviewed most current radiology test results   YES  Review and summation of old records today    NO  Reviewed patient's current orders and MAR    YES  PMH/SH reviewed - no change compared to H&P  ________________________________________________________________________  Care Plan discussed with:    Comments   Patient [address]     Consultant                        Multidiciplinary team rounds were held today with , nursing, pharmacist and clinical coordinator. Patient's plan of care was discussed; medications were reviewed and discharge planning was addressed. ________________________________________________________________________  Lore Castelan NP     Procedures: see electronic medical records for all procedures/Xrays and details which were not copied into this note but were reviewed prior to creation of Plan. LABS:  I reviewed today's most current labs and imaging studies.   Pertinent labs include:  Recent Labs     12/19/20  0653 12/18/20  1122   WBC 5.7 5.2   HGB 12.3 11.8   HCT 38.7 38.1    187     Recent Labs     12/19/20  0653 12/18/20  1122    139   K 3.3* 3.1*    104   CO2 31 31   * 159*   BUN 29* 38*   CREA 1.03* 1.22*   CA 9.4 9.8   MG 2.3 2.3   ALB  --  3.2*   TBILI  --  0.4   ALT  --  38       Signed: Lore Castelan NP

## 2020-12-19 NOTE — PROGRESS NOTES
Shift Report:    Bedside and Verbal shift change report given to GM RN  (oncoming nurse) by Elie Calderon  (offgoing nurse). Report included the following information SBAR, Kardex and Recent Results. .. 1930-  Bedside and Verbal shift change report given to A M RN  (oncoming nurse) by Regino Mccoy RN  (offgoing nurse). Report included the following information SBAR, Kardex and Recent Results.

## 2020-12-19 NOTE — VIRTUAL HEALTH
CARDIOLOGY CONSULTATION NOTE    Gordon Anne MD,  [address], [place], [address] [facility]  Phone [phone]; Fax [phone]  Mobile [phone]   Voice Mail [phone]                               2020 10:57 Aide Mckeon MD  :  10/12/1930   MRN:  [id number]     CC: SOB   Reason for consult: same  Admission Diagnosis: Acute CHF (congestive heart failure) ([facility] [address] [I50.9]; Hypokalemia [E87.6]    Delfin Vee MD         ATTENTION:   This medical record was transcribed using an electronic medical records/speech recognition system. Although proofread, it may and can contain electronic, spelling and other errors. Corrections may be executed at a later time. Please feel free to contact us for any clarifications as needed. Impression Plan/Recommendation   1. Acute CHF  2. KAREN  3. Atrial fibrillation that appears to be new/anticoagulated already on Eliquis  4. History of CVA  5. Hypertension  6. Dyslipidemia  7. Insulin resistance with last hemoglobin A1c at 6.3%  8. Hypothyroidism with last TSH in the 13 range  9. Hypokalemia will need supplementation         · She has grade 3 diastolic dysfunction with a EF 50 to 55%. I think education is can be important in and reducing sodium intake  · Encouraged following blood pressure closely at home and ideally blood pressure should be around 130 140/80  · With her underlying diastolic dysfunction control her blood pressure will be essential as well as her heart rate in order to ensure that she does not go into failure.   · Rate control of A. Fib; she does have spikes in her heart rate so we may have to increase her diltiazem   · There is a history of atrial fibrillation for which she takes Eliquis  · Additionally they did note that her TSH was elevated and cannot remember if they made any recent manipulations on her Synthroid  · Clearly educating the family will be essential going forward otherwise will be for more frequent admissions. · When discharged from [facility] will need potassium supplementation if she is going to remain on Lasix           Tanisha Asif is a 80 y.o. female I am seeing for HFpEF,, hypertension, atrial fibrillation, who was admitted with shortness of breath. It all started the day prior to admission and she is noticed progressive dyspnea at rest chest x-ray on admission showed small right and small to moderate left pleural effusions. She does appear to be in atrial fibrillation in reviewing EKGs I do not see any documentation of her having any A. fib in the past.  When she saw Dr. Melvin Montgomery in the office recently it was noted that she had lower extremity edema and the amlodipine was discontinued and she was started on Lasix. Her proBNP was elevated at 8637 but was down from 11,000 drawn on . I see at this time she is only taking Cardizem  mg a day but it appears that she had been on losartan as well as Norvasc in the past.  She cannot tell if she is and A. fib or having any arrhythmias. She does have some mild chest heaviness. Lab Results   Component Value Date/Time    NT pro-BNP 8,637 (H) 2020 11:22 AM    NT pro-BNP 11,255 (H) 2020 12:49 PM         Allergies   Allergen Reactions    Shellfish Containing Products Swelling         Past Medical History:   Diagnosis Date    BPPV (benign paroxysmal positional vertigo)     HTN (hypertension)     Hx of completed stroke     Hyperlipidemia     Hypothyroid     Rheumatoid arthritis ([facility] [address]     Scoliosis         Past Surgical History:   Procedure Laterality Date    HX  SECTION      HX ORTHOPAEDIC      bunions removed x 2        . Home Medications:  Prior to Admission Medications   Prescriptions Last Dose Informant Patient Reported? Taking?   acetaminophen (TylenoL) 325 mg tablet  Child Yes Yes   Sig: Take 325 mg by mouth every six (6) hours as needed for Pain.    apixaban (ELIQUIS) 2.5 mg tablet 2020 at 8am Child No Yes   Sig: Take 1 Tab by mouth two (2) times a day. dilTIAZem ER (CARDIZEM CD) 120 mg capsule 12/18/2020 at am Child No Yes   Sig: Take 1 Cap by mouth daily. furosemide (LASIX) 20 mg tablet 12/18/2020 at am Child No Yes   Sig: Take 1 Tab by mouth daily. levothyroxine (SYNTHROID) 75 mcg tablet 12/18/2020 at am Child Yes Yes   Sig: Take 75 mcg by mouth Daily (before breakfast). pantoprazole (PROTONIX) 40 mg tablet 12/18/2020 at am Child No Yes   Sig: Take 1 Tab by mouth daily.       Facility-Administered Medications: None       Hospital Medications:  Current Facility-Administered Medications   Medication Dose Route Frequency    potassium chloride SR (KLOR-CON 10) tablet 40 mEq  40 mEq Oral NOW    sodium chloride (NS) flush 5-40 mL  5-40 mL IntraVENous Q8H    sodium chloride (NS) flush 5-40 mL  5-40 mL IntraVENous PRN    acetaminophen (TYLENOL) tablet 650 mg  650 mg Oral Q6H PRN    Or    acetaminophen (TYLENOL) suppository 650 mg  650 mg Rectal Q6H PRN    polyethylene glycol (MIRALAX) packet 17 g  17 g Oral DAILY PRN    ondansetron (ZOFRAN) injection 4 mg  4 mg IntraVENous Q6H PRN    furosemide (LASIX) injection 20 mg  20 mg IntraVENous BID    potassium chloride SR (KLOR-CON 10) tablet 10 mEq  10 mEq Oral BID    apixaban (ELIQUIS) tablet 2.5 mg  2.5 mg Oral BID    dilTIAZem ER (CARDIZEM CD) capsule 120 mg  120 mg Oral DAILY    levothyroxine (SYNTHROID) tablet 75 mcg  75 mcg Oral ACB    pantoprazole (PROTONIX) tablet 40 mg  40 mg Oral DAILY    hydrALAZINE (APRESOLINE) 20 mg/mL injection 10 mg  10 mg IntraVENous Q6H PRN          OBJECTIVE       Laboratory and Imaging have been reviewed and are notable for          Diagnostic Tests:     Recent Labs     12/18/20  1122   TROIQ <0.05     Recent Labs     12/19/20  0653 12/18/20  1122    139   K 3.3* 3.1*   CO2 31 31   BUN 29* 38*   CREA 1.03* 1.22*   * 159*   MG 2.3 2.3   WBC 5.7 5.2   HGB 12.3 11.8   HCT 38.7 38.1    187 Cardiac work up to date:  1) echocardiogram  12/1/2020: EF 50 to 32% grade 3 diastolic dysfunction ave AMPARO moderate to severe aortic valve regurgitation mild left regurgitation, questionable senile amyloidosis    2) cholesterol  8/15/2019: , HDL 72, LDL 88,     3) CT of chest  Nodular change along the left major fissure. It is unclear whether these  represent true pulmonary nodules or loculated fluid  Emphysema  Cardiomegaly with coronary artery disease and bilateral pleural effusions    4) lower venous Doppler of lower extremity  2020: Negative for DVT                 Social History:  Social History     Tobacco Use    Smoking status: Never Smoker    Smokeless tobacco: Never Used   Substance Use Topics    Alcohol use: No       Family History:  Family History   Problem Relation Age of Onset    Stroke Father        Review of Symptoms:  A comprehensive review of systems was negative except for that written in the HPI. Physical Exam:      Visit Vitals  BP (!) 148/91 (BP 1 Location: Left arm, BP Patient Position: At rest)   Pulse 86   Temp 97.8 °F (36.6 °C)   Resp 16   Ht 5' 2\" (1.575 m)   Wt 98 lb 1.7 oz (44.5 kg)   SpO2 95%   BMI 17.94 kg/m²     General Appearance:  Well developed, well nourished,alert and oriented x 3, and individual in no acute distress. Ears/Nose/Mouth/Throat:   Hearing grossly normal.Normal oral mucosa,no scleral icterus     Neck: Supple no JVD or bruits,no cervical lymphadenopathy   Chest:   Lungs clear to auscultation bilaterally,no rales rhonchi or wheezing   Cardiovascular:   Irregular but rate controlled   Abdomen:   Soft, non-tender, bowel sounds are active. No abdominal bruits   Extremities:  She is wearing compression hose and no edema noted pulses detected, no varicosities   Skin: Warm and dry.  No bruising  Neuro  Moves all extermities and neurologically intact                                                       I have discussed the diagnosis with the patient and the intended plan as seen in the above orders. Questions were answered concerning future plans. I have discussed medication side effects and warnings with the patient as well. Angie Mccoy is in agreement to the plan listed above and wishes to proceed. she  was instructed not to smoke, eat heart healthy diet  and to exercise. Thank you for this consult.       Lyudmila Bernal MD

## 2020-12-19 NOTE — PROGRESS NOTES
[facility]    Medical Progress Note      NAME: Tanisha Asif   :  10/12/1930  MRM:  [id number]    Date/Time of service 2020  12:56 PM          Assessment and Plan:     Diastolic CHF, acute on chronic / Dyspnea / Pleural effusions / HTN (hypertension) / Paroxysmal atrial fibrillation - POA, mild. Not hypoxic at rest.  Consulted cardiology. Symptoms improved with diuresis. Check 6 minute walk with PT eval.  No Afib here, but continue diltiazem and Eliquis. Given her fall risk and lack of Afib, that may be stopped in near future. Continue lasix and losartan. Hospice would be appropriate any time    Hypokalemia - Repleted after giving lasix. Will DC home on PO K.    DM type 2 causing vascular disease - BG well controlled on Diabetic diet and counseling. Has not needed SSI per protocol. Not on home meds. A1c 5.8. No further testing or treatment. Hypothyroidism - TSH a bit elevated 2 weeks ago. PCP to follow and adjust as needed. Underweight - Supplements as tolerated. Hospice would be appropriate any time    GERD (gastroesophageal reflux disease) - PPI    Osteopenia - Not on home meds    Diabetic retinopathy - Supportive care    Hyperlipidemia - No statin, appropriate at age 80    Menieres disease - Supportive care    Scoliosis / Rheumatoid arthritis - Not on suppresive medications    Hx of completed stroke - Check PT needs. Does not do ADLs at home       Subjective:     Chief Complaint:  Breathing is a bit better. Weak and fatigued    ROS:  (bold if positive, if negative)    Tolerating some PT  Tolerating Diet        Objective:     Last 24hrs VS reviewed since prior progress note.  Most recent are:    Visit Vitals  /71 (BP 1 Location: Left arm, BP Patient Position: At rest)   Pulse 76   Temp 98.1 °F (36.7 °C)   Resp 18   Ht 5' 2\" (1.575 m)   Wt 44.5 kg (98 lb 1.7 oz)   SpO2 96%   BMI 17.94 kg/m²     SpO2 Readings from Last 6 Encounters:   20 96%   20 93% 11/17/20 98%   07/06/20 99%   02/22/20 99%   12/27/19 100%            Intake/Output Summary (Last 24 hours) at 12/19/2020 1607  Last data filed at 12/19/2020 1129  Gross per 24 hour   Intake 780 ml   Output 1290 ml   Net -510 ml        Physical Exam:    Gen:  Frail, cachectic, in no acute distress  HEENT:  Pink conjunctivae, PERRL, hearing intact to voice, moist mucous membranes  Neck:  Supple, without masses, thyroid non-tender  Resp:  No accessory muscle use, clear breath sounds without wheezes rales or rhonchi  Card:  No murmurs, normal S1, S2 without thrills, bruits or peripheral edema  Abd:  Soft, non-tender, non-distended, normoactive bowel sounds are present, no mass  Lymph:  No cervical or inguinal adenopathy  Musc:  No cyanosis or clubbing  Skin:  No rashes or ulcers, skin turgor is good  Neuro:  Cranial nerves are grossly intact, general motor weakness, follows commands   Psych:  Good insight, oriented to person, place and time, alert    Telemetry reviewed:   normal sinus rhythm  __________________________________________________________________  Medications Reviewed: (see below)  Medications:     Current Facility-Administered Medications   Medication Dose Route Frequency    potassium chloride SR (KLOR-CON 10) tablet 40 mEq  40 mEq Oral NOW    [START ON 12/20/2020] dilTIAZem ER (CARDIZEM CD) capsule 180 mg  180 mg Oral DAILY    [START ON 12/20/2020] losartan (COZAAR) tablet 50 mg  50 mg Oral DAILY    sodium chloride (NS) flush 5-40 mL  5-40 mL IntraVENous Q8H    sodium chloride (NS) flush 5-40 mL  5-40 mL IntraVENous PRN    acetaminophen (TYLENOL) tablet 650 mg  650 mg Oral Q6H PRN    Or    acetaminophen (TYLENOL) suppository 650 mg  650 mg Rectal Q6H PRN    polyethylene glycol (MIRALAX) packet 17 g  17 g Oral DAILY PRN    ondansetron (ZOFRAN) injection 4 mg  4 mg IntraVENous Q6H PRN    furosemide (LASIX) injection 20 mg  20 mg IntraVENous BID    potassium chloride SR (KLOR-CON 10) tablet 10 mEq  10 mEq Oral BID    apixaban (ELIQUIS) tablet 2.5 mg  2.5 mg Oral BID    levothyroxine (SYNTHROID) tablet 75 mcg  75 mcg Oral ACB    pantoprazole (PROTONIX) tablet 40 mg  40 mg Oral DAILY    hydrALAZINE (APRESOLINE) 20 mg/mL injection 10 mg  10 mg IntraVENous Q6H PRN        Lab Data Reviewed: (see below)  Lab Review:     Recent Labs     12/19/20  0653 12/18/20  1122   WBC 5.7 5.2   HGB 12.3 11.8   HCT 38.7 38.1    187     Recent Labs     12/19/20  0653 12/18/20  1122    139   K 3.3* 3.1*    104   CO2 31 31   * 159*   BUN 29* 38*   CREA 1.03* 1.22*   CA 9.4 9.8   MG 2.3 2.3   ALB  --  3.2*   TBILI  --  0.4   ALT  --  38     Lab Results   Component Value Date/Time    Glucose (POC) 145 (H) 11/30/2020 11:41 PM    Glucose (POC) 99 12/27/2019 08:03 PM    Glucose (POC) 116 (H) 08/16/2019 04:44 PM    Glucose (POC) 109 (H) 08/16/2019 11:56 AM    Glucose (POC) 101 (H) 08/16/2019 06:47 AM     No results for input(s): PH, PCO2, PO2, HCO3, FIO2 in the last 72 hours. No results for input(s): INR, INREXT, INREXT in the last 72 hours. All Micro Results     None          Other pertinent lab: none    Total time spent with patient: 45 Minutes I personally rounded from 12:05 to 12:50 PM, in addition to the time spent by my partner, NP Ferdinand Gonsalez, earlier today. I personally reviewed chart, notes, data and current medications in the medical record. I have personally examined and treated the patient at bedside during this period. I personally made additional diagnoses, placed additional orders and had additional discussions.                  Care Plan discussed with: Patient, Family, Nursing Staff and >50% of time spent in counseling and coordination of care    Discussed:  Care Plan and D/C Planning    Prophylaxis:  H2B/PPI and eliquis    Disposition:   PT, OT, RN           ___________________________________________________    Attending Physician: Radha Kay MD

## 2020-12-20 VITALS
DIASTOLIC BLOOD PRESSURE: 79 MMHG | HEART RATE: 64 BPM | WEIGHT: 99.2 LBS | BODY MASS INDEX: 18.26 KG/M2 | OXYGEN SATURATION: 98 % | HEIGHT: 62 IN | TEMPERATURE: 98 F | SYSTOLIC BLOOD PRESSURE: 136 MMHG | RESPIRATION RATE: 18 BRPM

## 2020-12-20 LAB
ANION GAP SERPL CALC-SCNC: 3 MMOL/L (ref 5–15)
ATRIAL RATE: 100 BPM
BNP SERPL-MCNC: 5075 PG/ML
BUN SERPL-MCNC: 30 MG/DL (ref 6–20)
BUN/CREAT SERPL: 25 (ref 12–20)
CALCIUM SERPL-MCNC: 10 MG/DL (ref 8.5–10.1)
CALCULATED R AXIS, ECG10: -75 DEGREES
CALCULATED T AXIS, ECG11: 22 DEGREES
CHLORIDE SERPL-SCNC: 102 MMOL/L (ref 97–108)
CO2 SERPL-SCNC: 34 MMOL/L (ref 21–32)
CREAT SERPL-MCNC: 1.22 MG/DL (ref 0.55–1.02)
DIAGNOSIS, 93000: NORMAL
GLUCOSE SERPL-MCNC: 122 MG/DL (ref 65–100)
MAGNESIUM SERPL-MCNC: 2.4 MG/DL (ref 1.6–2.4)
POTASSIUM SERPL-SCNC: 3.7 MMOL/L (ref 3.5–5.1)
Q-T INTERVAL, ECG07: 384 MS
QRS DURATION, ECG06: 80 MS
QTC CALCULATION (BEZET), ECG08: 480 MS
SODIUM SERPL-SCNC: 139 MMOL/L (ref 136–145)
TSH SERPL DL<=0.05 MIU/L-ACNC: 36.3 UIU/ML (ref 0.36–3.74)
VENTRICULAR RATE, ECG03: 94 BPM

## 2020-12-20 PROCEDURE — 97161 PT EVAL LOW COMPLEX 20 MIN: CPT | Performed by: PHYSICAL THERAPIST

## 2020-12-20 PROCEDURE — 97530 THERAPEUTIC ACTIVITIES: CPT | Performed by: PHYSICAL THERAPIST

## 2020-12-20 PROCEDURE — 74011250636 HC RX REV CODE- 250/636: Performed by: SPECIALIST

## 2020-12-20 PROCEDURE — 74011250637 HC RX REV CODE- 250/637: Performed by: INTERNAL MEDICINE

## 2020-12-20 PROCEDURE — 80048 BASIC METABOLIC PNL TOTAL CA: CPT

## 2020-12-20 PROCEDURE — 83880 ASSAY OF NATRIURETIC PEPTIDE: CPT

## 2020-12-20 PROCEDURE — 74011250637 HC RX REV CODE- 250/637: Performed by: SPECIALIST

## 2020-12-20 PROCEDURE — 93005 ELECTROCARDIOGRAM TRACING: CPT

## 2020-12-20 PROCEDURE — 36415 COLL VENOUS BLD VENIPUNCTURE: CPT

## 2020-12-20 PROCEDURE — 83735 ASSAY OF MAGNESIUM: CPT

## 2020-12-20 PROCEDURE — 74011250637 HC RX REV CODE- 250/637: Performed by: NURSE PRACTITIONER

## 2020-12-20 PROCEDURE — 99232 SBSQ HOSP IP/OBS MODERATE 35: CPT | Performed by: SPECIALIST

## 2020-12-20 PROCEDURE — 84443 ASSAY THYROID STIM HORMONE: CPT

## 2020-12-20 PROCEDURE — 97116 GAIT TRAINING THERAPY: CPT | Performed by: PHYSICAL THERAPIST

## 2020-12-20 RX ORDER — DIGOXIN 0.25 MG/ML
0.25 INJECTION INTRAMUSCULAR; INTRAVENOUS ONCE
Status: COMPLETED | OUTPATIENT
Start: 2020-12-20 | End: 2020-12-20

## 2020-12-20 RX ORDER — DILTIAZEM HYDROCHLORIDE 240 MG/1
240 CAPSULE, COATED, EXTENDED RELEASE ORAL DAILY
Qty: 30 CAP | Refills: 0 | Status: SHIPPED | OUTPATIENT
Start: 2020-12-21 | End: 2021-01-20

## 2020-12-20 RX ORDER — DIGOXIN 0.25 MG/ML
25 INJECTION INTRAMUSCULAR; INTRAVENOUS ONCE
Status: DISCONTINUED | OUTPATIENT
Start: 2020-12-20 | End: 2020-12-20

## 2020-12-20 RX ORDER — DILTIAZEM HYDROCHLORIDE 120 MG/1
240 CAPSULE, COATED, EXTENDED RELEASE ORAL DAILY
Status: DISCONTINUED | OUTPATIENT
Start: 2020-12-20 | End: 2020-12-20 | Stop reason: HOSPADM

## 2020-12-20 RX ORDER — LOSARTAN POTASSIUM 50 MG/1
50 TABLET ORAL DAILY
Qty: 30 TAB | Refills: 0 | Status: SHIPPED | OUTPATIENT
Start: 2020-12-21 | End: 2021-01-20

## 2020-12-20 RX ADMIN — ACETAMINOPHEN 650 MG: 325 TABLET ORAL at 08:40

## 2020-12-20 RX ADMIN — DIGOXIN 250 MCG: 0.25 INJECTION INTRAMUSCULAR; INTRAVENOUS at 03:34

## 2020-12-20 RX ADMIN — DILTIAZEM HYDROCHLORIDE 240 MG: 120 CAPSULE, COATED, EXTENDED RELEASE ORAL at 08:40

## 2020-12-20 RX ADMIN — POTASSIUM CHLORIDE 10 MEQ: 750 TABLET, FILM COATED, EXTENDED RELEASE ORAL at 08:40

## 2020-12-20 RX ADMIN — Medication 10 ML: at 06:25

## 2020-12-20 RX ADMIN — LEVOTHYROXINE SODIUM 75 MCG: 0.07 TABLET ORAL at 06:26

## 2020-12-20 RX ADMIN — Medication 10 ML: at 08:41

## 2020-12-20 RX ADMIN — LOSARTAN POTASSIUM 50 MG: 50 TABLET, FILM COATED ORAL at 08:40

## 2020-12-20 RX ADMIN — APIXABAN 2.5 MG: 2.5 TABLET, FILM COATED ORAL at 08:40

## 2020-12-20 RX ADMIN — PANTOPRAZOLE SODIUM 40 MG: 40 TABLET, DELAYED RELEASE ORAL at 06:24

## 2020-12-20 NOTE — PROGRESS NOTES
Disaster charting in effect. Bedside and Verbal shift change report given to Fallon Burkett RN (oncoming nurse) by Sheryl Hutchinson RN (offgoing nurse). Report included the following information SBAR, Kardex, Intake/Output, MAR, Recent Results, Med Rec Status and Cardiac Rhythm Afib toNSR.     0145 Received a call from the monitor tech that the patient's heart rate was fluctuating from 120s to 140s. Checked on patient and she was restless,  moving around in the bed with the blanket over her head. She denied chest pain or SOB. She was tearful and stated that she was hot. Vital signs obtained. Afebrile. Patient tachypneic with RR 30. EKG obtained. 0200 Left voicemail for Dr. Liberty Alcaraz regarding the above patient changes. 0215 Left a voicemail for cardiology, Dr. Nata Strong.    0230 Received call back from Dr. Nata Strong. Since patient was in Afib when he saw her yesterday he is not worried about her rhythm or rate. He did say to give 0.25 mg digoxin IV x 1.    0335 Patient continues to be in afib with HR 100s-130s and tachypneic with RR 34-36. 2L NC started due to work of breathing. Digoxin administered. Dr. Liberty Alcaraz updated via perfect serve.    0700 Bedside and Verbal shift change report given to Sheryl Hutchinson RN (oncoming nurse) by Fallon Burkett RN (offgoing nurse). Report included the following information SBAR, Kardex, Intake/Output, MAR, Recent Results, Med Rec Status and Cardiac Rhythm Afib.

## 2020-12-20 NOTE — PROGRESS NOTES
0201: Assisting Jasson RODRIGUEZ, pnt converted to A fib confirmed with EKG and states she's feeling extremely hot. VSS, temperature 97.8 orally. Cool wash cloths applied to chest and forehead    0224: Cardiology paged regarding conversion to A fib.  Awaiting a call back

## 2020-12-20 NOTE — PROGRESS NOTES
[facility]    Medical Progress Note      NAME: Delfino Gooden   :  10/12/1930  MRM:  [id number]    Date/Time of service 2020  9:35 AM          Assessment and Plan:     Diastolic CHF, acute on chronic / Dyspnea / Pleural effusions / HTN (hypertension) - POA, mild. Not hypoxic at rest.  Consulted cardiology. Symptoms improved with diuresis. Check 6 minute walk with PT eval.    Continue lasix and losartan. Hospice would be appropriate any time. Paroxysmal atrial fibrillation / Chronic anticoagulation - Went into Afib here, likely triggered by diuresis. I increased diltiazem and continued Eliquis. Hypokalemia - Repleted after giving lasix. Will DC home on PO K.    DM type 2 causing vascular disease - BG well controlled on Diabetic diet and counseling. Has not needed SSI per protocol. Not on home meds. A1c 5.8. No further testing or treatment. Hypothyroidism - TSH a bit elevated 2 weeks ago. PCP to follow and adjust as needed. Underweight - Supplements as tolerated. Hospice would be appropriate any time    GERD (gastroesophageal reflux disease) - PPI    Osteopenia - Not on home meds    Diabetic retinopathy - Supportive care    Hyperlipidemia - No statin, appropriate at age 80    Menieres disease - Supportive care    Scoliosis / Rheumatoid arthritis - Not on suppresive medications    Hx of completed stroke - Check PT needs. Does not do ADLs at home       Subjective:     Chief Complaint:  Breathing is a bit better, this AM, felt bad last night when her RVR was a its worse. Weak and fatigued. Wants to go home    ROS:  (bold if positive, if negative)    Tolerating some PT  Tolerating Diet        Objective:     Last 24hrs VS reviewed since prior progress note.  Most recent are:    Visit Vitals  BP (!) 151/91 (BP 1 Location: Left arm, BP Patient Position: At rest)   Pulse 82   Temp 97.7 °F (36.5 °C)   Resp 20   Ht 5' 2\" (1.575 m)   Wt 45 kg (99 lb 3.2 oz)   SpO2 99% Breastfeeding No   BMI 18.14 kg/m²     SpO2 Readings from Last 6 Encounters:   12/20/20 99%   12/01/20 93%   11/17/20 98%   07/06/20 99%   02/22/20 99%   12/27/19 100%    O2 Flow Rate (L/min): 2 l/min       Intake/Output Summary (Last 24 hours) at 12/20/2020 0842  Last data filed at 12/20/2020 0319  Gross per 24 hour   Intake 800 ml   Output 2275 ml   Net -1475 ml        Physical Exam:    Gen:  Frail, cachectic, in no acute distress  HEENT:  Pink conjunctivae, PERRL, hearing intact to voice, moist mucous membranes  Neck:  Supple, without masses, thyroid non-tender  Resp:  No accessory muscle use, clear breath sounds without wheezes rales or rhonchi  Card:  No murmurs, normal S1, S2 without thrills, bruits or peripheral edema  Abd:  Soft, non-tender, non-distended, normoactive bowel sounds are present, no mass  Lymph:  No cervical or inguinal adenopathy  Musc:  No cyanosis or clubbing  Skin:  No rashes or ulcers, skin turgor is good  Neuro:  Cranial nerves are grossly intact, general motor weakness, follows commands   Psych:  Good insight, oriented to person, place and time, alert    Telemetry reviewed:   AFIB  __________________________________________________________________  Medications Reviewed: (see below)  Medications:     Current Facility-Administered Medications   Medication Dose Route Frequency    dilTIAZem ER (CARDIZEM CD) capsule 240 mg  240 mg Oral DAILY    losartan (COZAAR) tablet 50 mg  50 mg Oral DAILY    sodium chloride (NS) flush 5-40 mL  5-40 mL IntraVENous Q8H    sodium chloride (NS) flush 5-40 mL  5-40 mL IntraVENous PRN    acetaminophen (TYLENOL) tablet 650 mg  650 mg Oral Q6H PRN    polyethylene glycol (MIRALAX) packet 17 g  17 g Oral DAILY PRN    ondansetron (ZOFRAN) injection 4 mg  4 mg IntraVENous Q6H PRN    potassium chloride SR (KLOR-CON 10) tablet 10 mEq  10 mEq Oral BID    apixaban (ELIQUIS) tablet 2.5 mg  2.5 mg Oral BID    levothyroxine (SYNTHROID) tablet 75 mcg  75 mcg Oral ACB    pantoprazole (PROTONIX) tablet 40 mg  40 mg Oral DAILY    hydrALAZINE (APRESOLINE) 20 mg/mL injection 10 mg  10 mg IntraVENous Q6H PRN        Lab Data Reviewed: (see below)  Lab Review:     Recent Labs     12/19/20  0653 12/18/20  1122   WBC 5.7 5.2   HGB 12.3 11.8   HCT 38.7 38.1    187     Recent Labs     12/20/20  0356 12/19/20  0653 12/18/20  1122    140 139   K 3.7 3.3* 3.1*    104 104   CO2 34* 31 31   * 124* 159*   BUN 30* 29* 38*   CREA 1.22* 1.03* 1.22*   CA 10.0 9.4 9.8   MG 2.4 2.3 2.3   ALB  --   --  3.2*   TBILI  --   --  0.4   ALT  --   --  38     Lab Results   Component Value Date/Time    Glucose (POC) 145 (H) 11/30/2020 11:41 PM    Glucose (POC) 99 12/27/2019 08:03 PM    Glucose (POC) 116 (H) 08/16/2019 04:44 PM    Glucose (POC) 109 (H) 08/16/2019 11:56 AM    Glucose (POC) 101 (H) 08/16/2019 06:47 AM     No results for input(s): PH, PCO2, PO2, HCO3, FIO2 in the last 72 hours. No results for input(s): INR, INREXT, INREXT in the last 72 hours. All Micro Results     None          Other pertinent lab: none    Total time spent with patient: 39 Minutes I personally reviewed chart, notes, data and current medications in the medical record. I have personally examined and treated the patient at bedside during this period.                    Care Plan discussed with: Patient, Family, Nursing Staff and >50% of time spent in counseling and coordination of care    Discussed:  Care Plan and D/C Planning    Prophylaxis:  H2B/PPI and eliquis    Disposition:   PT, OT, RN           ___________________________________________________    Attending Physician: Luis Troncoso MD

## 2020-12-20 NOTE — DISCHARGE INSTR - SUPPLEMENTARY INSTRUCTIONS
[facility]/ED Visit Follow-Up Instructions/Information You may have an in home follow up visit set up with [facility] or may wish to contact [facility] to set-up a visit: 
 
What are we? [facility] is an in-home urgent care service staffed with emergency trained medical teams. We come to your home in a vehicle stocked with medical supplies and technology. An ER physician is always available if needed. When? As a part of your hospital follow-up, an appointment has been/ or can be set up for us to come see you. Usually, this will be 24-72 hours after you leave the hospital or as needed. [facility] is open 7am-9pm, 7 days a week, 365 days a year, including holidays. Why? We know that you cannot always get to your doctor after being in the hospital and that your doctor is not always available when you need them. Once your workup is complete, we'll call in your prescriptions, update your family doctor, and handle billing with your insurance so you can focus on feeling better, faster without leaving home. How much? We accept most major health insurance plans, including Medicaid, Medicare, and Medicare Advantage [facility], [facility], [facility], and [employer]. We also accept: credit, debit, health savings account (HSA), health reimbursement account (HRA) and flexible spending account (FSA) payments. [facility]'s prices compare to conventional urgent care facilities, but we bring the care to you. How to reach us? Getting care is easy- use our mobile roberto ([employer]), website ([email or web address]) or call us [phone].

## 2020-12-20 NOTE — PROGRESS NOTES
CMS Note  12/20/2020    Patient received and signed their 2nd IMM letter. Patient was provided with a copy for their record.   Debra Hawk CMS

## 2020-12-20 NOTE — PROGRESS NOTES
CARDIOLOGY PROGRESS NOTE    Gordon Alberto MD,  [address], [place], [address] [facility]  Phone [phone]; Fax [phone]  Mobile [phone]   Voice Mail [phone]        2020 9:42 AM       Admit Date:           2020  Admit Diagnosis:  Acute CHF (congestive heart failure) ([address] [I50.9]; Hypokalemia [E87.6]  :          10/12/1930   MRN:          [id number]      Savanna Aponte MD       ATTENTION:   This medical record was transcribed using an electronic medical records/speech recognition system. Although proofread, it may and can contain electronic, spelling and other errors. Corrections may be executed at a later time. Please feel free to contact us for any clarifications as needed. We discussed the expected course, resolution and complications of the diagnosis(es) in detail. Medication risks, benefits, costs, interactions, and alternatives were discussed as indicated. Impression Plan/Recommendation   1. Acute CHF  2. KAREN  3. Atrial fibrillation that appears to be new/anticoagulated already on Eliquis  4. History of CVA  5. Hypertension  6. Dyslipidemia  7. Insulin resistance with last hemoglobin A1c at 6.3%  8. Hypothyroidism with last TSH in the 13 range  9. Hypokalemia will need supplementation             · Creatinine is gone up to 1.22 potassium is 3.7  · Her blood pressure is elevated this morning but in reviewing all of her numbers they are consistently around the high 120s to 140s. She they remain elevated can increase her Cozaar but would continue to follow her electrolytes and this can be done as an outpatient. · proBNP is 5000 TSH is 36  · Please follow-up with Dr. Aldo Pate her endocrinologist                 ICD-10-CM ICD-9-CM    1. Acute on chronic diastolic heart failure (HCC)  I50.33 428.33    2. Chronic atrial fibrillation (HCC)  I48.20 427.31    3. SOB (shortness of breath)  R06.02 786.05    4.  Pleural effusion, bilateral J90 511.9    5. Hypokalemia  E87.6 276.8    6. Atrial fibrillation with RVR ([facility])  I48.91 427.31        No intake/output data recorded. Last 3 Recorded Weights in this Encounter    12/18/20 1104 12/19/20 0359 12/20/20 0606   Weight: 108 lb 3.9 oz (49.1 kg) 98 lb 1.7 oz (44.5 kg) 99 lb 3.2 oz (45 kg)         12/18 1901 - 12/20 0700  In: 7458 [P.O.:1550]  Out: 6740 [Urine:2515]    SUBJECTIVE           Whitney Durán is a [address].o. female I am seeing for HFpEF, hypertension, atrial fibrillation, who was admitted with shortness of breath. It all started the day prior to admission and she is noticed progressive dyspnea at rest chest x-ray on admission showed small right and small to moderate left pleural effusions. She does appear to be in atrial fibrillation in reviewing EKGs I do not see any documentation of her having any A. fib in the past.  When she saw Dr. Erin Caruso in the office recently it was noted that she had lower extremity edema and the amlodipine was discontinued and she was started on Lasix. Her proBNP was elevated at 8637 but was down from 11,000 drawn on 30 November. I see at this time she is only taking Cardizem  mg a day but it appears that she had been on losartan as well as Norvasc in the past.  She cannot tell if she is and A. fib or having any arrhythmias. She does have some mild chest heaviness. Interval events overnight: Last night at approximately 740 she had a jump in her heart rate up to 120s to 140s. She was described as being restless no chest pain or shortness of breath. She stated she was. She was slightly tachypneic with a respiratory rate of 33. I did receive a call overnight and they said she was in A. fib but she was yesterday morning as well. Okay 0.25 digoxin IV x1. Appears as though her heart rate is better this morning. Unclear if the events last night are related to possibly sundowning. Her diltiazem was increased.     Whitney Durán reports She seems to be doing better. She has an A. fib and is in it intermittently. She looks good this morning states she has no problems milligrams respiratory close follow-up with Dr. Alis Dong. .      Current Facility-Administered Medications   Medication Dose Route Frequency    dilTIAZem ER (CARDIZEM CD) capsule 240 mg  240 mg Oral DAILY    losartan (COZAAR) tablet 50 mg  50 mg Oral DAILY    sodium chloride (NS) flush 5-40 mL  5-40 mL IntraVENous Q8H    sodium chloride (NS) flush 5-40 mL  5-40 mL IntraVENous PRN    acetaminophen (TYLENOL) tablet 650 mg  650 mg Oral Q6H PRN    polyethylene glycol (MIRALAX) packet 17 g  17 g Oral DAILY PRN    ondansetron (ZOFRAN) injection 4 mg  4 mg IntraVENous Q6H PRN    potassium chloride SR (KLOR-CON 10) tablet 10 mEq  10 mEq Oral BID    apixaban (ELIQUIS) tablet 2.5 mg  2.5 mg Oral BID    levothyroxine (SYNTHROID) tablet 75 mcg  75 mcg Oral ACB    pantoprazole (PROTONIX) tablet 40 mg  40 mg Oral DAILY    hydrALAZINE (APRESOLINE) 20 mg/mL injection 10 mg  10 mg IntraVENous Q6H PRN      OBJECTIVE               Intake/Output Summary (Last 24 hours) at 12/20/2020 0961  Last data filed at 12/20/2020 0319  Gross per 24 hour   Intake 800 ml   Output 2275 ml   Net -1475 ml       Review of Systems - History obtained from the patient AS PER  HPI        PHYSICAL EXAM        Visit Vitals  BP (!) 151/91 (BP 1 Location: Left arm, BP Patient Position: At rest) Comment: NOTIFIED NURSE   Pulse 82   Temp 97.7 °F (36.5 °C)   Resp 20   Ht 5' 2\" (1.575 m)   Wt 99 lb 3.2 oz (45 kg)   SpO2 99%   Breastfeeding No   BMI 18.14 kg/m²       Gen: Well-developed, well-nourished, in no acute distress   HEENT:  Pink conjunctivae, Hearing grossly normal.No scleral icterus or conjunctival, moist mucous membranes  Neck: Supple,No JVD, No Carotid Bruit, Thyroid- non tender No cervical lymphadenopathy  Resp: No accessory muscle use, clear to auscultation but poor effort  Card: Irregularly irregular rate control  MSK: No cyanosis or clubbing, good capillary refill  Skin: No rashes or ulcers, no bruising  Neuro:  Cranial nerves are grossly intact, moving all four extremities, no focal deficit  LE: No edema       DATA REVIEW      1) echocardiogram  12/1/2020: EF 50 to 06% grade 3 diastolic dysfunction ave AMPARO moderate to severe aortic valve regurgitation mild left regurgitation, questionable senile amyloidosis    2) cholesterol  8/15/2019: , HDL 72, LDL 88,        Laboratory and Imaging have been reviewed by me and are notable for  Recent Labs     12/18/20  1122   TROIQ <0.05     Recent Labs     12/20/20  0356 12/19/20  0653 12/18/20  1122    140 139   K 3.7 3.3* 3.1*   CO2 34* 31 31   BUN 30* 29* 38*   CREA 1.22* 1.03* 1.22*   * 124* 159*   MG 2.4 2.3 2.3   WBC  --  5.7 5.2   HGB  --  12.3 11.8   HCT  --  38.7 38.1   PLT  --  [address]

## 2020-12-20 NOTE — PROGRESS NOTES
12/20/2020     12:42 PM  CM notified pt will not require home O2. [facility] information attached on AVS.  Nursing, please call pt's DTR for pickup if cleared by Cardiology. 12:02 PM  RUR:  18%  Risk Level: []Low [x]Moderate []High  Value-based purchasing: [] Yes [x] No  Bundle patient: [] Yes [x] No   Specify:     Transition of care plan:  1. Discharge order submitted pending cardiology and PT clearance. Pt currently requiring 2L O2 which is not baseline. 2. Home with resumption of [facility] services - CM spoke with pt's DTR via p/c who expressed she wished for pt to continue with [facility] services. CM completed referral for resumption of care. 3. Outpatient follow-up. 4. Pt's family will transport.

## 2020-12-20 NOTE — PROGRESS NOTES
Shift Report:    Bedside and Verbal shift change report given to GM RN  (oncoming nurse) by Magi Durán RN  (offgoing nurse). Report included the following information SBAR, Kardex and Recent Results. .      1000-  LEFT a message on PT dept. .#[id number],   Regarding to assess  home oxygen. .    1430-  Pt is very anxious. .Waiting for the family to pick her up. .      I have reviewed discharge instructions with the patient and caregiver. The patient and caregiver verbalized understanding. 1530-  Discharged the  Pt via GEORGIE Ferris

## 2020-12-20 NOTE — DISCHARGE SUMMARY
Physician Discharge Summary     Patient ID:  Jerry Rodriguez  [id number]  80 y.o.  10/12/1930    Admit date: 12/18/2020    Discharge date of service and time: 12/20/2020    Admission Diagnoses: Acute CHF (congestive heart failure) ([facility] [I50.9]  Hypokalemia [E87.6]    Discharge Diagnoses:    Principal Diagnosis   Diastolic CHF, acute on chronic [facility]                                             Hospital Course and other diagnoses  Diastolic CHF, acute on chronic / Dyspnea / Pleural effusions / HTN (hypertension) - POA, mild. Not hypoxic at rest.  Consulted cardiology. Symptoms improved with diuresis. Check 6 minute walk with PT eval.    Continue lasix and losartan. Hospice would be appropriate any time.     Paroxysmal atrial fibrillation / Chronic anticoagulation - Went into Afib here, likely triggered by diuresis. I increased diltiazem and continued Eliquis.        Hypokalemia - Repleted after giving lasix. Will DC home on PO K.     DM type 2 causing vascular disease - BG well controlled on Diabetic diet and counseling. Has not needed SSI per protocol. Not on home meds. A1c 5.8. No further testing or treatment.     Hypothyroidism - TSH a bit elevated 2 weeks ago. PCP to follow and adjust as needed.     Underweight - Supplements as tolerated. Hospice would be appropriate any time     GERD (gastroesophageal reflux disease) - PPI     Osteopenia - Not on home meds     Diabetic retinopathy - Supportive care     Hyperlipidemia - No statin, appropriate at age 80     Menieres disease - Supportive care     Scoliosis / Rheumatoid arthritis - Not on suppresive medications     Hx of completed stroke - Check PT needs. Does not do ADLs at home     PCP: Kahlil Crouch MD    Consults: Cardiology    Significant Diagnostic Studies: See Hospital Course    Discharged home in improved condition.     Discharge Exam:  BP (!) 151/91 (BP 1 Location: Left arm, BP Patient Position: At rest)   Pulse 82   Temp 97.7 °F (36.5 °C) Resp 20   Ht 5' 2\" (1.575 m)   Wt 45 kg (99 lb 3.2 oz)   SpO2 99%   Breastfeeding No   BMI 18.14 kg/m²      Gen:  Frail, cachectic, in no acute distress  HEENT:  Pink conjunctivae, PERRL, hearing intact to voice, moist mucous membranes  Neck:  Supple, without masses, thyroid non-tender  Resp:  No accessory muscle use, clear breath sounds without wheezes rales or rhonchi  Card:  No murmurs, normal S1, S2 without thrills, bruits or peripheral edema  Abd:  Soft, non-tender, non-distended, normoactive bowel sounds are present, no mass  Lymph:  No cervical or inguinal adenopathy  Musc:  No cyanosis or clubbing  Skin:  No rashes or ulcers, skin turgor is good  Neuro:  Cranial nerves are grossly intact, general motor weakness, follows commands   Psych:  Good insight, oriented to person, place and time, alert    Patient Instructions:   Current Discharge Medication List      START taking these medications    Details   losartan (COZAAR) 50 mg tablet Take 1 Tab by mouth daily for 30 days. Qty: 30 Tab, Refills: 0         CONTINUE these medications which have CHANGED    Details   dilTIAZem ER (CARDIZEM CD) 240 mg capsule Take 1 Cap by mouth daily for 30 days. Qty: 30 Cap, Refills: 0         CONTINUE these medications which have NOT CHANGED    Details   apixaban (ELIQUIS) 2.5 mg tablet Take 1 Tab by mouth two (2) times a day. Qty: 60 Tab, Refills: 5      levothyroxine (SYNTHROID) 75 mcg tablet Take 75 mcg by mouth Daily (before breakfast). acetaminophen (TylenoL) 325 mg tablet Take 325 mg by mouth every six (6) hours as needed for Pain. furosemide (LASIX) 20 mg tablet Take 1 Tab by mouth daily. Qty: 30 Tab, Refills: 1      pantoprazole (PROTONIX) 40 mg tablet Take 1 Tab by mouth daily.   Qty: 30 Tab, Refills: 1    Associated Diagnoses: Elevated cholesterol           Activity: Activity as tolerated and PT/OT per Home Health  Diet: Cardiac Diet and Comfort feeding  Wound Care: None needed    Follow-up with your PCP and cardiology in a few weeks.   Follow-up tests/labs - none    Signed:  Christy Malave MD  12/20/2020  9:41 AM

## 2020-12-20 NOTE — DISCHARGE INSTRUCTIONS
Patient Discharge Instructions    Estelle Pittman / [id number] : 10/12/1930    Admitted 2020 Discharged: 2020     Primary Diagnoses  Problem List as of 2020 Date Reviewed: 2020           Pleural effusion   Hypokalemia   * (Principal) Diastolic CHF, acute on chronic (HCC)   Scoliosis   Rheumatoid arthritis ([facility]   Hypothyroid   Hx of completed stroke   DM type 2 causing vascular disease ([facility]   Menieres disease   Hyperlipidemia   HTN (hypertension)   GERD (gastroesophageal reflux disease)   Osteopenia   Diabetic retinopathy ([facility]   Hypothyroidism          Take Home Medications     · It is important that you take the medication exactly as they are prescribed. · Keep your medication in the bottles provided by the pharmacist and keep a list of the medication names, dosages, and times to be taken in your wallet. · Do not take other medications without consulting your doctor. What to do at Home    Recommended diet: Cardiac Diet and Comfort feeding    Recommended activity: Activity as tolerated and PT/OT Eval and Treat    If you experience worse symptoms, please follow up with cardiology. Follow-up with your PCP in a few weeks        Information obtained by :  I understand that if any problems occur once I am at home I am to contact my physician. I understand and acknowledge receipt of the instructions indicated above.                                                                                                                                            Physician's or R.N.'s Signature                                                                  Date/Time                                                                                                                                              Patient or Representative Signature                                                          Date/Time

## 2020-12-20 NOTE — PROGRESS NOTES
PHYSICAL THERAPY EVALUATION/DISCHARGE  Patient: Geraldine Corral (68 y.o. female)  Date: 12/20/2020  Primary Diagnosis: Acute CHF (congestive heart failure) ([facility] [address] [I50.9]  Hypokalemia [E87.6]        Precautions: DNR      ASSESSMENT  Based on the objective data described below, the patient presents with incontinence and received on 2L/min via nasal cannula @98%. Pt denies pain, and consistently inquire about IV discontinuation , and calling her daughter which RN stated previously. Pt alert, and responsive, asking questions appropriately. Easily redirected, and receptive of initiation of PT session to assist her in getting ready for discharge. Pt placed on RA with close monitoring of SPO2 during dynamic activity, gown change, undergarments, and linen. Pt seated in chair x 5 min while bed linens changed. SPO2 96% at rest/activity for ~ 10 min. Proceeded with ambulation using RW, completing 50 feet on RA with lowest SPO2 92%, with quick recovery to 96% seated post activity. Pt required only min A with most dynamic transfers, gait activity, and bed mobility activities. Pt very appreciative of walking, and dry linens. VSS stable throughout. Returned to supine without incidence, bed alarm reactivated. Patient is cleared for discharge today from PT standpoint per MD notification by RN. Functional Outcome Measure: The patient scored on the 25/100 outcome measure which is indicative of dependency with functional activities    Other factors to consider for discharge: Will require close supervision/assist with all activities. SPO2 RA remain within acceptable ranges. Pt scheduled for discharge today. Further skilled acute physical therapy is not indicated at this time.      PLAN :  Recommendation for discharge: (in order for the patient to meet his/her long term goals)  Physical therapy at least 2 days/week in the home may be beneficial    This discharge recommendation:  Has not yet been discussed the attending provider and/or case management    IF patient discharges home will need the following DME: patient owns DME required for discharge       SUBJECTIVE:   Patient stated Brenda Dontae are you taking this out of my arm? ( IV) How is going to call my daughter.     OBJECTIVE DATA SUMMARY:   HISTORY:    Past Medical History:   Diagnosis Date    BPPV (benign paroxysmal positional vertigo)     HTN (hypertension)     Hx of completed stroke     Hyperlipidemia     Hypothyroid     Rheumatoid arthritis ([address]     Scoliosis      Past Surgical History:   Procedure Laterality Date    HX  SECTION      HX ORTHOPAEDIC      bunions removed x 2       Prior level of function: supervision and assistance with mobility, ADL's/   Personal factors and/or comorbidities impacting plan of care: Pt will require supervision by daughter as previously    Home Situation  Home Environment: Private residence  # Steps to Enter: 5  Rails to Enter: Yes  Hand Rails : Left  Wheelchair Ramp: No  One/Two Story Residence: Two story, live on 1st floor  # of Interior Steps: 12  Interior Rails: Right  Lift Chair Available: No  Living Alone: No  Support Systems: Child(shyla)  Patient Expects to be Discharged to[de-identified] Private residence  Current DME Used/Available at Home: Walker, rolling  Tub or Shower Type: Shower    EXAMINATION/PRESENTATION/DECISION MAKING:   Critical Behavior:  Neurologic State: Alert  Orientation Level: Oriented X4  Cognition: Follows commands, Decreased attention/concentration  Safety/Judgement: Decreased awareness of need for assistance, Decreased awareness of need for safety  Hearing: Auditory  Auditory Impairment: None  Hearing Aids/Status: Other (comment)  Skin:  intact  Edema: none  Range Of Motion:   Within functional limits for age    Strength:      Within in functional limits for age     Functional Mobility:  Bed Mobility:  Rolling: Contact guard assistance  Supine to Sit: Minimum assistance  Sit to Supine: Minimum assistance  Scooting: Contact guard assistance  Transfers:  Sit to Stand: Minimum assistance;Assist x1;Additional time  Stand to Sit: Minimum assistance;Assist x1;Additional time     Balance:   Sitting: Intact  Standing: Intact; With support  Ambulation/Gait Training:  Distance (ft): 50 Feet (ft)  Assistive Device: Walker, rolling;Gait belt  Ambulation - Level of Assistance: Minimal assistance;Assist x1  Gait Description (WDL): Exceptions to WDL  Gait Abnormalities: Path deviations; Step to gait  Base of Support: Narrowed  Speed/Amber: Pace decreased (<100 feet/min)  Step Length: Left shortened;Right shortened        Functional Measure:  Barthel Index:    Bathin  Bladder: 0  Bowels: 5  Groomin  Dressin  Feedin  Mobility: 0  Stairs: 0  Toilet Use: 5  Transfer (Bed to Chair and Back): 5  Total: 25/100       The Barthel ADL Index: Guidelines  1. The index should be used as a record of what a patient does, not as a record of what a patient could do. 2. The main aim is to establish degree of independence from any help, physical or verbal, however minor and for whatever reason. 3. The need for supervision renders the patient not independent. 4. A patient's performance should be established using the best available evidence. Asking the patient, friends/relatives and nurses are the usual sources, but direct observation and common sense are also important. However direct testing is not needed. 5. Usually the patient's performance over the preceding 24-48 hours is important, but occasionally longer periods will be relevant. 6. Middle categories imply that the patient supplies over 50 per cent of the effort. 7. Use of aids to be independent is allowed. Savanna Sewell., Barthel, D.W. (9922). Functional evaluation: the Barthel Index. [address] [facility] (14)2. CODIE Izaguirre, Sundar Villalta., Priya Hadley., Agustina Martell, [address] [place] [address] ().  Measuring the change indisability after inpatient rehabilitation; comparison of the responsiveness of the Barthel Index and Functional Todd Measure. Journal of Neurology, Neurosurgery, and Psychiatry, 66(4), 148-980. MARYANN Lee, THERESA Celeste, & Aletha Carias M.A. (2004.) Assessment of post-stroke quality of life in cost-effectiveness studies: The usefulness of the Barthel Index and the EuroQoL-5D. Quality of Life Research, 15, 814-83           Physical Therapy Evaluation Charge Determination   History Examination Presentation Decision-Making   LOW Complexity : Zero comorbidities / personal factors that will impact the outcome / POC LOW Complexity : 1-2 Standardized tests and measures addressing body structure, function, activity limitation and / or participation in recreation  LOW Complexity : Stable, uncomplicated  HIGH Complexity : FOTO score of 1- 25       Based on the above components, the patient evaluation is determined to be of the following complexity level: HIGH     Pain Rating:  Denies pain    Activity Tolerance:   Fair, tolerates ADLs without rest breaks, and SpO2 stable on RA      After treatment patient left in no apparent distress:   Supine in bed, Call bell within reach, Bed / chair alarm activated, and Side rails x 3    COMMUNICATION/EDUCATION:   The patients plan of care was discussed with: Registered nurse. Fall prevention education was provided and the patient/caregiver indicated understanding., Patient/family have participated as able in goal setting and plan of care. , and Patient/family agree to work toward stated goals and plan of care.     Thank you for this referral.  Tyson Rolle, PT   Time Calculation: 40 mins

## 2020-12-21 ENCOUNTER — PATIENT OUTREACH (OUTPATIENT)
Dept: CASE MANAGEMENT | Age: 85
End: 2020-12-21

## 2020-12-21 NOTE — PROGRESS NOTES
Patient contacted regarding recent discharge and COVID-19 risk. Discussed COVID-19 related testing which was available at this time. Test results were negative. Patient informed of results, if available? yes    Outreach made within 2 business days of discharge: Yes    Care Transition Nurse/ Ambulatory Care Manager/ LPN Care Coordinator contacted the family by telephone to perform post discharge assessment. Verified name and  with family as identifiers. Patient has following risk factors of: heart failure. CTN/ACM/LPN reviewed discharge instructions, medical action plan and red flags related to discharge diagnosis. Reviewed and educated them on any new and changed medications related to discharge diagnosis. Advised obtaining a 90-day supply of all daily and as-needed medications. Advance Care Planning:   Does patient have an Advance Directive: DDNR made 2020    Education provided regarding infection prevention, and signs and symptoms of COVID-19 and when to seek medical attention with family who verbalized understanding. Discussed exposure protocols and quarantine from [address] you at higher risk for severe illness  and given an opportunity for questions and concerns. The family agrees to contact the COVID-19 hotline [phone] or PCP office for questions related to their healthcare. CTN/ACM/LPN provided contact information for future reference. From CDC: Are you at higher risk for severe illness?  Wash your hands often.  Avoid close contact (6 feet, which is about two arm lengths) with people who are sick.  Put distance between yourself and other people if COVID-19 is spreading in your community.  Clean and disinfect frequently touched surfaces.  Avoid all cruise travel and non-essential air travel.  Call your healthcare professional if you have concerns about COVID-19 and your underlying condition or if you are sick.     For more information on steps you can take to protect yourself, see CDC's How to Protect Yourself      Patient/family/caregiver given information for GetWell Loop and agrees to enroll yes  Patient's preferred e-mail:  [email or web address]  Patient's preferred phone number: [phone]  Based on Loop alert triggers, patient will be contacted by nurse care manager for worsening symptoms. Pt will be further monitored by COVID Loop Team based on severity of symptoms and risk factors.

## 2020-12-22 LAB
ATRIAL RATE: 141 BPM
CALCULATED R AXIS, ECG10: -43 DEGREES
CALCULATED T AXIS, ECG11: 28 DEGREES
DIAGNOSIS, 93000: NORMAL
Q-T INTERVAL, ECG07: 272 MS
QRS DURATION, ECG06: 84 MS
QTC CALCULATION (BEZET), ECG08: 350 MS
VENTRICULAR RATE, ECG03: 100 BPM

## 2021-01-12 ENCOUNTER — PATIENT OUTREACH (OUTPATIENT)
Dept: CASE MANAGEMENT | Age: 86
End: 2021-01-12

## 2021-01-13 NOTE — PROGRESS NOTES
Patient was admitted to [facility] on 12/18 and discharged on 12/20 for CHF. Outreach made within 2 business days of discharge: Yes    Top Discharge Challenges to be reviewed by the provider   Additional needs identified to be addressed with provider yes    Seen by Palliative Medicaine, patient completed ACP, wishes to be DNR, however declines hospice and wishes to prolong life at this time. Discussed COVID-19 related testing which was available at this time. Test results were negative. Patient informed of results, if available? yes   Method of communication with provider : chart routing       Advance Care Planning:   Does patient have an Advance Directive:  yes; reviewed and current     Inpatient Readmission Risk score: 80  Was this a readmission? no     Patients top risk factors for readmission: medical condition  Interventions to address risk factors: Unable to reach patient/daughter to discuss---will do this at next call    Care Transition Nurse (CTN) was unable to contacted the family by telephone to perform transition to bundle  assessment. CTN was able to reach patient after discharge for COVID TLUIO Episode. See note from CTN 12/21/20      Medication reconciliation was not performed with family, CTN will do this at next call. Home Health/Outpatient orders at discharge: home health care  [address] [place] [address] Way: [facility]  Date of initial visit:     Covid Risk Education was performed on 12/21/20. Patient has following risk factors of: heart failure and diabetes. Education provided regarding infection prevention, and signs and symptoms of COVID-19 and when to seek medical attention with family who verbalized understanding. Discussed exposure protocols and quarantine From CDC: Are you at higher risk for severe illness?  and given an opportunity for questions and concerns. The family agrees to contact the COVID-19 hotline [phone] or PCP office for questions related to COVID-19. For more information on steps you can take to protect yourself, see CDC's How to Protect Yourself     Discussed follow-up appointments. If no appointment was previously scheduled, appointment scheduling offered: unable to reach patient/family on phone. [facility] follow up appointment(s):   Future Appointments   Date Time Provider Issac Ram   2/2/2021  1:20 PM Arthur BERGER, DO [facility]       Plan for follow-up call in 5-7 days based on severity of symptoms and risk factors. CTN provided contact information for future needs. Goals      Prevent complications post hospitalization. 01/13/21  CTN unable to reach patient or daughter on phone yesterday and today, left messages requesting return call. CTN will follow up next week if no return call received. ---ivan    01/14/21  CTN unable to reach daughter again today on phone, left second message. CTN will call again next week. CTN reached out to [facility], spoke to [place]. Patient is currently still working with them for nursing and PT, [place] reports services resumed on 12.23 after discharge from [facility]. She has no other contact number listed for patient or daughter.  Daniel, there have been no significant concerns documented---ivan

## 2021-01-21 ENCOUNTER — PATIENT OUTREACH (OUTPATIENT)
Dept: CASE MANAGEMENT | Age: 86
End: 2021-01-21

## 2021-01-21 NOTE — PROGRESS NOTES
Goals      Prevent complications post hospitalization. 01/13/21  CTN unable to reach patient or daughter on phone yesterday and today, left messages requesting return call. CTN will follow up next week if no return call received. ---mkrw    01/14/21  CTN unable to reach daughter again today on phone, left second message. CTN will call again next week. CTN reached out to [facility], spoke to Mackenzie Khanna. Patient is currently still working with them for nursing and PT, Mackenzie Khanna reports services resumed on 12.23 after discharge from [facility]. She has no other contact number listed for patient or daughter. Daniel, there have been no significant concerns documented---mkrw    01/21/21  CTN unable to reach patient/daughter on phone, LM on  requesting return call.   CTN will reach out again in 7--10 days---ivan

## 2021-01-26 RX ORDER — FUROSEMIDE 20 MG/1
TABLET ORAL
Qty: 30 TAB | Refills: 0 | Status: SHIPPED | OUTPATIENT
Start: 2021-01-26 | End: 2021-06-09

## 2021-01-31 ENCOUNTER — HOSPITAL ENCOUNTER (EMERGENCY)
Age: 86
Discharge: HOME | End: 2021-01-31
Attending: EMERGENCY MEDICINE
Payer: MEDICARE

## 2021-01-31 ENCOUNTER — APPOINTMENT (OUTPATIENT)
Dept: GENERAL RADIOLOGY | Age: 86
End: 2021-01-31
Attending: EMERGENCY MEDICINE
Payer: MEDICARE

## 2021-01-31 VITALS
TEMPERATURE: 97.5 F | BODY MASS INDEX: 18.22 KG/M2 | WEIGHT: 99 LBS | HEART RATE: 97 BPM | DIASTOLIC BLOOD PRESSURE: 61 MMHG | HEIGHT: 62 IN | RESPIRATION RATE: 15 BRPM | SYSTOLIC BLOOD PRESSURE: 148 MMHG | OXYGEN SATURATION: 99 %

## 2021-01-31 DIAGNOSIS — M17.12 OSTEOARTHRITIS OF LEFT KNEE, UNSPECIFIED OSTEOARTHRITIS TYPE: ICD-10-CM

## 2021-01-31 DIAGNOSIS — R60.0 BILATERAL LOWER EXTREMITY EDEMA: Primary | ICD-10-CM

## 2021-01-31 LAB
ALBUMIN SERPL-MCNC: 3.3 G/DL (ref 3.5–5)
ALBUMIN/GLOB SERPL: 0.8 {RATIO} (ref 1.1–2.2)
ALP SERPL-CCNC: 116 U/L (ref 45–117)
ALT SERPL-CCNC: 26 U/L (ref 12–78)
ANION GAP SERPL CALC-SCNC: 4 MMOL/L (ref 5–15)
APPEARANCE UR: CLEAR
AST SERPL-CCNC: 28 U/L (ref 15–37)
BACTERIA URNS QL MICRO: NEGATIVE /HPF
BASOPHILS # BLD: 0 K/UL (ref 0–0.1)
BASOPHILS NFR BLD: 0 % (ref 0–1)
BILIRUB SERPL-MCNC: 0.4 MG/DL (ref 0.2–1)
BILIRUB UR QL: NEGATIVE
BNP SERPL-MCNC: 3879 PG/ML
BUN SERPL-MCNC: 39 MG/DL (ref 6–20)
BUN/CREAT SERPL: 38 (ref 12–20)
CALCIUM SERPL-MCNC: 9.3 MG/DL (ref 8.5–10.1)
CHLORIDE SERPL-SCNC: 105 MMOL/L (ref 97–108)
CO2 SERPL-SCNC: 28 MMOL/L (ref 21–32)
COLOR UR: NORMAL
CREAT SERPL-MCNC: 1.03 MG/DL (ref 0.55–1.02)
DIFFERENTIAL METHOD BLD: ABNORMAL
EOSINOPHIL # BLD: 0 K/UL (ref 0–0.4)
EOSINOPHIL NFR BLD: 1 % (ref 0–7)
EPITH CASTS URNS QL MICRO: NORMAL /LPF
ERYTHROCYTE [DISTWIDTH] IN BLOOD BY AUTOMATED COUNT: 13.4 % (ref 11.5–14.5)
GLOBULIN SER CALC-MCNC: 4.3 G/DL (ref 2–4)
GLUCOSE SERPL-MCNC: 99 MG/DL (ref 65–100)
GLUCOSE UR STRIP.AUTO-MCNC: NEGATIVE MG/DL
HCT VFR BLD AUTO: 29.3 % (ref 35–47)
HGB BLD-MCNC: 9.2 G/DL (ref 11.5–16)
HGB UR QL STRIP: NEGATIVE
HYALINE CASTS URNS QL MICRO: NORMAL /LPF (ref 0–5)
IMM GRANULOCYTES # BLD AUTO: 0 K/UL (ref 0–0.04)
IMM GRANULOCYTES NFR BLD AUTO: 0 % (ref 0–0.5)
KETONES UR QL STRIP.AUTO: NEGATIVE MG/DL
LEUKOCYTE ESTERASE UR QL STRIP.AUTO: NEGATIVE
LYMPHOCYTES # BLD: 1.4 K/UL (ref 0.8–3.5)
LYMPHOCYTES NFR BLD: 28 % (ref 12–49)
MCH RBC QN AUTO: 26.8 PG (ref 26–34)
MCHC RBC AUTO-ENTMCNC: 31.4 G/DL (ref 30–36.5)
MCV RBC AUTO: 85.4 FL (ref 80–99)
MONOCYTES # BLD: 0.4 K/UL (ref 0–1)
MONOCYTES NFR BLD: 9 % (ref 5–13)
NEUTS SEG # BLD: 3.1 K/UL (ref 1.8–8)
NEUTS SEG NFR BLD: 62 % (ref 32–75)
NITRITE UR QL STRIP.AUTO: NEGATIVE
NRBC # BLD: 0 K/UL (ref 0–0.01)
NRBC BLD-RTO: 0 PER 100 WBC
PH UR STRIP: 6.5 [PH] (ref 5–8)
PLATELET # BLD AUTO: 167 K/UL (ref 150–400)
PMV BLD AUTO: 10.2 FL (ref 8.9–12.9)
POTASSIUM SERPL-SCNC: 3.7 MMOL/L (ref 3.5–5.1)
PROT SERPL-MCNC: 7.6 G/DL (ref 6.4–8.2)
PROT UR STRIP-MCNC: NEGATIVE MG/DL
RBC # BLD AUTO: 3.43 M/UL (ref 3.8–5.2)
RBC #/AREA URNS HPF: NORMAL /HPF (ref 0–5)
SODIUM SERPL-SCNC: 137 MMOL/L (ref 136–145)
SP GR UR REFRACTOMETRY: 1.01 (ref 1–1.03)
UA: UC IF INDICATED,UAUC: NORMAL
UROBILINOGEN UR QL STRIP.AUTO: 0.2 EU/DL (ref 0.2–1)
WBC # BLD AUTO: 4.9 K/UL (ref 3.6–11)
WBC URNS QL MICRO: NORMAL /HPF (ref 0–4)

## 2021-01-31 PROCEDURE — 80053 COMPREHEN METABOLIC PANEL: CPT

## 2021-01-31 PROCEDURE — 83880 ASSAY OF NATRIURETIC PEPTIDE: CPT

## 2021-01-31 PROCEDURE — 73562 X-RAY EXAM OF KNEE 3: CPT

## 2021-01-31 PROCEDURE — 74011250637 HC RX REV CODE- 250/637: Performed by: EMERGENCY MEDICINE

## 2021-01-31 PROCEDURE — 85025 COMPLETE CBC W/AUTO DIFF WBC: CPT

## 2021-01-31 PROCEDURE — 99284 EMERGENCY DEPT VISIT MOD MDM: CPT

## 2021-01-31 PROCEDURE — 81001 URINALYSIS AUTO W/SCOPE: CPT

## 2021-01-31 PROCEDURE — 36415 COLL VENOUS BLD VENIPUNCTURE: CPT

## 2021-01-31 RX ORDER — ACETAMINOPHEN 325 MG/1
650 TABLET ORAL ONCE
Status: COMPLETED | OUTPATIENT
Start: 2021-01-31 | End: 2021-01-31

## 2021-01-31 RX ORDER — FUROSEMIDE 40 MG/1
20 TABLET ORAL
Status: COMPLETED | OUTPATIENT
Start: 2021-01-31 | End: 2021-01-31

## 2021-01-31 RX ADMIN — FUROSEMIDE 20 MG: 40 TABLET ORAL at 17:03

## 2021-01-31 RX ADMIN — ACETAMINOPHEN 650 MG: 325 TABLET ORAL at 17:03

## 2021-01-31 NOTE — ED PROVIDER NOTES
Date: 2021  Patient Name: Katrina Pate    History of Presenting Illness     Chief Complaint   Patient presents with    Leg Swelling    Knee Pain       History Provided By: Patient's daughter    HPI: Katrina Pate, 80 y.o. female with a past medical history of hypertension, hyperlipidemia and CHF presents to the ED with cc of lower extremity edema and L knee pain that began yesterday evening. Pt has a hx of CHF and was admitted to the hospital 1 month ago for CHF exacerbation. She is on Lasix and family states she has been compliant with her lasix. She is also on Eliquis for afib. Daughter states that she was concerned because the patient developed pain behind her L knee and she was worried about a blood clot. Pt has not had any trauma. She denies any CP or SOB. There are no other complaints, changes, or physical findings at this time. PCP: Harvie Severin, MD    No current facility-administered medications on file prior to encounter. Current Outpatient Medications on File Prior to Encounter   Medication Sig Dispense Refill    furosemide (LASIX) 20 mg tablet Take 1 tablet by mouth once daily 30 Tab 0    apixaban (ELIQUIS) 2.5 mg tablet Take 1 Tab by mouth two (2) times a day. 60 Tab 5    levothyroxine (SYNTHROID) 75 mcg tablet Take 75 mcg by mouth Daily (before breakfast).  acetaminophen (TylenoL) 325 mg tablet Take 325 mg by mouth every six (6) hours as needed for Pain.  pantoprazole (PROTONIX) 40 mg tablet Take 1 Tab by mouth daily.  30 Tab 1       Past History     Past Medical History:  Past Medical History:   Diagnosis Date    BPPV (benign paroxysmal positional vertigo)     HTN (hypertension)     Hx of completed stroke     Hyperlipidemia     Hypothyroid     Rheumatoid arthritis ([facility] [address]     Scoliosis        Past Surgical History:  Past Surgical History:   Procedure Laterality Date    HX  SECTION      HX ORTHOPAEDIC      bunions removed x 2       Family History:  Family History   Problem Relation Age of Onset    Stroke Father        Social History:  Social History     Tobacco Use    Smoking status: Never Smoker    Smokeless tobacco: Never Used   Substance Use Topics    Alcohol use: No    Drug use: No       Allergies: Allergies   Allergen Reactions    Shellfish Containing Products Swelling         Review of Systems   Review of Systems   Constitutional: Negative for fatigue and fever. HENT: Negative. Eyes: Negative. Respiratory: Negative for shortness of breath and wheezing. Cardiovascular: Positive for leg swelling. Negative for chest pain. Gastrointestinal: Negative for abdominal pain, blood in stool, constipation, diarrhea, nausea and vomiting. Endocrine: Negative. Genitourinary: Negative for difficulty urinating and dysuria. Musculoskeletal: Positive for arthralgias. Skin: Negative for rash. Allergic/Immunologic: Negative. Neurological: Negative for weakness and numbness. Hematological: Negative. Psychiatric/Behavioral: Negative. Physical Exam   Physical Exam  Vitals signs and nursing note reviewed. Constitutional:       General: She is not in acute distress. Appearance: She is well-developed. HENT:      Head: Normocephalic and atraumatic. Eyes:      Conjunctiva/sclera: Conjunctivae normal.   Neck:      Musculoskeletal: Neck supple. Vascular: No JVD. Trachea: No tracheal deviation. Cardiovascular:      Rate and Rhythm: Normal rate and regular rhythm. Heart sounds: No murmur. No friction rub. No gallop. Pulmonary:      Effort: Pulmonary effort is normal. No respiratory distress. Breath sounds: Normal breath sounds. No stridor. No wheezing. Abdominal:      General: Bowel sounds are normal. There is no distension. Palpations: Abdomen is soft. There is no mass. Tenderness: There is no abdominal tenderness. There is no guarding. Musculoskeletal: Normal range of motion. General: No tenderness. Comments: No deformity. Mild swelling of the L medial knee, no erythema or warmth to palpation. +3 edema in bilateral LE. Mild ttp of the posterior knee. Skin:     General: Skin is warm and dry. Findings: No rash. Neurological:      Mental Status: She is alert and oriented to person, place, and time. Comments: No focal deficits   Psychiatric:         Behavior: Behavior normal.         Thought Content: Thought content normal.         Judgment: Judgment normal.         Diagnostic Study Results     Labs -     Recent Results (from the past 24 hour(s))   METABOLIC PANEL, COMPREHENSIVE    Collection Time: 01/31/21  2:49 PM   Result Value Ref Range    Sodium 137 136 - 145 mmol/L    Potassium 3.7 3.5 - 5.1 mmol/L    Chloride 105 97 - 108 mmol/L    CO2 28 21 - 32 mmol/L    Anion gap 4 (L) 5 - 15 mmol/L    Glucose 99 65 - 100 mg/dL    BUN 39 (H) 6 - 20 MG/DL    Creatinine 1.03 (H) 0.55 - 1.02 MG/DL    BUN/Creatinine ratio 38 (H) 12 - 20      GFR est AA >60 >60 ml/min/1.73m2    GFR est non-AA 50 (L) >60 ml/min/1.73m2    Calcium 9.3 8.5 - 10.1 MG/DL    Bilirubin, total 0.4 0.2 - 1.0 MG/DL    ALT (SGPT) 26 12 - 78 U/L    AST (SGOT) 28 15 - 37 U/L    Alk.  phosphatase 116 45 - 117 U/L    Protein, total 7.6 6.4 - 8.2 g/dL    Albumin 3.3 (L) 3.5 - 5.0 g/dL    Globulin 4.3 (H) 2.0 - 4.0 g/dL    A-G Ratio 0.8 (L) 1.1 - 2.2     CBC WITH AUTOMATED DIFF    Collection Time: 01/31/21  2:49 PM   Result Value Ref Range    WBC 4.9 3.6 - 11.0 K/uL    RBC 3.43 (L) 3.80 - 5.20 M/uL    HGB 9.2 (L) 11.5 - 16.0 g/dL    HCT 29.3 (L) 35.0 - 47.0 %    MCV 85.4 80.0 - 99.0 FL    MCH 26.8 26.0 - 34.0 PG    MCHC 31.4 30.0 - 36.5 g/dL    RDW 13.4 11.5 - 14.5 %    PLATELET 086 496 - 509 K/uL    MPV 10.2 8.9 - 12.9 FL    NRBC 0.0 0  WBC    ABSOLUTE NRBC 0.00 0.00 - 0.01 K/uL    NEUTROPHILS 62 32 - 75 %    LYMPHOCYTES 28 12 - 49 %    MONOCYTES 9 5 - 13 %    EOSINOPHILS 1 0 - 7 %    BASOPHILS 0 0 - 1 % IMMATURE GRANULOCYTES 0 0.0 - 0.5 %    ABS. NEUTROPHILS 3.1 1.8 - 8.0 K/UL    ABS. LYMPHOCYTES 1.4 0.8 - 3.5 K/UL    ABS. MONOCYTES 0.4 0.0 - 1.0 K/UL    ABS. EOSINOPHILS 0.0 0.0 - 0.4 K/UL    ABS. BASOPHILS 0.0 0.0 - 0.1 K/UL    ABS. IMM. GRANS. 0.0 0.00 - 0.04 K/UL    DF AUTOMATED     NT-PRO BNP    Collection Time: 01/31/21  2:49 PM   Result Value Ref Range    NT pro-BNP 3,879 (H) <450 PG/ML   URINALYSIS W/ REFLEX CULTURE    Collection Time: 01/31/21  4:04 PM    Specimen: Urine   Result Value Ref Range    Color YELLOW/STRAW      Appearance CLEAR CLEAR      Specific gravity 1.012 1.003 - 1.030      pH (UA) 6.5 5.0 - 8.0      Protein Negative NEG mg/dL    Glucose Negative NEG mg/dL    Ketone Negative NEG mg/dL    Bilirubin Negative NEG      Blood Negative NEG      Urobilinogen 0.2 0.2 - 1.0 EU/dL    Nitrites Negative NEG      Leukocyte Esterase Negative NEG      WBC 0-4 0 - 4 /hpf    RBC 0-5 0 - 5 /hpf    Epithelial cells FEW FEW /lpf    Bacteria Negative NEG /hpf    UA:UC IF INDICATED CULTURE NOT INDICATED BY UA RESULT CNI      Hyaline cast 0-2 0 - 5 /lpf       Radiologic Studies -   XR KNEE LT 3 V   Final Result      No acute abnormality. Mild to moderate osteoarthritis with chondrocalcinosis. Small knee joint effusion                   CT Results  (Last 48 hours)    None        CXR Results  (Last 48 hours)    None          Medical Decision Making   I am the first provider for this patient. I reviewed the vital signs, available nursing notes, past medical history, past surgical history, family history and social history. Vital Signs-Reviewed the patient's vital signs. Patient Vitals for the past 12 hrs:   Temp Pulse Resp BP SpO2   01/31/21 1425 97.5 °F (36.4 °C) 97 15 (!) 111/58 98 %         Records Reviewed: Nursing Notes and Old Medical Records    Provider Notes (Medical Decision Making):   Pt is a 79 yo F with a hx of CHF presenting with bilateral LE and L knee pain.  Daughter was concerned that patient could have a blood clot in her leg, but patient is already on Eliquis and her lower extremity swelling is bilateral. No hx of trauma. Will get xray of the L knee to eval for arthritis. Will get labs to eval for metabolic abnormality, anemia, CHF exacerbation, hypoalbuminemia as source of swelling. ED Course:   Initial assessment performed. The patients presenting problems have been discussed, and they are in agreement with the care plan formulated and outlined with them. I have encouraged them to ask questions as they arise throughout their visit. ED Course as of Jan 31 2201   Christopher Mcclain Jan 31, 2021   1655 Updated patient on her lab and imaging results. She denies any SOB. Will treat with an additional dose of her home lasix as well as some pain medication for her knee. [CK]      ED Course User Index  [CK] Siobhan Lu DO             Disposition:  Discharge    DISCHARGE PLAN:  1. Discharge Medication List as of 1/31/2021  5:47 PM        2. Follow-up Information     Follow up With Specialties Details Why Contact Info    Hortensia Sebastian MD Internal Medicine Schedule an appointment as soon as possible for a visit in 1 day  [facility] [address] [facility] [address] 79      Your cardiologist  Call in 1 day      [facility] EMERGENCY DEPT Emergency Medicine  As needed, If symptoms worsen [address] [facility]  [phone]        3. Return to ED if worse     Diagnosis     Clinical Impression:   1. Bilateral lower extremity edema    2. Osteoarthritis of left knee, unspecified osteoarthritis type        Attestations:    Donna Song DO    Please note that this dictation was completed with Puf[employer], the computer voice recognition software. Quite often unanticipated grammatical, syntax, homophones, and other interpretive errors are inadvertently transcribed by the computer software. Please disregard these errors.   Please excuse any errors that have escaped final proofreading. Thank you.

## 2021-01-31 NOTE — ED TRIAGE NOTES
Pt here for swelling to legs. Pain worse on left with swelling behind left knee. Hx of dvts and CHF. Takes lasix.  Reports going to bathroom constantly and reports excess thirst.

## 2021-01-31 NOTE — DISCHARGE INSTRUCTIONS
You were seen in the ER today for leg swelling and left knee pain. You are not likely to have a blood clot in your leg because you are already on blood thinners, and they prevent you from getting blood clots. Your xray of your knee showed that you had arthritis of the left knee which is likely the cause of your pain and swelling. Your legs are swollen because of fluid, so can take an extra dose of your lasix tomorrow. Call your cardiologist tomorrow to see if your fluid pills need to be further adjusted. You can take tylenol for your knee pain and use ice to help with the swelling.

## 2021-02-01 ENCOUNTER — PATIENT OUTREACH (OUTPATIENT)
Dept: CASE MANAGEMENT | Age: 86
End: 2021-02-01

## 2021-02-02 ENCOUNTER — OFFICE VISIT (OUTPATIENT)
Dept: CARDIOLOGY CLINIC | Age: 86
End: 2021-02-02
Payer: MEDICARE

## 2021-02-02 VITALS
HEART RATE: 72 BPM | BODY MASS INDEX: 18.95 KG/M2 | SYSTOLIC BLOOD PRESSURE: 122 MMHG | OXYGEN SATURATION: 94 % | DIASTOLIC BLOOD PRESSURE: 70 MMHG | WEIGHT: 103 LBS | HEIGHT: 62 IN

## 2021-02-02 DIAGNOSIS — I42.8 INFILTRATIVE CARDIOMYOPATHY (HCC): Primary | ICD-10-CM

## 2021-02-02 DIAGNOSIS — I87.8 VENOUS STASIS: ICD-10-CM

## 2021-02-02 DIAGNOSIS — I50.33 DIASTOLIC CHF, ACUTE ON CHRONIC (HCC): ICD-10-CM

## 2021-02-02 DIAGNOSIS — I48.0 PAROXYSMAL ATRIAL FIBRILLATION (HCC): ICD-10-CM

## 2021-02-02 DIAGNOSIS — I10 ESSENTIAL HYPERTENSION: ICD-10-CM

## 2021-02-02 PROCEDURE — 99214 OFFICE O/P EST MOD 30 MIN: CPT | Performed by: STUDENT IN AN ORGANIZED HEALTH CARE EDUCATION/TRAINING PROGRAM

## 2021-02-02 PROCEDURE — G0463 HOSPITAL OUTPT CLINIC VISIT: HCPCS | Performed by: STUDENT IN AN ORGANIZED HEALTH CARE EDUCATION/TRAINING PROGRAM

## 2021-02-02 RX ORDER — DILTIAZEM HYDROCHLORIDE 240 MG/1
240 CAPSULE, EXTENDED RELEASE ORAL DAILY
COMMUNITY
End: 2021-03-24

## 2021-02-02 RX ORDER — LOSARTAN POTASSIUM 50 MG/1
50 TABLET ORAL DAILY
COMMUNITY

## 2021-02-02 NOTE — PROGRESS NOTES
[facility] [address], [address] [facility], [address] [place] [address]    Office [phone],NRL [phone]           Katrina Pate is a 80 y.o. female presents the office for follow-up evaluation    Assessment/Recommendations:    ICD-10-CM ICD-9-CM    1. Infiltrative cardiomyopathy (HCC)  I42.9 425.4 SPEP AND VAUGHN, SERUM      PE(RFX VAUGHN), RANDOM UR   2. Venous stasis  I87.8 459.81    3. Paroxysmal atrial fibrillation (HCC)  I48.0 427.31    4. Essential hypertension  I10 401.9    5. Diastolic CHF, acute on chronic (HCC)  I50.33 428.33      428.0      Infiltrative cardiomyopathy, diastolic heart failure-recent echocardiogram concerning for infiltrative cardiomyopathy. We will go forth with SPEP UPEP with immunofixation. Continue Lasix 20 mg daily. Long-term may need hematology evaluation with PYP testing. Given advanced age and poor functional capacity, unsure how aggressive in diagnosis we should be. Paroxysmal atrial fibrillation-AF likely related to restrictive cardiomyopathy. Continue dose reduced Eliquis 2.5 mg daily due to age and body weight. Continue diltiazem 240 mg daily    Hypertension-stable on current regimen    Bilateral lower extremity edema-resolved in the a.m., progressive throughout the day. Suspect significant component of venous stasis, recommend elevation and compression stockings when tolerating    History of DVT      Primary Care Physician- Harvie Severin, MD    Follow-up 3 months        Subjective:  80 y.o. presents to the office for follow-up evaluation. She presents with her daughter for evaluation. She was admitted at the end of December for shortness of breath. Found to have DVT and echocardiogram with evidence of infiltrative cardiomyopathy. She continues on Eliquis therapy without adverse bleeding. She was also found to have paroxysmal atrial fibrillation during her previous hospitalization.   Doing well on diltiazem therapy. Recently evaluated in the ED for left knee pain. Past Medical History:   Diagnosis Date    BPPV (benign paroxysmal positional vertigo)     HTN (hypertension)     Hx of completed stroke     Hyperlipidemia     Hypothyroid     Rheumatoid arthritis ([address]     Scoliosis         Past Surgical History:   Procedure Laterality Date    HX  SECTION      HX ORTHOPAEDIC      bunions removed x 2         Current Outpatient Medications:     dilTIAZem ER (TIAZAC) 240 mg capsule, diltiazem  mg capsule,extended release 24 hr, Disp: , Rfl:     losartan (COZAAR) 50 mg tablet, Take 50 mg by mouth daily. , Disp: , Rfl:     furosemide (LASIX) 20 mg tablet, Take 1 tablet by mouth once daily, Disp: 30 Tab, Rfl: 0    apixaban (ELIQUIS) 2.5 mg tablet, Take 1 Tab by mouth two (2) times a day., Disp: 60 Tab, Rfl: 5    levothyroxine (SYNTHROID) 75 mcg tablet, Take 75 mcg by mouth Daily (before breakfast). , Disp: , Rfl:     acetaminophen (TylenoL) 325 mg tablet, Take 325 mg by mouth every six (6) hours as needed for Pain., Disp: , Rfl:     pantoprazole (PROTONIX) 40 mg tablet, Take 1 Tab by mouth daily. , Disp: 30 Tab, Rfl: 1    Allergies   Allergen Reactions    Shellfish Containing Products Swelling        Family History   Problem Relation Age of Onset    Stroke Father        Social History     Tobacco Use    Smoking status: Never Smoker    Smokeless tobacco: Never Used   Substance Use Topics    Alcohol use: No    Drug use: No       Review of Symptoms:  Pertinent Positive: mild orthopnea, LE edema  Pertinent Negative:no trinh, chest pain  All Other systems reviewed and are negative for a Comprehensive ROS (10+)    Physical Exam    Blood pressure 122/70, pulse 72, height 5' 2\" (1.575 m), weight 103 lb (46.7 kg), SpO2 94 %. Constitutional:  well-developed and well-nourished. No distress. Presents in wheelchair with daughter  Cortney Ramirez: Normocephalic. Eyes: No scleral icterus. Neck:  Neck supple.  No JVD present. Pulmonary/Chest: Effort normal and breath sounds normal. No respiratory distress, wheezes or rales. Cardiovascular: Normal rate, regular rhythm, S1 S2 . Exam reveals no gallop and no friction rub. No murmur heard. Left LE edema 2-3+, right 1+  Extremities:  Normal muscle tone  Abdominal:   No abnormal distension. Neurological:  Moving all extremities, cranial nerves appear grossly intact. Skin: Skin is not cold. Not diaphoretic. No erythema. Psychiatric:  Grossly normal mood and affect. Intact insight. Objective Data:    11/30/20   ECHO ADULT COMPLETE 12/01/2020 12/1/2020    Narrative · LV: Estimated LVEF is 50 - 55%. Biplane method used to measure ejection   fraction. Normal systolic function (ejection fraction normal). Small left   ventricle. Mild concentric hypertrophy. Wall motion: normal. Severe (grade   3) left ventricular diastolic dysfunction. · RV: Borderline low systolic function. · LA: Severely dilated left atrium. · RA: Moderately dilated right atrium. · AV: Aortic valve leaflet calcification present. Mild aortic valve   stenosis is present. Moderate to severe aortic valve regurgitation is   present. · MV: Mitral valve thickening. Moderate mitral annular calcification. Mild   mitral valve regurgitation is present. · Minimal late right to left shunting within pulmonary level     Study suggestive of possible infiltrative cardiomyopathy, possible senile   amyloidosis       Signed by: DO Aleksandra Mejia DO          ATTENTION:   This medical record was transcribed using an electronic medical records/speech recognition system. Although proofread, it may and can contain electronic, spelling and other errors. Corrections may be executed at a later time. Please feel free to contact us for any clarifications as needed.

## 2021-02-02 NOTE — LETTER
2/2/2021 Patient: Lotus Gabriel YOB: 1930 Date of Visit: 2/2/2021 Kodak Dove MD 
[address] Fax: [phone] Dear Kodak Dove MD, Thank you for referring Ms. Lotus Gabriel to [facility] [address] [place] [address] for evaluation. My notes for this consultation are attached. If you have questions, please do not hesitate to call me. I look forward to following your patient along with you.  
 
 
Sincerely, 
 
Gabby Hernandez, DO

## 2021-02-02 NOTE — PROGRESS NOTES
Ashu Acosta is a 80 y.o. female    Chief Complaint   Patient presents with   [facility] Follow Up    CHF       Chest pain No    SOB No    Dizziness No    Swelling patient states some swelling in her ankles and feet     Refills No    Visit Vitals  /70 (BP 1 Location: Left upper arm, BP Patient Position: Sitting)   Pulse 72   Ht 5' 2\" (1.575 m)   Wt 103 lb (46.7 kg)   SpO2 94%   BMI 18.84 kg/m²       1. Have you been to the ER, urgent care clinic since your last visit? Hospitalized since your last visit? Ed 12/18-12/20  & 1/31    2. Have you seen or consulted any other health care providers outside of the [address] [place] [address] since your last visit? Include any pap smears or colon screening.   no

## 2021-02-09 ENCOUNTER — TRANSCRIBE ORDER (OUTPATIENT)
Dept: SCHEDULING | Age: 86
End: 2021-02-09

## 2021-02-09 DIAGNOSIS — M79.661 PAIN IN RIGHT LOWER LEG: Primary | ICD-10-CM

## 2021-02-15 ENCOUNTER — PATIENT OUTREACH (OUTPATIENT)
Dept: CASE MANAGEMENT | Age: 86
End: 2021-02-15

## 2021-02-15 NOTE — PROGRESS NOTES
Patient has graduated from the Bundle program on 2/15/2021. CTN has never been able to reach patient or emergency contacts to engage in care management since discharge in December 2020. Patient/family has the ability to self-manage at this time Care management goals have been completed. Patient was not referred to the [facility] team for further management. Goals Addressed                 This Visit's Progress     COMPLETED: Prevent complications post hospitalization. 01/13/21  CTN unable to reach patient or daughter on phone yesterday and today, left messages requesting return call. CTN will follow up next week if no return call received. [facility]    01/14/21  CTN unable to reach daughter again today on phone, left second message. CTN will call again next week. CTN reached out to [facility], spoke to [place]. Patient is currently still working with them for nursing and PT, [place] reports services resumed on 12.23 after discharge from [facility]. She has no other contact number listed for patient or daughter. Per [place], there have been no significant concerns documented---mkrw    01/21/21  CTN unable to reach patient/daughter on phone, LM on  requesting return call. CTN will reach out again in 7--10 days[facility]    02/01/21  CTN unable to contact patient or daughter to follow up on ED visit yesterday for concern of DVT. LM on  requesting return call. Per chart review, patient did not have DVT and was instructed to follow up with cards for adjustment to lasix as patient has 3 plus edema to leg. CTN notes patient has appt tomorrow scheduled to see Dr Christal Christensen. VS from ED visit:  111/58, 97, 97.5, 15---weight 99lbs    CTN has not been able to reach patient or family since discharge on 1/13/21 from [facility]. CTN has not received any return calls. ---[facility]    02/15/21  CTN unable to reach patient or daughter on phone, LM on both 's requesting return call.     Per chart review, patient attended appt with Dr Christal Christensen on 2/2/21. He has ordered labs to r/o infiltrative cardiomyopathy, however patient is 79 yo and feels management would be conservative. CTN has been unable to contact patient or family since hospital discharge in [facility] made multiple attempts and have received no return call. Chart reviews indicate that daughter takes patient to follow up appts as ordered. CTN will close e[pisode at this time and will reopen if patient or daughter returns CTN call. ---mkrw              Patient has Care Transition Nurse's contact information for any further questions, concerns, or needs.   Patients upcoming visits:    Future Appointments   Date Time Provider Issac Ram   5/4/2021  3:20 PM DO SEE [facility] BS AMB

## 2021-03-03 ENCOUNTER — PATIENT OUTREACH (OUTPATIENT)
Dept: CASE MANAGEMENT | Age: 86
End: 2021-03-03

## 2021-03-21 ENCOUNTER — APPOINTMENT (OUTPATIENT)
Dept: VASCULAR SURGERY | Age: 86
DRG: 291 | End: 2021-03-21
Attending: STUDENT IN AN ORGANIZED HEALTH CARE EDUCATION/TRAINING PROGRAM
Payer: MEDICARE

## 2021-03-21 ENCOUNTER — APPOINTMENT (OUTPATIENT)
Dept: GENERAL RADIOLOGY | Age: 86
DRG: 291 | End: 2021-03-21
Attending: STUDENT IN AN ORGANIZED HEALTH CARE EDUCATION/TRAINING PROGRAM
Payer: MEDICARE

## 2021-03-21 ENCOUNTER — HOSPITAL ENCOUNTER (INPATIENT)
Age: 86
LOS: 3 days | Discharge: HOME HEALTH | DRG: 291 | End: 2021-03-24
Attending: STUDENT IN AN ORGANIZED HEALTH CARE EDUCATION/TRAINING PROGRAM | Admitting: FAMILY MEDICINE
Payer: MEDICARE

## 2021-03-21 DIAGNOSIS — D64.9 ANEMIA, UNSPECIFIED TYPE: ICD-10-CM

## 2021-03-21 DIAGNOSIS — R91.8 LUNG MASS: ICD-10-CM

## 2021-03-21 DIAGNOSIS — I48.11 LONGSTANDING PERSISTENT ATRIAL FIBRILLATION (HCC): ICD-10-CM

## 2021-03-21 DIAGNOSIS — J90 PLEURAL EFFUSION: ICD-10-CM

## 2021-03-21 DIAGNOSIS — I50.9 ACUTE ON CHRONIC CONGESTIVE HEART FAILURE, UNSPECIFIED HEART FAILURE TYPE (HCC): Primary | ICD-10-CM

## 2021-03-21 DIAGNOSIS — R60.9 EDEMA, PERIPHERAL: ICD-10-CM

## 2021-03-21 LAB
ALBUMIN SERPL-MCNC: 3 G/DL (ref 3.5–5)
ALBUMIN/GLOB SERPL: 0.7 {RATIO} (ref 1.1–2.2)
ALP SERPL-CCNC: 123 U/L (ref 45–117)
ALT SERPL-CCNC: 24 U/L (ref 12–78)
ANION GAP SERPL CALC-SCNC: 7 MMOL/L (ref 5–15)
AST SERPL-CCNC: 29 U/L (ref 15–37)
BASOPHILS # BLD: 0 K/UL (ref 0–0.1)
BASOPHILS NFR BLD: 0 % (ref 0–1)
BILIRUB SERPL-MCNC: 0.3 MG/DL (ref 0.2–1)
BNP SERPL-MCNC: 6486 PG/ML
BUN SERPL-MCNC: 40 MG/DL (ref 6–20)
BUN/CREAT SERPL: 29 (ref 12–20)
CALCIUM SERPL-MCNC: 8.8 MG/DL (ref 8.5–10.1)
CHLORIDE SERPL-SCNC: 108 MMOL/L (ref 97–108)
CO2 SERPL-SCNC: 25 MMOL/L (ref 21–32)
COMMENT, HOLDF: NORMAL
CREAT SERPL-MCNC: 1.36 MG/DL (ref 0.55–1.02)
DIFFERENTIAL METHOD BLD: ABNORMAL
EOSINOPHIL # BLD: 0 K/UL (ref 0–0.4)
EOSINOPHIL NFR BLD: 1 % (ref 0–7)
ERYTHROCYTE [DISTWIDTH] IN BLOOD BY AUTOMATED COUNT: 16.3 % (ref 11.5–14.5)
GLOBULIN SER CALC-MCNC: 4.2 G/DL (ref 2–4)
GLUCOSE SERPL-MCNC: 169 MG/DL (ref 65–100)
HCT VFR BLD AUTO: 24.6 % (ref 35–47)
HGB BLD-MCNC: 7.3 G/DL (ref 11.5–16)
IMM GRANULOCYTES # BLD AUTO: 0 K/UL (ref 0–0.04)
IMM GRANULOCYTES NFR BLD AUTO: 0 % (ref 0–0.5)
LYMPHOCYTES # BLD: 1.1 K/UL (ref 0.8–3.5)
LYMPHOCYTES NFR BLD: 18 % (ref 12–49)
MCH RBC QN AUTO: 21.9 PG (ref 26–34)
MCHC RBC AUTO-ENTMCNC: 29.7 G/DL (ref 30–36.5)
MCV RBC AUTO: 73.7 FL (ref 80–99)
MONOCYTES # BLD: 0.5 K/UL (ref 0–1)
MONOCYTES NFR BLD: 8 % (ref 5–13)
NEUTS SEG # BLD: 4.3 K/UL (ref 1.8–8)
NEUTS SEG NFR BLD: 73 % (ref 32–75)
NRBC # BLD: 0 K/UL (ref 0–0.01)
NRBC BLD-RTO: 0 PER 100 WBC
PLATELET # BLD AUTO: 185 K/UL (ref 150–400)
PMV BLD AUTO: 10.3 FL (ref 8.9–12.9)
POTASSIUM SERPL-SCNC: 3.4 MMOL/L (ref 3.5–5.1)
PROT SERPL-MCNC: 7.2 G/DL (ref 6.4–8.2)
RBC # BLD AUTO: 3.34 M/UL (ref 3.8–5.2)
RETICS # AUTO: 0.07 M/UL (ref 0.02–0.08)
RETICS/RBC NFR AUTO: 2.1 % (ref 0.7–2.1)
SAMPLES BEING HELD,HOLD: NORMAL
SODIUM SERPL-SCNC: 140 MMOL/L (ref 136–145)
TROPONIN I SERPL-MCNC: <0.05 NG/ML
WBC # BLD AUTO: 5.9 K/UL (ref 3.6–11)

## 2021-03-21 PROCEDURE — 85025 COMPLETE CBC W/AUTO DIFF WBC: CPT

## 2021-03-21 PROCEDURE — 84484 ASSAY OF TROPONIN QUANT: CPT

## 2021-03-21 PROCEDURE — 93971 EXTREMITY STUDY: CPT

## 2021-03-21 PROCEDURE — 65270000029 HC RM PRIVATE

## 2021-03-21 PROCEDURE — 93005 ELECTROCARDIOGRAM TRACING: CPT

## 2021-03-21 PROCEDURE — 94761 N-INVAS EAR/PLS OXIMETRY MLT: CPT

## 2021-03-21 PROCEDURE — 85045 AUTOMATED RETICULOCYTE COUNT: CPT

## 2021-03-21 PROCEDURE — 99285 EMERGENCY DEPT VISIT HI MDM: CPT

## 2021-03-21 PROCEDURE — 36415 COLL VENOUS BLD VENIPUNCTURE: CPT

## 2021-03-21 PROCEDURE — 83540 ASSAY OF IRON: CPT

## 2021-03-21 PROCEDURE — 82728 ASSAY OF FERRITIN: CPT

## 2021-03-21 PROCEDURE — 80053 COMPREHEN METABOLIC PANEL: CPT

## 2021-03-21 PROCEDURE — 71046 X-RAY EXAM CHEST 2 VIEWS: CPT

## 2021-03-21 PROCEDURE — 74011000250 HC RX REV CODE- 250: Performed by: STUDENT IN AN ORGANIZED HEALTH CARE EDUCATION/TRAINING PROGRAM

## 2021-03-21 PROCEDURE — 83880 ASSAY OF NATRIURETIC PEPTIDE: CPT

## 2021-03-21 PROCEDURE — 96374 THER/PROPH/DIAG INJ IV PUSH: CPT

## 2021-03-21 RX ORDER — BUMETANIDE 0.25 MG/ML
0.5 INJECTION INTRAMUSCULAR; INTRAVENOUS
Status: COMPLETED | OUTPATIENT
Start: 2021-03-21 | End: 2021-03-21

## 2021-03-21 RX ORDER — ACETAMINOPHEN 325 MG/1
650 TABLET ORAL
Status: DISCONTINUED | OUTPATIENT
Start: 2021-03-21 | End: 2021-03-24 | Stop reason: HOSPADM

## 2021-03-21 RX ORDER — ACETAMINOPHEN 650 MG/1
650 SUPPOSITORY RECTAL
Status: DISCONTINUED | OUTPATIENT
Start: 2021-03-21 | End: 2021-03-24 | Stop reason: HOSPADM

## 2021-03-21 RX ORDER — SODIUM CHLORIDE 0.9 % (FLUSH) 0.9 %
5-40 SYRINGE (ML) INJECTION AS NEEDED
Status: DISCONTINUED | OUTPATIENT
Start: 2021-03-21 | End: 2021-03-24 | Stop reason: HOSPADM

## 2021-03-21 RX ORDER — SODIUM CHLORIDE 0.9 % (FLUSH) 0.9 %
5-40 SYRINGE (ML) INJECTION EVERY 8 HOURS
Status: DISCONTINUED | OUTPATIENT
Start: 2021-03-21 | End: 2021-03-24 | Stop reason: HOSPADM

## 2021-03-21 RX ADMIN — Medication 10 ML: at 22:50

## 2021-03-21 RX ADMIN — BUMETANIDE 0.5 MG: 0.25 INJECTION INTRAMUSCULAR; INTRAVENOUS at 22:49

## 2021-03-22 ENCOUNTER — APPOINTMENT (OUTPATIENT)
Dept: GENERAL RADIOLOGY | Age: 86
DRG: 291 | End: 2021-03-22
Attending: FAMILY MEDICINE
Payer: MEDICARE

## 2021-03-22 LAB
ANION GAP SERPL CALC-SCNC: 6 MMOL/L (ref 5–15)
APPEARANCE UR: CLEAR
ATRIAL RATE: 258 BPM
ATRIAL RATE: 79 BPM
BACTERIA URNS QL MICRO: NEGATIVE /HPF
BASOPHILS # BLD: 0 K/UL (ref 0–0.1)
BASOPHILS NFR BLD: 1 % (ref 0–1)
BILIRUB UR QL: NEGATIVE
BUN SERPL-MCNC: 37 MG/DL (ref 6–20)
BUN/CREAT SERPL: 33 (ref 12–20)
CALCIUM SERPL-MCNC: 8.4 MG/DL (ref 8.5–10.1)
CALCULATED R AXIS, ECG10: -85 DEGREES
CALCULATED R AXIS, ECG10: 159 DEGREES
CALCULATED T AXIS, ECG11: 26 DEGREES
CALCULATED T AXIS, ECG11: 80 DEGREES
CHLORIDE SERPL-SCNC: 110 MMOL/L (ref 97–108)
CO2 SERPL-SCNC: 26 MMOL/L (ref 21–32)
COLOR UR: ABNORMAL
CREAT SERPL-MCNC: 1.11 MG/DL (ref 0.55–1.02)
DIAGNOSIS, 93000: NORMAL
DIAGNOSIS, 93000: NORMAL
DIFFERENTIAL METHOD BLD: ABNORMAL
EOSINOPHIL # BLD: 0.1 K/UL (ref 0–0.4)
EOSINOPHIL NFR BLD: 1 % (ref 0–7)
EPITH CASTS URNS QL MICRO: ABNORMAL /LPF
ERYTHROCYTE [DISTWIDTH] IN BLOOD BY AUTOMATED COUNT: 16.2 % (ref 11.5–14.5)
FERRITIN SERPL-MCNC: 14 NG/ML (ref 26–388)
GLUCOSE SERPL-MCNC: 110 MG/DL (ref 65–100)
GLUCOSE UR STRIP.AUTO-MCNC: NEGATIVE MG/DL
HCT VFR BLD AUTO: 23.6 % (ref 35–47)
HGB BLD-MCNC: 7.1 G/DL (ref 11.5–16)
HGB UR QL STRIP: ABNORMAL
HISTORY CHECKED?,CKHIST: NORMAL
HYALINE CASTS URNS QL MICRO: ABNORMAL /LPF (ref 0–5)
IMM GRANULOCYTES # BLD AUTO: 0 K/UL (ref 0–0.04)
IMM GRANULOCYTES NFR BLD AUTO: 0 % (ref 0–0.5)
IRON SATN MFR SERPL: 3 % (ref 20–50)
IRON SERPL-MCNC: 12 UG/DL (ref 35–150)
KETONES UR QL STRIP.AUTO: NEGATIVE MG/DL
LEUKOCYTE ESTERASE UR QL STRIP.AUTO: NEGATIVE
LYMPHOCYTES # BLD: 1.5 K/UL (ref 0.8–3.5)
LYMPHOCYTES NFR BLD: 26 % (ref 12–49)
MAGNESIUM SERPL-MCNC: 2.2 MG/DL (ref 1.6–2.4)
MCH RBC QN AUTO: 21.6 PG (ref 26–34)
MCHC RBC AUTO-ENTMCNC: 30.1 G/DL (ref 30–36.5)
MCV RBC AUTO: 71.7 FL (ref 80–99)
MONOCYTES # BLD: 0.6 K/UL (ref 0–1)
MONOCYTES NFR BLD: 11 % (ref 5–13)
NEUTS SEG # BLD: 3.7 K/UL (ref 1.8–8)
NEUTS SEG NFR BLD: 61 % (ref 32–75)
NITRITE UR QL STRIP.AUTO: NEGATIVE
NRBC # BLD: 0 K/UL (ref 0–0.01)
NRBC BLD-RTO: 0 PER 100 WBC
PH UR STRIP: 7.5 [PH] (ref 5–8)
PLATELET # BLD AUTO: 159 K/UL (ref 150–400)
PMV BLD AUTO: 9.9 FL (ref 8.9–12.9)
POTASSIUM SERPL-SCNC: 3 MMOL/L (ref 3.5–5.1)
PROT UR STRIP-MCNC: NEGATIVE MG/DL
Q-T INTERVAL, ECG07: 328 MS
Q-T INTERVAL, ECG07: 384 MS
QRS DURATION, ECG06: 74 MS
QRS DURATION, ECG06: 78 MS
QTC CALCULATION (BEZET), ECG08: 412 MS
QTC CALCULATION (BEZET), ECG08: 426 MS
RBC # BLD AUTO: 3.29 M/UL (ref 3.8–5.2)
RBC #/AREA URNS HPF: ABNORMAL /HPF (ref 0–5)
SODIUM SERPL-SCNC: 142 MMOL/L (ref 136–145)
SP GR UR REFRACTOMETRY: 1.01 (ref 1–1.03)
TIBC SERPL-MCNC: 443 UG/DL (ref 250–450)
TSH SERPL DL<=0.05 MIU/L-ACNC: 19.7 UIU/ML (ref 0.36–3.74)
UA: UC IF INDICATED,UAUC: ABNORMAL
UROBILINOGEN UR QL STRIP.AUTO: 0.2 EU/DL (ref 0.2–1)
VENTRICULAR RATE, ECG03: 74 BPM
VENTRICULAR RATE, ECG03: 95 BPM
WBC # BLD AUTO: 6 K/UL (ref 3.6–11)
WBC URNS QL MICRO: ABNORMAL /HPF (ref 0–4)

## 2021-03-22 PROCEDURE — 36415 COLL VENOUS BLD VENIPUNCTURE: CPT

## 2021-03-22 PROCEDURE — 74011250637 HC RX REV CODE- 250/637: Performed by: FAMILY MEDICINE

## 2021-03-22 PROCEDURE — 84443 ASSAY THYROID STIM HORMONE: CPT

## 2021-03-22 PROCEDURE — 74011000250 HC RX REV CODE- 250: Performed by: HOSPITALIST

## 2021-03-22 PROCEDURE — 30233N1 TRANSFUSION OF NONAUTOLOGOUS RED BLOOD CELLS INTO PERIPHERAL VEIN, PERCUTANEOUS APPROACH: ICD-10-PCS | Performed by: INTERNAL MEDICINE

## 2021-03-22 PROCEDURE — 74011250637 HC RX REV CODE- 250/637: Performed by: INTERNAL MEDICINE

## 2021-03-22 PROCEDURE — 86923 COMPATIBILITY TEST ELECTRIC: CPT

## 2021-03-22 PROCEDURE — C9113 INJ PANTOPRAZOLE SODIUM, VIA: HCPCS | Performed by: FAMILY MEDICINE

## 2021-03-22 PROCEDURE — 73562 X-RAY EXAM OF KNEE 3: CPT

## 2021-03-22 PROCEDURE — 65270000029 HC RM PRIVATE

## 2021-03-22 PROCEDURE — 94760 N-INVAS EAR/PLS OXIMETRY 1: CPT

## 2021-03-22 PROCEDURE — 2709999900 HC NON-CHARGEABLE SUPPLY

## 2021-03-22 PROCEDURE — 74011250636 HC RX REV CODE- 250/636: Performed by: FAMILY MEDICINE

## 2021-03-22 PROCEDURE — P9016 RBC LEUKOCYTES REDUCED: HCPCS

## 2021-03-22 PROCEDURE — 77030038269 HC DRN EXT URIN PURWCK BARD -A

## 2021-03-22 PROCEDURE — 83735 ASSAY OF MAGNESIUM: CPT

## 2021-03-22 PROCEDURE — 80048 BASIC METABOLIC PNL TOTAL CA: CPT

## 2021-03-22 PROCEDURE — 86901 BLOOD TYPING SEROLOGIC RH(D): CPT

## 2021-03-22 PROCEDURE — 36430 TRANSFUSION BLD/BLD COMPNT: CPT

## 2021-03-22 PROCEDURE — 81001 URINALYSIS AUTO W/SCOPE: CPT

## 2021-03-22 PROCEDURE — 74011000250 HC RX REV CODE- 250: Performed by: FAMILY MEDICINE

## 2021-03-22 PROCEDURE — 93005 ELECTROCARDIOGRAM TRACING: CPT

## 2021-03-22 PROCEDURE — 74011250636 HC RX REV CODE- 250/636: Performed by: INTERNAL MEDICINE

## 2021-03-22 PROCEDURE — 85025 COMPLETE CBC W/AUTO DIFF WBC: CPT

## 2021-03-22 RX ORDER — DILTIAZEM HYDROCHLORIDE 5 MG/ML
10 INJECTION INTRAVENOUS ONCE
Status: COMPLETED | OUTPATIENT
Start: 2021-03-22 | End: 2021-03-22

## 2021-03-22 RX ORDER — LEVOTHYROXINE SODIUM 75 UG/1
75 TABLET ORAL
Status: DISCONTINUED | OUTPATIENT
Start: 2021-03-22 | End: 2021-03-23

## 2021-03-22 RX ORDER — LOSARTAN POTASSIUM 50 MG/1
50 TABLET ORAL DAILY
Status: DISCONTINUED | OUTPATIENT
Start: 2021-03-22 | End: 2021-03-24 | Stop reason: HOSPADM

## 2021-03-22 RX ORDER — FUROSEMIDE 10 MG/ML
20 INJECTION INTRAMUSCULAR; INTRAVENOUS EVERY 12 HOURS
Status: DISCONTINUED | OUTPATIENT
Start: 2021-03-22 | End: 2021-03-22

## 2021-03-22 RX ORDER — SODIUM CHLORIDE 9 MG/ML
250 INJECTION, SOLUTION INTRAVENOUS AS NEEDED
Status: DISCONTINUED | OUTPATIENT
Start: 2021-03-22 | End: 2021-03-24 | Stop reason: HOSPADM

## 2021-03-22 RX ORDER — FUROSEMIDE 10 MG/ML
40 INJECTION INTRAMUSCULAR; INTRAVENOUS EVERY 12 HOURS
Status: COMPLETED | OUTPATIENT
Start: 2021-03-22 | End: 2021-03-22

## 2021-03-22 RX ORDER — DILTIAZEM HYDROCHLORIDE 30 MG/1
60 TABLET, FILM COATED ORAL ONCE
Status: COMPLETED | OUTPATIENT
Start: 2021-03-22 | End: 2021-03-22

## 2021-03-22 RX ORDER — LIDOCAINE 4 G/100G
1 PATCH TOPICAL EVERY 24 HOURS
Status: DISCONTINUED | OUTPATIENT
Start: 2021-03-22 | End: 2021-03-24 | Stop reason: HOSPADM

## 2021-03-22 RX ORDER — DILTIAZEM HYDROCHLORIDE 240 MG/1
240 CAPSULE, COATED, EXTENDED RELEASE ORAL DAILY
Status: DISCONTINUED | OUTPATIENT
Start: 2021-03-23 | End: 2021-03-24

## 2021-03-22 RX ADMIN — FUROSEMIDE 40 MG: 10 INJECTION, SOLUTION INTRAMUSCULAR; INTRAVENOUS at 11:46

## 2021-03-22 RX ADMIN — Medication 10 ML: at 13:14

## 2021-03-22 RX ADMIN — POTASSIUM BICARBONATE 40 MEQ: 782 TABLET, EFFERVESCENT ORAL at 11:01

## 2021-03-22 RX ADMIN — LEVOTHYROXINE SODIUM 75 MCG: 0.07 TABLET ORAL at 06:34

## 2021-03-22 RX ADMIN — Medication 650 MG: at 00:44

## 2021-03-22 RX ADMIN — PANTOPRAZOLE SODIUM 40 MG: 40 INJECTION, POWDER, FOR SOLUTION INTRAVENOUS at 08:37

## 2021-03-22 RX ADMIN — PANTOPRAZOLE SODIUM 40 MG: 40 INJECTION, POWDER, FOR SOLUTION INTRAVENOUS at 03:26

## 2021-03-22 RX ADMIN — DILTIAZEM HYDROCHLORIDE 60 MG: 30 TABLET, FILM COATED ORAL at 16:57

## 2021-03-22 RX ADMIN — DILTIAZEM HYDROCHLORIDE 10 MG: 5 INJECTION INTRAVENOUS at 21:08

## 2021-03-22 RX ADMIN — FUROSEMIDE 40 MG: 10 INJECTION, SOLUTION INTRAMUSCULAR; INTRAVENOUS at 23:19

## 2021-03-22 RX ADMIN — Medication 10 ML: at 21:12

## 2021-03-22 RX ADMIN — Medication 10 ML: at 03:30

## 2021-03-22 RX ADMIN — LOSARTAN POTASSIUM 50 MG: 50 TABLET, FILM COATED ORAL at 08:37

## 2021-03-22 RX ADMIN — PANTOPRAZOLE SODIUM 40 MG: 40 INJECTION, POWDER, FOR SOLUTION INTRAVENOUS at 21:05

## 2021-03-22 NOTE — ROUTINE PROCESS
TRANSFER - OUT REPORT: 
 
Verbal report given to [place] RN(name) on Rolan Medrano  being transferred to [phone] (unit) for routine progression of care Report consisted of patients Situation, Background, Assessment and  
Recommendations(SBAR). Information from the following report(s) SBAR, ED Summary, [place] and Recent Results was reviewed with the receiving nurse. Lines:  
Peripheral IV 03/21/21 Right Antecubital (Active) Site Assessment Clean, dry, & intact 03/21/21 2030 Phlebitis Assessment 0 03/21/21 2030 Infiltration Assessment 0 03/21/21 2030 Dressing Status Clean, dry, & intact 03/21/21 2030 Dressing Type Tape;Transparent 03/21/21 2030 Hub Color/Line Status Pink;Flushed 03/21/21 2030 Action Taken Blood drawn 03/21/21 2030 Alcohol Cap Used Yes 03/21/21 2030 Opportunity for questions and clarification was provided. Patient transported with: 
 [employer]

## 2021-03-22 NOTE — H&P
[address] [facility] Adult  Hospitalist Group  History and Physical    Primary Care Provider: Mercedes Helm MD  Date of Service:  3/21/2021    Subjective:     Nimisha Pichardo is a 80 y.o. female withBPPV, HTN, CVA, dyslipidemia, hypothyroidism, and RA who presents with LE edema, R knee pain, and dyspnea. Per patient and her daughter, pt. Woke up with acute R knee pain, but has been having progressive worsening of dyspnea, and pnd. Denies fever, chills, melena, hematochezia, hematuria, hemoptysis. Endorses dry cough. In the  ED, VSS. Labs significant for microcytic anemia, BUN/creat 40/1.36 (b/l 1-1.2). proBNP 6,486, trop is pending. In the ED, she received 0.5mg IV bumex. Review of Systems:    A comprehensive review of systems was negative except for that written in the History of Present Illness. Past Medical History:   Diagnosis Date    BPPV (benign paroxysmal positional vertigo)     HTN (hypertension)     Hx of completed stroke     Hyperlipidemia     Hypothyroid     Rheumatoid [address] [address]     Scoliosis       Past Surgical History:   Procedure Laterality Date    HX  SECTION      HX ORTHOPAEDIC      bunions removed x 2     Prior to Admission medications    Medication Sig Start Date End Date Taking? Authorizing Provider   dilTIAZem ER [facility]) 240 mg capsule diltiazem  mg capsule,extended release 24 hr    Provider, Historical   losartan (COZAAR) 50 mg tablet Take 50 mg by mouth daily. Provider, Historical   furosemide (LASIX) 20 mg tablet Take 1 tablet by mouth once daily 21   Daksha Harper DO   apixaban (ELIQUIS) 2.5 mg tablet Take 1 Tab by mouth two (2) times a day. 20   Cherry Zarco NP   levothyroxine (SYNTHROID) 75 mcg tablet Take 75 mcg by mouth Daily (before breakfast). Provider, Historical   acetaminophen (TylenoL) 325 mg tablet Take 325 mg by mouth every six (6) hours as needed for Pain.     Provider, Historical   pantoprazole (PROTONIX) 40 mg tablet Take 1 Tab by mouth daily. 7/7/14   Emmanuelle Kruger MD     Allergies   Allergen Reactions    Shellfish Containing Products Swelling      Family History   Problem Relation Age of Onset    Stroke Father         SOCIAL HISTORY:  Patient resides at Home. Smoking history: none  Alcohol history: none    Objective:       Physical Exam:   Visit Vitals  /64 (BP 1 Location: Right upper arm, BP Patient Position: At rest)   Pulse 93   Temp 98.6 °F (37 °C)   Resp 20   Wt 45.4 kg (100 lb)   SpO2 99%   BMI 18.29 kg/m²     General:  Alert, cooperative, no distress, appears stated age. Head:  Normocephalic, without obvious abnormality, atraumatic. Eyes:  Conjunctivae/corneas clear. PERRL, EOMs intact. Throat: Lips, mucosa, and tongue normal.    Neck: Supple, symmetrical, trachea midline   Back:   Symmetric, no curvature. ROM normal.    Lungs:   Clear to auscultation bilaterally. Chest wall:  No tenderness or deformity. Heart:  Regular rate and rhythm, S1, S2 normal, no murmur, click, rub or gallop. Abdomen:   Soft, non-tender. Bowel sounds normal. No masses,  No organomegaly. Rectal:  Normal tone,  no masses or tenderness  Guaiac pending   Extremities: Extremities normal, atraumatic, no cyanosis. 1+ b/l LE edema, compression hose in place. Pulses: 2+ radial pulses   Skin: Skin color, texture, turgor normal. No rashes or lesions. ECG: pending    Data Review: All diagnostic labs and studies have been reviewed. Chest x-ray:  Ordered pending    Assessment:     Active Problems:    * No active hospital problems.  *      Plan:     B/l LE edema  -2/2 AOC HFpEF and AI  -RLE duplex negativ for DVT    #R knee pain  -known arthritis  -tylenol and lidocaine patch for   -XR right knee    #Microcytic Anemia  -chronic, progressive decreased in Hgb from 11-12 to 7.3  -denies melena, hematochezia, hematemesis, hemoptysis, hematuria  -ferritin, reticulocyte, iron studies, hemoccult  -transfuse 1 unit PRBC after anemia w/u drawn, with close monitoring of volume status  -SCD    #AOC HFpEF (suspected infiltrative CM)+valvular HF, NYHA III  -echo 12/2020 with EF 50-55%, probnp 6486, CXR pending  -s/p bumex, continue lasix 20mg IV times two additional doses  -continue home losartan  -followed by Dr. Swapna Hauser (cards) was pending heme w/u for poss amyloid     #paroxysmal atrial fibrillation  -hold eliquis  -continue diltiazem  -followed by Dr. Alaina Spurling: cardiac  Dvt: SCD pending anemia w/u  Mpoa: Tanisha Elizondo (daughter)  Code: DNR      Signed By: Gigi Daniels MD     March 21, 2021

## 2021-03-22 NOTE — PROGRESS NOTES
Yang Vazquez [facility] [address]  [address] [facility], [address] [place] [address]  [phone]      Medical Progress Note      NAME: Demetris Gillette   :  10/12/1930  MRM:  [id number]    Date/Time: 3/22/2021  4:44 PM           Assessment / Plan:     #Dyspnea: Mild pulmonary edema on exam, but also with anemia likely contributing. Echo 2020 with EF 50% and grade III diastolic dysfx. Her AFwRVR likely decompensating her. - Transfuse to treat anemia, lasix chaser   - IV diuresis    #Symptomatic Anemia: 12.3 three months ago, 9.2 2 months ago and now 7.3. C/o knee pain, but no obvious hemarthrosis. Denies melena, but FOBT pending. No other obvious source of bleeding. Iron deficient   - Transfuse 1u today   - Trend H/H   - f/u FOBT    - Hold apixaban today pending result     #AF: Intermittently in RVR today. She is on dilt 240mg daily as an outpt along with apixaban   - Cont dilt   - Optimize volume status   - Would likely benefit from rhythm control   - Check TSH    #HFpEF: Grade III diastolic and c/f possible amyloidosis. Mildly decompensated at present   - Diurese   - Control AF and HTN    #KAREN vs CKD: Volume up, favor cardiorenal   - diurese   - Renally dose meds, avoid nephrotoxins    #R knee pain: This was actually her presenting complaint. Xray reassuring.    - PT/OT for now                   Care Plan discussed with: Patient, Family and Nursing Staff    Discussed:  Care Plan    Prophylaxis:  SCD's    Disposition:  Home w/Family           ___________________________________________________    Attending Physician: Ivana Alba MD        Subjective:     Chief Complaint:  Norleen Goodell. Knee hurts, denies cp or sob    ROS:  (bold if positive, if negative)    Tolerating PT  Tolerating Diet          Objective:       Vitals:          Last 24hrs VS reviewed since prior progress note.  Most recent are:    Visit Vitals  /64   Pulse 100   Temp 98.1 °F (36.7 °C)   Resp 18   Ht 5' 2\" (1.575 m)   Wt 45.4 kg (100 lb)   SpO2 97%   BMI 18.29 kg/m²     SpO2 Readings from Last 6 Encounters:   03/22/21 97%   02/02/21 94%   01/31/21 99%   12/20/20 98%   12/01/20 93%   11/17/20 98%            Intake/Output Summary (Last 24 hours) at 3/22/2021 1644  Last data filed at 3/22/2021 1430  Gross per 24 hour   Intake 850 ml   Output 2300 ml   Net -1450 ml          Exam:     Physical Exam:    Gen:  Well-developed, well-nourished, in no acute distress  HEENT:  Pink conjunctivae, PERRL, hearing intact to voice, moist mucous membranes  Neck:  Supple, without masses, thyroid non-tender  Resp:  No accessory muscle use, basilar rales  Card:  No murmurs, normal S1, S2 without thrills, bruits or peripheral edema  Abd:  Soft, non-tender, non-distended, normoactive bowel sounds are present  Musc:  No cyanosis or clubbing  Skin:  No rashes or ulcers, skin turgor is good  Neuro:  Cranial nerves 3-12 are grossly intact,  strength is 5/5 bilaterally and dorsi / plantarflexion is 5/5 bilaterally, follows commands appropriately  Psych:  Good insight, oriented to person, place and time, alert       Medications Reviewed: (see below)    Lab Data Reviewed: (see below)    ______________________________________________________________________    Medications:     Current Facility-Administered Medications   Medication Dose Route Frequency    lidocaine 4 % patch 1 Patch  1 Patch TransDERmal Q24H    pantoprazole (PROTONIX) 40 mg in 0.9% sodium chloride 10 mL injection  40 mg IntraVENous Q12H    levothyroxine (SYNTHROID) tablet 75 mcg  75 mcg Oral ACB    losartan (COZAAR) tablet 50 mg  50 mg Oral DAILY    [START ON 3/23/2021] dilTIAZem ER (CARDIZEM CD) capsule 240 mg  240 mg Oral DAILY    0.9% sodium chloride infusion 250 mL  250 mL IntraVENous PRN    furosemide (LASIX) injection 40 mg  40 mg IntraVENous Q12H    dilTIAZem IR (CARDIZEM) tablet 60 mg  60 mg Oral ONCE    sodium chloride (NS) flush 5-40 mL  5-40 mL IntraVENous Q8H    sodium chloride (NS) flush 5-40 mL  5-40 mL IntraVENous PRN    acetaminophen (TYLENOL) tablet 650 mg  650 mg Oral Q6H PRN    Or    acetaminophen (TYLENOL) suppository 650 mg  650 mg Rectal Q6H PRN            Lab Review:     Recent Labs     03/22/21  0156 03/21/21 2032   WBC 6.0 5.9   HGB 7.1* 7.3*   HCT 23.6* 24.6*    185     Recent Labs     03/22/21 0156 03/21/21 2032    140   K 3.0* 3.4*   * 108   CO2 26 25   * 169*   BUN 37* 40*   CREA 1.11* 1.36*   CA 8.4* 8.8   MG 2.2  --    ALB  --  3.0*   ALT  --  24     No components found for: [place]

## 2021-03-22 NOTE — ED PROVIDER NOTES
The history is provided by the patient and a relative. Leg Swelling   This is a new problem. The current episode started 6 to 12 hours ago. The problem occurs rarely. The problem has been gradually worsening. The pain is present in the right lower leg and right upper leg. The quality of the pain is described as dull. The pain is mild. Associated symptoms include stiffness. The symptoms are aggravated by palpation. She has tried rest for the symptoms. The treatment provided no relief. There has been no history of extremity trauma. Shortness of Breath  This is a recurrent problem. The average episode lasts 2 days. The problem occurs intermittently. The current episode started 6 to 12 hours ago. The problem has been gradually worsening. Associated symptoms include chest pain and leg swelling (see HPI). Pertinent negatives include no fever, no cough, no sputum production, no vomiting, no abdominal pain and no rash. It is unknown what precipitated the problem. Treatments tried: taking home medications. The treatment provided no relief. Associated medical issues include heart failure and DVT.         Past Medical History:   Diagnosis Date    BPPV (benign paroxysmal positional vertigo)     HTN (hypertension)     Hx of completed stroke     Hyperlipidemia     Hypothyroid     Rheumatoid arthritis ([facility] [address]     Scoliosis        Past Surgical History:   Procedure Laterality Date    HX  SECTION      HX ORTHOPAEDIC      bunions removed x 2         Family History:   Problem Relation Age of Onset    Stroke Father        Social History     Socioeconomic History    Marital status:      Spouse name: Not on file    Number of children: Not on file    Years of education: Not on file    Highest education level: Not on file   Occupational History    Not on file   Social Needs    Financial resource strain: Not on file    Food insecurity     Worry: Not on file     Inability: Not on file   Vend needs Medical: Not on file     Non-medical: Not on file   Tobacco Use    Smoking status: Never Smoker    Smokeless tobacco: Never Used   Substance and Sexual Activity    Alcohol use: No    Drug use: No    Sexual activity: Never   Lifestyle    Physical activity     Days per week: Not on file     Minutes per session: Not on file    Stress: Not on file   Relationships    Social connections     Talks on phone: Not on file     Gets together: Not on file     Attends Latter day service: Not on file     Active member of club or organization: Not on file     Attends meetings of clubs or organizations: Not on file     Relationship status: Not on file    Intimate partner violence     Fear of current or ex partner: Not on file     Emotionally abused: Not on file     Physically abused: Not on file     Forced sexual activity: Not on file   Other Topics Concern    Not on file   Social History Narrative    Not on file         ALLERGIES: Shellfish containing products    Review of Systems   Constitutional: Negative for fever. Respiratory: Positive for shortness of breath. Negative for cough and sputum production. Cardiovascular: Positive for chest pain and leg swelling (see HPI). Gastrointestinal: Negative for abdominal pain, blood in stool, diarrhea and vomiting. Genitourinary: Negative for dysuria. Musculoskeletal: Positive for stiffness. Skin: Negative for color change and rash. Neurological: Negative for dizziness, syncope and light-headedness. All other systems reviewed and are negative. Vitals:    03/21/21 2011   BP: 113/64   Pulse: 93   Resp: 20   Temp: 98.6 °F (37 °C)   SpO2: 99%   Weight: 45.4 kg (100 lb)            Physical Exam  Vitals signs and nursing note reviewed. Constitutional:       General: She is not in acute distress. Appearance: She is well-developed. HENT:      Head: Normocephalic and atraumatic.    Eyes:      Conjunctiva/sclera: Conjunctivae normal.   Neck: Musculoskeletal: Normal range of motion and neck supple. Cardiovascular:      Rate and Rhythm: Normal rate. Rhythm irregular. Pulmonary:      Effort: Pulmonary effort is normal. No respiratory distress. Abdominal:      Palpations: Abdomen is soft. Tenderness: There is no abdominal tenderness. There is no guarding. Musculoskeletal: Normal range of motion. Right lower leg: Edema (2+, up to waist) present. Left lower leg: Edema (1+, up to waist) present. Skin:     General: Skin is warm and dry. Neurological:      Mental Status: She is alert and oriented to person, place, and time. Cranial Nerves: No dysarthria or facial asymmetry. Motor: No abnormal muscle tone. MDM       Procedures      EKG interpretation: 19:50  Rhythm: atrial fib; and irregular. Rate (approx.): 95; Axis: right axis deviation; Intervals: QTc 412 ms; ST/T wave: non-specific changes, no STEMI; EKG documented and interpreted by Betty Doe MD, ED MD.      [address] [place] [address] for Admission  10:00 PM    ED Room Number: ER13/13  Patient Name and age:  Liam Burt 80 y.o.  female  Working Diagnosis:   1. Acute on chronic congestive heart failure, unspecified heart failure type (HCC)    2. Lung mass    3. Edema, peripheral    4. Longstanding persistent atrial fibrillation ([address]    5. Anemia, unspecified type    6. Pleural effusion        COVID-19 Suspicion:  no  Sepsis present:  no  Reassessment needed: no  Code Status:  Full Code  Readmission: no  Isolation Requirements:  no  Recommended Level of Care:  telemetry  Department:[facility] ED - [phone]  Other:  Doppler pending, but pt on Eliquis (doubt DVT).

## 2021-03-22 NOTE — ACP (ADVANCE CARE PLANNING)
Advance Care Planning     General Advance Care Planning (ACP) Conversation      Date of Conversation: 3/21/2021  Conducted with: Patient with Decision Making Capacity    Healthcare Decision Maker:     Primary Decision Maker: Yovani Beckford - Daughter - [phone]    Primary Decision Maker: Maribeth Graves - Daughter - [phone]  Click here to complete [address] including selection of the Healthcare Decision Maker Relationship (ie \"Primary\")  Today we documented Decision Maker(s) consistent with Legal Next of Kin hierarchy. Content/Action Overview:    Has ACP document(s) on file - reflects the patient's care preferences  Reviewed DNR/DNI and patient elects DNR order - completed portable DNR form & placed order    Additional Comments: Discussed with patient daughter- DNR      Length of Voluntary ACP Conversation in minutes:  <16 minutes (Non-Billable)    Aniceto Gifford

## 2021-03-22 NOTE — PROGRESS NOTES
Could not complete med req. with patient. Pt. stated she was unable to recall the medications she takes.

## 2021-03-22 NOTE — PROGRESS NOTES
Bedside shift change report given to Ernie Alvarez RN (oncoming nurse) by JENNIFER CAIN/JENNIFER Montoya (offgoing nurse). Report included the following information SBAR, Kardex, Intake/Output, MAR, Accordion and Recent Results.

## 2021-03-22 NOTE — PROGRESS NOTES
11:56 AM  CM noted pt cardiac bundle- delivered letter to pt. BCPI-A letter regarding Heart Failure has been delivered to the patient/caregiver. 11:28 AM  CM reviewed EMR and spoke to pt's daughter over the phone to complete the initial evaluation. Confirmed charted demographics. Reason for Admission:   Acute Chronic Heart Failure                 RUR Score:     25%      PCP: First and Last name:  Brunilda Clay MD     Name of Practice:   Are you a current patient: Yes/No: Yes   Approximate date of last visit: 2/21   Can you do a virtual visit with your PCP:              Resources/supports as identified by patient/family:   Pt resides with her daughter Ugo Soto in her home. Her daughterLeni assists and helps with care. Top Challenges facing patient (as identified by patient/family and CM): Finances/Medication cost?     Pt has Medicare and Medicaid               Transportation? Pt's family provides transportation              Support system or lack thereof? Has good family support                     Living arrangements? 2 story home with 5 steps to enter- pt's bedroom is on the first floor           Self-care/ADLs/Cognition? Pt is able to dress herself with some assistance. Her daughter cooks meals and assists her with bathing, toileting.           Current Advanced Directive/Advance Care Plan:  DNR--- confirmed with pt's daughter, pt remains a DNR- DNR order in chart      Healthcare Decision Maker:   Click here to complete [employer] including selection of the Healthcare Decision Maker Relationship (ie \"Primary\")      Primary Decision MakerFlores Morilloa - Daughter - [phone]    Primary Decision Maker: Galo Cummins - Daughter - [phone]                            Plan for utilizing home health:    Pt is not currently open with home health but has [facility] in the past, if pt needs home health at [facility], pt's daughter would like a referral sent to [facility]. Transition of Care Plan:                Pt resides in a a two story home with 5 steps to enter the home. Pt's bedroom is located on the first floor. She uses a RW at home and the family has requested a BSC through her PCP, waiting for it to be approved through [facility]. Pt's family is working with Medicaid for personal care services at home- waiting for final arrangement- an agency has been identified. Pt's daughter not interested in SNF at dc they would only agree to home health. 1). Pt admitted for medical management  2). Family will transport at dc  3). Outpatient follow up- CM confirmed with family, pt would like a PCP follow up appt at dc  4). CM will follow for dc needs    Care Management Interventions  PCP Verified by CM: Yes(2/2021)  Mode of Transport at Discharge:  Other (see comment)  Transition of Care Consult (CM Consult): Discharge Planning  Current Support Network: Relative's Home(Pt lives with daughter Wendy Romero)  Confirm Follow Up Transport: Family  Discharge Location  Discharge Placement: Unable to determine at this time

## 2021-03-22 NOTE — PROGRESS NOTES
Bedside and Verbal shift change report given to [address] [place] [address],[address] (oncoming nurse) by Karly Mcduffie RN (offgoing nurse). Report included the following information SBAR, Kardex, MAR, Accordion and Recent Results.

## 2021-03-22 NOTE — ED TRIAGE NOTES
Pt. States started with SOB today, noted swelling in lower extremities, states today right knee and thigh has increased swelling. Pt. Takes eliquis and is on a fluid pill. Hx of CHF. Hx of blood clots.

## 2021-03-22 NOTE — PROGRESS NOTES
Telemetry showing AFib with rate fluctuating between 110 and 130s. MD notified and orders received for cardizem. Will administer and continue to monitor. 4/10 left wrist upon ADL/complains of pain/discomfort

## 2021-03-22 NOTE — PROGRESS NOTES
Comprehensive Nutrition Assessment    Type and Reason for Visit: Initial(low BMI)    Nutrition Recommendations/Plan:   1. Continue cardiac diet. 2. Add Ensure Enlive BID (strawberry) to promote weight gain. 3. Please obtain new measured weight. Nutrition Assessment:      3/22: 81 y/o female admitted with acute on chronic respiratory failure. PMH includes HTN, GERD, DM, CHF. BMI 18.3, c/w underweight. Pt reports good appetite and po intake currently and PTA. Breakfast tray in room, 100% consumed. Lunch tray in room, 100% fruit, broccoli, and 50% spaghetti consumed. No c/o N/V. Pt unsure of any recent weight changes. Weight appears stable for the past few months, though CBW does not have a source listed so unsure of accuracy. Pt says she has had Ensure before and likes it, agreeable to receiving while here. Will add BID. Labs- K 3.0, Cr 1.11, Bgh 7.1, BG . Meds- synthroid, lasix, Protonix. Intakes:  Patient Vitals for the past 168 hrs:   % Diet Eaten   03/22/21 0930 100 %     Weight hx: Wt Readings from Last 10 Encounters:   03/21/21 45.4 kg (100 lb)   02/02/21 46.7 kg (103 lb)   01/31/21 44.9 kg (99 lb)   12/20/20 45 kg (99 lb 3.2 oz)   12/01/20 47.6 kg (105 lb)   11/17/20 47.9 kg (105 lb 9.6 oz)   07/06/20 49.9 kg (110 lb)   12/27/19 49.9 kg (110 lb)   10/17/19 48.1 kg (106 lb)   08/15/19 51.3 kg (113 lb 1.5 oz)     Malnutrition Assessment:  Malnutrition Status: none identified    Estimated Daily Nutrient Needs:  Energy (kcal): 1323(826 x 1.3 AF (+250)); Weight Used for Energy Requirements: Current  Protein (g): 50(1-1.2 g/kg IBW);  Weight Used for Protein Requirements: Ideal  Fluid (ml/day): 1323; Method Used for Fluid Requirements: 1 ml/kcal      Nutrition Related Findings:  last BM not documented      Wounds:    None       Current Nutrition Therapies:  DIET CARDIAC Regular  DIET NUTRITIONAL SUPPLEMENTS Dinner, Breakfast; Ensure Enlive    Anthropometric Measures:  · Height:  5' 2\" (157.5 cm)  · Current Body Wt:  45.4 kg (100 lb)   · Admission Body Wt:       · Usual Body Wt:        · Ideal Body Wt:  110 lbs:  90.9 %   · Adjusted Body Weight:   ; Weight Adjustment for: No adjustment   · Adjusted BMI:       · BMI Category:  Underweight (BMI less than 18.5)       Nutrition Diagnosis:   · Underweight related to inadequate protein-energy intake as evidenced by BMI      Nutrition Interventions:   Food and/or Nutrient Delivery: Continue current diet, Start oral nutrition supplement  Nutrition Education and Counseling: No recommendations at this time  Coordination of Nutrition Care: Continue to monitor while inpatient    Goals:  PO intake >50% meals + ONS with 0.5#/week weight gain next 2-4 days       Nutrition Monitoring and Evaluation:   Behavioral-Environmental Outcomes: None identified  Food/Nutrient Intake Outcomes: Food and nutrient intake, Supplement intake  Physical Signs/Symptoms Outcomes: Weight, Biochemical data, Nutrition focused physical findings    Discharge Planning:    Continue current diet, Continue oral nutrition supplement     Electronically signed by Bryanna Domínguez RDN on 3/22/2021 at 2:11 PM    Contact: [phone]

## 2021-03-23 LAB
ABO + RH BLD: NORMAL
ANION GAP SERPL CALC-SCNC: 6 MMOL/L (ref 5–15)
BASOPHILS # BLD: 0 K/UL (ref 0–0.1)
BASOPHILS NFR BLD: 0 % (ref 0–1)
BLD PROD TYP BPU: NORMAL
BLOOD GROUP ANTIBODIES SERPL: NORMAL
BPU ID: NORMAL
BUN SERPL-MCNC: 24 MG/DL (ref 6–20)
BUN/CREAT SERPL: 20 (ref 12–20)
CALCIUM SERPL-MCNC: 9.5 MG/DL (ref 8.5–10.1)
CHLORIDE SERPL-SCNC: 100 MMOL/L (ref 97–108)
CO2 SERPL-SCNC: 31 MMOL/L (ref 21–32)
CREAT SERPL-MCNC: 1.18 MG/DL (ref 0.55–1.02)
CROSSMATCH RESULT,%XM: NORMAL
DIFFERENTIAL METHOD BLD: ABNORMAL
EOSINOPHIL # BLD: 0 K/UL (ref 0–0.4)
EOSINOPHIL NFR BLD: 0 % (ref 0–7)
ERYTHROCYTE [DISTWIDTH] IN BLOOD BY AUTOMATED COUNT: 16.6 % (ref 11.5–14.5)
EST. AVERAGE GLUCOSE BLD GHB EST-MCNC: 143 MG/DL
GLUCOSE BLD STRIP.AUTO-MCNC: 124 MG/DL (ref 65–100)
GLUCOSE SERPL-MCNC: 114 MG/DL (ref 65–100)
HBA1C MFR BLD: 6.6 % (ref 4–5.6)
HCT VFR BLD AUTO: 33.8 % (ref 35–47)
HGB BLD-MCNC: 10.3 G/DL (ref 11.5–16)
IMM GRANULOCYTES # BLD AUTO: 0 K/UL (ref 0–0.04)
IMM GRANULOCYTES NFR BLD AUTO: 0 % (ref 0–0.5)
LYMPHOCYTES # BLD: 1.2 K/UL (ref 0.8–3.5)
LYMPHOCYTES NFR BLD: 18 % (ref 12–49)
MAGNESIUM SERPL-MCNC: 2.4 MG/DL (ref 1.6–2.4)
MCH RBC QN AUTO: 22.3 PG (ref 26–34)
MCHC RBC AUTO-ENTMCNC: 30.5 G/DL (ref 30–36.5)
MCV RBC AUTO: 73.2 FL (ref 80–99)
MONOCYTES # BLD: 0.6 K/UL (ref 0–1)
MONOCYTES NFR BLD: 8 % (ref 5–13)
NEUTS SEG # BLD: 5 K/UL (ref 1.8–8)
NEUTS SEG NFR BLD: 73 % (ref 32–75)
NRBC # BLD: 0 K/UL (ref 0–0.01)
NRBC BLD-RTO: 0 PER 100 WBC
PHOSPHATE SERPL-MCNC: 2.2 MG/DL (ref 2.6–4.7)
PLATELET # BLD AUTO: 196 K/UL (ref 150–400)
PMV BLD AUTO: 10.1 FL (ref 8.9–12.9)
POTASSIUM SERPL-SCNC: 3.1 MMOL/L (ref 3.5–5.1)
RBC # BLD AUTO: 4.62 M/UL (ref 3.8–5.2)
SERVICE CMNT-IMP: ABNORMAL
SODIUM SERPL-SCNC: 137 MMOL/L (ref 136–145)
SPECIMEN EXP DATE BLD: NORMAL
STATUS OF UNIT,%ST: NORMAL
TROPONIN I SERPL-MCNC: <0.05 NG/ML
UNIT DIVISION, %UDIV: 0
WBC # BLD AUTO: 6.8 K/UL (ref 3.6–11)

## 2021-03-23 PROCEDURE — 83735 ASSAY OF MAGNESIUM: CPT

## 2021-03-23 PROCEDURE — 97535 SELF CARE MNGMENT TRAINING: CPT

## 2021-03-23 PROCEDURE — C9113 INJ PANTOPRAZOLE SODIUM, VIA: HCPCS | Performed by: FAMILY MEDICINE

## 2021-03-23 PROCEDURE — 2709999900 HC NON-CHARGEABLE SUPPLY

## 2021-03-23 PROCEDURE — 74011000250 HC RX REV CODE- 250: Performed by: FAMILY MEDICINE

## 2021-03-23 PROCEDURE — 97116 GAIT TRAINING THERAPY: CPT

## 2021-03-23 PROCEDURE — 74011000250 HC RX REV CODE- 250: Performed by: INTERNAL MEDICINE

## 2021-03-23 PROCEDURE — 97161 PT EVAL LOW COMPLEX 20 MIN: CPT

## 2021-03-23 PROCEDURE — 97165 OT EVAL LOW COMPLEX 30 MIN: CPT

## 2021-03-23 PROCEDURE — 80048 BASIC METABOLIC PNL TOTAL CA: CPT

## 2021-03-23 PROCEDURE — 83036 HEMOGLOBIN GLYCOSYLATED A1C: CPT

## 2021-03-23 PROCEDURE — 93005 ELECTROCARDIOGRAM TRACING: CPT

## 2021-03-23 PROCEDURE — 74011250637 HC RX REV CODE- 250/637: Performed by: FAMILY MEDICINE

## 2021-03-23 PROCEDURE — 36415 COLL VENOUS BLD VENIPUNCTURE: CPT

## 2021-03-23 PROCEDURE — 74011250637 HC RX REV CODE- 250/637: Performed by: INTERNAL MEDICINE

## 2021-03-23 PROCEDURE — 84100 ASSAY OF PHOSPHORUS: CPT

## 2021-03-23 PROCEDURE — 85025 COMPLETE CBC W/AUTO DIFF WBC: CPT

## 2021-03-23 PROCEDURE — 65270000029 HC RM PRIVATE

## 2021-03-23 PROCEDURE — 94760 N-INVAS EAR/PLS OXIMETRY 1: CPT

## 2021-03-23 PROCEDURE — 84484 ASSAY OF TROPONIN QUANT: CPT

## 2021-03-23 PROCEDURE — 82962 GLUCOSE BLOOD TEST: CPT

## 2021-03-23 PROCEDURE — 97530 THERAPEUTIC ACTIVITIES: CPT

## 2021-03-23 PROCEDURE — 82784 ASSAY IGA/IGD/IGG/IGM EACH: CPT

## 2021-03-23 PROCEDURE — 74011250636 HC RX REV CODE- 250/636: Performed by: INTERNAL MEDICINE

## 2021-03-23 PROCEDURE — 74011250636 HC RX REV CODE- 250/636: Performed by: FAMILY MEDICINE

## 2021-03-23 PROCEDURE — 99223 1ST HOSP IP/OBS HIGH 75: CPT | Performed by: STUDENT IN AN ORGANIZED HEALTH CARE EDUCATION/TRAINING PROGRAM

## 2021-03-23 RX ORDER — LEVOTHYROXINE SODIUM 100 UG/1
100 TABLET ORAL
Status: DISCONTINUED | OUTPATIENT
Start: 2021-03-24 | End: 2021-03-24 | Stop reason: HOSPADM

## 2021-03-23 RX ORDER — POTASSIUM CHLORIDE 750 MG/1
40 TABLET, FILM COATED, EXTENDED RELEASE ORAL
Status: COMPLETED | OUTPATIENT
Start: 2021-03-23 | End: 2021-03-23

## 2021-03-23 RX ORDER — SODIUM CHLORIDE AND POTASSIUM CHLORIDE .9; .15 G/100ML; G/100ML
SOLUTION INTRAVENOUS CONTINUOUS
Status: DISCONTINUED | OUTPATIENT
Start: 2021-03-23 | End: 2021-03-24 | Stop reason: HOSPADM

## 2021-03-23 RX ADMIN — PANTOPRAZOLE SODIUM 40 MG: 40 INJECTION, POWDER, FOR SOLUTION INTRAVENOUS at 10:50

## 2021-03-23 RX ADMIN — LEVOTHYROXINE SODIUM 75 MCG: 0.07 TABLET ORAL at 06:31

## 2021-03-23 RX ADMIN — POTASSIUM PHOSPHATE, MONOBASIC AND POTASSIUM PHOSPHATE, DIBASIC: 224; 236 INJECTION, SOLUTION, CONCENTRATE INTRAVENOUS at 09:25

## 2021-03-23 RX ADMIN — POTASSIUM CHLORIDE AND SODIUM CHLORIDE: 900; 150 INJECTION, SOLUTION INTRAVENOUS at 08:47

## 2021-03-23 RX ADMIN — DILTIAZEM HYDROCHLORIDE 240 MG: 240 CAPSULE, COATED, EXTENDED RELEASE ORAL at 06:31

## 2021-03-23 RX ADMIN — POTASSIUM CHLORIDE 40 MEQ: 750 TABLET, EXTENDED RELEASE ORAL at 09:25

## 2021-03-23 RX ADMIN — PANTOPRAZOLE SODIUM 40 MG: 40 INJECTION, POWDER, FOR SOLUTION INTRAVENOUS at 20:27

## 2021-03-23 RX ADMIN — LOSARTAN POTASSIUM 50 MG: 50 TABLET, FILM COATED ORAL at 10:47

## 2021-03-23 NOTE — PROGRESS NOTES
[facility]: MED RECONCILIATION    Comments/Recommendations:   Patient alert and oriented for medication reconciliation but does not know medications. Patient states she does manage her own medications at home and states the medications she takes are from [facility]. Medication list completed using Rx query fill history and outpatient cardiology notes. Medications added:     none    Medications removed:    none    Medications adjusted:    none    Information obtained from: patient, Rx query, outpatient cardiology notes    Allergies: Shellfish containing products    Prior to Admission Medications:     Medication Documentation Review Audit       Reviewed by Bárbara Dodge (Pharmacist) on 03/22/21 at 2118      Medication Sig Documenting Provider Last Dose Status Taking?   acetaminophen (TylenoL) 325 mg tablet Take 325 mg by mouth every six (6) hours as needed for Pain. Provider, Historical  Active Yes   apixaban (ELIQUIS) 2.5 mg tablet Take 1 Tab by mouth two (2) times a day. Brunilda English NP  Active Yes   dilTIAZem ER [facility]) 240 mg capsule Take 240 mg by mouth daily. Provider, Historical  Active Yes   furosemide (LASIX) 20 mg tablet Take 1 tablet by mouth once daily Ismael Lower, DO  Active Yes   levothyroxine (SYNTHROID) 75 mcg tablet Take 75 mcg by mouth Daily (before breakfast). Provider, Historical  Active Yes   losartan (COZAAR) 50 mg tablet Take 50 mg by mouth daily. Provider, Historical  Active Yes   pantoprazole (PROTONIX) 40 mg tablet Take 1 Tab by mouth daily.  John Peralta MD  Active Yes                    Thank Lossie Pears, PharmD, BCPS   Contact: [phone]

## 2021-03-23 NOTE — NURSE NAVIGATOR
Chart reviewed by Heart Failure Nurse Navigator. Heart Failure database completed. EF:  50 to 55% with severe grade 3 diastolic dysfunction, borderline low RV systolic function. ACEi/ARB/ARNi: Losartan 50 mg daily. BB: Not indicated. Aldosterone Antagonist: Not indicated. Obstructive Sleep Apnea Screening:   STOP-BANG score:   Referred to Sleep Medicine:     CRT Not indicated. NYHA Functional Class **. Heart Failure Teach Back in Patient Education. Heart Failure Avoiding Triggers on Discharge Instructions. Cardiologist: Dr. Clayton Ba discharge follow up phone call to be made within 48-72 hours of discharge.

## 2021-03-23 NOTE — PROGRESS NOTES
Physician Progress Note      Andrei Helton  CSN #:                  [id number]  :                       10/12/1930  ADMIT DATE:       3/21/2021 7:43 PM  [address] [place] DATE:  RESPONDING  PROVIDER #:        Kemi Aguirre DO, MD          QUERY TEXT:    Dear Hospitalist Team,  Patient admitted with Acute on Chronic Diastolic CHF. It's noted documentation of Acute Kidney Injury in progress note on 3/22. In order to support the diagnosis of KAREN, please include additional clinical indicators in your documentation. Or please document if the diagnosis of KAREN has been ruled out after further study. The medical record reflects the following:    Risk Factors: 80 yr F admitted with Acute on Chronic Diastolic CHF    Clinical Indicators: Patient arrived to the ED with c/o SOB and swelling. Work up in the ED revealed a BNP of 6,486 with CXR revealing right basilar atelectasis and small pleural effusion. Renal labs on admission were a BUN 40 Creatinine 1.36 GFR 37 and today are BUN 24 Creatinine 1.18 GFR 43. Noted previous admission BMP on 21 showed a BUN 39 Creatinine 1.03 GFR 50. Patient is receiving NS with 20 K+ IVF at 50 ML/HR. Treatment: Daily BMP, NS with 20 K+ IVF at 50 ML/HR and frequent vital signs/monitoring. Thank you,  Helen Zavala RN, CDI  [phone]    Defined by Kidney Disease Improving Global Outcomes (KDIGO) clinical practice guideline for acute kidney injury:  -Increase in SCr by greater than or equal to 0.3 mg/dl within 48 hours; or  -Increase or decrease in SCr to greater than or equal to 1.5 times baseline, which is known or presumed to have occurred within the prior 7 days; or  -Urine volume < 0.5ml/kg/h for 6 hours  Options provided:  -- Acute kidney injury evidenced by, Please document evidence as well as baseline creatinine, if known.   -- Acute kidney injury ruled out after study  -- Other - I will add my own diagnosis  -- Disagree - Not applicable / Not valid  -- Disagree - Clinically unable to determine / Unknown  -- Refer to Clinical Documentation Reviewer    PROVIDER RESPONSE TEXT:    Acute kidney injury was ruled out after study. Query created by: Amanda Worrell on 3/23/2021 2:06 PM      QUERY TEXT:    Dear Hospitalist Team,  Patient admitted with Acute on Chronic Diastolic CHF, it's noted that patient has CKD per progress note on 3/22. If possible, please document in progress notes and discharge summary if you are evaluating and/or treating any of the following: The medical record reflects the following:    Risk Factors: 80 yr F admitted with Acute on Chronic Diastolic CHF    Clinical Indicators: Patient arrived to the ED with c/o SOB and swelling. Work up in the ED revealed a BNP of 6,486 with CXR revealing right basilar atelectasis and small pleural effusion. Renal labs on admission were a BUN 40 Creatinine 1.36 GFR 37 and today are BUN 24 Creatinine 1.18 GFR 43. Noted previous admission BMP on 1/31/21 showed a BUN 39 Creatinine 1.03 GFR 50. Patient is receiving NS with 20 K+ IVF at 50 ML/HR. Treatment: Daily BMP, NS with 20 K+ IVF at 50 ML/HR and frequent vital signs/monitoring. Thank you,  Elizabeth Wise RN, [facility]  [phone]  Options provided:  -- CKD Stage 1 GFR>90  -- CKD Stage 2 GFR 60-90  -- CKD Stage 3a GFR 45-59  -- CKD Stage 3b GFR 30-44  -- CKD Stage 4 GFR 15-29  -- CKD Stage 5 GFR<15  -- ESRD  -- CKD ruled out  -- Other - I will add my own diagnosis  -- Disagree - Not applicable / Not valid  -- Disagree - Clinically unable to determine / Unknown  -- Refer to Clinical Documentation Reviewer    PROVIDER RESPONSE TEXT:    This patient has CKD Stage 3b. Query created by: Amanda Worrell on 3/23/2021 2:08 PM      QUERY TEXT:    Dear Hospitalist Team,  Patient admitted with c/o SOB and swelling. It's noted documentation of Acute on Chronic Diastolic CHF within the H&P on 3/21 and Chronic Diastolic CHF within progress note on 3/22.  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 If possible, please document in progress notes and discharge summary further specificity regarding the type and acuity of CHF:    The medical record reflects the following:    Risk Factors: 80 Yr F admitted with Acute on Chronic Diastolic CHF; Afib with RVR    Clinical Indicators:  Patient arrived to the ED with c/o SOB and swelling. Work up in the ED revealed a BNP of 6,486 with CXR revealing right basilar atelectasis and small pleural effusion. H&P states, AOC HFpEF (suspected infiltrative CM)+valvular HF, NYHA III. Patient was receiving Lasix 40 mg IV BID with one time dose of Bumex 0.5 mg IV. Progress note on 3/22 states, HFpEF: Grade III diastolic and c/f possible amyloidosis. Mildly decompensated at present. Dyspnea: Mild pulmonary edema on exam, but also with anemia likely contributing. Echo 12/2020 with EF 50% and grade III diastolic dysfx. Her AFwRVR likely decompensating her. Cardiology progress note on 3/22 states, Dyspnea, history of HFpEF probable infiltrative cardiomyopathy. 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 Treatment: BMP daily, CXR, Cardiology consultation, Lasix 40 mg IV BID with one time dose of Bumex 0.5 mg IV and frequent monitoring/vital signs. Thank you,  Herman Cat RN, [facility]  [phone]  Options provided:  -- Acute on Chronic Diastolic CHF/HFpEF  -- Chronic Diastolic CHF/HFpEF  -- No Diastolic CHF. Infiltrative cardiomyopathy with senile amyloidosis  -- Other - I will add my own diagnosis  -- Disagree - Not applicable / Not valid  -- Disagree - Clinically unable to determine / Unknown  -- Refer to Clinical Documentation Reviewer    PROVIDER RESPONSE TEXT:    This patient has chronic diastolic CHF/HFpEF. Query created by:  Zeyad Gregory on 3/23/2021 2:20 PM      Electronically signed by:  Damaris Kunz MD 3/23/2021 2:38 PM

## 2021-03-23 NOTE — PROGRESS NOTES
4:25 PM  CM received notification from [facility], they are able to accept pt for home health services. Aaron Gonzalez    4:08 PM  CM reviewed EMR, PT and OT recommending home with home health. MD in agreement. CM called and spoke to pt's daughter to confirm they would like home health and that they would like a referral sent to [facility]. Confirmed and sent referral, waiting for a response.  Aaron Gonzalez

## 2021-03-23 NOTE — PROGRESS NOTES
Problem: Mobility Impaired (Adult and Pediatric)  Goal: *Acute Goals and Plan of Care (Insert Text)  Description: FUNCTIONAL STATUS PRIOR TO ADMISSION: Patient was modified independent using a rolling walker for functional mobility. HOME SUPPORT PRIOR TO ADMISSION: The patient lived with daughter but did not require assist.    Physical Therapy Goals  Initiated 3/23/2021  1. Patient will move from supine to sit and sit to supine , scoot up and down, and roll side to side in bed with independence within 7 day(s). 2.  Patient will transfer from bed to chair and chair to bed with modified independence using the least restrictive device within 7 day(s). 3.  Patient will perform sit to stand with modified independence within 7 day(s). 4.  Patient will ambulate with modified independence for 200 feet with the least restrictive device within 7 day(s). 5.  Patient will ascend/descend 4 stairs with  handrail(s) with modified independence within 7 day(s).     3/23/2021 1159 by Jamal Zhong, PT  Outcome: Progressing Towards Goal   PHYSICAL THERAPY EVALUATION  Patient: Nicole Echeverria (37 y.o. female)  Date: 3/23/2021  Primary Diagnosis: Acute on chronic heart failure ([facility] [address] [I50.9]        Precautions: fall       ASSESSMENT  Based on the objective data described below, the patient presents with difficulty with ambulation. Communicated with nurse cleared for therapy. Patient supine on bed napping when received. Rolled on the edge of bed CGA, supine to sit CGA. Sit to stand min assist, steady sitting and standing balance. Ambulate with rolling walker in the room towards the recliner. Performed some active range of motion exercise on both LE all planes. Educate and  Instructed patient to continue active range of motion exercise on both legs while up on chair or on bed. Activated chair alarm and notified nurse who agreed to monitor patient.   Current Level of Function Impacting Discharge (mobility/balance): min assist with transfers and ambulation using a rolling walker    Functional Outcome Measure: The patient scored 50/100 on the barthel index outcome measure. Other factors to consider for discharge: fall     Patient will benefit from skilled therapy intervention to address the above noted impairments. PLAN :  Recommendations and Planned Interventions: bed mobility training, transfer training, gait training, therapeutic exercises, neuromuscular re-education, orthotic/prosthetic training, patient and family training/education and therapeutic activities      Frequency/Duration: Patient will be followed by physical therapy:  5 times a week to address goals. Recommendation for discharge: (in order for the patient to meet his/her long term goals)  Physical therapy at least 2 days/week in the home     This discharge recommendation:  Has been made in collaboration with the attending provider and/or case management    IF patient discharges home will need the following DME: patient owns DME required for discharge         SUBJECTIVE:   Patient stated Jeremy Benitez.     OBJECTIVE DATA SUMMARY:   HISTORY:    Past Medical History:   Diagnosis Date    BPPV (benign paroxysmal positional vertigo)     HTN (hypertension)     Hx of completed stroke     Hyperlipidemia     Hypothyroid     [facility]oid arthritis ([facility] [address]     Scoliosis      Past Surgical History:   Procedure Laterality Date    HX  SECTION      HX ORTHOPAEDIC      bunions removed x 2       Personal factors and/or comorbidities impacting plan of care:     Home Situation  Home Environment: Private residence  # Steps to Enter: 4  Rails to Enter: Yes  Hand Rails : Bilateral  Living Alone: No  Support Systems: Child(shyla)  Patient Expects to be Discharged to[de-identified] Private residence  Current DME Used/Available at Home: Walker, rolling  Tub or Shower Type: Shower    EXAMINATION/PRESENTATION/DECISION MAKING:   Critical Behavior:  Neurologic State: Alert  Orientation Level: Oriented to place, Oriented to person, Oriented to situation  Cognition: Follows commands  Safety/Judgement: Awareness of environment  Hearing:       Range Of Motion:  AROM: Within functional limits           PROM: Within functional limits           Strength:    Strength: Generally decreased, functional                    Tone & Sensation:   Tone: Normal              Sensation: Intact               Coordination:  Coordination: Within functional limits  Vision:      Functional Mobility:  Bed Mobility:  Rolling: Contact guard assistance  Supine to Sit: Contact guard assistance  Sit to Supine: Contact guard assistance  Scooting: Contact guard assistance  Transfers:  Sit to Stand: Minimum assistance  Stand to Sit: Minimum assistance  Stand Pivot Transfers: Minimum assistance     Bed to Chair: Minimum assistance              Balance:   Sitting: Intact  Standing: With support;Impaired  Standing - Static: Fair  Standing - Dynamic : Fair  Ambulation/Gait Training:  Distance (ft): 10 Feet (ft)  Assistive Device: Walker, rolling;Gait belt  Ambulation - Level of Assistance: Minimal assistance     Gait Description (WDL): Exceptions to WDL  Gait Abnormalities: Path deviations;Step to gait        Base of Support: Widened     Speed/Amber: Pace decreased (<100 feet/min)  Step Length: Right shortened;Left shortened                       Therapeutic Exercises:    Instructed patient to continue active range of motion exercise on both legs while up on chair or on bed.       Functional Measure:  Barthel Index:    Bathin  Bladder: 5  Bowels: 10  Groomin  Dressin  Feeding: 10  Mobility: 0  Stairs: 5  Toilet Use: 5  Transfer (Bed to Chair and Back): 10  Total: 50/100       The Barthel ADL Index: Guidelines  1. The index should be used as a record of what a patient does, not as a record of what a patient could do.  2. The main aim is to establish degree of independence from any help, physical or verbal, however minor and for  whatever reason. 3. The need for supervision renders the patient not independent. 4. A patient's performance should be established using the best available evidence. Asking the patient, friends/relatives and nurses are the usual sources, but direct observation and common sense are also important. However direct testing is not needed. 5. Usually the patient's performance over the preceding 24-48 hours is important, but occasionally longer periods will be relevant. 6. Middle categories imply that the patient supplies over 50 per cent of the effort. 7. Use of aids to be independent is allowed. Alirio Hopkins., Barthel, D.W. (9167). Functional evaluation: the Barthel Index. [facility] (14)2. Aston Aiken michelle CODIE Holly, Felix English., Mino Zafar., Clementina, [address] [facility] (). Measuring the change indisability after inpatient rehabilitation; comparison of the responsiveness of the Barthel Index and Functional Clayton Measure. Journal of Neurology, Neurosurgery, and Psychiatry, 66(4), 809-237. Earnest Travis N.J.A, THERESA Celeste, & Drew Mcmanus METHAN. (2004.) Assessment of post-stroke quality of life in cost-effectiveness studies: The usefulness of the Barthel Index and the EuroQoL-5D.  Quality of Life Research, 15, 374-99          Physical Therapy Evaluation Charge Determination   History Examination Presentation Decision-Making   LOW Complexity : Zero comorbidities / personal factors that will impact the outcome / POC LOW Complexity : 1-2 Standardized tests and measures addressing body structure, function, activity limitation and / or participation in recreation  LOW Complexity : Stable, uncomplicated  Other outcome measures barthel  LOW       Based on the above components, the patient evaluation is determined to be of the following complexity level: LOW     Pain Ratin/10    Activity Tolerance:   Good    After treatment patient left in no apparent distress:   Sitting in chair, Heels elevated for pressure relief, Call bell within reach and Bed / chair alarm activated    COMMUNICATION/EDUCATION:   The patients plan of care was discussed with: Occupational therapist and Registered nurse. Fall prevention education was provided and the patient/caregiver indicated understanding. Thank you for this referral.  Rachele Alexandre, PT,WCC.    Time Calculation: 28 mins

## 2021-03-23 NOTE — PROGRESS NOTES
Transition of Care Plan:  RUR-25%  1). PT/OT consults  2). Family will transport at dc  3). GI following  4). CM will follow for dc needs  SARAI Hemphill RN

## 2021-03-23 NOTE — CONSULTS
Get Ma DO  [facility] [address] [place] [address], [address] [place] [address], [address] [place] [address]                                       Office [phone],KZO [phone]  Office [phone],TNJ [phone]      Date of  Admission: 3/21/2021  7:43 PM  PCP- Kamilla Leyva MD    Niki Ochoa is a 80 y.o. female admitted for Acute on chronic heart failure ([facility] [address] [I50.9]. 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 We will not be able to pursue rhythm management since patient is currently intolerant of anticoagulation therapy due to symptomatic microcytic anemia. Hold DOAC therapy for microcytic anemia. Recommend to obtain gammopathy panel while patient is admitted. Gentle diuresis. []    High complexity decision making was performed  []    Patient is at high-risk of decompensation with multiple organ involvement      I appreciate the opportunity to be involved in Ms. Garcia. See below note for details. Please do not hesitate to contact us with questions or concerns. Kala Purdy DO      Subjective:  Niki Ochoa is a 80 y.o. female presented to the ED with right knee pain and worsening dyspnea. Patient is well-known to me. She has a history of persistent atrial fibrillation along with probable infiltrative cardiomyopathy. She was noted to be in atrial fibrillation with rapid ventricular response on admission. Laboratory data shows severe microcytic anemia, moderate elevation in NT proBNP with small pleural effusion noted on chest x-ray.     During our interview, patient appears to be having severe muscle cramps within her quadriceps. Past Medical History:   Diagnosis Date    BPPV (benign paroxysmal positional vertigo)     HTN (hypertension)     Hx of completed stroke     Hyperlipidemia     Hypothyroid     Rheumatoid arthritis ([address]     Scoliosis       Past Surgical History:   Procedure Laterality Date    HX  SECTION      HX ORTHOPAEDIC      bunions removed x 2     Allergies   Allergen Reactions    Shellfish Containing Products Swelling     Family History   Problem Relation Age of Onset    Stroke Father       Social History     Tobacco Use    Smoking status: Never Smoker    Smokeless tobacco: Never Used   Substance Use Topics    Alcohol use: No    Drug use: No          Medications:  Medications Prior to Admission   Medication Sig    dilTIAZem ER (TIAZAC) 240 mg capsule Take 240 mg by mouth daily.  losartan (COZAAR) 50 mg tablet Take 50 mg by mouth daily.  furosemide (LASIX) 20 mg tablet Take 1 tablet by mouth once daily    apixaban (ELIQUIS) 2.5 mg tablet Take 1 Tab by mouth two (2) times a day.  levothyroxine (SYNTHROID) 75 mcg tablet Take 75 mcg by mouth Daily (before breakfast).  acetaminophen (TylenoL) 325 mg tablet Take 325 mg by mouth every six (6) hours as needed for Pain.  pantoprazole (PROTONIX) 40 mg tablet Take 1 Tab by mouth daily.      Current Facility-Administered Medications   Medication Dose Route Frequency    0.9% sodium chloride with KCl 20 mEq/L infusion   IntraVENous CONTINUOUS    potassium phosphate 30 mmol in 0.9% sodium chloride 250 mL infusion   IntraVENous ONCE    [START ON 3/24/2021] levothyroxine (SYNTHROID) tablet 100 mcg  100 mcg Oral ACB    lidocaine 4 % patch 1 Patch  1 Patch TransDERmal Q24H    pantoprazole (PROTONIX) 40 mg in 0.9% sodium chloride 10 mL injection  40 mg IntraVENous Q12H    losartan (COZAAR) tablet 50 mg  50 mg Oral DAILY    dilTIAZem ER (CARDIZEM CD) capsule 240 mg  240 mg Oral DAILY    0.9% sodium chloride infusion 250 mL  250 mL IntraVENous PRN    sodium chloride (NS) flush 5-40 mL  5-40 mL IntraVENous Q8H    sodium chloride (NS) flush 5-40 mL  5-40 mL IntraVENous PRN    acetaminophen (TYLENOL) tablet 650 mg  650 mg Oral Q6H PRN    Or    acetaminophen (TYLENOL) suppository 650 mg  650 mg Rectal Q6H PRN         Review of Systems:  Pertinent Positive: Improved dyspnea  Pertinent Negative: No chest pain, orthopnea, PND  All Other systems reviewed and are negative for a Comprehensive ROS (10+)        Physical Exam:  Visit Vitals  BP (!) 139/59 (BP 1 Location: Left upper arm)   Pulse 87   Temp 99.2 °F (37.3 °C)   Resp 16   Ht 5' 2\" (1.575 m)   Wt 100 lb (45.4 kg)   SpO2 94%   BMI 18.29 kg/m²           Gen: Frail, elderly, in no acute distress  HEENT:  Pink conjunctivae, hearing intact to voice, moist mucous membranes  Neck: Supple,No JVD, No Carotid Bruit  Resp: No accessory muscle use, Clear breath sounds, No rales or rhonchi  Card: Tachycardic, irregular Rythm,Normal S1, S2, No murmurs, rubs or gallop. No thrills. Abd:  Soft, non-tender, non-distended, normoactive bowel sounds are present   MSK: No cyanosis or clubbing  Skin: No rashes or ulcers  Neuro:  Cranial nerves are grossly intact, moving all four extremities, no focal deficit, follows commands appropriately  Psych:  Good insight, oriented to person, place and time, alert, Nml Affect  LE: No edema  Vascular:Distal Pulses 2+ and symmetric      Telemetry: AFIB    EKG:           Cardiac Testing/ Procedures:    11/30/20   ECHO ADULT COMPLETE 12/01/2020 12/1/2020    Narrative · LV: Estimated LVEF is 50 - 55%. Biplane method used to measure ejection   fraction. Normal systolic function (ejection fraction normal). Small left   ventricle. Mild concentric hypertrophy. Wall motion: normal. Severe (grade   3) left ventricular diastolic dysfunction. · RV: Borderline low systolic function. · LA: Severely dilated left atrium. · RA: Moderately dilated right atrium.   · AV: Aortic valve leaflet calcification present. Mild aortic valve   stenosis is present. Moderate to severe aortic valve regurgitation is   present. · MV: Mitral valve thickening. Moderate mitral annular calcification. Mild   mitral valve regurgitation is present.   · Minimal late right to left shunting within pulmonary level     Study suggestive of possible infiltrative cardiomyopathy, possible senile   amyloidosis       Signed by: Ladonna Ruiz DO       LABS:    Lab Results   Component Value Date/Time    WBC 6.8 03/23/2021 06:02 AM    HGB 10.3 (L) 03/23/2021 06:02 AM    HCT 33.8 (L) 03/23/2021 06:02 AM    PLATELET 831 36/46/3301 06:02 AM    MCV 73.2 (L) 03/23/2021 06:02 AM     Lab Results   Component Value Date/Time    Sodium 137 03/23/2021 06:02 AM    Potassium 3.1 (L) 03/23/2021 06:02 AM    Chloride 100 03/23/2021 06:02 AM    CO2 31 03/23/2021 06:02 AM    Anion gap 6 03/23/2021 06:02 AM    Glucose 114 (H) 03/23/2021 06:02 AM    BUN 24 (H) 03/23/2021 06:02 AM    Creatinine 1.18 (H) 03/23/2021 06:02 AM    BUN/Creatinine ratio 20 03/23/2021 06:02 AM    GFR est AA 52 (L) 03/23/2021 06:02 AM    GFR est non-AA 43 (L) 03/23/2021 06:02 AM    Calcium 9.5 03/23/2021 06:02 AM     Lab Results   Component Value Date/Time    CK 52 10/29/2013 10:45 AM    CK-MB Index 1.7 10/29/2013 10:45 AM    Troponin-I, Qt. <0.05 03/23/2021 06:02 AM     Lab Results   Component Value Date/Time    aPTT <20.0 (L) 11/30/2020 12:49 PM     Lab Results   Component Value Date/Time    INR 1.1 08/14/2019 08:18 PM    INR 1.1 07/12/2016 08:19 AM    INR 1.1 06/22/2016 08:27 AM    Prothrombin time 11.0 08/14/2019 08:18 PM    Prothrombin time 11.4 (H) 07/12/2016 08:19 AM    Prothrombin time 11.1 06/22/2016 08:27 AM           Sara Rascon DO

## 2021-03-23 NOTE — PROGRESS NOTES
Yang Sidd[address]  [address] [place] [address]  [phone]      Medical Progress Note      NAME: Filippo    :  10/12/1930  MRM:  [id number]    Date/Time of service: 3/23/2021  3:26 PM       Subjective:     Chief Complaint:  Patient was personally seen and examined by me during this time period. Chart reviewed. No chest pain, SOB       Objective:       Vitals:       Last 24hrs VS reviewed since prior progress note.  Most recent are:    Visit Vitals  /61 (BP 1 Location: Left lower arm, BP Patient Position: At rest)   Pulse 93   Temp 98.9 °F (37.2 °C)   Resp 16   Ht 5' 2\" (1.575 m)   Wt 45.4 kg (100 lb)   SpO2 99%   BMI 18.29 kg/m²     SpO2 Readings from Last 6 Encounters:   21 99%   21 94%   21 99%   20 98%   20 93%   20 98%            Intake/Output Summary (Last 24 hours) at 3/23/2021 1526  Last data filed at 3/23/2021 1337  Gross per 24 hour   Intake 1371.67 ml   Output 900 ml   Net 471.67 ml        Exam:     Physical Exam:    Gen:  Elderly, thin, frail, NAD  HEENT:  Pink conjunctivae, PERRL, hearing intact to voice, moist mucous membranes  Neck:  Supple, without masses, thyroid non-tender  Resp:  No accessory muscle use, clear breath sounds without wheezes rales or rhonchi  Card:  No murmurs, normal S1, S2 without thrills, bruits or peripheral edema  Abd:  Soft, non-tender, non-distended, normoactive bowel sounds are present  Musc:  No cyanosis or clubbing  Skin:  No rashes  Neuro:  Cranial nerves 3-12 are grossly intact, follows commands appropriately  Psych:  fair insight, oriented to person, place and time, alert    Medications Reviewed: (see below)    Lab Data Reviewed: (see below)    ______________________________________________________________________    Medications:     Current Facility-Administered Medications   Medication Dose Route Frequency    0.9% sodium chloride with KCl 20 mEq/L infusion   IntraVENous CONTINUOUS    [START ON 3/24/2021] levothyroxine (SYNTHROID) tablet 100 mcg  100 mcg Oral ACB    lidocaine 4 % patch 1 Patch  1 Patch TransDERmal Q24H    pantoprazole (PROTONIX) 40 mg in 0.9% sodium chloride 10 mL injection  40 mg IntraVENous Q12H    losartan (COZAAR) tablet 50 mg  50 mg Oral DAILY    dilTIAZem ER (CARDIZEM CD) capsule 240 mg  240 mg Oral DAILY    0.9% sodium chloride infusion 250 mL  250 mL IntraVENous PRN    sodium chloride (NS) flush 5-40 mL  5-40 mL IntraVENous Q8H    sodium chloride (NS) flush 5-40 mL  5-40 mL IntraVENous PRN    acetaminophen (TYLENOL) tablet 650 mg  650 mg Oral Q6H PRN    Or    acetaminophen (TYLENOL) suppository 650 mg  650 mg Rectal Q6H PRN          Lab Review:     Recent Labs     03/23/21  0602 03/22/21  0156 03/21/21 2032   WBC 6.8 6.0 5.9   HGB 10.3* 7.1* 7.3*   HCT 33.8* 23.6* 24.6*    159 185     Recent Labs     03/23/21  0602 03/22/21  0156 03/21/21 2032    142 140   K 3.1* 3.0* 3.4*    110* 108   CO2 31 26 25   * 110* 169*   BUN 24* 37* 40*   CREA 1.18* 1.11* 1.36*   CA 9.5 8.4* 8.8   MG 2.4 2.2  --    PHOS 2.2*  --   --    ALB  --   --  3.0*   TBILI  --   --  0.3   ALT  --   --  24     Lab Results   Component Value Date/Time    Glucose (POC) 124 (H) 03/23/2021 05:56 AM    Glucose (POC) 145 (H) 11/30/2020 11:41 PM    Glucose (POC) 99 12/27/2019 08:03 PM    Glucose (POC) 116 (H) 08/16/2019 04:44 PM    Glucose (POC) 109 (H) 08/16/2019 11:56 AM          Assessment / Plan:     79 yo hx of HTN, DM, dCHF, Pafib, CVA, hypothyroid, RA, presented w/ LE edema, afib RVR, iron def anemia    1) Dyspnea/pulm edema/acute on chronic dCHF: EF 50% with grade 3 diastolic dysfunction. Cont IV diuresis prn, losartan     2) Afib RVR: Cont dilt. Resume eliquis if Hgb stable. Consult cards    3) Iron def anemia: s/p 1u RBCs. Will give IV iron. Cont to monitor Hgb closely, transfuse prn. Consult GI    4) Renal insuff: likely has underlying CKD 3. Will monitor closley    5) LE edema: due to CHF. LE dopplers neg DVT    6) Hypothyroid: TSH 19.7. Synthroid was increased     7) DM type 2: A1C 5.8%.   Cont diet control     8) HTN: cont losartan    Total time spent with patient: 39 Minutes **I personally saw and examined the patient during this time period**                 Care Plan discussed with: Patient, nursing     Discussed:  Care Plan    Prophylaxis:  SCD's    Disposition:   PT, OT, RN           ___________________________________________________    Attending Physician: Ritchie Rosales MD

## 2021-03-23 NOTE — PROGRESS NOTES
Problem: Self Care Deficits Care Plan (Adult)  Goal: *Acute Goals and Plan of Care (Insert Text)  Description:   FUNCTIONAL STATUS PRIOR TO ADMISSION: Patient reports able to perform ADLs, but daughter assists with IADL tasks. HOME SUPPORT: The patient lived with daughter. Occupational Therapy Goals  Initiated 3/23/2021  1. Patient will perform lower body dressing with supervision/set-up within 7 day(s). 2.  Patient will perform grooming with supervision/set-up within 7 day(s). 3.  Patient will perform toilet transfers with supervision/set-up within 7 day(s). 4.  Patient will perform all aspects of toileting with supervision/set-up within 7 day(s). 5.  Patient will participate in upper extremity therapeutic exercise/activities with supervision/set-up for 10 minutes within 7 day(s). 6.  Patient will utilize energy conservation techniques during functional activities with verbal cues within 7 day(s). Outcome: Progressing Towards Goal   OCCUPATIONAL THERAPY EVALUATION  Patient: Rolan Medrano (89 y.o. female)  Date: 3/23/2021  Primary Diagnosis: Acute on chronic heart failure ([facility] [address] [I50.9]        Precautions: fall       ASSESSMENT  Based on the objective data described below, the patient presents with hospital admission secondary to acute on chronic heart failure. Patient Patient with rapid response called early this morning secondary to AFib with sustained tachycardia. Patient supine in bed, resting. She reports being very sleepy, but agreeable to activity. She requires CGA to come to sitting at EOB. Once seated, patient is able to don  socks over compression hose already donned, with light assist. Patient perform sit to stand and transfers with min assist using RW . Patient reports using RW at all times at home. Recommend continued OT services to maximize function with HH.     Current Level of Function Impacting Discharge (ADLs/self-care): min assist -CGA     Functional Outcome Measure: The patient scored 50/100 on the Barthel INdex  outcome measure. Other factors to consider for discharge: lives with family, not alone during day hours     Patient will benefit from skilled therapy intervention to address the above noted impairments. PLAN :  Recommendations and Planned Interventions: self care training, functional mobility training, therapeutic exercise, balance training, therapeutic activities, endurance activities, patient education, home safety training, and family training/education    Frequency/Duration: Patient will be followed by occupational therapy 5 times a week to address goals. Recommendation for discharge: (in order for the patient to meet his/her long term goals)  Occupational therapy at least 2 days/week in the home     This discharge recommendation:  Has not yet been discussed the attending provider and/or case management    IF patient discharges home will need the following DME: none       SUBJECTIVE:   Patient stated I am so sleepy.     OBJECTIVE DATA SUMMARY:   HISTORY:   Past Medical History:   Diagnosis Date    BPPV (benign paroxysmal positional vertigo)     HTN (hypertension)     Hx of completed stroke     Hyperlipidemia     Hypothyroid     Rheumatoid arthritis ([facility] [address]     Scoliosis      Past Surgical History:   Procedure Laterality Date    HX  SECTION      HX ORTHOPAEDIC      bunions removed x 2       Expanded or extensive additional review of patient history:     Home Situation  Home Environment: Private residence  # Steps to Enter: 4  Rails to Enter: Yes  Hand Rails : Bilateral  Living Alone: No  Support Systems: Child(shyla)  Patient Expects to be Discharged to[de-identified] Private residence  Current DME Used/Available at Home: Walker, rolling  Tub or Shower Type: Shower    Hand dominance: Right    EXAMINATION OF PERFORMANCE DEFICITS:  Cognitive/Behavioral Status:  Neurologic State: Alert  Orientation Level: Oriented to place;Oriented to person;Oriented to situation  Cognition: Follows commands  Perception: Appears intact  Perseveration: No perseveration noted  Safety/Judgement: Awareness of environment    Skin: intact as seen    Edema: none noted     Hearing:       Vision/Perceptual:                                     Range of Motion:  AROM: Within functional limits  PROM: Within functional limits                      Strength:  Strength: Generally decreased, functional                Coordination:  Coordination: Within functional limits  Fine Motor Skills-Upper: Left Intact; Right Intact    Gross Motor Skills-Upper: Left Intact; Right Intact    Tone & Sensation:    Tone: Normal  Sensation: Intact                      Balance:  Sitting: Intact  Standing: With support    Functional Mobility and Transfers for ADLs:  Bed Mobility:  Supine to Sit: Contact guard assistance  Scooting: Contact guard assistance    Transfers:  Sit to Stand: Minimum assistance  Stand to Sit: Minimum assistance  Bed to Chair: Minimum assistance  Toilet Transfer : Minimum assistance  Assistive Device : Walker, rolling    ADL Assessment:  Feeding: Supervision    Oral Facial Hygiene/Grooming: Minimum assistance    Bathing: Minimum assistance    Upper Body Dressing: Minimum assistance    Lower Body Dressing: Minimum assistance    Toileting: Minimum assistance                ADL Intervention and task modifications:                                     Cognitive Retraining  Safety/Judgement: Awareness of environment    Therapeutic Exercise:     Functional Measure:  Barthel Index:    Bathin  Bladder: 5  Bowels: 10  Groomin  Dressin  Feeding: 10  Mobility: 0  Stairs: 5  Toilet Use: 5  Transfer (Bed to Chair and Back): 10  Total: 50/100        The Barthel ADL Index: Guidelines  1. The index should be used as a record of what a patient does, not as a record of what a patient could do.   2. The main aim is to establish degree of independence from any help, physical or verbal, however minor and for whatever reason. 3. The need for supervision renders the patient not independent. 4. A patient's performance should be established using the best available evidence. Asking the patient, friends/relatives and nurses are the usual sources, but direct observation and common sense are also important. However direct testing is not needed. 5. Usually the patient's performance over the preceding 24-48 hours is important, but occasionally longer periods will be relevant. 6. Middle categories imply that the patient supplies over 50 per cent of the effort. 7. Use of aids to be independent is allowed. Marcial Neri., Barthel, DVioletW. (2521). Functional evaluation: the Barthel Index. [facility] (14)2. Jasmin Alvarenga michelle CODIE Holly, Eric Salas., Tressa Ulloa., [facility], [address] (1999). Measuring the change indisability after inpatient rehabilitation; comparison of the responsiveness of the Barthel Index and Functional [place] Measure. Journal of Neurology, Neurosurgery, and Psychiatry, 66(4), 081-195. Freddy Franklin, N.J.A, THERESA Celeste, & Maddie Henriquez M.A. (2004.) Assessment of post-stroke quality of life in cost-effectiveness studies: The usefulness of the Barthel Index and the EuroQoL-5D.  Quality of Life Research, 15, 250-30         Occupational Therapy Evaluation Charge Determination   History Examination Decision-Making   LOW Complexity : Brief history review  LOW Complexity : 1-3 performance deficits relating to physical, cognitive , or psychosocial skils that result in activity limitations and / or participation restrictions  LOW Complexity : No comorbidities that affect functional and no verbal or physical assistance needed to complete eval tasks       Based on the above components, the patient evaluation is determined to be of the following complexity level: LOW   Pain Rating:      Activity Tolerance:   Fair    After treatment patient left in no apparent distress:    Sitting in chair, Call bell within reach, and Bed / chair alarm activated    COMMUNICATION/EDUCATION:   The patients plan of care was discussed with: Physical therapist and Registered nurse. Home safety education was provided and the patient/caregiver indicated understanding., Patient/family have participated as able in goal setting and plan of care. , and Patient/family agree to work toward stated goals and plan of care. This patients plan of care is appropriate for delegation to [facility].     Thank you for this referral.  Jessica Spann, OTR/L  Time Calculation: 19 mins

## 2021-03-23 NOTE — PROGRESS NOTES
Bedside and Verbal shift change report given to [address] [place] [address],[address] (oncoming nurse) by Tianna Bae RN (offgoing nurse). Report included the following information SBAR, Kardex, MAR, Accordion and Recent Results.

## 2021-03-23 NOTE — PROGRESS NOTES
0550: Rapid response called at this time due to sustained tachycardia. 7765: RN calls MD at this time. MD orders for AM dose of diltiazem to be given at this time.

## 2021-03-23 NOTE — CONSULTS
[address] [place] [address]. [address] [place] [address] NP  [phone]                    GASTROENTEROLOGY CONSULTATION NOTE              NAME:  Zachery Kim   :   10/12/1930   MRN:   [id number]       Referring Physician:   Dr. Zahra Grijalva Date:   3/23/2021 3:54 PM    Chief Complaint:    Anemia     History of Present Illness:    Patient is a 80 y.o. who we have been asked to see in consultation for the above complaints. The patient presented to the ER with complaints of knee pain and shortness of breath. She has been admitted and currently being treated for CHF. She was taking Eliquis for A fib as outpatient. She was noted to have a hemoglobin of 7.3 on admission. A hemoccult test is pending. Of note she is a poor historian and no family were at bedside so this HPI has been obtained from chart review. PMH:  Past Medical History:   Diagnosis Date    BPPV (benign paroxysmal positional vertigo)     HTN (hypertension)     Hx of completed stroke     Hyperlipidemia     Hypothyroid     Rheumatoid arthritis (HCC)     Scoliosis        PSH:  Past Surgical History:   Procedure Laterality Date    HX  SECTION      HX ORTHOPAEDIC      bunions removed x 2       Allergies: Allergies   Allergen Reactions    Shellfish Containing Products Swelling       Home Medications:  Prior to Admission Medications   Prescriptions Last Dose Informant Patient Reported? Taking?   acetaminophen (TylenoL) 325 mg tablet  Self Yes Yes   Sig: Take 325 mg by mouth every six (6) hours as needed for Pain. apixaban (ELIQUIS) 2.5 mg tablet  Self No Yes   Sig: Take 1 Tab by mouth two (2) times a day. dilTIAZem ER (TIAZAC) 240 mg capsule  Self Yes Yes   Sig: Take 240 mg by mouth daily. furosemide (LASIX) 20 mg tablet  Self No Yes   Sig: Take 1 tablet by mouth once daily   levothyroxine (SYNTHROID) 75 mcg tablet  Self Yes Yes   Sig: Take 75 mcg by mouth Daily (before breakfast).    losartan (COZAAR) 50 mg tablet  Self Yes Yes   Sig: Take 50 mg by mouth daily. pantoprazole (PROTONIX) 40 mg tablet  Self No Yes   Sig: Take 1 Tab by mouth daily.       Facility-Administered Medications: None       Hospital Medications:  Current Facility-Administered Medications   Medication Dose Route Frequency    0.9% sodium chloride with KCl 20 mEq/L infusion   IntraVENous CONTINUOUS    [START ON 3/24/2021] levothyroxine (SYNTHROID) tablet 100 mcg  100 mcg Oral ACB    [START ON 3/24/2021] iron sucrose (VENOFER) injection 100 mg  100 mg IntraVENous DAILY    lidocaine 4 % patch 1 Patch  1 Patch TransDERmal Q24H    pantoprazole (PROTONIX) 40 mg in 0.9% sodium chloride 10 mL injection  40 mg IntraVENous Q12H    losartan (COZAAR) tablet 50 mg  50 mg Oral DAILY    dilTIAZem ER (CARDIZEM CD) capsule 240 mg  240 mg Oral DAILY    0.9% sodium chloride infusion 250 mL  250 mL IntraVENous PRN    sodium chloride (NS) flush 5-40 mL  5-40 mL IntraVENous Q8H    sodium chloride (NS) flush 5-40 mL  5-40 mL IntraVENous PRN    acetaminophen (TYLENOL) tablet 650 mg  650 mg Oral Q6H PRN    Or    acetaminophen (TYLENOL) suppository 650 mg  650 mg Rectal Q6H PRN       Social History:  Social History     Tobacco Use    Smoking status: Never Smoker    Smokeless tobacco: Never Used   Substance Use Topics    Alcohol use: No       Family History:  Family History   Problem Relation Age of Onset    Stroke Father        Review of Systems:  Unable to obtain    Objective:     Patient Vitals for the past 8 hrs:   BP Temp Pulse Resp SpO2   03/23/21 1448 115/61 98.9 °F (37.2 °C) 93 16 99 %   03/23/21 1423   (!) 101     03/23/21 1200 111/61 97.9 °F (36.6 °C) 91 16 99 %   03/23/21 1026 (!) 139/59  87     03/23/21 0956 (!) 143/63       03/23/21 0849 138/62 99.2 °F (37.3 °C) 100 16 94 %     03/23 0701 - 03/23 1900  In: 971.7 [P.O.:480; I.V.:491.7]  Out: -   03/21 1901 - 03/23 0700  In: 1250 [P.O.:900]  Out: 3200 [Urine:3200]    EXAM:     NEURO-alert, oriented x3, affect appropriate   HEENT-Head: Normocephalic, no lesions, without obvious abnormality. LUNGS-clear to auscultation bilaterally    COR-regular rate and rhythym     ABD- soft, non-tender. Bowel sounds normal. No masses,  no organomegaly     EXT-no edema    Skin - No rash     Data Review     Recent Labs     03/23/21  0602 03/22/21  0156   WBC 6.8 6.0   HGB 10.3* 7.1*   HCT 33.8* 23.6*    159     Recent Labs     03/23/21  0602 03/22/21  0156    142   K 3.1* 3.0*    110*   CO2 31 26   BUN 24* 37*   CREA 1.18* 1.11*   * 110*   PHOS 2.2*  --    CA 9.5 8.4*     Recent Labs     03/21/21 2032   *   TP 7.2   ALB 3.0*   GLOB 4.2*     No results for input(s): INR, PTP, APTT, INREXT in the last 72 hours. Patient Active Problem List   Diagnosis Code    HTN (hypertension) I10    GERD (gastroesophageal reflux disease) K21.9    Osteopenia M85.80    Diabetic retinopathy ([facility] [address] E11.319    Hypothyroidism E03.9    Hyperlipidemia E78.5    Menieres disease H81.09    Scoliosis M41.9    Rheumatoid arthritis ([facility] [address] M06.9    Hypothyroid E03.9    Hx of completed stroke Z80.78    DM type 2 causing vascular disease ([facility] [address] E11.59    Hypokalemia I02.0    Diastolic CHF, acute on chronic (HCC) I50.33    Pleural effusion J90    Acute on chronic heart failure (HCC) I50.9       Assessment and Plan:  Iron Deficiency Anemia:    - Continue monitoring hemoglobin  - Continue to hold Eliquis  - IV PPI BID  - No NSAIDs  - Follow results of hemocult test which is pending collection  - Could consider endoscopic evaluation pending results of stool test, optimization of cardiopulmonary status, and discussion with pt and family about procedures. No urgent plans for this as she is not actively bleeding. Following      Thanks for allowing me to participate in the care of this patient.   Signed By: Clarice Murillo NP     3/23/2021  3:54 PM

## 2021-03-23 NOTE — PROGRESS NOTES
RRT called. HR jumping up to the 120s, occasionally the 150s. Pt asx, /95. Labs drawn and sent to lab. Dr. Rao Merritt paged, but requested we page Dr. Josef Hodgson. Primary nurse called Dr. Josef Hodgson to talk about treatment plan. Dr. Josef Hodgson wants morning diltiazem given early. 1460: Dr. Josef Hodgson at bedside to assess pt.   0631: Primary nurse at bedside to give medications. Asked nurse to call if any questions or concerns.

## 2021-03-23 NOTE — PROGRESS NOTES
0700: Bedside and Verbal shift change report given to [address] [place] [address] (oncoming nurse) by Beatriz Moses (offgoing nurse). Report included the following information SBAR, Kardex, Procedure Summary, Intake/Output, MAR, Accordion, Recent Results and Med Rec Status.

## 2021-03-24 PROBLEM — D50.9 IRON DEFICIENCY ANEMIA: Status: ACTIVE | Noted: 2021-03-24

## 2021-03-24 PROBLEM — I48.20 CHRONIC A-FIB (HCC): Chronic | Status: ACTIVE | Noted: 2021-03-24

## 2021-03-24 LAB
ANION GAP SERPL CALC-SCNC: 3 MMOL/L (ref 5–15)
ATRIAL RATE: 133 BPM
BUN SERPL-MCNC: 27 MG/DL (ref 6–20)
BUN/CREAT SERPL: 28 (ref 12–20)
CALCIUM SERPL-MCNC: 9.5 MG/DL (ref 8.5–10.1)
CALCULATED R AXIS, ECG10: -59 DEGREES
CALCULATED T AXIS, ECG11: 42 DEGREES
CHLORIDE SERPL-SCNC: 104 MMOL/L (ref 97–108)
CO2 SERPL-SCNC: 30 MMOL/L (ref 21–32)
CREAT SERPL-MCNC: 0.98 MG/DL (ref 0.55–1.02)
DIAGNOSIS, 93000: NORMAL
ERYTHROCYTE [DISTWIDTH] IN BLOOD BY AUTOMATED COUNT: 17.2 % (ref 11.5–14.5)
GLUCOSE SERPL-MCNC: 109 MG/DL (ref 65–100)
HCT VFR BLD AUTO: 33.9 % (ref 35–47)
HGB BLD-MCNC: 9.9 G/DL (ref 11.5–16)
MAGNESIUM SERPL-MCNC: 2.3 MG/DL (ref 1.6–2.4)
MCH RBC QN AUTO: 21.9 PG (ref 26–34)
MCHC RBC AUTO-ENTMCNC: 29.2 G/DL (ref 30–36.5)
MCV RBC AUTO: 74.8 FL (ref 80–99)
NRBC # BLD: 0 K/UL (ref 0–0.01)
NRBC BLD-RTO: 0 PER 100 WBC
PHOSPHATE SERPL-MCNC: 2.4 MG/DL (ref 2.6–4.7)
PLATELET # BLD AUTO: 188 K/UL (ref 150–400)
PMV BLD AUTO: 10.3 FL (ref 8.9–12.9)
POTASSIUM SERPL-SCNC: 4.1 MMOL/L (ref 3.5–5.1)
Q-T INTERVAL, ECG07: 346 MS
QRS DURATION, ECG06: 78 MS
QTC CALCULATION (BEZET), ECG08: 499 MS
RBC # BLD AUTO: 4.53 M/UL (ref 3.8–5.2)
SODIUM SERPL-SCNC: 137 MMOL/L (ref 136–145)
VENTRICULAR RATE, ECG03: 125 BPM
WBC # BLD AUTO: 6.7 K/UL (ref 3.6–11)

## 2021-03-24 PROCEDURE — 74011000250 HC RX REV CODE- 250: Performed by: FAMILY MEDICINE

## 2021-03-24 PROCEDURE — 74011250637 HC RX REV CODE- 250/637: Performed by: FAMILY MEDICINE

## 2021-03-24 PROCEDURE — 97530 THERAPEUTIC ACTIVITIES: CPT

## 2021-03-24 PROCEDURE — 97116 GAIT TRAINING THERAPY: CPT

## 2021-03-24 PROCEDURE — C9113 INJ PANTOPRAZOLE SODIUM, VIA: HCPCS | Performed by: FAMILY MEDICINE

## 2021-03-24 PROCEDURE — 36415 COLL VENOUS BLD VENIPUNCTURE: CPT

## 2021-03-24 PROCEDURE — 94760 N-INVAS EAR/PLS OXIMETRY 1: CPT

## 2021-03-24 PROCEDURE — 85027 COMPLETE CBC AUTOMATED: CPT

## 2021-03-24 PROCEDURE — 80048 BASIC METABOLIC PNL TOTAL CA: CPT

## 2021-03-24 PROCEDURE — 99232 SBSQ HOSP IP/OBS MODERATE 35: CPT | Performed by: STUDENT IN AN ORGANIZED HEALTH CARE EDUCATION/TRAINING PROGRAM

## 2021-03-24 PROCEDURE — 74011250637 HC RX REV CODE- 250/637: Performed by: NURSE PRACTITIONER

## 2021-03-24 PROCEDURE — 74011250637 HC RX REV CODE- 250/637: Performed by: INTERNAL MEDICINE

## 2021-03-24 PROCEDURE — 84100 ASSAY OF PHOSPHORUS: CPT

## 2021-03-24 PROCEDURE — 74011250636 HC RX REV CODE- 250/636: Performed by: INTERNAL MEDICINE

## 2021-03-24 PROCEDURE — 74011250636 HC RX REV CODE- 250/636: Performed by: FAMILY MEDICINE

## 2021-03-24 PROCEDURE — 83735 ASSAY OF MAGNESIUM: CPT

## 2021-03-24 RX ORDER — DILTIAZEM HYDROCHLORIDE 180 MG/1
360 CAPSULE, COATED, EXTENDED RELEASE ORAL DAILY
Status: DISCONTINUED | OUTPATIENT
Start: 2021-03-25 | End: 2021-03-24 | Stop reason: HOSPADM

## 2021-03-24 RX ORDER — LEVOTHYROXINE SODIUM 100 UG/1
100 TABLET ORAL
Qty: 30 TAB | Refills: 0 | Status: ON HOLD | OUTPATIENT
Start: 2021-03-25 | End: 2021-06-09 | Stop reason: SDUPTHER

## 2021-03-24 RX ORDER — TRAMADOL HYDROCHLORIDE 50 MG/1
50 TABLET ORAL
Status: DISCONTINUED | OUTPATIENT
Start: 2021-03-24 | End: 2021-03-24 | Stop reason: HOSPADM

## 2021-03-24 RX ORDER — DILTIAZEM HYDROCHLORIDE 360 MG/1
360 CAPSULE, EXTENDED RELEASE ORAL DAILY
Qty: 30 CAP | Refills: 0 | Status: SHIPPED | OUTPATIENT
Start: 2021-03-25 | End: 2021-05-22

## 2021-03-24 RX ORDER — DILTIAZEM HYDROCHLORIDE 30 MG/1
60 TABLET, FILM COATED ORAL ONCE
Status: COMPLETED | OUTPATIENT
Start: 2021-03-24 | End: 2021-03-24

## 2021-03-24 RX ORDER — FERROUS GLUCONATE 324(37.5)
324 TABLET ORAL
Qty: 30 TAB | Refills: 0 | Status: SHIPPED | OUTPATIENT
Start: 2021-03-24 | End: 2021-06-17

## 2021-03-24 RX ADMIN — IRON SUCROSE 100 MG: 20 INJECTION, SOLUTION INTRAVENOUS at 10:52

## 2021-03-24 RX ADMIN — PANTOPRAZOLE SODIUM 40 MG: 40 INJECTION, POWDER, FOR SOLUTION INTRAVENOUS at 10:51

## 2021-03-24 RX ADMIN — DILTIAZEM HYDROCHLORIDE 60 MG: 30 TABLET, FILM COATED ORAL at 10:57

## 2021-03-24 RX ADMIN — LEVOTHYROXINE SODIUM 100 MCG: 0.1 TABLET ORAL at 06:47

## 2021-03-24 RX ADMIN — LOSARTAN POTASSIUM 50 MG: 50 TABLET, FILM COATED ORAL at 10:52

## 2021-03-24 RX ADMIN — DILTIAZEM HYDROCHLORIDE 240 MG: 240 CAPSULE, COATED, EXTENDED RELEASE ORAL at 04:36

## 2021-03-24 NOTE — PROGRESS NOTES
Problem: Mobility Impaired (Adult and Pediatric)  Goal: *Acute Goals and Plan of Care (Insert Text)  Description: FUNCTIONAL STATUS PRIOR TO ADMISSION: Patient was modified independent using a rolling walker for functional mobility. HOME SUPPORT PRIOR TO ADMISSION: The patient lived with daughter but did not require assist.    Physical Therapy Goals  Initiated 3/23/2021  1. Patient will move from supine to sit and sit to supine , scoot up and down, and roll side to side in bed with independence within 7 day(s). 2.  Patient will transfer from bed to chair and chair to bed with modified independence using the least restrictive device within 7 day(s). 3.  Patient will perform sit to stand with modified independence within 7 day(s). 4.  Patient will ambulate with modified independence for 200 feet with the least restrictive device within 7 day(s). 5.  Patient will ascend/descend 4 stairs with  handrail(s) with modified independence within 7 day(s). Note:   PHYSICAL THERAPY TREATMENT  Patient: Janeen Garcia (3 y.o. female)  Date: 3/24/2021  Diagnosis: Acute on chronic heart failure (HCC) [I50.9] Iron deficiency anemia       Precautions:    Chart, physical therapy assessment, plan of care and goals were reviewed. ASSESSMENT  Patient continues with skilled PT services. Pt seen this AM.Pt supine to sit  with min assist.Pt sit to stand from bed with min assist.Pt ambulated 25ft with RW min assist.Pt left sitting. Pt progressing slowly. Continue goals. Current Level of Function Impacting Discharge (mobility/balance): Pt is min assist for mobility. PLAN :  Patient continues to benefit from skilled intervention to address the above impairments. Continue treatment per established plan of care. to address goals.     Recommendation for discharge: (in order for the patient to meet his/her long term goals)  Physical therapy at least 2 days/week in the home     This discharge recommendation:  Has been made in collaboration with the attending provider and/or case management    IF patient discharges home will need the following DME: rolling walker       SUBJECTIVE:       OBJECTIVE DATA SUMMARY:   Critical Behavior:  Neurologic State: Alert  Orientation Level: Disoriented to time, Oriented to person, Oriented to place, Oriented to situation  Cognition: Follows commands  Safety/Judgement: Awareness of environment  Functional Mobility Training:  Bed Mobility:        Sit to Supine: Minimum assistance           Transfers:  Sit to Stand: Minimum assistance  Stand to Sit: Minimum assistance                             Balance:  Standing: With support  Ambulation/Gait Training:      Pt ambulated 25ft with RW min assist          Activity Tolerance:   Fair to good    After treatment patient left in no apparent distress:   Sitting in chair    COMMUNICATION/COLLABORATION:   The patients plan of care was discussed with: Physical therapist.     Saad Rawls PTA   Time Calculation: 23 mins

## 2021-03-24 NOTE — PROGRESS NOTES
Cardiology Progress Note      5th floor    NAME:  Encarnacion Shone   :   10/12/1930   MRN:   [id number]     Assessment/Plan:   1. Persistent a fib: RVR overnight, dilt given at 4 am.  Inc po dilt to 360 mg every day. Holding anticoagulation 2/2 anemia. 2. Hx HTN  3. Hx HF p EF: probable infiltrative cardiomyopathy, gammopathy panel pending. 4. Anemia: GI following, considering endoscopy if FOBT is positive. 5. Hx dementia  6. Hx CVA  7. Hypothyroidism:          Subjective:   HPI: Encarnacion Shone is a 80 y.o. female presented to the ED with right knee pain and worsening dyspnea. She has a history of persistent atrial fibrillation along with probable infiltrative cardiomyopathy. She was noted to be in atrial fibrillation with rapid ventricular response on admission. Laboratory data shows severe microcytic anemia, moderate elevation in NT proBNP with small pleural effusion noted on chest x-ray. Cardiac ROS: Patient denies any exertional chest pain, dyspnea, palpitations, syncope, orthopnea, edema or paroxysmal nocturnal dyspnea. Previous Cardiac Eval  1) echocardiogram  2020: EF 50 to 16% grade 3 diastolic dysfunction ave AMPARO moderate to severe aortic valve regurgitation mild left regurgitation, questionable senile amyloidosis    2) cholesterol  8/15/2019: , HDL 72, LDL 88,    Review of Systems: No nausea, indigestion, vomiting, pain, cough, sputum. No bleeding. Taking po. Appetite ok. No further leg cramping. Sitting on edge of bed. Objective:     Visit Vitals  /86   Pulse (!) 111   Temp 98.2 °F (36.8 °C)   Resp 16   Ht 5' 2\" (1.575 m)   Wt 49.3 kg (108 lb 9.6 oz)   SpO2 96%   BMI 19.86 kg/m²      O2 Device: Room air    Temp (24hrs), Av.6 °F (37 °C), Min:97.9 °F (36.6 °C), Max:99.2 °F (37.3 °C)      No intake/output data recorded.  1901 -  0700  In: 2091.7 [P.O.:1000;  I.V.:1091.7]  Out: 900 [Urine:900]  TELE: a fib RVR General: AAOx3 cooperative, no acute distress. [place]   HEENT: Atraumatic. Pink and moist.  Anicteric sclerae. Neck : Supple, no thyromegaly. Lungs: CTA bilaterally. No wheezing/rhonchi/rales. Heart: irregular rhythm, no murmur, no rubs, no gallops. No JVD. No carotid bruits. Abdomen: Soft, non-distended, non-tender. + Bowel sounds. Extremities: No edema, no clubbing, no cyanosis. Neurologic: Grossly intact. Alert and oriented X 3. No acute neurological distress. Psych: Fair insight. Not anxious nor agitated. Care Plan discussed with:    Comments   Patient x    Family      RN x    Care Manager                    Consultant:  [email or web address]        Data Review:     No lab exists for component: ITNL   Recent Labs     03/23/21  0602 03/21/21 2032   TROIQ <0.05 <0.05     Recent Labs     03/24/21  0629 03/23/21  0602 03/22/21  0156 03/21/21 2032    137 142 140   K 4.1 3.1* 3.0* 3.4*    100 110* 108   CO2 30 31 26 25   BUN 27* 24* 37* 40*   CREA 0.98 1.18* 1.11* 1.36*   * 114* 110* 169*   PHOS 2.4* 2.2*  --   --    MG 2.3 2.4 2.2  --    ALB  --   --   --  3.0*   WBC 6.7 6.8 6.0 5.9   HGB 9.9* 10.3* 7.1* 7.3*   HCT 33.9* 33.8* 23.6* 24.6*    196 159 185     No results for input(s): INR, PTP, APTT, INREXT in the last 72 hours.     Medications reviewed  Current Facility-Administered Medications   Medication Dose Route Frequency    traMADoL (ULTRAM) tablet 50 mg  50 mg Oral Q6H PRN    0.9% sodium chloride with KCl 20 mEq/L infusion   IntraVENous CONTINUOUS    levothyroxine (SYNTHROID) tablet 100 mcg  100 mcg Oral ACB    iron sucrose (VENOFER) injection 100 mg  100 mg IntraVENous DAILY    lidocaine 4 % patch 1 Patch  1 Patch TransDERmal Q24H    pantoprazole (PROTONIX) 40 mg in 0.9% sodium chloride 10 mL injection  40 mg IntraVENous Q12H    losartan (COZAAR) tablet 50 mg  50 mg Oral DAILY    dilTIAZem ER (CARDIZEM CD) capsule 240 mg  240 mg Oral DAILY    0.9% sodium chloride infusion 250 mL  250 mL IntraVENous PRN    sodium chloride (NS) flush 5-40 mL  5-40 mL IntraVENous Q8H    sodium chloride (NS) flush 5-40 mL  5-40 mL IntraVENous PRN    acetaminophen (TYLENOL) tablet 650 mg  650 mg Oral Q6H PRN    Or    acetaminophen (TYLENOL) suppository 650 mg  650 mg Rectal Q6H PRN         Shanta Tejeda NP

## 2021-03-24 NOTE — PROGRESS NOTES
3/24/2021  1:02 PM  DC order noted, pt stable for DC to home today w/ [facility]. AVS sent to [facility] in [facility] 3/25/21  Pt resides w/ Dtr  [facility] who will transport home. Pt is ready for DC from CM standpoint  Care Management Interventions  PCP Verified by CM: Yes(2/2021)  Mode of Transport at Discharge:  Other (see comment)  Transition of Care Consult (CM Consult): Discharge Planning  Current Support Network: Relative's Home(Pt lives with daughter [facility])  Confirm Follow Up Transport: Family  Discharge Location  Discharge Placement: Unable to determine at this time  NANDO Aragon

## 2021-03-24 NOTE — PROGRESS NOTES
I have reviewed discharge instructions with the patient.  The patient verbalized understanding. Patient and daughter educated on CHF management. Daughter and patient verbalized understanding.

## 2021-03-24 NOTE — PROGRESS NOTES
Problem: Self Care Deficits Care Plan (Adult)  Goal: *Acute Goals and Plan of Care (Insert Text)  Description:   FUNCTIONAL STATUS PRIOR TO ADMISSION: Patient reports able to perform ADLs, but daughter assists with IADL tasks. HOME SUPPORT: The patient lived with daughter. Occupational Therapy Goals  Initiated 3/23/2021  1. Patient will perform lower body dressing with supervision/set-up within 7 day(s). 2.  Patient will perform grooming with supervision/set-up within 7 day(s). 3.  Patient will perform toilet transfers with supervision/set-up within 7 day(s). 4.  Patient will perform all aspects of toileting with supervision/set-up within 7 day(s). 5.  Patient will participate in upper extremity therapeutic exercise/activities with supervision/set-up for 10 minutes within 7 day(s). 6.  Patient will utilize energy conservation techniques during functional activities with verbal cues within 7 day(s). Outcome: Progressing Towards Goal   OCCUPATIONAL THERAPY TREATMENT  Patient: Medina Drew (86 y.o. female)  Date: 3/24/2021  Diagnosis: Acute on chronic heart failure (HCC) [I50.9] Iron deficiency anemia       Precautions:    Chart, occupational therapy assessment, plan of care, and goals were reviewed. ASSESSMENT  Patient continues with skilled OT services and is progressing towards goals. Pt assist x 1 for ADL related transfers, no SOB verbalized. Pt requires assist x 1 with LB dressing, brief management. Pt wanted to return to bed following using restroom, set-up for lunch. Current Level of Function Impacting Discharge (ADLs): contact guard for toileting transfer and for toileting, min assist for LB dress    Other factors to consider for discharge:          PLAN :  Patient continues to benefit from skilled intervention to address the above impairments. Continue treatment per established plan of care. to address goals.     Recommend with staff: Out of bed for ADL's, meals, there act    Recommend next OT session: Cont towards goals    Recommendation for discharge: (in order for the patient to meet his/her long term goals)  Occupational therapy at least 2 days/week in the home     This discharge recommendation:  Has not yet been discussed the attending provider and/or case management    IF patient discharges home will need the following DME:        SUBJECTIVE:   Patient stated I can    OBJECTIVE DATA SUMMARY:   Cognitive/Behavioral Status:  Neurologic State: Alert  Orientation Level: Disoriented to time;Oriented to person;Oriented to place;Oriented to situation  Cognition: Follows commands             Functional Mobility and Transfers for ADLs:  Bed Mobility:  Sit to Supine: Minimum assistance    Transfers:  Sit to Stand: Minimum assistance          Balance:  Standing: With support    ADL Intervention:  Feeding  Feeding Assistance: Set-up  Container Management: Set-up         Activity Tolerance:   Fair    After treatment patient left in no apparent distress:   Supine in bed    COMMUNICATION/COLLABORATION:   The patients plan of care was discussed with: Physical therapy assistant, Occupational therapist, and Registered nurse.      TREMAYNE Harris/L  Time Calculation: 15 mins

## 2021-03-24 NOTE — PROGRESS NOTES
3/24/2021  1:11 PM  Medicare pt has received, 2nd IM letter informing them of their right to appeal the discharge. Copy has been placed on pt bedside chart. CM provided verbal explanation to pt's Dtr Abbey Gillespie via phone.   NANDO Wilson

## 2021-03-24 NOTE — DISCHARGE INSTRUCTIONS
HOSPITALIST DISCHARGE INSTRUCTIONS  NAME: Paulie Brady   :  10/12/1930   MRN:  [id number]     Date/Time:  3/24/2021 12:07 PM    ADMIT DATE: 3/21/2021     DISCHARGE DATE: 3/24/2021     ADMITTING DIAGNOSIS:  Iron deficiency anemia, afib     DISCHARGE DIAGNOSIS:  same    MEDICATIONS:  See after visit summary       · It is important that you take the medication exactly as they are prescribed. · Keep your medication in the bottles provided by the pharmacist and keep a list of the medication names, dosages, and times to be taken in your wallet. · Do not take other medications without consulting your doctor     Pain Management: per above medications    What to do at Home    Recommended diet:  Regular Diet    Recommended activity: Activity as tolerated    1) Return to the hospital if you feel worse    2) If you experience any of the following symptoms then please call your primary care physician or return to the emergency room if you cannot get hold of your doctor:  Fever, chills, nausea, vomiting, diarrhea, change in mentation, falling, bleeding, shortness of breath, chest pain, severe headache, severe abdominal pain,     3) Hold your blood thinner, Eliquis, until you see your Cardiologist    4) Have your blood counts (CBC) rechecked with your primary care doctor     Follow Up: Follow-up Information     Follow up With Specialties Details Why [address] Susan Quinonez, PT and OT [address] Brunilda Clay MD Internal Medicine Go on 3/30/2021 For hospital follow up; appt scheduled for 9:10am. Please arrive 20 min early.  Lisa Burkett DO Cardiology Schedule an appointment as soon as possible for a visit in 1 week  [facility] [address] [place] [address]      Ridge Seals MD Gastroenterology Schedule an appointment as soon as possible for a visit in 2 weeks  [address]   [phone]              Information obtained by :  I understand that if any problems occur once I am at home I am to contact my physician. I understand and acknowledge receipt of the instructions indicated above. Physician's or R.N.'s Signature                                                                  Date/Time                                                                                                                                              Patient or Representative Signature                                                          Date/Time      Patient Education        Anemia: Care Instructions  Your Care Instructions     Anemia is a low level of red blood cells, which carry oxygen throughout your body. Many things can cause anemia. Lack of iron is one of the most common causes. Your body needs iron to make hemoglobin, a substance in red blood cells that carries oxygen from the lungs to your body's cells. Without enough iron, the body produces fewer and smaller red blood cells. As a result, your body's cells do not get enough oxygen, and you feel tired and weak. And you may have trouble concentrating. Bleeding is the most common cause of a lack of iron. You may have heavy menstrual bleeding or bleeding caused by conditions such as ulcers, hemorrhoids, or cancer. Regular use of aspirin or other anti-inflammatory medicines (such as ibuprofen) also can cause bleeding in some people. A lack of iron in your diet also can cause anemia, especially at times when the body needs more iron, such as during pregnancy, infancy, and the teen years. Your doctor may have prescribed iron pills.  It may take several months of treatment for your iron levels to return to normal. Your doctor also may suggest that you eat foods that are rich in iron, such as meat and beans. There are many other causes of anemia. It is not always due to a lack of iron. Finding the specific cause of your anemia will help your doctor find the right treatment for you. Follow-up care is a key part of your treatment and safety. Be sure to make and go to all appointments, and call your doctor if you are having problems. It's also a good idea to know your test results and keep a list of the medicines you take. How can you care for yourself at home? · Take your medicines exactly as prescribed. Call your doctor if you think you are having a problem with your medicine. · If your doctor recommends iron pills, take them as directed:  ? Try to take the pills on an empty stomach about 1 hour before or 2 hours after meals. But you may need to take iron with food to avoid an upset stomach. ? Do not take antacids or drink milk or caffeine drinks (such as coffee, tea, or cola) at the same time or within 2 hours of the time that you take your iron. They can make it hard for your body to absorb the iron. ? Vitamin C (from food or supplements) helps your body absorb iron. Try taking iron pills with a glass of orange juice or some other food that is high in vitamin C, such as citrus fruits. ? Iron pills may cause stomach problems, such as heartburn, nausea, diarrhea, constipation, and cramps. Be sure to drink plenty of fluids, and include fruits, vegetables, and fiber in your diet each day. Iron pills often make your bowel movements dark or green. ? If you forget to take an iron pill, do not take a double dose of iron the next time you take a pill. ? Keep iron pills out of the reach of small children. An overdose of iron can be very dangerous. · Follow your doctor's advice about eating iron-rich foods.  These include red meat, shellfish, poultry, eggs, beans, raisins, whole-grain bread, and leafy green vegetables. · Steam vegetables to help them keep their iron content. When should you call for help? Call 911 anytime you think you may need emergency care. For example, call if:    · You have symptoms of a heart attack. These may include:  ? Chest pain or pressure, or a strange feeling in the chest.  ? Sweating. ? Shortness of breath. ? Nausea or vomiting. ? Pain, pressure, or a strange feeling in the back, neck, jaw, or upper belly or in one or both shoulders or arms. ? Lightheadedness or sudden weakness. ? A fast or irregular heartbeat. After you call 911, the  may tell you to chew 1 adult-strength or 2 to 4 low-dose aspirin. Wait for an ambulance. Do not try to drive yourself.     · You passed out (lost consciousness). Call your doctor now or seek immediate medical care if:    · You have new or increased shortness of breath.     · You are dizzy or lightheaded, or you feel like you may faint.     · Your fatigue and weakness continue or get worse.     · You have any abnormal bleeding, such as:  ? Nosebleeds. ? Vaginal bleeding that is different (heavier, more frequent, at a different time of the month) than what you are used to.  ? Bloody or black stools, or rectal bleeding. ? Bloody or pink urine. Watch closely for changes in your health, and be sure to contact your doctor if:    · You do not get better as expected. Where can you learn more? Go to http://www.Dyyno.com/  Enter R301 in the search box to learn more about \"Anemia: Care Instructions. \"  Current as of: November 8, 2019               Content Version: 12.6  © 9357-2130 Healthwise, Incorporated. Care instructions adapted under license by [employer] (which disclaims liability or warranty for this information).  If you have questions about a medical condition or this instruction, always ask your healthcare professional. [address] any warranty or liability for your use of this information. Avoiding Triggers With Heart Failure: Care Instructions  Your Care Instructions     Triggers are anything that make your heart failure flare up. A flare-up is also called \"sudden heart failure\" or \"acute heart failure. \" When you have a flare-up, fluid builds up in your lungs, and you have problems breathing. You might need to go to the hospital. By watching for changes in your condition and avoiding triggers, you can prevent heart failure flare-ups. Follow-up care is a key part of your treatment and safety. Be sure to make and go to all appointments, and call your doctor if you are having problems. It's also a good idea to know your test results and keep a list of the medicines you take. How can you care for yourself at home? Watch for changes in your weight and condition  · Weigh yourself without clothing at the same time each day. Record your weight. Call your doctor if you have sudden weight gain, such as more than 2 to 3 pounds in a day or 5 pounds in a week. (Your doctor may suggest a different range of weight gain.) A sudden weight gain may mean that your heart failure is getting worse. · Keep a daily record of your symptoms. Write down any changes in how you feel, such as new shortness of breath, cough, or problems eating. Also record if your ankles are more swollen than usual and if you feel more tired than usual. Note anything that you ate or did that could have triggered these changes. Limit sodium  Sodium causes your body to hold on to extra water. This may cause your heart failure symptoms to get worse. People get most of their sodium from processed foods. Fast food and restaurant meals also tend to be very high in sodium. · Your doctor may suggest that you limit sodium. Your doctor can tell you how much sodium is right for you. This includes limiting sodium in cooked and packaged foods. · Read food labels on cans and food packages.  They tell you how much sodium you get in one serving. Check the serving size. If you eat more than one serving, you are getting more sodium. · Be aware that sodium can come in forms other than salt, including monosodium glutamate (MSG), sodium citrate, and sodium bicarbonate (baking soda). MSG is often added to Asian food. You can sometimes ask for food without MSG or salt. · Slowly reducing salt will help you adjust to the taste. Take the salt shaker off the table. · Flavor your food with garlic, lemon juice, onion, vinegar, herbs, and spices instead of salt. Do not use soy sauce, steak sauce, onion salt, garlic salt, mustard, or ketchup on your food, unless it is labeled \"low-sodium\" or \"low-salt. \"  · Make your own salad dressings, sauces, and ketchup without adding salt. · Use fresh or frozen ingredients, instead of canned ones, whenever you can. Choose low-sodium canned goods. · Eat less processed food and food from restaurants, including fast food. Exercise as directed  Moderate, regular exercise is very good for your heart. It improves your blood flow and helps control your weight. But too much exercise can stress your heart and cause a heart failure flare-up. · Check with your doctor before you start an exercise program.  · Walking is an easy way to get exercise. Start out slowly. Gradually increase the length and pace of your walk. Swimming, riding a bike, and using a treadmill are also good forms of exercise. · When you exercise, watch for signs that your heart is working too hard. You are pushing yourself too hard if you cannot talk while you are exercising. If you become short of breath or dizzy or have chest pain, stop, sit down, and rest.  · Do not exercise when you do not feel well. Take medicines correctly  · Take your medicines exactly as prescribed. Call your doctor if you think you are having a problem with your medicine. · Make a list of all the medicines you take.  Include those prescribed to you by other doctors and any over-the-counter medicines, vitamins, or supplements you take. Take this list with you when you go to any doctor. · Take your medicines at the same time every day. It may help you to post a list of all the medicines you take every day and what time of day you take them. · Make taking your medicine as simple as you can. Plan times to take your medicines when you are doing other things, such as eating a meal or getting ready for bed. This will make it easier to remember to take your medicines. · Get organized. Use helpful tools, such as daily or weekly pill containers. When should you call for help? Call 911 if you have symptoms of sudden heart failure such as:    · You have severe trouble breathing.     · You cough up pink, foamy mucus.     · You have a new irregular or rapid heartbeat. Call your doctor now or seek immediate medical care if:    · You have new or increased shortness of breath.     · You are dizzy or lightheaded, or you feel like you may faint.     · You have sudden weight gain, such as more than 2 to 3 pounds in a day or 5 pounds in a week. (Your doctor may suggest a different range of weight gain.)     · You have increased swelling in your legs, ankles, or feet.     · You are suddenly so tired or weak that you cannot do your usual activities. Watch closely for changes in your health, and be sure to contact your doctor if you develop new symptoms. Where can you learn more? Go to http://www.gray.com/  Enter V089 in the search box to learn more about \"Avoiding Triggers With Heart Failure: Care Instructions. \"  Current as of: December 16, 2019               Content Version: 12.6  © 8624-1384 Healthwise, Incorporated. Care instructions adapted under license by [employer] (which disclaims liability or warranty for this information).  If you have questions about a medical condition or this instruction, always ask your healthcare professional. [employer] disclaims any warranty or liability for your use of this information.

## 2021-03-24 NOTE — DISCHARGE SUMMARY
Yang Vazquez [facility]  [address] [facility], [address] [place] [address]  [phone]    Physician Discharge Summary     Patient ID:  Jose Luis Costa  [id number]  80 y.o.  10/12/1930    Admit date: 3/21/2021    Discharge date and time: 3/24/2021 12:08 PM    Admission Diagnoses: Acute on chronic heart failure ([facility] [I50.9]    Discharge Diagnoses:  Principal Diagnosis Iron deficiency anemia                                            Principal Problem:    Iron deficiency anemia (3/24/2021)    Active Problems:    HTN (hypertension) (7/15/2011)      Rheumatoid arthritis (HCC) ()      Diastolic CHF, acute on chronic ([facility] (12/18/2020)      Chronic a-fib ([facility] (3/24/2021)           Hospital Course:     79 yo hx of HTN, DM, dCHF, Pafib, CVA, hypothyroid, RA, presented w/ LE edema, afib RVR, iron def anemia     1) Dyspnea/pulm edema/acute on chronic dCHF: EF 50% with grade 3 diastolic dysfunction. Improved with IV bumex. Cont home lasix, losartan     2) Afib RVR: Cards increased oral dilt to 360mg daily. Holding Eliquis due to iron def anemia     3) Iron def anemia: s/p 1u RBCs and IV iron. Hgb stable on discharge. GI recommended EGD/colon if stool blood positive. Continue to hold Eliquis for now     4) Renal insuff: resolved      5) LE edema: due to CHF. LE dopplers neg DVT     6) Hypothyroid: TSH 19.7. Synthroid was increased      7) DM type 2: A1C 5.8%.   Cont diet control      8) HTN: cont losartan    PCP: Karin Canavan, MD     Consults: cards, GI    Significant Diagnostic Studies: none    Discharge Exam:  Physical Exam:    Gen:  Elderly, thin, frail, NAD  HEENT:  Pink conjunctivae, PERRL, hearing intact to voice, moist mucous membranes  Neck:  Supple, without masses, thyroid non-tender  Resp:  No accessory muscle use, clear breath sounds without wheezes rales or rhonchi  Card:  No murmurs, normal S1, S2 without thrills, bruits or peripheral edema  Abd:  Soft, non-tender, non-distended, normoactive bowel sounds are present  Musc:  No cyanosis or clubbing  Skin:  No rashes  Neuro:  Cranial nerves 3-12 are grossly intact, follows commands appropriately  Psych:  fair insight, oriented to person, place and time, alert    Disposition: [facility]  Discharge Condition: Stable    Patient Instructions:   Current Discharge Medication List      START taking these medications    Details   dilTIAZem ER (CARDIZEM CD) 360 mg capsule Take 1 Cap by mouth daily. Qty: 30 Cap, Refills: 0      ferrous gluconate 324 mg (37.5 mg iron) tablet Take 1 Tab by mouth Daily (before breakfast). Qty: 30 Tab, Refills: 0         CONTINUE these medications which have CHANGED    Details   levothyroxine (SYNTHROID) 100 mcg tablet Take 1 Tab by mouth Daily (before breakfast). Qty: 30 Tab, Refills: 0         CONTINUE these medications which have NOT CHANGED    Details   losartan (COZAAR) 50 mg tablet Take 50 mg by mouth daily. furosemide (LASIX) 20 mg tablet Take 1 tablet by mouth once daily  Qty: 30 Tab, Refills: 0      acetaminophen (TylenoL) 325 mg tablet Take 325 mg by mouth every six (6) hours as needed for Pain.      pantoprazole (PROTONIX) 40 mg tablet Take 1 Tab by mouth daily. Qty: 30 Tab, Refills: 1    Associated Diagnoses: Elevated cholesterol         STOP taking these medications       dilTIAZem ER (TIAZAC) 240 mg capsule Comments:   Reason for Stopping:         apixaban (ELIQUIS) 2.5 mg tablet Comments:   Reason for Stopping:             Activity: Activity as tolerated  Diet: Regular Diet  Wound Care: None needed    Follow-up with  Follow-up Information     Follow up With Specialties Details Why [address] Susan Quinonez PT and OT [address] Jessica Rivera MD Internal Medicine Go on 3/30/2021 For hospital follow up; appt scheduled for 9:10am. Please arrive 20 min early.  [address] Clinton Judd DO Cardiology Schedule an appointment as soon as possible for a visit in 1 week  [facility]  [id number] [place] [address]      Angel Novak MD Gastroenterology Schedule an appointment as soon as possible for a visit in 2 weeks  [address] [facility]  [phone]            Follow-up tests/labs CBC    Signed:  Blake Coppola MD  3/24/2021  12:08 PM  **I personally spent 35 min on discharge**

## 2021-03-24 NOTE — PROGRESS NOTES
ALIE made hospital follow up appointment with Heidi Savage MD for 03/30/21 at 9:10am.  Appointment confirmed by Geremias Acosta at [facility] office.  Follow up information added to AVS.    _____________________________________  RUTH ANN Beltrán - Care Management  3/24/2021   11:38 AM

## 2021-03-24 NOTE — PROGRESS NOTES
0700: Bedside and Verbal shift change report given to [address] [place] [address] (oncoming nurse) by Carin Shelley RN (offgoing nurse). Report included the following information SBAR, Kardex, Intake/Output, MAR, Accordion, Recent Results and Med Rec Status.

## 2021-03-24 NOTE — NURSE NAVIGATOR
Attempted to meet with patient for HF education but she was sleeping soundly. Daughter was not at hospital.  Living with HF Education Book, HF magnet, and HF calendar left at bedside. Discussed with charge nurse who will relay to primary nurse for further instruction once daughter arrives.

## 2021-03-25 ENCOUNTER — PATIENT OUTREACH (OUTPATIENT)
Dept: CASE MANAGEMENT | Age: 86
End: 2021-03-25

## 2021-03-25 NOTE — PROGRESS NOTES
Care Transitions Initial Follow Up Call    Call within 2 business days of discharge: Yes     Patient: Wandra Bernheim Patient : 10/12/1930 MRN: [id number]    [facility] Discharge [facility]       Complaint Diagnosis Description Type Department Provider    3/21/21 Leg Swelling; Shortness of Breath Acute on chronic congestive heart failure, unspecified heart failure type ([address] . .. ED to Hosp-Admission (Discharged) (ADMIT) [id number] Stef Santos MD; Levy Concepcion, ... Was this an external facility discharge? No Discharge Facility: [facility]    Challenges to be reviewed by the provider   Additional needs identified to be addressed with provider no  none         Method of communication with provider : chart routing    Discussed COVID-19 related testing which was not done at this time. Test results were not done. Patient informed of results, if available? na     Advance Care Planning:   Does patient have an Advance Directive: patient has DDNR on file     Inpatient Readmission Risk score: Unplanned Readmit Risk Score: 25    Was this a readmission? no       Patients top risk factors for readmission: medical condition and patient age heart failure and diabetes  Interventions to address risk factors: Scheduled appointment with Specialist-cards and GI Provide education, resources    Care Transition Nurse (CTN) contacted the family by telephone to perform post hospital discharge assessment. Verified name and  with family as identifiers. Provided introduction to self, and explanation of the CTN role. CTN reviewed discharge instructions, medical action plan and red flags with family who verbalized understanding. Were discharge instructions available to patient? yes. Reviewed appropriate site of care based on symptoms and resources available to patient including: Specialist. Family given an opportunity to ask questions and does not have any further questions or concerns at this time.  The family agrees to contact the PCP office for questions related to their healthcare. Medication reconciliation was performed with family, who verbalizes understanding of administration of home medications. Advised obtaining a 90-day supply of all daily and as-needed medications. Referral to Pharm D needed: no     Home Health/Outpatient orders at discharge: home health care  [address]: Mary Garnett  Date of initial visit:  3/25/21 per [facility] office    Durable Medical Equipment ordered at discharge: None    Covid Risk Education    Patient has following risk factors of: heart failure and diabetes. Education provided regarding infection prevention, and signs and symptoms of COVID-19 and when to seek medical attention with family who verbalized understanding. Discussed exposure protocols and quarantine From [facility]: Are you at higher risk for severe illness?  and given an opportunity for questions and concerns. The family agrees to contact the COVID-19 hotline [phone] or PCP office for questions related to COVID-19. For more information on steps you can take to protect yourself, see CDC's How to Protect Yourself     Was patient discharged with a pulse oximeter? no Discussed and confirmed pulse oximeter discharge instructions and when to notify provider or seek emergency care. Patient/family/caregiver given information for [employer] and agrees to enroll no  Patient's preferred e-mail: declines  Patient's preferred phone number: declines    Discussed follow-up appointments. If no appointment was previously scheduled, appointment scheduling offered: yes Is follow up appointment scheduled within 7 days of discharge?  yes   [facility][1] follow up appointment(s):   Future Appointments   Date Time Provider Issac Ram   4/5/2021 11:00 AM Elizabeth BERGER DO [facility] BS AMB   5/4/2021  3:20 PM Kecia Leon DO [facility] BS AMB     Non-BS follow up appointment(s): PCP 3/30 at 9:10, Deon Castillo for follow-up call in 5-7 days based on severity of symptoms and risk factors. Plan for next call: symptom management-CHF  CTN provided contact information for future needs. Goals      Prevent complications post hospitalization. 03/25/21  CTN spoke to daughter Chalino Strickland to review discharge instructions/red falgs to prevent readmission    Appts:    PCP--appt made while IP for 3/30 at 9:10, daughter aware and will take pt    Cards Dr Flores Fleming scheduled appt  Monday 4/5/21  11:00. Per [facility] at [facility], this is the soonest appt available. Chalino Copier notified of date /time. GI Dr WILKES---CTN called MD office, they will call CTN back after confirming they can accept insurance. [facility]---per [facility] office, they have pt scheduled to start services today 3/25/21     Per Chalino Strickland patient is doing well, denies SOB or CP this morning.  CTN reviewed medication changes and new meds, per Chalino Copjamal pt picked up new meds yesterday.  CTN reviewed avoiding constipation with taking iron, instructed her to get OC stool softener if patient c/o difficulty having BM. Encouraged ambulation and drinking water.  CTN reviewed daily weights and instructed daughter to call Dr Estevan Patel office to report gains of greater than 3lbs/day and 5 lbs/week. Watch for edema in feet and ankles . Kimberly Levo understanding.  Review low sodium diet, foods to avoid, reading labels to kep intake less than 2000mg day. Kimberly Levo understanding.  Instructed [facility]inai to take pt BP and HR twice a day and to call Dr Estevan Patel with concerns/abnormal readings.  Dispatch health information provided. 1316 update---CTN has not heard back from from GI office yet to obtain appt. CTN called daughter Chalino Strickland to see if MD office called her directly, LM on VM. ---mkrw    03/26/21  CTN received call back from Dr Carmella Dove office that they do not accept pt insurance. CTN called [facility], they will accept appt and informed CTN that any balance will be pt responsibility.   Appt scheduled for 4/21/2021 telehealth Dr Chávez 10:45. Text message with link.   Per chart notes:  Iron def anemia: s/p 1u RBCs and IV iron.  Hgb stable on discharge.  GI recommended EGD/colon if stool blood positive.  Continue to hold Eliquis for now.    CTN tried to reach [facility] again, call went direcly to . CTN will call back later today.---ivan

## 2021-03-26 LAB
ALBUMIN SERPL ELPH-MCNC: 3.3 G/DL (ref 2.9–4.4)
ALBUMIN/GLOB SERPL: 0.9 {RATIO} (ref 0.7–1.7)
ALPHA1 GLOB SERPL ELPH-MCNC: 0.3 G/DL (ref 0–0.4)
ALPHA2 GLOB SERPL ELPH-MCNC: 0.9 G/DL (ref 0.4–1)
B-GLOBULIN SERPL ELPH-MCNC: 1 G/DL (ref 0.7–1.3)
GAMMA GLOB SERPL ELPH-MCNC: 1.6 G/DL (ref 0.4–1.8)
GLOBULIN SER-MCNC: 3.7 G/DL (ref 2.2–3.9)
IGA SERPL-MCNC: 280 MG/DL (ref 64–422)
IGG SERPL-MCNC: 1687 MG/DL (ref 586–1602)
IGM SERPL-MCNC: 242 MG/DL (ref 26–217)
INTERPRETATION SERPL IEP-IMP: ABNORMAL
KAPPA LC FREE SER-MCNC: 62.8 MG/L (ref 3.3–19.4)
KAPPA LC FREE/LAMBDA FREE SER: 1.32 {RATIO} (ref 0.26–1.65)
LAMBDA LC FREE SERPL-MCNC: 47.5 MG/L (ref 5.7–26.3)
M PROTEIN SERPL ELPH-MCNC: ABNORMAL G/DL
PROT SERPL-MCNC: 7 G/DL (ref 6–8.5)

## 2021-03-26 NOTE — PROGRESS NOTES
Goals      Prevent complications post hospitalization. 03/25/21  CTN spoke to arvin Villar to review discharge instructions/red falgs to prevent readmission    Appts:    PCP--appt made while IP for 3/30 at 9:10, daughter aware and will take pt    Cards Dr Kartik Okeefe scheduled appt  Monday 4/5/21  11:00. Per Miracle at [facility], this is the soonest appt available. Tl Villar notified of date /time. GI Dr WILKES---CTN called MD office, they will call CTN back after confirming they can accept insurance. [facility]---per [facility] office, they have pt scheduled to start services today 3/25/21     Per Tl Villar patient is doing well, denies SOB or CP this morning.  CTN reviewed medication changes and new meds, per Tl Villar pt picked up new meds yesterday.  CTN reviewed avoiding constipation with taking iron, instructed her to get OC stool softener if patient c/o difficulty having BM. Encouraged ambulation and drinking water.  CTN reviewed daily weights and instructed daughter to call Dr Devonte Ambrosio office to report gains of greater than 3lbs/day and 5 lbs/week. Watch for edema in feet and ankles . Meliton Dys understanding.  Review low sodium diet, foods to avoid, reading labels to kep intake less than 2000mg day. Meliton Dys understanding.  Instructed [facility] to take pt BP and HR twice a day and to call Dr Devonte Ambrosio with concerns/abnormal readings.  Dispatch health information provided. 1316 update---CTN has not heard back from from GI office yet to obtain appt. CTN called arvin Villar to see if MD office called her directly, LM on VM. ---mkrw    03/26/21  CTN received call back from Dr Siddhartha Walton office that they do not accept pt insurance. CTN called [facility], they will accept appt and informed CTN that any balance will be pt responsibility. Appt scheduled for 4/21/2021 telehealth Dr Christen Clark 10:45. Text message with link. Per chart notes:  Iron def anemia: s/p 1u RBCs and IV iron.   Hgb stable on discharge. GI recommended EGD/colon if stool blood positive. Continue to hold Eliquis for now. CTN tried to reach Jameel Thompson again, call went direcly to . CTN will call back later today. ---mkrw    UPDATE:    CTN received call from daughter Sinai--CTN gave her appts information for Dr Sita Archuleta and Dr Roro Vora, GI, explaining that Dr Saman Hanley was unable to take appt but that Dr Murphy Hobson office will bill pt for any balance that insurance does no cover. Jameel Thompson agreeable to this.     CTN will follow up next week---mkrw

## 2021-03-31 VITALS
HEIGHT: 62 IN | RESPIRATION RATE: 18 BRPM | BODY MASS INDEX: 19.98 KG/M2 | WEIGHT: 108.6 LBS | SYSTOLIC BLOOD PRESSURE: 114 MMHG | HEART RATE: 62 BPM | TEMPERATURE: 97.9 F | OXYGEN SATURATION: 94 % | DIASTOLIC BLOOD PRESSURE: 60 MMHG

## 2021-04-05 ENCOUNTER — OFFICE VISIT (OUTPATIENT)
Dept: CARDIOLOGY CLINIC | Age: 86
End: 2021-04-05
Payer: MEDICARE

## 2021-04-05 ENCOUNTER — TELEPHONE (OUTPATIENT)
Dept: CARDIOLOGY CLINIC | Age: 86
End: 2021-04-05

## 2021-04-05 VITALS
WEIGHT: 117 LBS | SYSTOLIC BLOOD PRESSURE: 118 MMHG | HEART RATE: 80 BPM | HEIGHT: 62 IN | OXYGEN SATURATION: 99 % | BODY MASS INDEX: 21.53 KG/M2 | DIASTOLIC BLOOD PRESSURE: 82 MMHG

## 2021-04-05 DIAGNOSIS — I10 ESSENTIAL HYPERTENSION: ICD-10-CM

## 2021-04-05 DIAGNOSIS — R60.9 EDEMA, UNSPECIFIED TYPE: ICD-10-CM

## 2021-04-05 DIAGNOSIS — I48.0 PAROXYSMAL ATRIAL FIBRILLATION (HCC): ICD-10-CM

## 2021-04-05 DIAGNOSIS — I42.8 INFILTRATIVE CARDIOMYOPATHY (HCC): Primary | ICD-10-CM

## 2021-04-05 PROCEDURE — 99214 OFFICE O/P EST MOD 30 MIN: CPT | Performed by: STUDENT IN AN ORGANIZED HEALTH CARE EDUCATION/TRAINING PROGRAM

## 2021-04-05 PROCEDURE — G0463 HOSPITAL OUTPT CLINIC VISIT: HCPCS | Performed by: STUDENT IN AN ORGANIZED HEALTH CARE EDUCATION/TRAINING PROGRAM

## 2021-04-05 NOTE — TELEPHONE ENCOUNTER
Called patient's PCP office [phone] Dr Doug Owens MD left a message for PCP to call Dr Madeleine Wade on his cell phone pertaining to patient's care.

## 2021-04-05 NOTE — PROGRESS NOTES
Room 7    Visit Vitals  /82 (BP 1 Location: Right arm, BP Patient Position: Sitting, BP Cuff Size: Adult)   Pulse 80   Ht 5' 2\" (1.575 m)   Wt 117 lb (53.1 kg)   SpO2 99%   BMI 21.40 kg/m²       PCP mentioned hospice     Chest pain: no  Shortness of breath: no  Edema: YES   Palpitations: no  Dizziness: no    New diagnosis/Surgeries: no    ER/Hospitalizations: Admit date: 3/21/2021     Discharge date and time: 3/24/2021 12:08 PM     Admission Diagnoses: Acute on chronic heart failure ([facility] [I50.9]     Discharge Diagnoses:  Principal Diagnosis Iron deficiency anemia                                            Principal Problem:    Refills: no

## 2021-04-07 NOTE — PROGRESS NOTES
[facility] [address], [address] [facility], [address] [place] [address]    Office [phone],RADHA [phone]           Varinder Youssef is a 80 y.o. female presents the office for follow-up evaluation    Assessment/Recommendations:    ICD-10-CM ICD-9-CM    1. Infiltrative cardiomyopathy (HCC)  I42.9 425.4    2. Paroxysmal atrial fibrillation (HCC)  I48.0 427.31    3. Essential hypertension  I10 401.9    4. Edema, unspecified type  R60.9 782.3         Infiltrative cardiomyopathy, diastolic heart failurerecent echocardiogram concerning for infiltrative cardiomyopathy. Paroxysmal atrial fibrillationAF likely related to restrictive cardiomyopathy. Hold Eliquis due to recent iron deficiency anemia continue diltiazem 240 mg adelina  Hypertensionstable   History of DVT    Long discussion with patient's daughter in the office today, also discussed with Dr. Oxana Williamson. 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 Given her advanced age and poor functional capacity, I do believe hospice will optimize her care. They will be able to more aggressively manage her diuretics. Recommend to continue diltiazem therapy for adequate rate control with paroxysmal atrial fibrillation. Hold further DOAC therapy. Diuretic dosing per hospice nursing. Primary Care Physician- Kassi Potts MD    Follow-up as needed        Subjective:  80 y.o. presents to the office for follow-up evaluation. She presents with her daughter for evaluation. She was recently admitted to the hospital for exacerbation of HFpEF. At that time she also demonstrated evidence of severe iron deficiency anemia. Patient recently was seen by Kassi Potts MD, who recommended potential hospice. During her hospitalization her DOAC therapy was discontinued. She has had improvement in her lower extremity edema and shortness of breath since hospital discharge.   She has had improvement in her dyspnea since hospital discharge, however she continues to have mild lower extremity edema. Past Medical History:   Diagnosis Date    BPPV (benign paroxysmal positional vertigo)     HTN (hypertension)     Hx of completed stroke     Hyperlipidemia     Hypothyroid     Rheumatoid arthritis ([facility]     Scoliosis         Past Surgical History:   Procedure Laterality Date    HX  SECTION      HX ORTHOPAEDIC      bunions removed x 2         Current Outpatient Medications:     dilTIAZem ER (CARDIZEM CD) 360 mg capsule, Take 1 Cap by mouth daily. , Disp: 30 Cap, Rfl: 0    ferrous gluconate 324 mg (37.5 mg iron) tablet, Take 1 Tab by mouth Daily (before breakfast). , Disp: 30 Tab, Rfl: 0    levothyroxine (SYNTHROID) 100 mcg tablet, Take 1 Tab by mouth Daily (before breakfast). , Disp: 30 Tab, Rfl: 0    losartan (COZAAR) 50 mg tablet, Take 50 mg by mouth daily. , Disp: , Rfl:     furosemide (LASIX) 20 mg tablet, Take 1 tablet by mouth once daily, Disp: 30 Tab, Rfl: 0    acetaminophen (TylenoL) 325 mg tablet, Take 325 mg by mouth every six (6) hours as needed for Pain., Disp: , Rfl:     pantoprazole (PROTONIX) 40 mg tablet, Take 1 Tab by mouth daily. , Disp: 30 Tab, Rfl: 1    Allergies   Allergen Reactions    Shellfish Containing Products Swelling        Family History   Problem Relation Age of Onset    Stroke Father        Social History     Tobacco Use    Smoking status: Never Smoker    Smokeless tobacco: Never Used   Substance Use Topics    Alcohol use: No    Drug use: No       Review of Symptoms:  Pertinent Positive: mild orthopnea, dyspnea, LE edema  Pertinent Negative:nochest pain  All Other systems reviewed and are negative for a Comprehensive ROS (10+)    Physical Exam    Blood pressure 118/82, pulse 80, height 5' 2\" (1.575 m), weight 117 lb (53.1 kg), SpO2 99 %. Constitutional:  well-developed and well-nourished. No distress.  Presents in wheelchair with daughter  Madison Phoenix: Normocephalic. Eyes: No scleral icterus. Neck:  Neck supple. No JVD present. Pulmonary/Chest: Effort normal and breath sounds normal. No respiratory distress, wheezes or rales. Cardiovascular: Normal rate, regular rhythm, S1 S2 . Exam reveals no gallop and no friction rub. No murmur heard. Bilateral 2+ lower extremity edema  Extremities:  Normal muscle tone  Abdominal:   No abnormal distension. Neurological:  Moving all extremities, cranial nerves appear grossly intact. Skin: Skin is not cold. Not diaphoretic. No erythema. Psychiatric:  Grossly normal mood and affect. Intact insight. Objective Data:    11/30/20   ECHO ADULT COMPLETE 12/01/2020 12/1/2020    Narrative · LV: Estimated LVEF is 50 - 55%. Biplane method used to measure ejection   fraction. Normal systolic function (ejection fraction normal). Small left   ventricle. Mild concentric hypertrophy. Wall motion: normal. Severe (grade   3) left ventricular diastolic dysfunction. · RV: Borderline low systolic function. · LA: Severely dilated left atrium. · RA: Moderately dilated right atrium. · AV: Aortic valve leaflet calcification present. Mild aortic valve   stenosis is present. Moderate to severe aortic valve regurgitation is   present. · MV: Mitral valve thickening. Moderate mitral annular calcification. Mild   mitral valve regurgitation is present. · Minimal late right to left shunting within pulmonary level     Study suggestive of possible infiltrative cardiomyopathy, possible senile   amyloidosis       Signed by: DO Gloria Epperson DO          ATTENTION:   This medical record was transcribed using an electronic medical records/speech recognition system. Although proofread, it may and can contain electronic, spelling and other errors. Corrections may be executed at a later time. Please feel free to contact us for any clarifications as needed.

## 2021-05-22 ENCOUNTER — APPOINTMENT (OUTPATIENT)
Dept: CT IMAGING | Age: 86
DRG: 061 | End: 2021-05-22
Attending: EMERGENCY MEDICINE
Payer: MEDICARE

## 2021-05-22 ENCOUNTER — APPOINTMENT (OUTPATIENT)
Dept: MRI IMAGING | Age: 86
DRG: 061 | End: 2021-05-22
Attending: INTERNAL MEDICINE
Payer: MEDICARE

## 2021-05-22 ENCOUNTER — APPOINTMENT (OUTPATIENT)
Dept: VASCULAR SURGERY | Age: 86
DRG: 061 | End: 2021-05-22
Attending: INTERNAL MEDICINE
Payer: MEDICARE

## 2021-05-22 ENCOUNTER — HOSPITAL ENCOUNTER (INPATIENT)
Age: 86
LOS: 6 days | Discharge: HOME HEALTH | DRG: 061 | End: 2021-05-28
Attending: EMERGENCY MEDICINE | Admitting: INTERNAL MEDICINE
Payer: MEDICARE

## 2021-05-22 DIAGNOSIS — I63.9 ACUTE CVA (CEREBROVASCULAR ACCIDENT) (HCC): ICD-10-CM

## 2021-05-22 DIAGNOSIS — I50.20 HFREF (HEART FAILURE WITH REDUCED EJECTION FRACTION) (HCC): ICD-10-CM

## 2021-05-22 DIAGNOSIS — R13.12 OROPHARYNGEAL DYSPHAGIA: ICD-10-CM

## 2021-05-22 DIAGNOSIS — I63.9 CEREBROVASCULAR ACCIDENT (CVA), UNSPECIFIED MECHANISM (HCC): Primary | ICD-10-CM

## 2021-05-22 DIAGNOSIS — Z71.89 GOALS OF CARE, COUNSELING/DISCUSSION: ICD-10-CM

## 2021-05-22 DIAGNOSIS — I48.20 CHRONIC A-FIB (HCC): Chronic | ICD-10-CM

## 2021-05-22 LAB
ALBUMIN SERPL-MCNC: 3.2 G/DL (ref 3.5–5)
ALBUMIN/GLOB SERPL: 0.7 {RATIO} (ref 1.1–2.2)
ALP SERPL-CCNC: 180 U/L (ref 45–117)
ALT SERPL-CCNC: 31 U/L (ref 12–78)
ANION GAP SERPL CALC-SCNC: 6 MMOL/L (ref 5–15)
AST SERPL-CCNC: 30 U/L (ref 15–37)
BASOPHILS # BLD: 0 K/UL (ref 0–0.1)
BASOPHILS NFR BLD: 0 % (ref 0–1)
BILIRUB SERPL-MCNC: 0.4 MG/DL (ref 0.2–1)
BUN SERPL-MCNC: 33 MG/DL (ref 6–20)
BUN/CREAT SERPL: 28 (ref 12–20)
CALCIUM SERPL-MCNC: 9.7 MG/DL (ref 8.5–10.1)
CHLORIDE SERPL-SCNC: 105 MMOL/L (ref 97–108)
CO2 SERPL-SCNC: 26 MMOL/L (ref 21–32)
COMMENT, HOLDF: NORMAL
CREAT SERPL-MCNC: 1.18 MG/DL (ref 0.55–1.02)
DIFFERENTIAL METHOD BLD: ABNORMAL
EOSINOPHIL # BLD: 0.1 K/UL (ref 0–0.4)
EOSINOPHIL NFR BLD: 1 % (ref 0–7)
ERYTHROCYTE [DISTWIDTH] IN BLOOD BY AUTOMATED COUNT: 19.1 % (ref 11.5–14.5)
FERRITIN SERPL-MCNC: 19 NG/ML (ref 26–388)
GLOBULIN SER CALC-MCNC: 4.7 G/DL (ref 2–4)
GLUCOSE BLD STRIP.AUTO-MCNC: 114 MG/DL (ref 65–117)
GLUCOSE SERPL-MCNC: 151 MG/DL (ref 65–100)
HCT VFR BLD AUTO: 30.5 % (ref 35–47)
HGB BLD-MCNC: 8.8 G/DL (ref 11.5–16)
IMM GRANULOCYTES # BLD AUTO: 0 K/UL (ref 0–0.04)
IMM GRANULOCYTES NFR BLD AUTO: 0 % (ref 0–0.5)
INR PPP: 1.2 (ref 0.9–1.1)
IRON SATN MFR SERPL: 4 % (ref 20–50)
IRON SERPL-MCNC: 18 UG/DL (ref 35–150)
LYMPHOCYTES # BLD: 1 K/UL (ref 0.8–3.5)
LYMPHOCYTES NFR BLD: 19 % (ref 12–49)
MCH RBC QN AUTO: 20.4 PG (ref 26–34)
MCHC RBC AUTO-ENTMCNC: 28.9 G/DL (ref 30–36.5)
MCV RBC AUTO: 70.6 FL (ref 80–99)
MONOCYTES # BLD: 0.5 K/UL (ref 0–1)
MONOCYTES NFR BLD: 9 % (ref 5–13)
NEUTS SEG # BLD: 3.5 K/UL (ref 1.8–8)
NEUTS SEG NFR BLD: 71 % (ref 32–75)
NRBC # BLD: 0 K/UL (ref 0–0.01)
NRBC BLD-RTO: 0 PER 100 WBC
PLATELET # BLD AUTO: 198 K/UL (ref 150–400)
PMV BLD AUTO: 9.7 FL (ref 8.9–12.9)
POTASSIUM SERPL-SCNC: 3.8 MMOL/L (ref 3.5–5.1)
PROT SERPL-MCNC: 7.9 G/DL (ref 6.4–8.2)
PROTHROMBIN TIME: 12.6 SEC (ref 9–11.1)
RBC # BLD AUTO: 4.32 M/UL (ref 3.8–5.2)
RBC MORPH BLD: ABNORMAL
SAMPLES BEING HELD,HOLD: NORMAL
SERVICE CMNT-IMP: NORMAL
SODIUM SERPL-SCNC: 137 MMOL/L (ref 136–145)
TIBC SERPL-MCNC: 480 UG/DL (ref 250–450)
TROPONIN I SERPL-MCNC: <0.05 NG/ML
WBC # BLD AUTO: 5.1 K/UL (ref 3.6–11)

## 2021-05-22 PROCEDURE — 74011000636 HC RX REV CODE- 636: Performed by: RADIOLOGY

## 2021-05-22 PROCEDURE — 65610000006 HC RM INTENSIVE CARE

## 2021-05-22 PROCEDURE — 99223 1ST HOSP IP/OBS HIGH 75: CPT | Performed by: SPECIALIST

## 2021-05-22 PROCEDURE — 84484 ASSAY OF TROPONIN QUANT: CPT

## 2021-05-22 PROCEDURE — 70498 CT ANGIOGRAPHY NECK: CPT

## 2021-05-22 PROCEDURE — 82728 ASSAY OF FERRITIN: CPT

## 2021-05-22 PROCEDURE — 93005 ELECTROCARDIOGRAM TRACING: CPT

## 2021-05-22 PROCEDURE — 74011250636 HC RX REV CODE- 250/636: Performed by: EMERGENCY MEDICINE

## 2021-05-22 PROCEDURE — 82962 GLUCOSE BLOOD TEST: CPT

## 2021-05-22 PROCEDURE — 70450 CT HEAD/BRAIN W/O DYE: CPT

## 2021-05-22 PROCEDURE — 36415 COLL VENOUS BLD VENIPUNCTURE: CPT

## 2021-05-22 PROCEDURE — 85610 PROTHROMBIN TIME: CPT

## 2021-05-22 PROCEDURE — 93880 EXTRACRANIAL BILAT STUDY: CPT

## 2021-05-22 PROCEDURE — 74011250636 HC RX REV CODE- 250/636: Performed by: INTERNAL MEDICINE

## 2021-05-22 PROCEDURE — 80053 COMPREHEN METABOLIC PANEL: CPT

## 2021-05-22 PROCEDURE — 0042T CT CODE NEURO PERF W CBF: CPT

## 2021-05-22 PROCEDURE — 74011000258 HC RX REV CODE- 258: Performed by: EMERGENCY MEDICINE

## 2021-05-22 PROCEDURE — 99285 EMERGENCY DEPT VISIT HI MDM: CPT

## 2021-05-22 PROCEDURE — 3E03317 INTRODUCTION OF OTHER THROMBOLYTIC INTO PERIPHERAL VEIN, PERCUTANEOUS APPROACH: ICD-10-PCS | Performed by: INTERNAL MEDICINE

## 2021-05-22 PROCEDURE — 85025 COMPLETE CBC W/AUTO DIFF WBC: CPT

## 2021-05-22 PROCEDURE — 83540 ASSAY OF IRON: CPT

## 2021-05-22 PROCEDURE — 74011000250 HC RX REV CODE- 250: Performed by: EMERGENCY MEDICINE

## 2021-05-22 PROCEDURE — 37195 THROMBOLYTIC THERAPY STROKE: CPT

## 2021-05-22 PROCEDURE — 70551 MRI BRAIN STEM W/O DYE: CPT

## 2021-05-22 RX ORDER — SODIUM CHLORIDE 9 MG/ML
50 INJECTION, SOLUTION INTRAVENOUS ONCE
Status: DISPENSED | OUTPATIENT
Start: 2021-05-22 | End: 2021-05-22

## 2021-05-22 RX ORDER — LEVOTHYROXINE SODIUM 100 UG/1
100 TABLET ORAL
Status: DISCONTINUED | OUTPATIENT
Start: 2021-05-23 | End: 2021-05-28 | Stop reason: HOSPADM

## 2021-05-22 RX ORDER — SODIUM CHLORIDE 9 MG/ML
50 INJECTION, SOLUTION INTRAVENOUS ONCE
Status: COMPLETED | OUTPATIENT
Start: 2021-05-22 | End: 2021-05-22

## 2021-05-22 RX ORDER — DILTIAZEM HYDROCHLORIDE EXTENDED-RELEASE TABLETS 240 MG/1
240 TABLET, EXTENDED RELEASE ORAL DAILY
COMMUNITY
End: 2021-05-28

## 2021-05-22 RX ORDER — LOSARTAN POTASSIUM 50 MG/1
50 TABLET ORAL DAILY
Status: DISCONTINUED | OUTPATIENT
Start: 2021-05-23 | End: 2021-05-25

## 2021-05-22 RX ORDER — LABETALOL HCL 20 MG/4 ML
10 SYRINGE (ML) INTRAVENOUS
Status: DISCONTINUED | OUTPATIENT
Start: 2021-05-22 | End: 2021-05-24

## 2021-05-22 RX ORDER — SODIUM CHLORIDE 9 MG/ML
50 INJECTION, SOLUTION INTRAVENOUS CONTINUOUS
Status: DISCONTINUED | OUTPATIENT
Start: 2021-05-22 | End: 2021-05-24

## 2021-05-22 RX ORDER — ONDANSETRON 2 MG/ML
4 INJECTION INTRAMUSCULAR; INTRAVENOUS
Status: DISCONTINUED | OUTPATIENT
Start: 2021-05-22 | End: 2021-05-28 | Stop reason: HOSPADM

## 2021-05-22 RX ORDER — SODIUM CHLORIDE 0.9 % (FLUSH) 0.9 %
5-40 SYRINGE (ML) INJECTION AS NEEDED
Status: DISCONTINUED | OUTPATIENT
Start: 2021-05-22 | End: 2021-05-28 | Stop reason: HOSPADM

## 2021-05-22 RX ORDER — FACIAL-BODY WIPES
10 EACH TOPICAL DAILY PRN
Status: DISCONTINUED | OUTPATIENT
Start: 2021-05-22 | End: 2021-05-28 | Stop reason: HOSPADM

## 2021-05-22 RX ORDER — SODIUM CHLORIDE 9 MG/ML
25 INJECTION, SOLUTION INTRAVENOUS AS NEEDED
Status: DISCONTINUED | OUTPATIENT
Start: 2021-05-22 | End: 2021-05-25

## 2021-05-22 RX ORDER — PROCHLORPERAZINE EDISYLATE 5 MG/ML
10 INJECTION INTRAMUSCULAR; INTRAVENOUS
Status: DISCONTINUED | OUTPATIENT
Start: 2021-05-22 | End: 2021-05-28 | Stop reason: HOSPADM

## 2021-05-22 RX ORDER — SODIUM CHLORIDE 0.9 % (FLUSH) 0.9 %
5-40 SYRINGE (ML) INJECTION EVERY 8 HOURS
Status: DISCONTINUED | OUTPATIENT
Start: 2021-05-22 | End: 2021-05-28 | Stop reason: HOSPADM

## 2021-05-22 RX ORDER — ACETAMINOPHEN 325 MG/1
650 TABLET ORAL
Status: DISCONTINUED | OUTPATIENT
Start: 2021-05-22 | End: 2021-05-28 | Stop reason: HOSPADM

## 2021-05-22 RX ORDER — ACETAMINOPHEN 650 MG/1
650 SUPPOSITORY RECTAL
Status: DISCONTINUED | OUTPATIENT
Start: 2021-05-22 | End: 2021-05-28 | Stop reason: HOSPADM

## 2021-05-22 RX ORDER — DILTIAZEM HYDROCHLORIDE 240 MG/1
240 CAPSULE, COATED, EXTENDED RELEASE ORAL DAILY
Status: DISCONTINUED | OUTPATIENT
Start: 2021-05-23 | End: 2021-05-24

## 2021-05-22 RX ADMIN — SODIUM CHLORIDE 50 ML/HR: 9 INJECTION, SOLUTION INTRAVENOUS at 15:56

## 2021-05-22 RX ADMIN — ALTEPLASE 4.81 MG: KIT at 11:42

## 2021-05-22 RX ADMIN — SODIUM CHLORIDE 50 ML: 0.9 INJECTION, SOLUTION INTRAVENOUS at 12:36

## 2021-05-22 RX ADMIN — Medication 10 ML: at 22:04

## 2021-05-22 RX ADMIN — IOPAMIDOL 100 ML: 755 INJECTION, SOLUTION INTRAVENOUS at 12:03

## 2021-05-22 RX ADMIN — Medication 10 ML: at 15:56

## 2021-05-22 RX ADMIN — ALTEPLASE 43.25 MG: KIT at 11:43

## 2021-05-22 NOTE — H&P
Yang Vaz[facility] [address] [address]  [address] [facility], [address] [place] [address]  [phone]    Admission History and Physical      NAME:  Masoud Slaughter   :   10/12/1930   MRN:  [id number]     PCP:  Isabella Holland MD     Date/Time of service:  2021  1:49 PM        Subjective:     CHIEF COMPLAINT: right sided weakness     HISTORY OF PRESENT ILLNESS:     The patient is a 81 yo hx of HTN, DM, dCHF, chronic afib, CVA, hypothyroid, RA, presented w/ R sided weakness, aphasia, CVA. The patient was in a normal state of health when she developed acute onset R sided weakness this AM, associated with aphasia and L facial droop. Denied chest pain, SOB, fevers, chills, nausea, vomiting, diarrhea. The patient was last admitted to [address] [place] [address] in March for iron def anemia and was taken off Eliquis. In the ED, head CTA was negative for LVO. She was started on TPA. Allergies   Allergen Reactions    Shellfish Containing Products Swelling       Prior to Admission medications    Medication Sig Start Date End Date Taking? Authorizing Provider   dilTIAZem ER (CARDIZEM CD) 360 mg capsule Take 1 Cap by mouth daily. 3/25/21   , Brenda ALBERT MD   ferrous gluconate 324 mg (37.5 mg iron) tablet Take 1 Tab by mouth Daily (before breakfast). 3/24/21   , Frankie Painting MD   levothyroxine (SYNTHROID) 100 mcg tablet Take 1 Tab by mouth Daily (before breakfast). 3/25/21   , Frankie Painting MD   losartan (COZAAR) 50 mg tablet Take 50 mg by mouth daily. Provider, Historical   furosemide (LASIX) 20 mg tablet Take 1 tablet by mouth once daily 21   Genevieve Cabrera DO   acetaminophen (TylenoL) 325 mg tablet Take 325 mg by mouth every six (6) hours as needed for Pain. Provider, Historical   pantoprazole (PROTONIX) 40 mg tablet Take 1 Tab by mouth daily.  14   Aris Monet MD       Past Medical History:   Diagnosis Date    BPPV (benign paroxysmal positional vertigo)     HTN (hypertension)     Hx of completed stroke     Hyperlipidemia     Hypothyroid     [facility]     Scoliosis         Past Surgical History:   Procedure Laterality Date    HX  SECTION      HX ORTHOPAEDIC      bunions removed x 2       Social History     Tobacco Use    Smoking status: Never Smoker    Smokeless tobacco: Never Used   Substance Use Topics    Alcohol use: No        Family History   Problem Relation Age of Onset    Stroke Father         Review of Systems:  (bold if positive, if negative)    Gen:  Eyes:  ENT:  CVS:  Pulm:  GI:  :  MS:  Skin:  Psych:  Endo:  Hem:  Renal:  Neuro:   weakness,        Objective:      VITALS:    Vital signs reviewed; most recent are:    Visit Vitals  BP (!) 161/73   Pulse 92   Temp 98 °F (36.7 °C)   Resp 18   Ht 5' 2\" (1.575 m)   Wt 53.4 kg (117 lb 11.2 oz)   SpO2 98%   BMI 21.53 kg/m²     SpO2 Readings from Last 6 Encounters:   21 98%   21 99%   21 94%   21 94%   21 99%   20 98%            Intake/Output Summary (Last 24 hours) at 2021 1349  Last data filed at 2021 1347  Gross per 24 hour   Intake 50 ml   Output    Net 50 ml        Exam:     Physical Exam:    Gen:  Elderly, thin, frail, NAD  HEENT:  Pink conjunctivae, PERRL, hearing intact to voice  Neck:  Supple, without masses, thyroid non-tender  Resp:  No accessory muscle use, clear breath sounds without wheezes rales or rhonchi  Card:  No murmurs, normal S1, S2 without thrills, bruits or peripheral edema  Abd:  Soft, non-tender, non-distended, normoactive bowel sounds are present  Lymph:  No cervical adenopathy  Musc:  No cyanosis or clubbing  Skin:  No rashes   Neuro:  Awake, 3/5 right side strength, left facial droop, tongue deviated on right  Psych:  Alert with poor insight.   Oriented to person, place    Labs:    Recent Labs     21  1141   WBC 5.1   HGB 8.8*   HCT 30.5*        Recent Labs     21  1141      K 3.8      CO2 26   *   BUN 33*   CREA 1.18* CA 9.7   ALB 3.2*   TBILI 0.4   ALT 31     Lab Results   Component Value Date/Time    Glucose (POC) 124 (H) 03/23/2021 05:56 AM    Glucose (POC) 145 (H) 11/30/2020 11:41 PM     No results for input(s): PH, PCO2, PO2, HCO3, FIO2 in the last 72 hours. Recent Labs     05/22/21  1141   INR 1.2*       Radiology and EKG reviewed:   Head CTA reviewed    **Old Records reviewed in [facility]**       Assessment/Plan:       Principal Problem:    79 yo hx of HTN, DM, dCHF, chronic afib, CVA, hypothyroid, RA, presented w/ R sided weakness, aphasia, CVA    1) Acute CVA (cerebrovascular accident)/R sided hemiparesis/aphasia: likely due to chronic afib. Eliquis stopped in March due to severe iron def anemia. Now received TPA in ED. Will monitor in ICU. No ASA or anticoagulation x24 hours. Will need to be back on Eliquis once stable. Check head MRI, echo, carotid dopplers, lipids. Consult Neuro, PT/OT    2) Chronic afib: cont dilt. Off Eliquis since March. Consult Cards    3) Chronic iron def anemia: will check stool blood, iron studies. Consider GI consult for inpatient EGD/colon    4) HTN: cont dilt, losartan    5) Chronic dCHF: volume stable. Hold lasix for now    6) Hypothyroid: check TSH. Cont synthroid    7) DM type 2: A1C 6.6%.   Diet control    Risk of deterioration: high      Total time spent with patient: 70 Minutes (spent ~35 min on critical care) **I personally saw and examined the patient during this time period**                 Care Plan discussed with: Patient, nursing, family    Discussed:  Care Plan    Prophylaxis:  SCD's    Probable Disposition:  SNF/LTC           ___________________________________________________    Attending Physician: Irvin Barbosa MD

## 2021-05-22 NOTE — CONSULTS
Zain Bowling MD. [facility]              Patient: Carole Zuluaga  : 10/12/1930      Today's Date: 2021          HISTORY OF PRESENT ILLNESS:     History of Present Illness:  Reason for consult: Afib, CVA     The patient is a 79 yo hx of HTN, DM, dCHF, chronic afib, CVA, hypothyroid, RA, presented w/ R sided weakness, aphasia, CVA. History obtained from chart and the daughter. The patient was in a normal state of health when she developed acute onset R sided weakness this AM, associated with aphasia and L facial droop. Last month she was taken off of Eliquis due to concern of anemia and potential bleeding. She was given TPA in the ED. She still has aphasia when I see her. PAST MEDICAL HISTORY:     Past Medical History:   Diagnosis Date    BPPV (benign paroxysmal positional vertigo)     HTN (hypertension)     Hx of completed stroke     Hyperlipidemia     Hypothyroid     PAF (paroxysmal atrial fibrillation) (HCC)     Rheumatoid arthritis ([facility] [address]     Scoliosis          Past Surgical History:   Procedure Laterality Date    HX  SECTION      HX ORTHOPAEDIC      bunions removed x 2           MEDICATIONS:     Prior to Admission Medications:      Prior to Admission Medications   Prescriptions Last Dose Informant Patient Reported? Taking?   dilTIAZem ER (CARDIZEM LA) 240 mg tablet     Yes Yes   Sig: Take 240 mg by mouth daily. ferrous gluconate 324 mg (37.5 mg iron) tablet     No Yes   Sig: Take 1 Tab by mouth Daily (before breakfast). furosemide (LASIX) 20 mg tablet   Self No Yes   Sig: Take 1 tablet by mouth once daily   levothyroxine (SYNTHROID) 100 mcg tablet     No Yes   Sig: Take 1 Tab by mouth Daily (before breakfast). losartan (COZAAR) 50 mg tablet   Self Yes Yes   Sig: Take 50 mg by mouth daily. pantoprazole (PROTONIX) 40 mg tablet   Self No Yes   Sig: Take 1 Tab by mouth daily.       Facility-Administered Medications: None                  Current Facility-Administered Medications   Medication Dose Route Frequency Provider Last Rate Last Admin    0.9% sodium chloride infusion 50 mL  50 mL IntraVENous ONCE Suman Puente MD   Held at 05/22/21 1133    labetaloL (NORMODYNE;TRANDATE) 20 mg/4 mL (5 mg/mL) injection 10 mg  10 mg IntraVENous Q4H PRN William Puente MD        prochlorperazine (COMPAZINE) injection 10 mg  10 mg IntraVENous Q6H PRN Suman Puente MD        [START ON 5/23/2021] dilTIAZem ER (CARDIZEM CD) capsule 240 mg  240 mg Oral DAILY Suman Puente MD        [START ON 5/23/2021] levothyroxine (SYNTHROID) tablet 100 mcg  100 mcg Oral ACB Suman Puente MD        [START ON 5/23/2021] losartan (COZAAR) tablet 50 mg  50 mg Oral DAILY Suman Puente MD        [START ON 5/23/2021] pantoprazole (PROTONIX) 40 mg in 0.9% sodium chloride 10 mL injection  40 mg IntraVENous ACB William Puente MD        ondansetron (ZOFRAN) injection 4 mg  4 mg IntraVENous Q6H PRN William Puente MD        0.9% sodium chloride infusion  50 mL/hr IntraVENous CONTINUOUS William Puente MD 50 mL/hr at 05/22/21 1556 50 mL/hr at 05/22/21 1556    sodium chloride (NS) flush 5-40 mL  5-40 mL IntraVENous Q8H William Puente MD   10 mL at 05/22/21 1556    sodium chloride (NS) flush 5-40 mL  5-40 mL IntraVENous PRN William Puente MD        0.9% sodium chloride infusion 25 mL  25 mL IntraVENous PRN William Puente MD        acetaminophen (TYLENOL) tablet 650 mg  650 mg Oral Q6H PRN William Puente MD        Or    acetaminophen (TYLENOL) suppository 650 mg  650 mg Rectal Q6H PRN William Puente MD        bisacodyL (DULCOLAX) suppository 10 mg  10 mg Rectal DAILY PRN Suman Puente MD           Allergies   Allergen Reactions    Shellfish Containing Products Swelling           SOCIAL HISTORY:     Social History     Tobacco Use    Smoking status: Never Smoker    Smokeless tobacco: Never Used   Substance Use Topics    Alcohol use: No    Drug use: No         FAMILY HISTORY:     Family History   Problem Relation Age of Onset    Stroke Father          REVIEW OF SYMPTOMS:     Review of Symptoms:  Constitutional: Negative for fever, chills  HEENT: Negative for nosebleeds, tinnitus, and vision changes. Respiratory: Negative for cough, wheezing  Cardiovascular: Negative for   syncope, and PND. Gastrointestinal: Negative for abdominal pain, diarrhea, melena. Genitourinary: Negative for dysuria  Musculoskeletal: Negative for myalgias. Skin: Negative for rash  Heme: No problems bleeding. Neurological: per HPI            PHYSICAL EXAM:     Physical Exam:  Visit Vitals  BP (!) 152/75   Pulse (!) 107   Temp 98.7 °F (37.1 °C)   Resp 18   Ht 5' 2\" (1.575 m)   Wt 117 lb 11.2 oz (53.4 kg)   SpO2 97%   BMI 21.53 kg/m²     Patient NAD, elderly, frail   HEENT:  Hearing intact, non-icteric, normocephalic, atraumatic. Neck Exam: Supple   Lung Exam: Clear to auscultation, even breath sounds. Cardiac Exam: Irreg irreg with 2/6 systolic murmur. Abdomen: Soft, non-tender, normal bowel sounds. Extremities: Moves all ext well. No lower extremity edema.   Skin: No significant rashes  Psych: Appropriate affect  Neuro - + CVA          LABS / OTHER STUDIES reviewed:       Recent Results (from the past 24 hour(s))   EKG, 12 LEAD, INITIAL    Collection Time: 05/22/21 11:38 AM   Result Value Ref Range    Ventricular Rate 85 BPM    Atrial Rate 66 BPM    QRS Duration 82 ms    Q-T Interval 380 ms    QTC Calculation (Bezet) 452 ms    Calculated R Axis -71 degrees    Diagnosis       Atrial fibrillation  Left anterior fascicular block  Anterior infarct (cited on or before 22-MAR-2021)  Abnormal ECG  When compared with ECG of 23-MAR-2021 05:53,  No significant change was found     CBC WITH AUTOMATED DIFF    Collection Time: 05/22/21 11:41 AM   Result Value Ref Range    WBC 5.1 3.6 - 11.0 K/uL    RBC 4.32 3.80 - 5.20 M/uL    HGB 8.8 (L) 11.5 - 16.0 g/dL    HCT 30.5 (L) 35.0 - 47.0 %    MCV 70.6 (L) 80.0 - 99.0 FL    MCH 20.4 (L) 26.0 - 34.0 PG    MCHC 28.9 (L) 30.0 - 36.5 g/dL    RDW 19.1 (H) 11.5 - 14.5 %    PLATELET 632 077 - 368 K/uL    MPV 9.7 8.9 - 12.9 FL    NRBC 0.0 0  WBC    ABSOLUTE NRBC 0.00 0.00 - 0.01 K/uL    NEUTROPHILS 71 32 - 75 %    LYMPHOCYTES 19 12 - 49 %    MONOCYTES 9 5 - 13 %    EOSINOPHILS 1 0 - 7 %    BASOPHILS 0 0 - 1 %    IMMATURE GRANULOCYTES 0 0.0 - 0.5 %    ABS. NEUTROPHILS 3.5 1.8 - 8.0 K/UL    ABS. LYMPHOCYTES 1.0 0.8 - 3.5 K/UL    ABS. MONOCYTES 0.5 0.0 - 1.0 K/UL    ABS. EOSINOPHILS 0.1 0.0 - 0.4 K/UL    ABS. BASOPHILS 0.0 0.0 - 0.1 K/UL    ABS. IMM. GRANS. 0.0 0.00 - 0.04 K/UL    DF SMEAR SCANNED      RBC COMMENTS HYPOCHROMIA  2+        RBC COMMENTS OVALOCYTES  PRESENT        RBC COMMENTS POLYCHROMASIA  1+        RBC COMMENTS MICROCYTOSIS  1+       METABOLIC PANEL, COMPREHENSIVE    Collection Time: 05/22/21 11:41 AM   Result Value Ref Range    Sodium 137 136 - 145 mmol/L    Potassium 3.8 3.5 - 5.1 mmol/L    Chloride 105 97 - 108 mmol/L    CO2 26 21 - 32 mmol/L    Anion gap 6 5 - 15 mmol/L    Glucose 151 (H) 65 - 100 mg/dL    BUN 33 (H) 6 - 20 MG/DL    Creatinine 1.18 (H) 0.55 - 1.02 MG/DL    BUN/Creatinine ratio 28 (H) 12 - 20      GFR est AA 52 (L) >60 ml/min/1.73m2    GFR est non-AA 43 (L) >60 ml/min/1.73m2    Calcium 9.7 8.5 - 10.1 MG/DL    Bilirubin, total 0.4 0.2 - 1.0 MG/DL    ALT (SGPT) 31 12 - 78 U/L    AST (SGOT) 30 15 - 37 U/L    Alk.  phosphatase 180 (H) 45 - 117 U/L    Protein, total 7.9 6.4 - 8.2 g/dL    Albumin 3.2 (L) 3.5 - 5.0 g/dL    Globulin 4.7 (H) 2.0 - 4.0 g/dL    A-G Ratio 0.7 (L) 1.1 - 2.2     PROTHROMBIN TIME + INR    Collection Time: 05/22/21 11:41 AM   Result Value Ref Range    INR 1.2 (H) 0.9 - 1.1      Prothrombin time 12.6 (H) 9.0 - 11.1 sec   TROPONIN I    Collection Time: 05/22/21 11:41 AM   Result Value Ref Range    Troponin-I, Qt. <0.05 <0.05 ng/mL   SAMPLES BEING HELD    Collection Time: 05/22/21 11:41 AM   Result Value Ref Range    SAMPLES BEING HELD 1SST     COMMENT        Add-on orders for these samples will be processed based on acceptable specimen integrity and analyte stability, which may vary by analyte. IRON PROFILE    Collection Time: 05/22/21 11:41 AM   Result Value Ref Range    Iron 18 (L) 35 - 150 ug/dL    TIBC 480 (H) 250 - 450 ug/dL    Iron % saturation 4 (L) 20 - 50 %   DUPLEX CAROTID BILATERAL    Collection Time: 05/22/21  5:06 PM   Result Value Ref Range    Right subclavian sys 53.3 cm/s    RIGHT SUBCLAVIAN ARTERY D 6.65 cm/s    Right cca dist sys 70.6 cm/s    Right CCA dist salazar 12.1 cm/s    Right CCA prox sys 65.3 cm/s    Right CCA prox salazar 8.6 cm/s    Right eca sys 73.2 cm/s    RIGHT EXTERNAL CAROTID ARTERY D 5.15 cm/s    Right ICA dist sys 49.6 cm/s    Right ICA dist salazar 15.6 cm/s    Right ICA mid sys 35.4 cm/s    Right ICA mid salazar 12.2 cm/s    Right ICA prox sys 53.1 cm/s    Right ICA prox salazar 10.4 cm/s    Right vertebral sys 21.3 cm/s    RIGHT VERTEBRAL ARTERY D 5.97 cm/s    Right ICA/CCA sys 0.8     Left subclavian sys 107.3 cm/s    LEFT SUBCLAVIAN ARTERY D 0.00 cm/s    Left CCA dist sys 33.1 cm/s    Left CCA dist salazar 8.6 cm/s    Left CCA prox sys 62.0 cm/s    Left CCA prox salazar 18.0 cm/s    Left ECA sys 74.1 cm/s    LEFT EXTERNAL CAROTID ARTERY D 0.00 cm/s    Left ICA dist sys 56.7 cm/s    Left ICA dist salazar 14.4 cm/s    Left ICA mid sys 54.1 cm/s    Left ICA mid salazar 15.0 cm/s    Left ICA prox sys 69.2 cm/s    Left ICA prox salazar 20.0 cm/s    Left vertebral sys 36.6 cm/s    LEFT VERTEBRAL ARTERY D 7.76 cm/s    Left ICA/CCA sys 2.09              CARDIAC DIAGNOSTICS:     Cardiac Evaluation Includes:  I reviewed the results below. 11/30/20    ECHO ADULT COMPLETE 12/01/2020 12/1/2020    Interpretation Summary  · LV: Estimated LVEF is 50 - 55%. Biplane method used to measure ejection fraction. Normal systolic function (ejection fraction normal). Small left ventricle. Mild concentric hypertrophy. Wall motion: normal. Severe (grade 3) left ventricular diastolic dysfunction.   · RV: Borderline low systolic function. · LA: Severely dilated left atrium. · RA: Moderately dilated right atrium. · AV: Aortic valve leaflet calcification present. Mild aortic valve stenosis is present. Moderate to severe aortic valve regurgitation is present. · MV: Mitral valve thickening. Moderate mitral annular calcification. Mild mitral valve regurgitation is present. 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 Nader Vital.  [place], [address] [place] [address]  Ph: [phone]   Ph [phone]

## 2021-05-22 NOTE — ED NOTES
TRANSFER - OUT REPORT:    Verbal report given to [address], RN(name) on Royal Guajardo  being transferred to ICU 3009(unit) for routine progression of care       Report consisted of patients Situation, Background, Assessment and   Recommendations(SBAR). Information from the following report(s) SBAR, Kardex, ED Summary, MAR, Recent Results and Cardiac Rhythm AFIB was reviewed with the receiving nurse. Lines:   Peripheral IV 05/22/21 Anterior;Proximal;Right Forearm (Active)       Peripheral IV 05/22/21 Left Antecubital (Active)        Opportunity for questions and clarification was provided.       Patient transported with:   Monitor  Registered Nurse

## 2021-05-22 NOTE — ED TRIAGE NOTES
Pt arriving via ems with code neuro call; pt pre-arrived her with name and  so orders can be placed PTA. , LKW 10:30 AM. Pt has left sided facial droop, aphasia, and right sided weakness. Pt is usually A&Ox4 per EMS.

## 2021-05-22 NOTE — PROGRESS NOTES
Stroke binder and verbal education provided to patient and daughters. Understanding verbalized by daughters.

## 2021-05-22 NOTE — PROGRESS NOTES
[facility]: MED RECONCILIATION    Comments/Recommendations:   Patient with a recent discharge from [facility] on 3/24/21. Med rec completed by pharmacy on 3/22/21 notes that the patient was unable to provide any medication history. Medication information was gathered from Rx fills and Cardiology notes. Per Cardiology notes entered on 4/5/21, patient is to resume Diltiazem 240 mg daily (which is the strength of the most recent dose filled from pharmacy). Medication(s) ADDED to PTA list:  none    Medication(s) REMOVED from PTA list:  none    Medication(s) ADJUSTED on PTA list:  Diltiazem strength changed to 240 mg (from 360 mg)        Allergies: Shellfish containing products    Prior to Admission Medications:     Prior to Admission Medications   Prescriptions Last Dose Informant Patient Reported? Taking?   dilTIAZem ER (CARDIZEM LA) 240 mg tablet   Yes Yes   Sig: Take 240 mg by mouth daily. ferrous gluconate 324 mg (37.5 mg iron) tablet   No Yes   Sig: Take 1 Tab by mouth Daily (before breakfast). furosemide (LASIX) 20 mg tablet  Self No Yes   Sig: Take 1 tablet by mouth once daily   levothyroxine (SYNTHROID) 100 mcg tablet   No Yes   Sig: Take 1 Tab by mouth Daily (before breakfast). losartan (COZAAR) 50 mg tablet  Self Yes Yes   Sig: Take 50 mg by mouth daily. pantoprazole (PROTONIX) 40 mg tablet  Self No Yes   Sig: Take 1 Tab by mouth daily.       Facility-Administered Medications: None            Tasha Lechuga, IZZYD

## 2021-05-22 NOTE — PROGRESS NOTES
Spoke with patient's nurses to attempt Carotid study. Nurses suggested to perform study once patient has been moved to room on the 3rd floor. Will attempt again when schedule allows.

## 2021-05-22 NOTE — ED NOTES
Stroke Education provided to patient and relative(s) and the following topics were discussed    1. Patients personal risk factors for stroke are hypertension, atrial fibrillation, diabetes mellitus and prior stroke    2. Warning signs of Stroke:        * Sudden numbness or weakness of the face, arm or leg, especially on one side of          The body            * Sudden confusion, trouble speaking or understanding        * Sudden trouble seeing in one or both eyes        * Sudden trouble walking, dizziness, loss of balance or coordination        * Sudden severe headache with no known cause      3. Importance of activation Emergency Medical Services ( 9-1-1 ) immediately if experience any warning signs of stroke. 4. Be sure and schedule a follow-up appointment with your primary care doctor or any specialists as instructed. 5. You must take medicine every day to treat your risk factors for stroke. Be sure to take your medicines exactly as your doctor tells you: no more, no less. Know what your medicines are for , what they do. Anti-thrombotics /anticoagulants can help prevent strokes. You are taking the following medicine(s)  see med list     6. Smoking and second-hand smoke greatly increase your risk of stroke, cardiovascular disease and death. Smoking history never    7. Information provided was [facility] Stroke Education Binder, Stroke Handouts or Verbal Education    8. Documentation of teaching completed in Patient Education Activity and on Care Plan with teaching response noted?   yes

## 2021-05-22 NOTE — ED PROVIDER NOTES
Date: 5/22/2021  Patient Name: Bird Vernon    History of Presenting Illness     Chief Complaint   Patient presents with    Stroke       History Provided By: Patient    HPI: Bird Vernon, 80 y.o. female with a past medical history of hypertension, hyperlipidemia, stroke and paroxysmal afib presents to the ED with cc of acute onset aphasia, right-sided deficits and weakness. EMS reports that the patient was last seen normal at 10:30 AM. She is normally ambulatory, talkative and alert and oriented. She lives with her family who found her slumped over and nonambulatory. EMS reports that she was hypertensive in route and had a blood sugar in the 240s. Per chart review, patient was recently taken off Eliquis in March secondary to iron deficiency anemia. Prealert Code S was called. There are no other complaints, changes, or physical findings at this time. PCP: Naga Hernández MD    No current facility-administered medications on file prior to encounter. Current Outpatient Medications on File Prior to Encounter   Medication Sig Dispense Refill    dilTIAZem ER (CARDIZEM CD) 360 mg capsule Take 1 Cap by mouth daily. 30 Cap 0    ferrous gluconate 324 mg (37.5 mg iron) tablet Take 1 Tab by mouth Daily (before breakfast). 30 Tab 0    levothyroxine (SYNTHROID) 100 mcg tablet Take 1 Tab by mouth Daily (before breakfast). 30 Tab 0    losartan (COZAAR) 50 mg tablet Take 50 mg by mouth daily.  furosemide (LASIX) 20 mg tablet Take 1 tablet by mouth once daily 30 Tab 0    acetaminophen (TylenoL) 325 mg tablet Take 325 mg by mouth every six (6) hours as needed for Pain.  pantoprazole (PROTONIX) 40 mg tablet Take 1 Tab by mouth daily.  30 Tab 1       Past History     Past Medical History:  Past Medical History:   Diagnosis Date    BPPV (benign paroxysmal positional vertigo)     HTN (hypertension)     Hx of completed stroke     Hyperlipidemia     Hypothyroid     Rheumatoid arthritis ([facility] [address]     Scoliosis        Past Surgical History:  Past Surgical History:   Procedure Laterality Date    HX  SECTION      HX ORTHOPAEDIC      bunions removed x 2       Family History:  Family History   Problem Relation Age of Onset    Stroke Father        Social History:  Social History     Tobacco Use    Smoking status: Never Smoker    Smokeless tobacco: Never Used   Substance Use Topics    Alcohol use: No    Drug use: No       Allergies: Allergies   Allergen Reactions    Shellfish Containing Products Swelling         Review of Systems   Review of Systems   Unable to perform ROS: Acuity of condition       Physical Exam   Physical Exam  Vitals and nursing note reviewed. Constitutional:       General: She is not in acute distress. Appearance: She is well-developed. HENT:      Head: Normocephalic and atraumatic. Eyes:      Conjunctiva/sclera: Conjunctivae normal.   Neck:      Vascular: No JVD. Trachea: No tracheal deviation. Cardiovascular:      Rate and Rhythm: Normal rate. Rhythm irregular. Heart sounds: No murmur heard. No friction rub. No gallop. Pulmonary:      Effort: Pulmonary effort is normal. No respiratory distress. Breath sounds: Normal breath sounds. No stridor. No wheezing. Abdominal:      General: Bowel sounds are normal. There is no distension. Palpations: Abdomen is soft. There is no mass. Tenderness: There is no abdominal tenderness. There is no guarding. Musculoskeletal:         General: No tenderness. Normal range of motion. Cervical back: Neck supple. Right lower leg: Edema present. Left lower leg: Edema present. Comments: No deformity   Skin:     General: Skin is warm and dry. Findings: No rash. Neurological:      Mental Status: She is alert. Comments: Right-sided facial droop on exam. 1 out of 5 muscle strength in the right upper extremity, 5/5 muscle strength in the left upper extremity.  5 out of 5 muscle strength in the bilateral lower extremities. Patient is aphasic but following commands. Diagnostic Study Results     Labs -     Recent Results (from the past 72 hour(s))   EKG, 12 LEAD, INITIAL    Collection Time: 05/22/21 11:38 AM   Result Value Ref Range    Ventricular Rate 85 BPM    Atrial Rate 66 BPM    QRS Duration 82 ms    Q-T Interval 380 ms    QTC Calculation (Bezet) 452 ms    Calculated R Axis -71 degrees    Diagnosis       Atrial fibrillation  Left anterior fascicular block  Anterior infarct (cited on or before 22-MAR-2021)  Abnormal ECG  When compared with ECG of 23-MAR-2021 05:53,  No significant change was found     CBC WITH AUTOMATED DIFF    Collection Time: 05/22/21 11:41 AM   Result Value Ref Range    WBC 5.1 3.6 - 11.0 K/uL    RBC 4.32 3.80 - 5.20 M/uL    HGB 8.8 (L) 11.5 - 16.0 g/dL    HCT 30.5 (L) 35.0 - 47.0 %    MCV 70.6 (L) 80.0 - 99.0 FL    MCH 20.4 (L) 26.0 - 34.0 PG    MCHC 28.9 (L) 30.0 - 36.5 g/dL    RDW 19.1 (H) 11.5 - 14.5 %    PLATELET 769 097 - 338 K/uL    MPV 9.7 8.9 - 12.9 FL    NRBC 0.0 0  WBC    ABSOLUTE NRBC 0.00 0.00 - 0.01 K/uL    NEUTROPHILS 71 32 - 75 %    LYMPHOCYTES 19 12 - 49 %    MONOCYTES 9 5 - 13 %    EOSINOPHILS 1 0 - 7 %    BASOPHILS 0 0 - 1 %    IMMATURE GRANULOCYTES 0 0.0 - 0.5 %    ABS. NEUTROPHILS 3.5 1.8 - 8.0 K/UL    ABS. LYMPHOCYTES 1.0 0.8 - 3.5 K/UL    ABS. MONOCYTES 0.5 0.0 - 1.0 K/UL    ABS. EOSINOPHILS 0.1 0.0 - 0.4 K/UL    ABS. BASOPHILS 0.0 0.0 - 0.1 K/UL    ABS. IMM.  GRANS. 0.0 0.00 - 0.04 K/UL    DF SMEAR SCANNED      RBC COMMENTS HYPOCHROMIA  2+        RBC COMMENTS OVALOCYTES  PRESENT        RBC COMMENTS POLYCHROMASIA  1+        RBC COMMENTS MICROCYTOSIS  1+       METABOLIC PANEL, COMPREHENSIVE    Collection Time: 05/22/21 11:41 AM   Result Value Ref Range    Sodium 137 136 - 145 mmol/L    Potassium 3.8 3.5 - 5.1 mmol/L    Chloride 105 97 - 108 mmol/L    CO2 26 21 - 32 mmol/L    Anion gap 6 5 - 15 mmol/L    Glucose 151 (H) 65 - 100 mg/dL BUN 33 (H) 6 - 20 MG/DL    Creatinine 1.18 (H) 0.55 - 1.02 MG/DL    BUN/Creatinine ratio 28 (H) 12 - 20      GFR est AA 52 (L) >60 ml/min/1.73m2    GFR est non-AA 43 (L) >60 ml/min/1.73m2    Calcium 9.7 8.5 - 10.1 MG/DL    Bilirubin, total 0.4 0.2 - 1.0 MG/DL    ALT (SGPT) 31 12 - 78 U/L    AST (SGOT) 30 15 - 37 U/L    Alk. phosphatase 180 (H) 45 - 117 U/L    Protein, total 7.9 6.4 - 8.2 g/dL    Albumin 3.2 (L) 3.5 - 5.0 g/dL    Globulin 4.7 (H) 2.0 - 4.0 g/dL    A-G Ratio 0.7 (L) 1.1 - 2.2     PROTHROMBIN TIME + INR    Collection Time: 05/22/21 11:41 AM   Result Value Ref Range    INR 1.2 (H) 0.9 - 1.1      Prothrombin time 12.6 (H) 9.0 - 11.1 sec   TROPONIN I    Collection Time: 05/22/21 11:41 AM   Result Value Ref Range    Troponin-I, Qt. <0.05 <0.05 ng/mL   SAMPLES BEING HELD    Collection Time: 05/22/21 11:41 AM   Result Value Ref Range    SAMPLES BEING HELD 1SST     COMMENT        Add-on orders for these samples will be processed based on acceptable specimen integrity and analyte stability, which may vary by analyte. Radiologic Studies -   CT CODE NEURO HEAD WO CONTRAST   Final Result   Moderate to severe chronic perivascular ischemic change and cerebral atrophy. No acute process identified. .          CTA CODE NEURO HEAD AND NECK W CONT    (Results Pending)   CT CODE NEURO PERF W CBF    (Results Pending)     CT Results  (Last 48 hours)               05/22/21 1116  CT CODE NEURO HEAD WO CONTRAST Final result    Impression:  Moderate to severe chronic perivascular ischemic change and cerebral atrophy. No acute process identified. .           Narrative:  CLINICAL HISTORY: Code Stroke   INDICATION: Code Stroke   COMPARISON: 12/27/2019. CT dose reduction was achieved through use of a standardized protocol tailored   for this examination and automatic exposure control for dose modulation.     TECHNIQUE: Serial axial images with a collimation of 5 mm were obtained from the   skull base through the vertex     FINDINGS:    Sulcal and ventricular prominence. . There is no evidence of an acute infarction,   hemorrhage, or mass-effect. There is no evidence of midline shift or   hydrocephalus. Posterior fossa structures are unremarkable. No extra-axial   collections are seen. Mastoid air cells are well pneumatized and clear. Sulcal and ventricular prominence. Remote infarct in the subcortical white   matter of the right parietal lobe is barely detectable on CT. Extensive   periventricular and scattered foci of increased T2 signal intensity in the   corona radiata and centrum semiovale. CXR Results  (Last 48 hours)    None          Medical Decision Making   I am the first provider for this patient. I reviewed the vital signs, available nursing notes, past medical history, past surgical history, family history and social history. Vital Signs-Reviewed the patient's vital signs. Patient Vitals for the past 12 hrs:   Temp Pulse Resp BP SpO2   05/22/21 1142    (!) 147/121    05/22/21 1120 98 °F (36.7 °C) 91 17 (!) 132/98 98 %       EKG interpretation: (Preliminary)  EKG interpreted by me. Shows atrial fibrillation with a rate of 85. No ST elevations or depressions concerning for ischemia. Normal intervals. Records Reviewed: Nursing Notes and Old Medical Records    Provider Notes (Medical Decision Making):   Patient is a 27-year-old female presenting with acute onset aphasia and right-sided deficits. Pre-alert Code S called. Patient evaluated with neurology at bedside, decision was made to give TPA with family consent. Patient CTA was negative for LVO, confirmed with neuro interventional at [facility]. Will admit. ED Course:   Initial assessment performed. The patients presenting problems have been discussed, and they are in agreement with the care plan formulated and outlined with them. I have encouraged them to ask questions as they arise throughout their visit.     ED Course as of May 22 1316   Sat May 22, 2021   1215 Spoke with Dr. Burgess Vazquez from neuro interventional at [facility]. No large vessel occlusion seen on his read from the CTA. If the formal read confirms there is no LVO, [facility]. [CK]      ED Course User Index  [CK] Meliza Norton DO        CONSULTS    Dr. Ej Govea spoke with Dr. Richa Levi  Specialty: Neuro  Discussed pt's hx, disposition, and available diagnostic and imaging results. Reviewed care plans. Consultant agrees with plans as outlined. Recommends TPA administration. Daughters at bedside and consented to treatment. 11:59 AM    Adriel Capellan DO spoke with Eli Munoz, neurointerventionalist NP, Consult for Neurology. Discussed available diagnostic tests and clinical findings. He/She is in agreement with care plans as outlined. He/she will review the CTA and speak with attending. Critical Care Time:   CRITICAL CARE NOTE :    1:26 PM    IMPENDING DETERIORATION -CNS  ASSOCIATED RISK FACTORS - CNS Decompensation  MANAGEMENT- Bedside Assessment and Supervision of Care  INTERPRETATION -  CT Scan, ECG and Blood Pressure  INTERVENTIONS - Neurologic interventions   CASE REVIEW - Hospitalist/Intensivist, Medical Sub-Specialist, Nursing and Family  TREATMENT RESPONSE -Improved  PERFORMED BY - Self    NOTES   :  I have spent 100 minutes of critical care time involved in lab review, consultations with specialist, family decision- making, bedside attention and documentation. This time excludes time spent in any separate billed procedures. During this entire length of time I was immediately available to the patient . Perfect Serve Consult for Admission  1:16 PM      ED Room Number: ER15/15  Patient Name and age:  Johnny Hicks 80 y.o.  female  Working Diagnosis:   1.  Cerebrovascular accident (CVA), unspecified mechanism ([facility] [address]        COVID-19 Suspicion:  no  Sepsis present:  no  Reassessment needed: no  Code Status:  Do Not Resuscitate  Readmission: no  Isolation Requirements:  no  Recommended Level of Care:  ICU  Department:[facility] ED - [phone]  Other: Patient is a 40-year-old female with a history of stroke with no residual neurologic deficits, hyperlipidemia, paroxysmal atrial fibrillation, hypothyroidism, hypertension presenting to the ER for a CVA today. EMS had reported that the patient is normally walking, talking and alert and oriented. Last known normal was 10:30 AM in the family found her slumped over in her room, aphasic and now nonambulatory. Patient had been on Eliquis, but was taken off of her Eliquis in March secondary to iron deficiency anemia. She is noted to be in atrial fibrillation here. CT of the head was negative so TPA was given. CTA showed no LVO. Disposition:  Admitted to ICU Medical ICU the case was discussed with the admitting physician Dr. Silvio Hunt        Diagnosis     Clinical Impression:   1. Cerebrovascular accident (CVA), unspecified mechanism ([address]        Attestations:    Sharath Median, DO    Please note that this dictation was completed with [email or web address], the [employer] voice recognition software. Quite often unanticipated grammatical, syntax, homophones, and other interpretive errors are inadvertently transcribed by the computer software. Please disregard these errors. Please excuse any errors that have escaped final proofreading. Thank you.

## 2021-05-22 NOTE — PROGRESS NOTES
CARE MANAGEMENT INITIAL ASSESSEMENT      NAME:   Poli Reeves   :     10/12/1930   MRN:     [id number]       Emergency Contact:  Extended Emergency Contact Information  Primary Emergency Contact: [address] Phone: [phone]  Relation: Daughter  Secondary Emergency Contact: [address] Phone: [phone]  Relation: Daughter    Advance Directive:  Prior, Pt does not have an advance directive. Healthcare Decision Maker:    Primary Decision Maker: Jenise Matthew - Daughter - [phone]    Primary Decision Maker: Louie Allen - Daughter - [phone]     Reason for Admission:  Ms. Jennifer Nicole is a [address] y.o. female with history that includes HTN, diabetes, a-fib, and CVA admitted for an emergent admission for:  CVA    Patient Active Problem List   Diagnosis Code    HTN (hypertension) I10    GERD (gastroesophageal reflux disease) K21.9    Osteopenia M85.80    Diabetic retinopathy ([address] E11.319    Hypothyroidism E03.9    Hyperlipidemia E78.5    Menieres disease H81.09    Scoliosis M41.9    Rheumatoid arthritis ([address] M06.9    Hypothyroid E03.9    Hx of completed stroke Z80.78    DM type 2 causing vascular disease ([address] E11.59    Hypokalemia U28.1    Diastolic CHF, acute on chronic (HCC) I50.33    Pleural effusion J90    Iron deficiency anemia D50.9    Chronic a-fib (HCC) I48.20    Acute CVA (cerebrovascular accident) ([address] I63.9       Assessment: With family member in person. Who/Relation:  daughters- Opal Sherwood and Rajendraisia    RUR:  Not available  Risk Level: Moderate  Value-based purchasing:  no  Bundle patient:  Yes - Specify: Cardiology/CHF    Residency: Private residence  Exterior Steps:  6  Interior Steps:  [address]. Family or caregiver assists Pt up the steps.     Lives With: Other family members    Prior functioning:  Dependent  Pt requires assistance with: Bathing, Dressing, Toileting, Eating, Ambulation    Prior DME required: []None  [x]RW  []Cane  []Crutches  []Bedside commode  []CPAP  []Home O2 (Liters/Provider: )  []Nebulizer   []Shower Chair  []Wheelchair  []Hospital Bed  []Salty  []Stair lift  []Rollator  []Other:    DME available:  []None  [x]RW  []Cane  []Crutches  []Bedside commode  []CPAP  []Home O2 (Liter/Provider: )  []Nebulizer  []Shower Chair  []Wheelchair  []Hospital Bed  []Salty  []Stair lift  []Rollator  []Other:    Rehab history:  Home Health: Provider - [facility] Date - CURRENTLY OPEN     Discharge Concerns:  No    Insurer:  Payor: [place] MEDICARE / Plan: VA MEDICARE PART A & B / Product Type: Medicare /      BCPI-A letter regarding Heart Failure has been delivered to the patient/caregiver. PCP: Kyung Singletary MD   Name of Practice: [address] [place] [address]   Current patient: yes   Approximate date of last visit: 5/19   Access to virtual PCP visits: no    Pharmacy:  [address] Family denies problems obtaining meds. DC Transport:  TBD      Transition of care plan:   Home with outpatient follow-up      Comments:   CM met with Pt's daughters, Savage Stiles ([phone]) and Beryl Roxi ([phone]), to complete initial assessment. Pt lives at home with Beryl Diane. Pt has a caregiver daily for 8 hrs/day. Beryl Diane confirms family or caregivers are with Pt 24/7. Pt has HH SN through [facility]. Pt has a walker at home. Beryl Diane states they are interested in a rollator with a seat (Medicare did not pay for Pt's current walker). Beryl Diane states that Pt is able to ambulate with walker but family or caregiver is always with Pt. Family or caregiver assists with all ADLs. CM discussed discharge plan with family. Family states ideal discharge plan would [facility], however, family would consider SNF rehab if needed. CM will continue to follow.    _____________________________________  RUTH ANN Resendez - Care Management  5/22/2021   2:52 PM      Care Management Interventions  PCP Verified by CM: Yes Tremayne Frost MD)  Mode of Transport at Discharge:  Other (see comment) (family)  Transition of Care Consult (CM Consult): Discharge Planning  MyChart Signup: No  Discharge Durable Medical Equipment: No  Physical Therapy Consult: No  Occupational Therapy Consult: No  Speech Therapy Consult: No  Current Support Network: Has Personal Caregivers, Relative's Home  Confirm Follow Up Transport: Family  Discharge Location  Discharge Placement: Unable to determine at this time

## 2021-05-22 NOTE — PROGRESS NOTES
Problem: TIA/CVA Stroke: 0-24 hours  Goal: Activity/Safety  Outcome: Progressing Towards Goal  Goal: Consults, if ordered  Outcome: Progressing Towards Goal  Goal: Diagnostic Test/Procedures  Outcome: Progressing Towards Goal  Goal: Medications  Outcome: Progressing Towards Goal

## 2021-05-22 NOTE — PROGRESS NOTES
1445- TRANSFER - IN REPORT:    [facility]  being received from ED (unit) for routine progression of care      Report consisted of patients Situation, Background, Assessment and   Recommendations(SBAR). Information from the following report(s) SBAR, Kardex, Intake/Output and Cardiac Rhythm afib  was reviewed with the receiving nurse. Opportunity for questions and clarification was provided. Assessment completed upon patients arrival to unit and care assumed. Report given and patient transported in MRI. NIHSS score is 9. NIHSS to be scored every 30 minutes until 1945. Patient aphasic, unable to answer questions but nods appropriately. Able to move all extremities. Purewick in place, clean, dry, intact. Patient left with daughter at bedside and call bell in reach. 1900- Bedside and Verbal shift change report given to JENNIFER Burgess (oncoming nurse) by Jeny Olivas RN (offgoing nurse). Report included the following information SBAR, Kardex, Intake/Output and Cardiac Rhythm afib.

## 2021-05-23 ENCOUNTER — APPOINTMENT (OUTPATIENT)
Dept: NON INVASIVE DIAGNOSTICS | Age: 86
DRG: 061 | End: 2021-05-23
Attending: INTERNAL MEDICINE
Payer: MEDICARE

## 2021-05-23 ENCOUNTER — APPOINTMENT (OUTPATIENT)
Dept: CT IMAGING | Age: 86
DRG: 061 | End: 2021-05-23
Attending: INTERNAL MEDICINE
Payer: MEDICARE

## 2021-05-23 LAB
ALBUMIN SERPL-MCNC: 3 G/DL (ref 3.5–5)
ALBUMIN/GLOB SERPL: 0.7 {RATIO} (ref 1.1–2.2)
ALP SERPL-CCNC: 164 U/L (ref 45–117)
ALT SERPL-CCNC: 27 U/L (ref 12–78)
ANION GAP SERPL CALC-SCNC: 9 MMOL/L (ref 5–15)
APTT PPP: 23.5 SEC (ref 22.1–31)
APTT PPP: 23.9 SEC (ref 22.1–31)
AST SERPL-CCNC: 34 U/L (ref 15–37)
ATRIAL RATE: 66 BPM
BILIRUB DIRECT SERPL-MCNC: 0.2 MG/DL (ref 0–0.2)
BILIRUB SERPL-MCNC: 0.5 MG/DL (ref 0.2–1)
BUN SERPL-MCNC: 27 MG/DL (ref 6–20)
BUN/CREAT SERPL: 31 (ref 12–20)
CALCIUM SERPL-MCNC: 9.1 MG/DL (ref 8.5–10.1)
CALCULATED R AXIS, ECG10: -71 DEGREES
CHLORIDE SERPL-SCNC: 107 MMOL/L (ref 97–108)
CHOLEST SERPL-MCNC: 116 MG/DL
CO2 SERPL-SCNC: 24 MMOL/L (ref 21–32)
CREAT SERPL-MCNC: 0.86 MG/DL (ref 0.55–1.02)
DIAGNOSIS, 93000: NORMAL
ECHO AO ASC DIAM: 3.3 CM
ECHO AO ROOT DIAM: 3.07 CM
ECHO AR MAX VEL PISA: 453.72 CENTIMETER/SECOND
ECHO AV AREA PEAK VELOCITY: 2.06 CM2
ECHO AV AREA VTI: 2.12 CM2
ECHO AV AREA/BSA PEAK VELOCITY: 1.4 CM2/M2
ECHO AV AREA/BSA VTI: 1.4 CM2/M2
ECHO AV MEAN GRADIENT: 5.59 MMHG
ECHO AV PEAK GRADIENT: 9.68 MMHG
ECHO AV PEAK VELOCITY: 155.56 CM/S
ECHO AV REGURGITANT PHT: 516.87 MS
ECHO AV VTI: 24.1 CM
ECHO IVC PROX: 2.13 CM
ECHO LA AREA 4C: 24.65 CM2
ECHO LA MAJOR AXIS: 4.36 CM
ECHO LA MINOR AXIS: 2.89 CM
ECHO LA VOL 2C: 74.57 ML (ref 22–52)
ECHO LA VOL 4C: 79.62 ML (ref 22–52)
ECHO LA VOL BP: 84.12 ML (ref 22–52)
ECHO LA VOL/BSA BIPLANE: 55.71 ML/M2 (ref 16–28)
ECHO LA VOLUME INDEX A2C: 49.38 ML/M2 (ref 16–28)
ECHO LA VOLUME INDEX A4C: 52.73 ML/M2 (ref 16–28)
ECHO LV E' LATERAL VELOCITY: 6.1 CENTIMETER/SECOND
ECHO LV E' SEPTAL VELOCITY: 4.44 CENTIMETER/SECOND
ECHO LV INTERNAL DIMENSION DIASTOLIC: 3.37 CM (ref 3.9–5.3)
ECHO LV INTERNAL DIMENSION SYSTOLIC: 2.42 CM
ECHO LV IVSD: 1.45 CM (ref 0.6–0.9)
ECHO LV MASS 2D: 178.8 G (ref 67–162)
ECHO LV MASS INDEX 2D: 118.4 G/M2 (ref 43–95)
ECHO LV POSTERIOR WALL DIASTOLIC: 1.5 CM (ref 0.6–0.9)
ECHO LVOT DIAM: 1.87 CM
ECHO LVOT PEAK GRADIENT: 6.52 MMHG
ECHO LVOT PEAK VELOCITY: 116.29 CM/S
ECHO LVOT SV: 51 ML
ECHO LVOT VTI: 18.5 CM
ECHO MV A VELOCITY: 62.7 CENTIMETER/SECOND
ECHO MV AREA PHT: 3.25 CM2
ECHO MV AREA VTI: 2.14 CM2
ECHO MV E DECELERATION TIME (DT): 233.38 MS
ECHO MV E VELOCITY: 171.27 CENTIMETER/SECOND
ECHO MV MAX VELOCITY: 179.36 CM/S
ECHO MV MEAN GRADIENT: 6.4 MMHG
ECHO MV PEAK GRADIENT: 12.87 MMHG
ECHO MV PRESSURE HALF TIME (PHT): 67.68 MS
ECHO MV VTI: 23.88 CM
ECHO PV MAX VELOCITY: 68.37 CM/S
ECHO PV PEAK INSTANTANEOUS GRADIENT SYSTOLIC: 1.92 MMHG
ECHO RV INTERNAL DIMENSION: 3.77 CM
ECHO RV TAPSE: 1.04 CM (ref 1.5–2)
ECHO TV REGURGITANT MAX VELOCITY: 342.01 CM/S
ECHO TV REGURGITANT PEAK GRADIENT: 46.79 MMHG
ERYTHROCYTE [DISTWIDTH] IN BLOOD BY AUTOMATED COUNT: 19.2 % (ref 11.5–14.5)
EST. AVERAGE GLUCOSE BLD GHB EST-MCNC: 151 MG/DL
GLOBULIN SER CALC-MCNC: 4.2 G/DL (ref 2–4)
GLUCOSE BLD STRIP.AUTO-MCNC: 121 MG/DL (ref 65–117)
GLUCOSE SERPL-MCNC: 110 MG/DL (ref 65–100)
HBA1C MFR BLD: 6.9 % (ref 4–5.6)
HCT VFR BLD AUTO: 30.1 % (ref 35–47)
HDLC SERPL-MCNC: 60 MG/DL
HDLC SERPL: 1.9 {RATIO} (ref 0–5)
HGB BLD-MCNC: 8.8 G/DL (ref 11.5–16)
LA VOL DISK BP: 77.97 ML (ref 22–52)
LDLC SERPL CALC-MCNC: 45.6 MG/DL (ref 0–100)
LVOT MG: 2.65 MMHG
MAGNESIUM SERPL-MCNC: 2.4 MG/DL (ref 1.6–2.4)
MCH RBC QN AUTO: 20.5 PG (ref 26–34)
MCHC RBC AUTO-ENTMCNC: 29.2 G/DL (ref 30–36.5)
MCV RBC AUTO: 70.2 FL (ref 80–99)
NRBC # BLD: 0 K/UL (ref 0–0.01)
NRBC BLD-RTO: 0 PER 100 WBC
PHOSPHATE SERPL-MCNC: 2.5 MG/DL (ref 2.6–4.7)
PLATELET # BLD AUTO: 182 K/UL (ref 150–400)
PMV BLD AUTO: 10.1 FL (ref 8.9–12.9)
POTASSIUM SERPL-SCNC: 4.4 MMOL/L (ref 3.5–5.1)
PROT SERPL-MCNC: 7.2 G/DL (ref 6.4–8.2)
Q-T INTERVAL, ECG07: 380 MS
QRS DURATION, ECG06: 82 MS
QTC CALCULATION (BEZET), ECG08: 452 MS
RBC # BLD AUTO: 4.29 M/UL (ref 3.8–5.2)
SERVICE CMNT-IMP: ABNORMAL
SODIUM SERPL-SCNC: 140 MMOL/L (ref 136–145)
T3FREE SERPL-MCNC: 1.4 PG/ML (ref 2.2–4)
T4 FREE SERPL-MCNC: 1 NG/DL (ref 0.8–1.5)
THERAPEUTIC RANGE,PTTT: NORMAL SECS (ref 58–77)
THERAPEUTIC RANGE,PTTT: NORMAL SECS (ref 58–77)
TRIGL SERPL-MCNC: 52 MG/DL (ref ?–150)
TSH SERPL DL<=0.05 MIU/L-ACNC: 20.5 UIU/ML (ref 0.36–3.74)
VENTRICULAR RATE, ECG03: 85 BPM
VLDLC SERPL CALC-MCNC: 10.4 MG/DL
WBC # BLD AUTO: 6.4 K/UL (ref 3.6–11)

## 2021-05-23 PROCEDURE — 93306 TTE W/DOPPLER COMPLETE: CPT

## 2021-05-23 PROCEDURE — 74011000258 HC RX REV CODE- 258: Performed by: HOSPITALIST

## 2021-05-23 PROCEDURE — 74011250636 HC RX REV CODE- 250/636: Performed by: INTERNAL MEDICINE

## 2021-05-23 PROCEDURE — 97530 THERAPEUTIC ACTIVITIES: CPT

## 2021-05-23 PROCEDURE — 99233 SBSQ HOSP IP/OBS HIGH 50: CPT | Performed by: SPECIALIST

## 2021-05-23 PROCEDURE — 65660000000 HC RM CCU STEPDOWN

## 2021-05-23 PROCEDURE — 99223 1ST HOSP IP/OBS HIGH 75: CPT | Performed by: PSYCHIATRY & NEUROLOGY

## 2021-05-23 PROCEDURE — 74011000250 HC RX REV CODE- 250: Performed by: SPECIALIST

## 2021-05-23 PROCEDURE — 97116 GAIT TRAINING THERAPY: CPT

## 2021-05-23 PROCEDURE — 83735 ASSAY OF MAGNESIUM: CPT

## 2021-05-23 PROCEDURE — C9113 INJ PANTOPRAZOLE SODIUM, VIA: HCPCS | Performed by: INTERNAL MEDICINE

## 2021-05-23 PROCEDURE — 97162 PT EVAL MOD COMPLEX 30 MIN: CPT

## 2021-05-23 PROCEDURE — 84100 ASSAY OF PHOSPHORUS: CPT

## 2021-05-23 PROCEDURE — 84439 ASSAY OF FREE THYROXINE: CPT

## 2021-05-23 PROCEDURE — 80076 HEPATIC FUNCTION PANEL: CPT

## 2021-05-23 PROCEDURE — 80048 BASIC METABOLIC PNL TOTAL CA: CPT

## 2021-05-23 PROCEDURE — 85027 COMPLETE CBC AUTOMATED: CPT

## 2021-05-23 PROCEDURE — 85730 THROMBOPLASTIN TIME PARTIAL: CPT

## 2021-05-23 PROCEDURE — 74011250637 HC RX REV CODE- 250/637: Performed by: INTERNAL MEDICINE

## 2021-05-23 PROCEDURE — 83036 HEMOGLOBIN GLYCOSYLATED A1C: CPT

## 2021-05-23 PROCEDURE — 84481 FREE ASSAY (FT-3): CPT

## 2021-05-23 PROCEDURE — 36415 COLL VENOUS BLD VENIPUNCTURE: CPT

## 2021-05-23 PROCEDURE — 80061 LIPID PANEL: CPT

## 2021-05-23 PROCEDURE — 84443 ASSAY THYROID STIM HORMONE: CPT

## 2021-05-23 PROCEDURE — 74011250636 HC RX REV CODE- 250/636: Performed by: HOSPITALIST

## 2021-05-23 PROCEDURE — 70450 CT HEAD/BRAIN W/O DYE: CPT

## 2021-05-23 PROCEDURE — 82962 GLUCOSE BLOOD TEST: CPT

## 2021-05-23 RX ORDER — AMIODARONE HYDROCHLORIDE 150 MG/3ML
300 INJECTION, SOLUTION INTRAVENOUS ONCE
Status: DISCONTINUED | OUTPATIENT
Start: 2021-05-23 | End: 2021-05-23

## 2021-05-23 RX ORDER — GUAIFENESIN 100 MG/5ML
81 LIQUID (ML) ORAL DAILY
Status: DISCONTINUED | OUTPATIENT
Start: 2021-05-23 | End: 2021-05-23

## 2021-05-23 RX ORDER — HEPARIN SODIUM 10000 [USP'U]/100ML
12-25 INJECTION, SOLUTION INTRAVENOUS
Status: DISCONTINUED | OUTPATIENT
Start: 2021-05-23 | End: 2021-05-24

## 2021-05-23 RX ORDER — METOPROLOL TARTRATE 5 MG/5ML
2.5 INJECTION INTRAVENOUS EVERY 4 HOURS
Status: DISCONTINUED | OUTPATIENT
Start: 2021-05-23 | End: 2021-05-24

## 2021-05-23 RX ORDER — ATORVASTATIN CALCIUM 20 MG/1
80 TABLET, FILM COATED ORAL
Status: DISCONTINUED | OUTPATIENT
Start: 2021-05-23 | End: 2021-05-23

## 2021-05-23 RX ORDER — SODIUM CHLORIDE 9 MG/ML
999 INJECTION, SOLUTION INTRAVENOUS ONCE
Status: COMPLETED | OUTPATIENT
Start: 2021-05-23 | End: 2021-05-23

## 2021-05-23 RX ORDER — ATORVASTATIN CALCIUM 10 MG/1
10 TABLET, FILM COATED ORAL
Status: DISCONTINUED | OUTPATIENT
Start: 2021-05-23 | End: 2021-05-28 | Stop reason: HOSPADM

## 2021-05-23 RX ADMIN — Medication 10 ML: at 13:43

## 2021-05-23 RX ADMIN — SODIUM CHLORIDE 50 ML/HR: 9 INJECTION, SOLUTION INTRAVENOUS at 05:15

## 2021-05-23 RX ADMIN — ACETAMINOPHEN 650 MG: 650 SUPPOSITORY RECTAL at 04:47

## 2021-05-23 RX ADMIN — Medication 10 ML: at 05:17

## 2021-05-23 RX ADMIN — SODIUM CHLORIDE 999 ML/HR: 9 INJECTION, SOLUTION INTRAVENOUS at 04:47

## 2021-05-23 RX ADMIN — METOPROLOL TARTRATE 2.5 MG: 5 INJECTION INTRAVENOUS at 16:55

## 2021-05-23 RX ADMIN — AMIODARONE HYDROCHLORIDE 1 MG/MIN: 50 INJECTION, SOLUTION INTRAVENOUS at 07:41

## 2021-05-23 RX ADMIN — PANTOPRAZOLE SODIUM 40 MG: 40 INJECTION, POWDER, FOR SOLUTION INTRAVENOUS at 08:01

## 2021-05-23 RX ADMIN — AMIODARONE HYDROCHLORIDE 0.5 MG/MIN: 50 INJECTION, SOLUTION INTRAVENOUS at 15:21

## 2021-05-23 RX ADMIN — SODIUM CHLORIDE 250 ML: 9 INJECTION, SOLUTION INTRAVENOUS at 07:21

## 2021-05-23 RX ADMIN — DEXTROSE MONOHYDRATE 300 MG: 5 INJECTION, SOLUTION INTRAVENOUS at 07:22

## 2021-05-23 RX ADMIN — ONDANSETRON 4 MG: 2 INJECTION INTRAMUSCULAR; INTRAVENOUS at 22:04

## 2021-05-23 RX ADMIN — Medication 10 ML: at 22:08

## 2021-05-23 RX ADMIN — SODIUM CHLORIDE 75 ML/HR: 9 INJECTION, SOLUTION INTRAVENOUS at 21:50

## 2021-05-23 RX ADMIN — HEPARIN SODIUM 12 UNITS/KG/HR: 10000 INJECTION, SOLUTION INTRAVENOUS at 15:06

## 2021-05-23 RX ADMIN — METOPROLOL TARTRATE 2.5 MG: 5 INJECTION INTRAVENOUS at 22:04

## 2021-05-23 NOTE — CONSULTS
Neurology Consult - Inpatient      Name:   Delvis Cornelius  MRN:    [id number]    Date of Admission:  5/22/2021    Date of Consultation:  05/23/21       HISTORY OF PRESENT ILLNESS:     This is a 80 y.o. female with  has a past medical history of BPPV (benign paroxysmal positional vertigo), HTN (hypertension), completed stroke, Hyperlipidemia, Hypothyroid, PAF (paroxysmal atrial fibrillation) ([facility] [address] Rheumatoid arthritis ([address] and Scoliosis. She also has no past medical history of Sun-damaged skin. [facility] is asked to see the patient for stroke. Daughter is at bedside. Reports she was with patient yesterday AM, had walked away for around 10 mins and when returned pt had right facial droop, speech change and right sided weakness. Called 911, brought to ER yesterday AM, CTA head neck was negative for LVO. Was treated with tPA yesterday at around 1133 AM. Had been on Eliquis but that was stopped in March 2021 d/t anemia. Daughter notes some improvement overnight, moving right side better, still with aphasia    Labs: A1c 6.9, Lipid panel (total chol 116, LDL 45.6), TSH 20.50 (HH), LFTs (elevated AlkPhos 164), mild elevate glucose, normal Cr, normal GFR, normal WBCs, Hgb 8.8, Hct 30.1. MRI BRAIN WO CONT. Result Date: 5/22/2021. Minimal acute infarction posterior left insular cortex and posterior left frontal lobe. No superimposed hemorrhage, midline shift or mass effect. Severe chronic microvascular ischemic change and atrophy. Moderate left maxillary sinus disease.       SBPs have ranged up to 150-160 and DBPs between 85 and 95 over last 24 hours       Complete Review of Systems: reviewed on admission H&P    ====================================     Allergies   Allergen Reactions    Shellfish Containing Products Swelling       Current Outpatient Medications   Medication Instructions    dilTIAZem ER (CARDIZEM LA) 240 mg, Oral, DAILY    ferrous gluconate 324 mg (37.5 mg iron) tablet 1 Tablet, Oral, DAILY BEFORE BREAKFAST    furosemide (LASIX) 20 mg tablet Take 1 tablet by mouth once daily    levothyroxine (SYNTHROID) 100 mcg, Oral, DAILY BEFORE BREAKFAST    losartan (COZAAR) 50 mg, Oral, DAILY    pantoprazole (PROTONIX) 40 mg, Oral, DAILY       Current Facility-Administered Medications   Medication Dose Route Frequency Provider Last Rate Last Admin    amiodarone (CORDARONE) 375 mg in dextrose 5% 250 mL infusion  0.5-1 mg/min IntraVENous TITRATE Vee Silverman MD 40 mL/hr at 05/23/21 0741 1 mg/min at 05/23/21 0741    atorvastatin (LIPITOR) tablet 10 mg  10 mg Oral QHS Kelsea Palomino MD        labetaloL (NORMODYNE;TRANDATE) 20 mg/4 mL (5 mg/mL) injection 10 mg  10 mg IntraVENous Q4H PRN DoWilliam MD        prochlorperazine (COMPAZINE) injection 10 mg  10 mg IntraVENous Q6H PRN William Puente MD        dilTIAZem ER (CARDIZEM CD) capsule 240 mg  240 mg Oral DAILY William Puente MD        levothyroxine (SYNTHROID) tablet 100 mcg  100 mcg Oral ACB William Puente MD        losartan (COZAAR) tablet 50 mg  50 mg Oral DAILY William Puente MD        pantoprazole (PROTONIX) 40 mg in 0.9% sodium chloride 10 mL injection  40 mg IntraVENous ACB William Puente MD   40 mg at 05/23/21 0801    ondansetron (ZOFRAN) injection 4 mg  4 mg IntraVENous Q6H PRN William Puente MD        0.9% sodium chloride infusion  50 mL/hr IntraVENous CONTINUOUS William Puente MD 50 mL/hr at 05/23/21 0515 50 mL/hr at 05/23/21 0515    sodium chloride (NS) flush 5-40 mL  5-40 mL IntraVENous Q8H William Puente MD   10 mL at 05/23/21 0517    sodium chloride (NS) flush 5-40 mL  5-40 mL IntraVENous PRN William Puente MD        0.9% sodium chloride infusion 25 mL  25 mL IntraVENous PRN William Puente MD        acetaminophen (TYLENOL) tablet 650 mg  650 mg Oral Q6H PRN William Puente MD        Or    acetaminophen (TYLENOL) suppository 650 mg  650 mg Rectal Q6H PRN William Puente MD   650 mg at 05/23/21 0447    bisacodyL (DULCOLAX) suppository 10 mg  10 mg Rectal DAILY MARQUIS Puente, John Brady MD           PSHx  has a past surgical history that includes hx orthopaedic and hx  section. SocHx  reports that she has never smoked. She has never used smokeless tobacco. She reports that she does not drink alcohol and does not use drugs. FHx family history includes Stroke in her father. PHYSICAL EXAM    Patient Vitals for the past 4 hrs:   Temp Pulse Resp BP SpO2   21 1230  (!) 106 (!) 31 (!) 158/90 97 %   21 1200 98.2 °F (36.8 °C) (!) 129 28 (!) 143/95 95 %   21 1115  (!) 116 23  93 %   21 1100  (!) 114 22 (!) 141/95 94 %   21 1045  (!) 117 21 (!) 153/95 93 %   21 1030  (!) 116 25 (!) 140/85 95 %   21 1015  (!) 117 19 (!) 143/107 96 %   21 1000  (!) 115 17 (!) 150/98 94 %   21 0954    (!) 147/97    21 0945  (!) 119 23 (!) 161/110 94 %   21 0930  (!) 107 20 (!) 140/101 93 %   21 0915  100 18 (!) 147/97 92 %   21 0900  (!) 107 19 (!) 147/101 93 %   21 0845  (!) 107 16 (!) 141/85 92 %         Resting in bedside chair, intermittently dozing off, awakens to voice  Follows commands. Expressive aphasia. Mild right lower facial weakness. EOMI, VF normal bilateral (uses fingers to tell me how many fingers I'm showing her)  Left side strength: 5/5  Right side: 4/5 (drift in RUE). RLE: 5/5  Sensation: cannot assess d/t aphasia  No resting tremors  DTRs: 1+ biceps, trace patellars  Gait/ Romberg: deferred  PT worked with her this AM      Labs/ Radiology       Imaging:    MRI BRAIN WO CONT    Result Date: 2021  Minimal acute infarction posterior left insular cortex and posterior left frontal lobe. No superimposed hemorrhage, midline shift or mass effect. Severe chronic microvascular ischemic change and atrophy. Moderate left maxillary sinus disease. Assessment/ Plan       ICD-10-CM ICD-9-CM    1. Cerebrovascular accident (CVA), unspecified mechanism ([facility] [address]  I63.9 434.91    2. Acute CVA (cerebrovascular accident) ([facility] [address]  I63.9 434.91    3. Chronic a-fib (HCC)  I48.20 427.31      Small to medium size acute ischemia in left medial temporal lobe (primarily cortical location)  Consistent with acute aphasia and right sided weakness  S/p tpA yesterday AM (around 1130)    Very high TSH/ defer to Hospitalist    Post-tpA Head CT is done, awaiting final read. To my view, no evidence of ICH. If Radiology report confirms that, then okay to restart anticoagulation from my standpoint (to reduce risk of future stroke). Now that 24 hours after stroke onset, Hospitalist/ Cardiologist can adjust BP meds to reach normal BP goals. Added SCDs for DVT prophylaxis. Reduced Lipitor to 10 mg QHS due to age and LDL is well controlled. Okay to transfer to floor from m standpoint. PT reports family wants to do home PT (not any inpatient rehab).       Discussed above with Cardiology/ Dr Kaia Mathis    Will f/u tomorrow    Signed By: Olive Villa MD     May 23, 2021

## 2021-05-23 NOTE — PROGRESS NOTES
Yang Vazquez [facility]  [address] [place] [address]  [phone]      Medical Progress Note      NAME: Joseph Trimble   :  10/12/1930  MRM:  [id number]    Date/Time: 2021  3:35 PM         Subjective:     Chief Complaint:  Pt aphasic     Pt seen and examined. Aphasic. Has been NPO as has not passed bedside swallow     ROS:  (bold if positive, if negative)             Objective:       Vitals:          Last 24hrs VS reviewed since prior progress note. Most recent are:    Visit Vitals  BP (!) 147/102   Pulse (!) 118   Temp 98.2 °F (36.8 °C)   Resp 25   Ht 5' 2\" (1.575 m)   Wt 51.7 kg (114 lb)   SpO2 97%   BMI 20.85 kg/m²     SpO2 Readings from Last 6 Encounters:   21 97%   21 99%   21 94%   21 94%   21 99%   20 98%            Intake/Output Summary (Last 24 hours) at 2021 1535  Last data filed at 2021 1200  Gross per 24 hour   Intake 1866 ml   Output 700 ml   Net 1166 ml          Exam:     Physical Exam:    Gen: Thin elderly female. R sided facial droop. HEENT:  Pink conjunctivae, PERRL, hearing intact to voice, moist mucous membranes  Neck:  Supple, without masses, thyroid non-tender  Resp:  No accessory muscle use, clear breath sounds without wheezes rales or rhonchi  Card:  No murmurs, normal S1, S2 without thrills, bruits or peripheral edema  Abd:  Soft, non-tender, non-distended, normoactive bowel sounds are present  Musc:  No cyanosis or clubbing  Skin:  No rashes or ulcers, skin turgor is good  Neuro: See above. Aphasic.  Past pointing on exam. RUE weakness 4/5   Psych: Unable to ascertain mental status     Medications Reviewed: (see below)    Lab Data Reviewed: (see below)    ______________________________________________________________________    Medications:     Current Facility-Administered Medications   Medication Dose Route Frequency    amiodarone (CORDARONE) 375 mg in dextrose 5% 250 mL infusion  0.5-1 mg/min IntraVENous TITRATE    [Held by provider] atorvastatin (LIPITOR) tablet 10 mg  10 mg Oral QHS    heparin 25,000 units in D5W 250 ml infusion  12-25 Units/kg/hr IntraVENous TITRATE    labetaloL (NORMODYNE;TRANDATE) 20 mg/4 mL (5 mg/mL) injection 10 mg  10 mg IntraVENous Q4H PRN    prochlorperazine (COMPAZINE) injection 10 mg  10 mg IntraVENous Q6H PRN    [Held by provider] dilTIAZem ER (CARDIZEM CD) capsule 240 mg  240 mg Oral DAILY    [Held by provider] levothyroxine (SYNTHROID) tablet 100 mcg  100 mcg Oral ACB    [Held by provider] losartan (COZAAR) tablet 50 mg  50 mg Oral DAILY    pantoprazole (PROTONIX) 40 mg in 0.9% sodium chloride 10 mL injection  40 mg IntraVENous ACB    ondansetron (ZOFRAN) injection 4 mg  4 mg IntraVENous Q6H PRN    0.9% sodium chloride infusion  75 mL/hr IntraVENous CONTINUOUS    sodium chloride (NS) flush 5-40 mL  5-40 mL IntraVENous Q8H    sodium chloride (NS) flush 5-40 mL  5-40 mL IntraVENous PRN    0.9% sodium chloride infusion 25 mL  25 mL IntraVENous PRN    acetaminophen (TYLENOL) tablet 650 mg  650 mg Oral Q6H PRN    Or    acetaminophen (TYLENOL) suppository 650 mg  650 mg Rectal Q6H PRN    bisacodyL (DULCOLAX) suppository 10 mg  10 mg Rectal DAILY PRN            Lab Review:     Recent Labs     05/23/21  0433 05/22/21  1141   WBC 6.4 5.1   HGB 8.8* 8.8*   HCT 30.1* 30.5*    198     Recent Labs     05/23/21  0433 05/22/21  1141    137   K 4.4 3.8    105   CO2 24 26   * 151*   BUN 27* 33*   CREA 0.86 1.18*   CA 9.1 9.7   MG 2.4  --    PHOS 2.5*  --    ALB 3.0* 3.2*   ALT 27 31   INR  --  1.2*     No components found for: [place]    MRI =>   Minimal acute infarction posterior left insular cortex and posterior left  frontal lobe.    No superimposed hemorrhage, midline shift or mass effect.     Severe chronic microvascular ischemic change and atrophy.     Moderate left maxillary sinus disease.        Assessment / Plan:    Acute CVA (cerebrovascular accident) [facility]) (5/22/2021) - s/p tPA. MRI as above. Repeat head CT WITHOUT e/o bleeding   -start heparin gtt considering h/o a-fib and elevated CHADS2 vasc. Also unable to tolerate PO currently due aphasia  -on statin; unable to take due to NPO status   -permissive HTN for now   -PT/OT/speech eval       Diabetic retinopathy ([facility] [address] (7/15/2011)  -outpt follow-up      Hypothyroidism (7/15/2011) - TSH elevated. ?compliance with levothyroxine. If compliant then will need a dose increase  -check free T4  -continue levothyroxine when able to tolerate PO       Rheumatoid arthritis (HCC) ()  -not on DMARDS      DM type 2 causing vascular disease (HCC) () - A1c 6.9.  With recent CVA ideally needs to be optimized a bit more but with age, concern for hypoglycemia as well   -can consider low dose metformin at discharge if would tolerate       Iron deficiency anemia (3/24/2021)   -check stool blood as will likely need to d/c on a DOAC considering h/o CVA/a-fib       Chronic a-fib ([facility] [address] (3/24/2021)  -on dilt; unable to tolerate as NPO for now     Total time spent with patient: 35 Minutes cc time s/p tPA                  Care Plan discussed with: Patient and Nursing Staff    Discussed:  Care Plan    Prophylaxis:  Heparin gtt    Disposition:  TBD           ___________________________________________________    Attending Physician: Milli Torres MD

## 2021-05-23 NOTE — PROGRESS NOTES
Problem: Mobility Impaired (Adult and Pediatric)  Goal: *Acute Goals and Plan of Care (Insert Text)  Outcome: Progressing Towards Goal  Note: FUNCTIONAL STATUS PRIOR TO ADMISSION: At baseline patient uses a rolling walker and assistance. HOME SUPPORT PRIOR TO ADMISSION: The patient lived with her daughter and received caregiver support 8 hrs/day. She lives in a two story home, showers on the second floor, bedroom on first.  6 steps to enter - has assistance from family on stairs. Physical Therapy Goals  Initiated 5/23/2021  1. Patient will move from supine to sit and sit to supine  in bed with minimal assistance/contact guard assist within 7 day(s). 2.  Patient will transfer from bed to chair and chair to bed with minimal assistance/contact guard assist using the least restrictive device within 7 day(s). 3.  Patient will perform sit to stand with minimal assistance/contact guard assist within 7 day(s). 4.  Patient will ambulate with minimal assistance/contact guard assist for 50 feet with the least restrictive device within 7 day(s). 5.  Patient will ascend/descend 6 stairs with handrail(s) with minimal assistance/contact guard assist within 7 day(s). PHYSICAL THERAPY EVALUATION  Patient: Joseph Trimble (1 y.o. female)  Date: 5/23/2021  Primary Diagnosis: Acute CVA (cerebrovascular accident) [facility]) [I63.9]        Precautions: falls       ASSESSMENT  Based on the objective data described below, the patient presents with decreased strength, decreased balance (leans right in sitting and standing), assistance needed for all mobility. The patient has had a stroke in the past (2019), she is right handed. Admitted with new right sided weakness and slurred speech. Last admit was in April for anemia. -130, afib. Daughter present and answers questions and translates to Croatian for the patient. Patient does know some Croatian. Assessed lower extremity strength and coordination in the bed. Decreased but functional.  Performed transfer supine>sit assessed sitting at side of bed. ICU bed does not allow for pt's feet to touch floor. Stood twice with moderate assist of one, pt leans right and braces posterior legs against the bed. Decreased foot clearance with gait using hand held assist, does better with assistance on the right. Current Level of Function Impacting Discharge (mobility/balance): moderate assist with transfers and gait    Functional Outcome Measure: The patient scored 6 bout of 56 on the Alexander outcome measure which is indicative of high fall risk. Other factors to consider for discharge: has 24/7 family support, pt does not want inpt rehab, has had home PT in the past     Patient will benefit from skilled therapy intervention to address the above noted impairments. PLAN :  Recommendations and Planned Interventions: bed mobility training, transfer training, gait training, and therapeutic exercises      Frequency/Duration: Patient will be followed by physical therapy:  daily to address goals. Recommendation for discharge: (in order for the patient to meet his/her long term goals)  Physical therapy at least 2 days/week in the home AND ensure assist and/or supervision for safety with transfers and gait    This discharge recommendation:  Has not yet been discussed the attending provider and/or case management    IF patient discharges home will need the following DME: to be determined (TBD), has a rw, family asking about a rollator         SUBJECTIVE:   Patient stated nonverbal/difficult to understand.   Primary language Surinamese    OBJECTIVE DATA SUMMARY:   HISTORY:    Past Medical History:   Diagnosis Date    BPPV (benign paroxysmal positional vertigo)     HTN (hypertension)     Hx of completed stroke     Hyperlipidemia     Hypothyroid     PAF (paroxysmal atrial fibrillation) (HCC)     Rheumatoid arthritis ([facility] [address]     Scoliosis      Past Surgical History:   Procedure Laterality Date    HX  SECTION      HX ORTHOPAEDIC      bunions removed x 2       Personal factors and/or comorbidities impacting plan of care:     Home Situation  Home Environment: Private residence  One/Two Story Residence: One story  Living Alone: No  Support Systems: Child(shyla), Family member(s)  Patient Expects to be Discharged to[de-identified] Private residence  Current DME Used/Available at Home: Courtney Fallow    EXAMINATION/PRESENTATION/DECISION MAKING:   Critical Behavior:  Neurologic State: Alert, Eyes open spontaneously  Orientation Level: Unable to verbalize  Cognition: Follows commands, Recognition of people/places     Hearing: Auditory  Auditory Impairment: None  Skin:  visible skin appears intact  Edema: no lower extremitites edema  Range Of Motion:  AROM: Within functional limits           PROM: Within functional limits           Strength:    Strength: Generally decreased, functional                    Tone & Sensation:   Tone: Normal                              Coordination:  Coordination: Generally decreased, functional  Vision:      Functional Mobility:  Bed Mobility:     Supine to Sit: Moderate assistance;Assist x1;Additional time        Transfers:  Sit to Stand: Moderate assistance;Assist x1;Additional time  Stand to Sit: Moderate assistance;Assist x1;Additional time        Bed to Chair: Moderate assistance;Assist x1;Additional time              Balance:   Sitting: Intact; With support  Standing: Impaired  Standing - Static: Constant support  Standing - Dynamic : Constant support  Ambulation/Gait Training:  Distance (ft): 2 Feet (ft)  Assistive Device: Gait belt; Other (comment) (hand held assist of one)  Ambulation - Level of Assistance:  Moderate assistance;Assist x1;Additional time                                         Functional Measure:  Alexander Balance Test:    Sitting to Standin  Standing Unsupported: 0  Sitting with Back Unsupported: 3  Standing to Sittin  Transfers: 1  Standing Unsupported with Eyes Closed: 0  Standing Unsupported with Feet Together: 0  Reach Forward with Outstretched Arm: 0   Object: 0  Turn to Look Over Shoulders: 0  Turn 360 Degrees: 0  Alternate Foot on Step/Stool: 0  Standing Unsupported One Foot in Front: 0  Stand on One Le  Total: 6/         56=Maximum possible score;   0-20=High fall risk  21-40=Moderate fall risk   41-56=Low fall risk               Physical Therapy Evaluation Charge Determination   History Examination Presentation Decision-Making   MEDIUM  Complexity : 1-2 comorbidities / personal factors will impact the outcome/ POC  MEDIUM Complexity : 3 Standardized tests and measures addressing body structure, function, activity limitation and / or participation in recreation  MEDIUM Complexity : Evolving with changing characteristics  MEDIUM Complexity : FOTO score of 26-74      Based on the above components, the patient evaluation is determined to be of the following complexity level: MEDIUM    Pain Rating:  Patient figits a lot, miladys reports this is baseline, pt denies pain    Activity Tolerance:   Fair    After treatment patient left in no apparent distress:   Sitting in chair, Call bell within reach, Bed / chair alarm activated, and Caregiver / family present    COMMUNICATION/EDUCATION:   The patients plan of care was discussed with: Registered nurse, Physician, and two daughters . Fall prevention education was provided and the patient/caregiver indicated understanding., Patient/family have participated as able in goal setting and plan of care. , and Patient/family agree to work toward stated goals and plan of care.     Thank you for this referral.  Aminah Bartlett, PT   Time Calculation: 38 mins

## 2021-05-23 NOTE — PROGRESS NOTES
0700: Bedside and Verbal shift change report given to JENNIFER Yoon (oncoming nurse) by Panfilo French RN (offgoing nurse). Report included the following information SBAR, Intake/Output, MAR, Recent Results and Cardiac Rhythm A fib. Primary Nurse Alan Conroy and JENNIFER Burgess performed a dual skin assessment on this patient No impairment noted  August score is see flow sheet. 0730: Amio bolus given. 0745: NIH scale completed, score of 9.   0800: Assessment completed. Patient alert. Remains aphasic but according to night RN garbled speech has been improving. Right sided facial droop present and R side weaker than left. Able to move all extremities. Pulses present and palpable. Pupils equal and reactive. Lung sounds clear on room air. A fib on monitor, amio drip started this am per cards for HR 130s. BS active. Patient instructed to call out if she needs to void as purewick had not been working well overnight. Pitting edema present in BLE. Patient turned and repositioned. 0845: NIH score of 9.  0945: NIH score of 9.  1000: Patient turned and repositioned. 1010: Incontinence care provided. Purewick in place. 1045: NIH score of 9.  1115: Patient assisted up to recliner by PT.  1145: 24 hour post tpa, NIH score of 9. Neuro MD at bedside assessing patient. 1200: Reassessment completed. Patient assisted back to bed and taken to CT. Patient turned and repositioned. 1230: Return from CT.  1340: Amio titrated to 0.5.  1400: Patient assisted up to recliner. Dr Idalmis Deng at bedside assessing patient. 1500: Baseline ptt sent. Heparin drip started at 12.  1600: Reassessment completed. Patient assisted back to bed. During movement LAC PIV is accidentally removed.    1700: Patient requesting water, educated on why she can't be eating or drinking and offered a mouth swab.   1800: TRANSFER - OUT REPORT:    Verbal report given to PCC(name) on Radha Tovar  being transferred to PCC(unit) for routine progression of care       Report consisted of patients Situation, Background, Assessment and   Recommendations(SBAR). Information from the following report(s) SBAR, Intake/Output, MAR, Recent Results and Cardiac Rhythm A fib was reviewed with the receiving nurse. Lines:   Peripheral IV 05/22/21 Anterior;Proximal;Right Forearm (Active)   Site Assessment Clean, dry, & intact 05/23/21 1600   Phlebitis Assessment 0 05/23/21 1600   Infiltration Assessment 0 05/23/21 1600   Dressing Status Clean, dry, & intact 05/23/21 1600   Dressing Type Transparent 05/23/21 1600   Hub Color/Line Status Pink; Infusing 05/23/21 1600   Action Taken Open ports on tubing capped 05/23/21 1600   Alcohol Cap Used Yes 05/23/21 1600        Opportunity for questions and clarification was provided.       Patient transported with:   Monitor

## 2021-05-23 NOTE — PROGRESS NOTES
Occupational Therapy Note:  Chart reviewed and spoke with nursing. Patient currently unavailable with nursing at bedside assisting with care/IV. Patient just returned to the bed after being up most of the morning. Will continue to follow.   Bethany Desouza OTR/SHARAN

## 2021-05-23 NOTE — PROGRESS NOTES
Dr. Altagracia Conteh notified HR sustaining 130-140s. New orders received. called in prescription for Trilyte bowel prep kit to [facility] in [place] 10/27/2020 at 3:50pm

## 2021-05-23 NOTE — PROGRESS NOTES
Bedside and Verbal shift change report given to Liam Michaels (oncoming nurse) by Beronica Matias (offgoing nurse). Report included the following information SBAR, Kardex, ED Summary, Intake/Output, MAR, Recent Results and Cardiac Rhythm NSR. Shift Summary:  Patient aphasic with RUE weakness and R facial droop noted. Patient able to follow commands, and point to appropriate pictures named on NIH stroke scale, but unable to verbalize. Patient makes garbled sounds and noises to attempt to communicate, but can gesture and nod appropriately. Patient noted to appear increasingly upset with inability to communicate despite nodding appropriately and gesturing. Verbally comforted patient. Patient also expressed frustration with gesturing and facial expressions with each NIHSS/neuro check every hour after 2300. Patient and daughter educated on safety and stroke care s/p tPA. Daughter verbalized understanding and patient nodded. Patient tearful this morning. Unable to get comfortable and frustrated with NIHSS/neurocheck, but noted to be asleep during bedside report at 0710. Patient daughter has remained at bedside. Daughter educated on not overstimulating patient during rest hours between NIHSS every hour. Patient daughter verbalized understanding. Patient able to point at L shoulder this morning concerning pain. See flowsheet for pain scale and MAR for rectal tylenol. Patient has remained NPO d/t failing dysphagia screen.  notified for increased HR during shift. See other notes.

## 2021-05-23 NOTE — CONSULTS
Consult Date: 2021    Consults Asked to assist with ICU mgmt of CVA post tPA by Dr. Marta Luis.    Subjective    81 y/o with pmh HTN, DM, CHF, a-fib, CVA, RA and hypothyroid admitted  with R sided weakness and aphasia. Pt recently taken off Eliquis due to ongoing anemia.       Pt given tPA in ER and admitted to ICU for ongoing neuro eval.    Past Medical History:   Diagnosis Date    BPPV (benign paroxysmal positional vertigo)     HTN (hypertension)     Hx of completed stroke     Hyperlipidemia     Hypothyroid     PAF (paroxysmal atrial fibrillation) (HCC)     Rheumatoid arthritis ([facility] [address]     Scoliosis       Past Surgical History:   Procedure Laterality Date    HX  SECTION      HX ORTHOPAEDIC      bunions removed x 2     Family History   Problem Relation Age of Onset    Stroke Father       Social History     Tobacco Use    Smoking status: Never Smoker    Smokeless tobacco: Never Used   Substance Use Topics    Alcohol use: No       Current Facility-Administered Medications   Medication Dose Route Frequency Provider Last Rate Last Admin    amiodarone (CORDARONE) 375 mg in dextrose 5% 250 mL infusion  0.5-1 mg/min IntraVENous TITRATE Bradley Verma MD 40 mL/hr at 21 0741 1 mg/min at 21 0741    atorvastatin (LIPITOR) tablet 80 mg  80 mg Oral QHS Shauna Do MD        labetaloL (NORMODYNE;TRANDATE) 20 mg/4 mL (5 mg/mL) injection 10 mg  10 mg IntraVENous Q4H PRN William Puente MD        prochlorperazine (COMPAZINE) injection 10 mg  10 mg IntraVENous Q6H PRN William Puente MD        dilTIAZem ER (CARDIZEM CD) capsule 240 mg  240 mg Oral DAILY William Puente MD        levothyroxine (SYNTHROID) tablet 100 mcg  100 mcg Oral ACB William Puente MD        losartan (COZAAR) tablet 50 mg  50 mg Oral DAILY William Puente MD        pantoprazole (PROTONIX) 40 mg in 0.9% sodium chloride 10 mL injection  40 mg IntraVENous ACB William Puente MD   40 mg at 21 0801    ondansetron ([facility]) injection 4 mg  4 mg IntraVENous Q6H PRN William Puente MD        0.9% sodium chloride infusion  50 mL/hr IntraVENous CONTINUOUS William Peunte MD 50 mL/hr at 05/23/21 0515 50 mL/hr at 05/23/21 0515    sodium chloride (NS) flush 5-40 mL  5-40 mL IntraVENous Q8H William Puente MD   10 mL at 05/23/21 0517    sodium chloride (NS) flush 5-40 mL  5-40 mL IntraVENous PRN William Puente MD        0.9% sodium chloride infusion 25 mL  25 mL IntraVENous PRN Wililam Puente MD        acetaminophen (TYLENOL) tablet 650 mg  650 mg Oral Q6H PRN William Puente MD        Or    acetaminophen (TYLENOL) suppository 650 mg  650 mg Rectal Q6H PRN William Puente MD   650 mg at 05/23/21 0447    bisacodyL (DULCOLAX) suppository 10 mg  10 mg Rectal DAILY PRN , Emmanuel Olivas MD            Review of Systems  Aphasia and unable to obtain. Objective   Gen: aphasia, awake, alert. HEENT: NCAT  Chest: symmetrical chest rise  CV: irregular rate, rhythm  Abd: non-distended  Ext: no c/c/e  Neuro: Facial droop (described by RN. Difficult to see on video). Moves all ext with 5/5 strength per RN.     Vital signs for last 24 hours:  Visit Vitals  /87   Pulse (!) 105   Temp 98.3 °F (36.8 °C)   Resp 26   Ht 5' 2\" (1.575 m)   Wt 52.1 kg (114 lb 13.8 oz)   SpO2 93%   BMI 21.01 kg/m²       Intake/Output this shift:  Current Shift: 05/23 0701 - 05/23 1900  In: 602.7 [I.V.:602.7]  Out: -   Last 3 Shifts: 05/21 1901 - 05/23 0700  In: 953.3 [I.V.:953.3]  Out: 700 [Urine:700]    Data Review:   Recent Results (from the past 24 hour(s))   EKG, 12 LEAD, INITIAL    Collection Time: 05/22/21 11:38 AM   Result Value Ref Range    Ventricular Rate 85 BPM    Atrial Rate 66 BPM    QRS Duration 82 ms    Q-T Interval 380 ms    QTC Calculation (Bezet) 452 ms    Calculated R Axis -71 degrees    Diagnosis       Atrial fibrillation  Left anterior fascicular block  Poor Anterior Forces  Abnormal ECG  When compared with ECG of 23-MAR-2021 05:53,  No significant change was found  Confirmed by Setfani Drew MD, [id number]) on 5/23/2021 4:04:35 AM     CBC WITH AUTOMATED DIFF    Collection Time: 05/22/21 11:41 AM   Result Value Ref Range    WBC 5.1 3.6 - 11.0 K/uL    RBC 4.32 3.80 - 5.20 M/uL    HGB 8.8 (L) 11.5 - 16.0 g/dL    HCT 30.5 (L) 35.0 - 47.0 %    MCV 70.6 (L) 80.0 - 99.0 FL    MCH 20.4 (L) 26.0 - 34.0 PG    MCHC 28.9 (L) 30.0 - 36.5 g/dL    RDW 19.1 (H) 11.5 - 14.5 %    PLATELET 373 328 - 723 K/uL    MPV 9.7 8.9 - 12.9 FL    NRBC 0.0 0  WBC    ABSOLUTE NRBC 0.00 0.00 - 0.01 K/uL    NEUTROPHILS 71 32 - 75 %    LYMPHOCYTES 19 12 - 49 %    MONOCYTES 9 5 - 13 %    EOSINOPHILS 1 0 - 7 %    BASOPHILS 0 0 - 1 %    IMMATURE GRANULOCYTES 0 0.0 - 0.5 %    ABS. NEUTROPHILS 3.5 1.8 - 8.0 K/UL    ABS. LYMPHOCYTES 1.0 0.8 - 3.5 K/UL    ABS. MONOCYTES 0.5 0.0 - 1.0 K/UL    ABS. EOSINOPHILS 0.1 0.0 - 0.4 K/UL    ABS. BASOPHILS 0.0 0.0 - 0.1 K/UL    ABS. IMM. GRANS. 0.0 0.00 - 0.04 K/UL    DF SMEAR SCANNED      RBC COMMENTS HYPOCHROMIA  2+        RBC COMMENTS OVALOCYTES  PRESENT        RBC COMMENTS POLYCHROMASIA  1+        RBC COMMENTS MICROCYTOSIS  1+       METABOLIC PANEL, COMPREHENSIVE    Collection Time: 05/22/21 11:41 AM   Result Value Ref Range    Sodium 137 136 - 145 mmol/L    Potassium 3.8 3.5 - 5.1 mmol/L    Chloride 105 97 - 108 mmol/L    CO2 26 21 - 32 mmol/L    Anion gap 6 5 - 15 mmol/L    Glucose 151 (H) 65 - 100 mg/dL    BUN 33 (H) 6 - 20 MG/DL    Creatinine 1.18 (H) 0.55 - 1.02 MG/DL    BUN/Creatinine ratio 28 (H) 12 - 20      GFR est AA 52 (L) >60 ml/min/1.73m2    GFR est non-AA 43 (L) >60 ml/min/1.73m2    Calcium 9.7 8.5 - 10.1 MG/DL    Bilirubin, total 0.4 0.2 - 1.0 MG/DL    ALT (SGPT) 31 12 - 78 U/L    AST (SGOT) 30 15 - 37 U/L    Alk.  phosphatase 180 (H) 45 - 117 U/L    Protein, total 7.9 6.4 - 8.2 g/dL    Albumin 3.2 (L) 3.5 - 5.0 g/dL    Globulin 4.7 (H) 2.0 - 4.0 g/dL    A-G Ratio 0.7 (L) 1.1 - 2.2     PROTHROMBIN TIME + INR    Collection Time: 05/22/21 11:41 AM Result Value Ref Range    INR 1.2 (H) 0.9 - 1.1      Prothrombin time 12.6 (H) 9.0 - 11.1 sec   TROPONIN I    Collection Time: 05/22/21 11:41 AM   Result Value Ref Range    Troponin-I, Qt. <0.05 <0.05 ng/mL   SAMPLES BEING HELD    Collection Time: 05/22/21 11:41 AM   Result Value Ref Range    SAMPLES BEING HELD 1SST     COMMENT        Add-on orders for these samples will be processed based on acceptable specimen integrity and analyte stability, which may vary by analyte.    IRON PROFILE    Collection Time: 05/22/21 11:41 AM   Result Value Ref Range    Iron 18 (L) 35 - 150 ug/dL    TIBC 480 (H) 250 - 450 ug/dL    Iron % saturation 4 (L) 20 - 50 %   FERRITIN    Collection Time: 05/22/21 11:41 AM   Result Value Ref Range    Ferritin 19 (L) 26 - 388 NG/ML   DUPLEX CAROTID BILATERAL    Collection Time: 05/22/21  5:06 PM   Result Value Ref Range    Right subclavian sys 53.3 cm/s    RIGHT SUBCLAVIAN ARTERY D 6.65 cm/s    Right cca dist sys 70.6 cm/s    Right CCA dist salazar 12.1 cm/s    Right CCA prox sys 65.3 cm/s    Right CCA prox salazar 8.6 cm/s    Right eca sys 73.2 cm/s    RIGHT EXTERNAL CAROTID ARTERY D 5.15 cm/s    Right ICA dist sys 49.6 cm/s    Right ICA dist salazar 15.6 cm/s    Right ICA mid sys 35.4 cm/s    Right ICA mid salazar 12.2 cm/s    Right ICA prox sys 53.1 cm/s    Right ICA prox salazar 10.4 cm/s    Right vertebral sys 21.3 cm/s    RIGHT VERTEBRAL ARTERY D 5.97 cm/s    Right ICA/CCA sys 0.8     Left subclavian sys 107.3 cm/s    LEFT SUBCLAVIAN ARTERY D 0.00 cm/s    Left CCA dist sys 33.1 cm/s    Left CCA dist salazar 8.6 cm/s    Left CCA prox sys 62.0 cm/s    Left CCA prox salazar 18.0 cm/s    Left ECA sys 74.1 cm/s    LEFT EXTERNAL CAROTID ARTERY D 0.00 cm/s    Left ICA dist sys 56.7 cm/s    Left ICA dist salazar 14.4 cm/s    Left ICA mid sys 54.1 cm/s    Left ICA mid salazar 15.0 cm/s    Left ICA prox sys 69.2 cm/s    Left ICA prox salazar 20.0 cm/s    Left vertebral sys 36.6 cm/s    LEFT VERTEBRAL ARTERY D 7.76 cm/s    Left ICA/CCA sys 2.09    GLUCOSE, POC    Collection Time: 05/22/21 10:46 PM   Result Value Ref Range    Glucose (POC) 114 65 - 117 mg/dL    Performed by [employer]    CBC W/O DIFF    Collection Time: 05/23/21  4:33 AM   Result Value Ref Range    WBC 6.4 3.6 - 11.0 K/uL    RBC 4.29 3.80 - 5.20 M/uL    HGB 8.8 (L) 11.5 - 16.0 g/dL    HCT 30.1 (L) 35.0 - 47.0 %    MCV 70.2 (L) 80.0 - 99.0 FL    MCH 20.5 (L) 26.0 - 34.0 PG    MCHC 29.2 (L) 30.0 - 36.5 g/dL    RDW 19.2 (H) 11.5 - 14.5 %    PLATELET 431 708 - 694 K/uL    MPV 10.1 8.9 - 12.9 FL    NRBC 0.0 0  WBC    ABSOLUTE NRBC 0.00 0.00 - 0.01 K/uL   PTT    Collection Time: 05/23/21  4:33 AM   Result Value Ref Range    aPTT 23.9 22.1 - 31.0 sec    aPTT, therapeutic range     58.0 - 77.0 SECS   TSH 3RD GENERATION    Collection Time: 05/23/21  4:33 AM   Result Value Ref Range    TSH 20.50 (H) 0.36 - 6.88 uIU/mL   METABOLIC PANEL, BASIC    Collection Time: 05/23/21  4:33 AM   Result Value Ref Range    Sodium 140 136 - 145 mmol/L    Potassium 4.4 3.5 - 5.1 mmol/L    Chloride 107 97 - 108 mmol/L    CO2 24 21 - 32 mmol/L    Anion gap 9 5 - 15 mmol/L    Glucose 110 (H) 65 - 100 mg/dL    BUN 27 (H) 6 - 20 MG/DL    Creatinine 0.86 0.55 - 1.02 MG/DL    BUN/Creatinine ratio 31 (H) 12 - 20      GFR est AA >60 >60 ml/min/1.73m2    GFR est non-AA >60 >60 ml/min/1.73m2    Calcium 9.1 8.5 - 10.1 MG/DL   MAGNESIUM    Collection Time: 05/23/21  4:33 AM   Result Value Ref Range    Magnesium 2.4 1.6 - 2.4 mg/dL   PHOSPHORUS    Collection Time: 05/23/21  4:33 AM   Result Value Ref Range    Phosphorus 2.5 (L) 2.6 - 4.7 MG/DL   HEPATIC FUNCTION PANEL    Collection Time: 05/23/21  4:33 AM   Result Value Ref Range    Protein, total 7.2 6.4 - 8.2 g/dL    Albumin 3.0 (L) 3.5 - 5.0 g/dL    Globulin 4.2 (H) 2.0 - 4.0 g/dL    A-G Ratio 0.7 (L) 1.1 - 2.2      Bilirubin, total 0.5 0.2 - 1.0 MG/DL    Bilirubin, direct 0.2 0.0 - 0.2 MG/DL    Alk.  phosphatase 164 (H) 45 - 117 U/L    AST (SGOT) 33 12 - 37 U/L    ALT (SGPT) 27 12 - 66 U/L       81 y/o with pmh CVA, HTN, DM and chronic a-fib admitted with acute ischemic CVA, now s/p tPA. Ischemic CVA:  -- neuro consulted  -- likely a-fib related  -- s/p tPA 5/22  -- f/u imaging 24 hours post tPA. -- no ac for first 24 hours. -- PT/OT, speech  -- TTE pending  -- statin  -- tight CBG, temp control. A-fib:  -- recently taken off AC due to severe anemia  -- seen by cardiology 5/22  -- continue dilt per their rec for rate control. Cardiomyopathy:  -- dCHF  -- recent TTE with concern for amyloid  -- cautious vol mgmt in setting of CVA    HTN:  -- prns for goal SBP <180  -- resume home meds when taking PO. Anemia:  -- transfuse for goal hgb >7  -- chronic, iron def. DM:  -- diet controlled outpt  -- SSI for tight CBG mgmt in setting of CVA    CCM time 35 min. Pt at risk of life threatening deterioration from CVA. Pt seen and evaluated via tele interaction. Audio and video used for this encounter. Discussed with pt's daughter at bedside 5/23.

## 2021-05-23 NOTE — PROGRESS NOTES
Matilde Dotson MD. [facility]              Patient: Alize Ruiz  : 10/12/1930      Today's Date: 2021        CARDIOLOGY PROGRESS NOTE  S: Aphasic   O:  Visit Vitals  BP (!) 147/102   Pulse (!) 118   Temp 98.2 °F (36.8 °C)   Resp 25   Ht 5' 2\" (1.575 m)   Wt 114 lb (51.7 kg)   SpO2 97%   BMI 20.85 kg/m²     elderly, frail - aphasic   HEENT:  Hearing intact, non-icteric, normocephalic, atraumatic. Neck Exam: Supple   Lung Exam: Clear to auscultation, even breath sounds. Cardiac Exam: Irreg irreg with 2/6 systolic murmur. Abdomen: Soft, non-tender, normal bowel sounds. Extremities:  No lower extremity edema.   Skin: No significant rashes  Neuro - + CVA / aphasic     ROS - unable to obtain       LABS / OTHER STUDIES reviewed:     Recent Results (from the past 24 hour(s))   DUPLEX CAROTID BILATERAL    Collection Time: 21  5:06 PM   Result Value Ref Range    Right subclavian sys 53.3 cm/s    RIGHT SUBCLAVIAN ARTERY D 6.65 cm/s    Right cca dist sys 70.6 cm/s    Right CCA dist salazar 12.1 cm/s    Right CCA prox sys 65.3 cm/s    Right CCA prox salazar 8.6 cm/s    Right eca sys 73.2 cm/s    RIGHT EXTERNAL CAROTID ARTERY D 5.15 cm/s    Right ICA dist sys 49.6 cm/s    Right ICA dist salazar 15.6 cm/s    Right ICA mid sys 35.4 cm/s    Right ICA mid salazar 12.2 cm/s    Right ICA prox sys 53.1 cm/s    Right ICA prox salazar 10.4 cm/s    Right vertebral sys 21.3 cm/s    RIGHT VERTEBRAL ARTERY D 5.97 cm/s    Right ICA/CCA sys 0.8     Left subclavian sys 107.3 cm/s    LEFT SUBCLAVIAN ARTERY D 0.00 cm/s    Left CCA dist sys 33.1 cm/s    Left CCA dist salazar 8.6 cm/s    Left CCA prox sys 62.0 cm/s    Left CCA prox salazar 18.0 cm/s    Left ECA sys 74.1 cm/s    LEFT EXTERNAL CAROTID ARTERY D 0.00 cm/s    Left ICA dist sys 56.7 cm/s    Left ICA dist salazar 14.4 cm/s    Left ICA mid sys 54.1 cm/s    Left ICA mid salazar 15.0 cm/s    Left ICA prox sys 69.2 cm/s    Left ICA prox salazar 20.0 cm/s    Left vertebral sys 36.6 cm/s    LEFT VERTEBRAL ARTERY D 7.76 cm/s    Left ICA/CCA sys 2.09    GLUCOSE, POC    Collection Time: 05/22/21 10:46 PM   Result Value Ref Range    Glucose (POC) 114 65 - 117 mg/dL    Performed by Mamie Faustin    CBC W/O DIFF    Collection Time: 05/23/21  4:33 AM   Result Value Ref Range    WBC 6.4 3.6 - 11.0 K/uL    RBC 4.29 3.80 - 5.20 M/uL    HGB 8.8 (L) 11.5 - 16.0 g/dL    HCT 30.1 (L) 35.0 - 47.0 %    MCV 70.2 (L) 80.0 - 99.0 FL    MCH 20.5 (L) 26.0 - 34.0 PG    MCHC 29.2 (L) 30.0 - 36.5 g/dL    RDW 19.2 (H) 11.5 - 14.5 %    PLATELET 199 968 - 598 K/uL    MPV 10.1 8.9 - 12.9 FL    NRBC 0.0 0  WBC    ABSOLUTE NRBC 0.00 0.00 - 0.01 K/uL   PTT    Collection Time: 05/23/21  4:33 AM   Result Value Ref Range    aPTT 23.9 22.1 - 31.0 sec    aPTT, therapeutic range     58.0 - 77.0 SECS   LIPID PANEL    Collection Time: 05/23/21  4:33 AM   Result Value Ref Range    Cholesterol, total 116 <200 MG/DL    Triglyceride 52 <150 MG/DL    HDL Cholesterol 60 MG/DL    LDL, calculated 45.6 0 - 100 MG/DL    VLDL, calculated 10.4 MG/DL    CHOL/HDL Ratio 1.9 0.0 - 5.0     HEMOGLOBIN A1C WITH EAG    Collection Time: 05/23/21  4:33 AM   Result Value Ref Range    Hemoglobin A1c 6.9 (H) 4.0 - 5.6 %    Est. average glucose 151 mg/dL   TSH 3RD GENERATION    Collection Time: 05/23/21  4:33 AM   Result Value Ref Range    TSH 20.50 (H) 0.36 - 5.69 uIU/mL   METABOLIC PANEL, BASIC    Collection Time: 05/23/21  4:33 AM   Result Value Ref Range    Sodium 140 136 - 145 mmol/L    Potassium 4.4 3.5 - 5.1 mmol/L    Chloride 107 97 - 108 mmol/L    CO2 24 21 - 32 mmol/L    Anion gap 9 5 - 15 mmol/L    Glucose 110 (H) 65 - 100 mg/dL    BUN 27 (H) 6 - 20 MG/DL    Creatinine 0.86 0.55 - 1.02 MG/DL    BUN/Creatinine ratio 31 (H) 12 - 20      GFR est AA >60 >60 ml/min/1.73m2    GFR est non-AA >60 >60 ml/min/1.73m2    Calcium 9.1 8.5 - 10.1 MG/DL   MAGNESIUM    Collection Time: 05/23/21  4:33 AM   Result Value Ref Range    Magnesium 2.4 1.6 - 2.4 mg/dL PHOSPHORUS    Collection Time: 05/23/21  4:33 AM   Result Value Ref Range    Phosphorus 2.5 (L) 2.6 - 4.7 MG/DL   HEPATIC FUNCTION PANEL    Collection Time: 05/23/21  4:33 AM   Result Value Ref Range    Protein, total 7.2 6.4 - 8.2 g/dL    Albumin 3.0 (L) 3.5 - 5.0 g/dL    Globulin 4.2 (H) 2.0 - 4.0 g/dL    A-G Ratio 0.7 (L) 1.1 - 2.2      Bilirubin, total 0.5 0.2 - 1.0 MG/DL    Bilirubin, direct 0.2 0.0 - 0.2 MG/DL    Alk.  phosphatase 164 (H) 45 - 117 U/L    AST (SGOT) 34 15 - 37 U/L    ALT (SGPT) 27 12 - 78 U/L   ECHO ADULT COMPLETE    Collection Time: 05/23/21  9:01 AM   Result Value Ref Range    IVSd 1.45 (A) 0.60 - 0.90 cm    LVIDd 3.37 (A) 3.90 - 5.30 cm    LVIDs 2.42 cm    LVOT d 1.87 cm    LVPWd 1.50 (A) 0.60 - 0.90 cm    LVOT Peak Gradient 6.52 mmHg    Left Ventricular Outflow Tract Mean Gradient 2.65 mmHg    LVOT SV 51.0 mL    LVOT Peak Velocity 116.29 cm/s    LVOT VTI 18.50 cm    RVIDd 3.77 cm    Left Atrium Major Axis 4.36 cm    LA Volume 84.12 22.0 - 52.0 mL    LA Area 4C 24.65 cm2    LA Vol 2C 74.57 (A) 22.00 - 52.00 mL    LA Vol 4C 79.62 (A) 22.00 - 52.00 mL    LA Volume DISK BP 77.97 22.0 - 52.0 mL    Aortic Valve Area by Continuity of Peak Velocity 2.06 cm2    Aortic Valve Area by Continuity of VTI 2.12 cm2    Aortic Regurgitant Pressure Half-time 516.87 ms    AR Max Gabino 453.72 centimeter/second    AoV PG 9.68 mmHg    Aortic Valve Systolic Mean Gradient 2.67 mmHg    Aortic Valve Systolic Peak Velocity 080.06 cm/s    AoV VTI 24.10 cm    MV A Gabino 62.70 centimeter/second    Mitral Valve E Wave Deceleration Time 233.38 ms    MV E Gabino 171.27 centimeter/second    LV E' Lateral Velocity 6.10 centimeter/second    LV E' Septal Velocity 4.44 centimeter/second    Mitral Valve Pressure Half-time 67.68 ms    MVA (PHT) 3.25 cm2    MVA VTI 2.14 cm2    MV Peak Gradient 12.87 mmHg    MV Mean Gradient 6.40 mmHg    Mitral Valve Max Velocity 179.36 cm/s    Mitral Valve Annulus Velocity Time Integral 23.88 cm Pulmonic Valve Systolic Peak Instantaneous Gradient 1.92 mmHg    Pulmonic Valve Max Velocity 68.37 cm/s    Tapse 1.04 (A) 1.50 - 2.00 cm    Triscuspid Valve Regurgitation Peak Gradient 46.79 mmHg    TR Max Velocity 342.01 cm/s    AO ASC D 3.30 cm    Ao Root D 3.07 cm    IVC proximal 2.13 cm    LV Mass .8 67.0 - 162.0 g    LV Mass AL Index 118.4 43.0 - 95.0 g/m2    Left Atrium Minor Axis 2.89 cm    LA Vol Index 55.71 16.00 - 28.00 ml/m2    LA Vol Index 49.38 16.00 - 28.00 ml/m2    LA Vol Index 52.73 16.00 - 28.00 ml/m2    JESSICA/BSA Pk Gabino 1.4 cm2/m2    JESSICA/BSA VTI 1.4 cm2/m2   PTT    Collection Time: 05/23/21  3:01 PM   Result Value Ref Range    aPTT 23.5 22.1 - 31.0 sec    aPTT, therapeutic range     58.0 - 77.0 SECS          CARDIAC DIAGNOSTICS:      Cardiac Evaluation Includes:  I reviewed the results below.      11/30/20     ECHO ADULT COMPLETE 12/01/2020 12/1/2020     Interpretation Summary  · LV: Estimated LVEF is 50 - 55%. Biplane method used to measure ejection fraction. Normal systolic function (ejection fraction normal). Small left ventricle. Mild concentric hypertrophy. Wall motion: normal. Severe (grade 3) left ventricular diastolic dysfunction. · RV: Borderline low systolic function. · LA: Severely dilated left atrium. · RA: Moderately dilated right atrium. · AV: Aortic valve leaflet calcification present. Mild aortic valve stenosis is present. Moderate to severe aortic valve regurgitation is present. · MV: Mitral valve thickening. Moderate mitral annular calcification. Mild mitral valve regurgitation is present. 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   · LV: Estimated LVEF is 20 - 25%. Normal cavity size. Moderate concentric hypertrophy. Severely reduced systolic function. Inconclusive left ventricular diastolic function. · LA: Moderately dilated left atrium. · RA: Moderately dilated right atrium. · AV: Moderate aortic valve sclerosis. Mild aortic valve regurgitation is present. · MV: Mild mitral annular calcification. Mild mitral valve regurgitation is present. · TV: Moderate tricuspid valve regurgitation is present. · PV: Mild pulmonic valve regurgitation is present. · PA: Mild pulmonary hypertension. Pulmonary arterial systolic pressure is 48 mmHg. · IAS: Agitated saline contrast study was performed. There was a left-to-right shunt on color Doppler and right-to-left on bubble study.     Signed by: Jose Flor MD on 5/23/2021  3:13 PM         EKG 5/22/21 - Afib, LAFB - I viewed myself         ASSESSMENT AND PLAN:      Assessment and Plan:  1) Acute CVA and Afb   - In April, Cardiology stopped Eliquis (due to iron defic anemia) after long discussion with the family with plans of patient going into hospice potentially   - She comes in with Acute CVA 5/22/1  - Dr. 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 Souleymane Coppola MD, [address], [address]      [address]  [address], [facility] [address]  Ph: [phone]                               Ph [phone]

## 2021-05-23 NOTE — PROGRESS NOTES
Patient -130s sustained in A fib. A fib is patient baseline. Dr. Mic Verma notified. No new orders received. Continue to monitor per Dr. Mic Verma.

## 2021-05-24 ENCOUNTER — APPOINTMENT (OUTPATIENT)
Dept: GENERAL RADIOLOGY | Age: 86
DRG: 061 | End: 2021-05-24
Attending: NURSE PRACTITIONER
Payer: MEDICARE

## 2021-05-24 LAB
ANION GAP SERPL CALC-SCNC: 13 MMOL/L (ref 5–15)
APTT PPP: 104.5 SEC (ref 22.1–31)
APTT PPP: 37.6 SEC (ref 22.1–31)
APTT PPP: 81.2 SEC (ref 22.1–31)
BASOPHILS # BLD: 0 K/UL (ref 0–0.1)
BASOPHILS NFR BLD: 0 % (ref 0–1)
BNP SERPL-MCNC: ABNORMAL PG/ML
BUN SERPL-MCNC: 35 MG/DL (ref 6–20)
BUN/CREAT SERPL: 24 (ref 12–20)
CALCIUM SERPL-MCNC: 9.3 MG/DL (ref 8.5–10.1)
CHLORIDE SERPL-SCNC: 107 MMOL/L (ref 97–108)
CO2 SERPL-SCNC: 18 MMOL/L (ref 21–32)
COMMENT, HOLDF: NORMAL
CREAT SERPL-MCNC: 1.45 MG/DL (ref 0.55–1.02)
DIFFERENTIAL METHOD BLD: ABNORMAL
EOSINOPHIL # BLD: 0 K/UL (ref 0–0.4)
EOSINOPHIL NFR BLD: 0 % (ref 0–7)
ERYTHROCYTE [DISTWIDTH] IN BLOOD BY AUTOMATED COUNT: 19.8 % (ref 11.5–14.5)
GLUCOSE BLD STRIP.AUTO-MCNC: 104 MG/DL (ref 65–117)
GLUCOSE BLD STRIP.AUTO-MCNC: 105 MG/DL (ref 65–117)
GLUCOSE BLD STRIP.AUTO-MCNC: 151 MG/DL (ref 65–117)
GLUCOSE BLD STRIP.AUTO-MCNC: 184 MG/DL (ref 65–117)
GLUCOSE SERPL-MCNC: 95 MG/DL (ref 65–100)
HCT VFR BLD AUTO: 35.1 % (ref 35–47)
HGB BLD-MCNC: 10 G/DL (ref 11.5–16)
IMM GRANULOCYTES # BLD AUTO: 0.1 K/UL (ref 0–0.04)
IMM GRANULOCYTES NFR BLD AUTO: 1 % (ref 0–0.5)
LEFT CCA DIST DIAS: 8.6 CM/S
LEFT CCA DIST SYS: 33.1 CM/S
LEFT CCA PROX DIAS: 18 CM/S
LEFT CCA PROX SYS: 62 CM/S
LEFT ECA DIAS: 0 CM/S
LEFT ECA SYS: 74.1 CM/S
LEFT ICA DIST DIAS: 14.4 CM/S
LEFT ICA DIST SYS: 56.7 CM/S
LEFT ICA MID DIAS: 15 CM/S
LEFT ICA MID SYS: 54.1 CM/S
LEFT ICA PROX DIAS: 20 CM/S
LEFT ICA PROX SYS: 69.2 CM/S
LEFT ICA/CCA SYS: 2.09
LEFT SUBCLAVIAN DIAS: 0 CM/S
LEFT SUBCLAVIAN SYS: 107.3 CM/S
LEFT VERTEBRAL DIAS: 7.76 CM/S
LEFT VERTEBRAL SYS: 36.6 CM/S
LYMPHOCYTES # BLD: 0.8 K/UL (ref 0.8–3.5)
LYMPHOCYTES NFR BLD: 8 % (ref 12–49)
MAGNESIUM SERPL-MCNC: 2.7 MG/DL (ref 1.6–2.4)
MCH RBC QN AUTO: 20.3 PG (ref 26–34)
MCHC RBC AUTO-ENTMCNC: 28.5 G/DL (ref 30–36.5)
MCV RBC AUTO: 71.2 FL (ref 80–99)
MONOCYTES # BLD: 1 K/UL (ref 0–1)
MONOCYTES NFR BLD: 10 % (ref 5–13)
NEUTS SEG # BLD: 8.5 K/UL (ref 1.8–8)
NEUTS SEG NFR BLD: 81 % (ref 32–75)
NRBC # BLD: 0 K/UL (ref 0–0.01)
NRBC BLD-RTO: 0 PER 100 WBC
PLATELET # BLD AUTO: 201 K/UL (ref 150–400)
PMV BLD AUTO: 10.3 FL (ref 8.9–12.9)
POTASSIUM SERPL-SCNC: 4.5 MMOL/L (ref 3.5–5.1)
PROCALCITONIN SERPL-MCNC: <0.05 NG/ML
RBC # BLD AUTO: 4.93 M/UL (ref 3.8–5.2)
RBC MORPH BLD: ABNORMAL
RBC MORPH BLD: ABNORMAL
RIGHT CCA DIST DIAS: 12.1 CM/S
RIGHT CCA DIST SYS: 70.6 CM/S
RIGHT CCA PROX DIAS: 8.6 CM/S
RIGHT CCA PROX SYS: 65.3 CM/S
RIGHT ECA DIAS: 5.15 CM/S
RIGHT ECA SYS: 73.2 CM/S
RIGHT ICA DIST DIAS: 15.6 CM/S
RIGHT ICA DIST SYS: 49.6 CM/S
RIGHT ICA MID DIAS: 12.2 CM/S
RIGHT ICA MID SYS: 35.4 CM/S
RIGHT ICA PROX DIAS: 10.4 CM/S
RIGHT ICA PROX SYS: 53.1 CM/S
RIGHT ICA/CCA SYS: 0.8
RIGHT SUBCLAVIAN DIAS: 6.65 CM/S
RIGHT SUBCLAVIAN SYS: 53.3 CM/S
RIGHT VERTEBRAL DIAS: 5.97 CM/S
RIGHT VERTEBRAL SYS: 21.3 CM/S
SAMPLES BEING HELD,HOLD: NORMAL
SERVICE CMNT-IMP: ABNORMAL
SERVICE CMNT-IMP: ABNORMAL
SERVICE CMNT-IMP: NORMAL
SERVICE CMNT-IMP: NORMAL
SODIUM SERPL-SCNC: 138 MMOL/L (ref 136–145)
THERAPEUTIC RANGE,PTTT: ABNORMAL SECS (ref 58–77)
WBC # BLD AUTO: 10.4 K/UL (ref 3.6–11)

## 2021-05-24 PROCEDURE — 97112 NEUROMUSCULAR REEDUCATION: CPT

## 2021-05-24 PROCEDURE — 85730 THROMBOPLASTIN TIME PARTIAL: CPT

## 2021-05-24 PROCEDURE — 36415 COLL VENOUS BLD VENIPUNCTURE: CPT

## 2021-05-24 PROCEDURE — 92610 EVALUATE SWALLOWING FUNCTION: CPT

## 2021-05-24 PROCEDURE — 97165 OT EVAL LOW COMPLEX 30 MIN: CPT

## 2021-05-24 PROCEDURE — 97530 THERAPEUTIC ACTIVITIES: CPT

## 2021-05-24 PROCEDURE — 99232 SBSQ HOSP IP/OBS MODERATE 35: CPT | Performed by: STUDENT IN AN ORGANIZED HEALTH CARE EDUCATION/TRAINING PROGRAM

## 2021-05-24 PROCEDURE — 74011250637 HC RX REV CODE- 250/637: Performed by: INTERNAL MEDICINE

## 2021-05-24 PROCEDURE — 74011000250 HC RX REV CODE- 250: Performed by: SPECIALIST

## 2021-05-24 PROCEDURE — 74011250636 HC RX REV CODE- 250/636: Performed by: INTERNAL MEDICINE

## 2021-05-24 PROCEDURE — 82962 GLUCOSE BLOOD TEST: CPT

## 2021-05-24 PROCEDURE — 97535 SELF CARE MNGMENT TRAINING: CPT

## 2021-05-24 PROCEDURE — 74011250636 HC RX REV CODE- 250/636: Performed by: HOSPITALIST

## 2021-05-24 PROCEDURE — C9113 INJ PANTOPRAZOLE SODIUM, VIA: HCPCS | Performed by: INTERNAL MEDICINE

## 2021-05-24 PROCEDURE — 99232 SBSQ HOSP IP/OBS MODERATE 35: CPT | Performed by: PSYCHIATRY & NEUROLOGY

## 2021-05-24 PROCEDURE — 85025 COMPLETE CBC W/AUTO DIFF WBC: CPT

## 2021-05-24 PROCEDURE — 83880 ASSAY OF NATRIURETIC PEPTIDE: CPT

## 2021-05-24 PROCEDURE — 74011000258 HC RX REV CODE- 258: Performed by: HOSPITALIST

## 2021-05-24 PROCEDURE — 80048 BASIC METABOLIC PNL TOTAL CA: CPT

## 2021-05-24 PROCEDURE — 65660000000 HC RM CCU STEPDOWN

## 2021-05-24 PROCEDURE — 74011250637 HC RX REV CODE- 250/637: Performed by: NURSE PRACTITIONER

## 2021-05-24 PROCEDURE — 84145 PROCALCITONIN (PCT): CPT

## 2021-05-24 PROCEDURE — 71045 X-RAY EXAM CHEST 1 VIEW: CPT

## 2021-05-24 PROCEDURE — 83735 ASSAY OF MAGNESIUM: CPT

## 2021-05-24 PROCEDURE — 74011250637 HC RX REV CODE- 250/637: Performed by: PSYCHIATRY & NEUROLOGY

## 2021-05-24 PROCEDURE — 74011000258 HC RX REV CODE- 258: Performed by: INTERNAL MEDICINE

## 2021-05-24 RX ORDER — MAGNESIUM SULFATE 100 %
4 CRYSTALS MISCELLANEOUS AS NEEDED
Status: DISCONTINUED | OUTPATIENT
Start: 2021-05-24 | End: 2021-05-28 | Stop reason: HOSPADM

## 2021-05-24 RX ORDER — FUROSEMIDE 10 MG/ML
20 INJECTION INTRAMUSCULAR; INTRAVENOUS ONCE
Status: COMPLETED | OUTPATIENT
Start: 2021-05-24 | End: 2021-05-24

## 2021-05-24 RX ORDER — HEPARIN SODIUM 5000 [USP'U]/ML
3000 INJECTION, SOLUTION INTRAVENOUS; SUBCUTANEOUS ONCE
Status: COMPLETED | OUTPATIENT
Start: 2021-05-24 | End: 2021-05-24

## 2021-05-24 RX ORDER — METOPROLOL TARTRATE 50 MG/1
50 TABLET ORAL EVERY 6 HOURS
Status: DISCONTINUED | OUTPATIENT
Start: 2021-05-24 | End: 2021-05-25

## 2021-05-24 RX ORDER — DEXTROSE 50 % IN WATER (D50W) INTRAVENOUS SYRINGE
12.5-25 AS NEEDED
Status: DISCONTINUED | OUTPATIENT
Start: 2021-05-24 | End: 2021-05-28 | Stop reason: HOSPADM

## 2021-05-24 RX ORDER — INSULIN LISPRO 100 [IU]/ML
INJECTION, SOLUTION INTRAVENOUS; SUBCUTANEOUS
Status: DISCONTINUED | OUTPATIENT
Start: 2021-05-24 | End: 2021-05-28 | Stop reason: HOSPADM

## 2021-05-24 RX ADMIN — HEPARIN SODIUM 3000 UNITS: 5000 INJECTION INTRAVENOUS; SUBCUTANEOUS at 01:20

## 2021-05-24 RX ADMIN — METOPROLOL TARTRATE 2.5 MG: 5 INJECTION INTRAVENOUS at 08:28

## 2021-05-24 RX ADMIN — APIXABAN 2.5 MG: 2.5 TABLET, FILM COATED ORAL at 10:13

## 2021-05-24 RX ADMIN — AMIODARONE HYDROCHLORIDE 0.5 MG/MIN: 50 INJECTION, SOLUTION INTRAVENOUS at 05:46

## 2021-05-24 RX ADMIN — METOPROLOL TARTRATE 50 MG: 50 TABLET, FILM COATED ORAL at 18:10

## 2021-05-24 RX ADMIN — METOPROLOL TARTRATE 2.5 MG: 5 INJECTION INTRAVENOUS at 06:13

## 2021-05-24 RX ADMIN — ATORVASTATIN CALCIUM 10 MG: 10 TABLET, FILM COATED ORAL at 22:43

## 2021-05-24 RX ADMIN — METOPROLOL TARTRATE 2.5 MG: 5 INJECTION INTRAVENOUS at 01:20

## 2021-05-24 RX ADMIN — METOPROLOL TARTRATE 50 MG: 50 TABLET, FILM COATED ORAL at 22:43

## 2021-05-24 RX ADMIN — APIXABAN 2.5 MG: 2.5 TABLET, FILM COATED ORAL at 18:10

## 2021-05-24 RX ADMIN — FUROSEMIDE 20 MG: 10 INJECTION, SOLUTION INTRAMUSCULAR; INTRAVENOUS at 14:54

## 2021-05-24 RX ADMIN — LEVOTHYROXINE SODIUM 100 MCG: 0.1 TABLET ORAL at 10:01

## 2021-05-24 RX ADMIN — PANTOPRAZOLE SODIUM 40 MG: 40 INJECTION, POWDER, FOR SOLUTION INTRAVENOUS at 06:13

## 2021-05-24 RX ADMIN — PIPERACILLIN AND TAZOBACTAM 3.38 G: 3; .375 INJECTION, POWDER, LYOPHILIZED, FOR SOLUTION INTRAVENOUS at 21:32

## 2021-05-24 RX ADMIN — Medication 10 ML: at 06:14

## 2021-05-24 RX ADMIN — Medication 10 ML: at 14:55

## 2021-05-24 RX ADMIN — PIPERACILLIN AND TAZOBACTAM 3.38 G: 3; .375 INJECTION, POWDER, LYOPHILIZED, FOR SOLUTION INTRAVENOUS at 12:47

## 2021-05-24 RX ADMIN — SODIUM CHLORIDE 100 ML/HR: 9 INJECTION, SOLUTION INTRAVENOUS at 10:15

## 2021-05-24 RX ADMIN — Medication 10 ML: at 22:43

## 2021-05-24 RX ADMIN — METOPROLOL TARTRATE 50 MG: 50 TABLET, FILM COATED ORAL at 10:01

## 2021-05-24 NOTE — PROGRESS NOTES
Spiritual Care Assessment/Progress Note  [address]      NAME: Elsie Dozier      MRN: [id number]  AGE: 80 y.o. SEX: female  Nondenominational Affiliation: Gene Contreras   Language: English     5/24/2021     Total Time (in minutes): 18     Spiritual Assessment begun in [facility] [address] through conversation with:         [x]Patient        [] Family    [] Friend(s)        Reason for Consult: Palliative Care, Initial/Spiritual Assessment     Spiritual beliefs: (Please include comment if needed)     [x] Identifies with a mila tradition:  Synagogue        [] Supported by a mila community:            [] Claims no spiritual orientation:           [] Seeking spiritual identity:                [] Adheres to an individual form of spirituality:           [] Not able to assess:                           Identified resources for coping:      [] Prayer                               [] Music                  [] Guided Imagery     [x] Family/friends                 [] Pet visits     [] Devotional reading                         [] Unknown     [] Other:                                             Interventions offered during this visit: (See comments for more details)    Patient Interventions: Affirmation of emotions/emotional suffering, Affirmation of mila, Prayer (assurance of), Iconic (affirming the presence of God/Higher Power)     Family/Friend(s):  Affirmation of emotions/emotional suffering, Iconic (affirming the presence of God/Higher Power), Affirmation of mila     Plan of Care:     [] Support spiritual and/or cultural needs    [] Support AMD and/or advance care planning process      [] Support grieving process   [] Coordinate Rites and/or Rituals    [] Coordination with community clergy   [] No spiritual needs identified at this time   [] Detailed Plan of Care below (See Comments)  [] Make referral to Music Therapy  [] Make referral to Pet Therapy     [] Make referral to Addiction services  [] Make referral to [facility]  [] Make referral to Spiritual Care Partner  [x] No future visits requested        [x] Follow up upon further referrals     Comments: Initial Palliative Care spiritual assessment in [facility]. Miss Castillo's daughter Stan Pinto was in the room. Miss Molly Lau appeared to be alert  Though it was difficult to understand what she says. She lives with Stan Pinto so she is able to figure out what her mother was saying, sometimes switching to [place] Verifying her spiritual beliefs she appeared to become nervous when asking about a [facility]. We affirmed that though she has a [employer] background, she now considers herself [place]. Miss Molly Lau uses humor, when asked what if anything I could do for her her daughter indicated her mother said give me keegan money, using humor as a response. Provided spiritual presence and prayer. Advised of  Availability.   Visited by: Sona Lopez., MS., [address] ([id number])

## 2021-05-24 NOTE — PROGRESS NOTES
2:10pm:  CM provided update on [facility] to patient's daughters. They do not wish for patient to go to rehab. They are in agreement with using another home health agency other than [facility]. Choice letter signed and referral placed to [facility] patient. 12 pm:  HH resumption order received and sent to [facility]. They are unable to provide speech therapy for at least two weeks. Transition of Care Plan - RUR 34% (high risk):  1. CM following  2. Patient to discharge home when stable with family assistance and home health provided by [employer]. 3. Outpatient follow-up, PCP virtual appointment scheduled on 6/1 and added to AVS  4.  Family will provide transportation at discharge    Travis Mccain

## 2021-05-24 NOTE — PROGRESS NOTES
[facility]  [address] [place], [place], [id number] [place] [address]  [phone]      Medical Progress Note      NAME: Tim Castanon   :  10/12/1930  MRM:  [id number]    Date/Time: 2021  3:35 PM         Subjective:     Chief Complaint:  Pt aphasic     Pt seen and examined. Still aphasix. Daughter at the bedside. Did pass swallow eval     ROS:  (bold if positive, if negative)             Objective:       Vitals:          Last 24hrs VS reviewed since prior progress note. Most recent are:    Visit Vitals  BP (!) 141/97 (BP 1 Location: Left upper arm, BP Patient Position: At rest)   Pulse 95   Temp 97.4 °F (36.3 °C)   Resp 18   Ht 5' 2\" (1.575 m)   Wt 51.8 kg (114 lb 4.8 oz)   SpO2 93%   BMI 20.91 kg/m²     SpO2 Readings from Last 6 Encounters:   21 93%   21 99%   21 94%   21 94%   21 99%   20 98%            Intake/Output Summary (Last 24 hours) at 2021 1342  Last data filed at 2021 1044  Gross per 24 hour   Intake 1526.63 ml   Output 255 ml   Net 1271.63 ml          Exam:     Physical Exam:    Gen: Thin elderly female. R sided facial droop. HEENT:  Pink conjunctivae, PERRL, hearing intact to voice, moist mucous membranes  Neck:  Supple, without masses, thyroid non-tender  Resp: Diffuse crackles. Card:  No murmurs, normal S1, S2 without thrills, bruits or peripheral edema  Abd:  Soft, non-tender, non-distended, normoactive bowel sounds are present  Musc:  No cyanosis or clubbing  Skin:  No rashes or ulcers, skin turgor is good  Neuro: See above. Aphasic.  Past pointing on exam. RUE weakness /   Psych: AAOx3    Medications Reviewed: (see below)    Lab Data Reviewed: (see below)    ______________________________________________________________________    Medications:     Current Facility-Administered Medications   Medication Dose Route Frequency    metoprolol tartrate (LOPRESSOR) tablet 50 mg  50 mg Oral Q6H    apixaban (ELIQUIS) tablet 2.5 mg  2.5 mg Oral BID    piperacillin-tazobactam (ZOSYN) 3.375 g in 0.9% sodium chloride (MBP/ADV) 100 mL MBP  3.375 g IntraVENous Q12H    atorvastatin (LIPITOR) tablet 10 mg  10 mg Oral QHS    prochlorperazine (COMPAZINE) injection 10 mg  10 mg IntraVENous Q6H PRN    levothyroxine (SYNTHROID) tablet 100 mcg  100 mcg Oral ACB    [Held by provider] losartan (COZAAR) tablet 50 mg  50 mg Oral DAILY    pantoprazole (PROTONIX) 40 mg in 0.9% sodium chloride 10 mL injection  40 mg IntraVENous ACB    ondansetron (ZOFRAN) injection 4 mg  4 mg IntraVENous Q6H PRN    sodium chloride (NS) flush 5-40 mL  5-40 mL IntraVENous Q8H    sodium chloride (NS) flush 5-40 mL  5-40 mL IntraVENous PRN    0.9% sodium chloride infusion 25 mL  25 mL IntraVENous PRN    acetaminophen (TYLENOL) tablet 650 mg  650 mg Oral Q6H PRN    Or    acetaminophen (TYLENOL) suppository 650 mg  650 mg Rectal Q6H PRN    bisacodyL (DULCOLAX) suppository 10 mg  10 mg Rectal DAILY PRN            Lab Review:     Recent Labs     05/24/21  0633 05/23/21  0433 05/22/21  1141   WBC 10.4 6.4 5.1   HGB 10.0* 8.8* 8.8*   HCT 35.1 30.1* 30.5*    182 198     Recent Labs     05/24/21  0633 05/23/21  0433 05/22/21  1141    140 137   K 4.5 4.4 3.8    107 105   CO2 18* 24 26   GLU 95 110* 151*   BUN 35* 27* 33*   CREA 1.45* 0.86 1.18*   CA 9.3 9.1 9.7   MG 2.7* 2.4  --    PHOS  --  2.5*  --    ALB  --  3.0* 3.2*   ALT  --  27 31   INR  --   --  1.2*     No components found for: [place]    MRI =>   Minimal acute infarction posterior left insular cortex and posterior left  frontal lobe. No superimposed hemorrhage, midline shift or mass effect.     Severe chronic microvascular ischemic change and atrophy.     Moderate left maxillary sinus disease.        Assessment / Plan:    Acute CVA (cerebrovascular accident) ([facility] (5/22/2021) - s/p tPA. MRI as above.  Repeat head CT WITHOUT e/o bleeding   -initially on heparin gtt as was pending speech eval; now started on a diet; start eliquis   -on statin  -PT/OT/speech eval => family would like her home       Diabetic retinopathy ([facility] [address] (7/15/2011)  -outpt follow-up      Hypothyroidism (7/15/2011) - TSH elevated. ?compliance with levothyroxine. If compliant then will need a dose increase  -free T4 WNL   -continue levothyroxine        Rheumatoid arthritis (HCC) ()  -not on DMARDS      DM type 2 causing vascular disease (HCC) () - A1c 6.9. With recent CVA ideally needs to be optimized a bit more but with age, concern for hypoglycemia as well   -can consider low dose metformin at discharge if would tolerate       Iron deficiency anemia (3/24/2021)   -check stool blood as will likely need to d/c on a DOAC considering h/o CVA/a-fib       Acute on Chronic a-fib ([facility] [address] (3/24/2021)  -s/p amiodarone and heparin gtt  -transitioned to metoprolol and eliquis     Acute systolic CHF - decompensated. EF now 20-25% on recent TTE from 5/23  -s/p IV lasix today   -on metoprolol   -resume ARB once Cr stable     KAREN - congestive considering reduced EF?   -IV lasix x 1   -monitor closely     Dispo   -family would like her home with home PT/OT/speech. Considering a-fib, CHF, CVA and age, certainly would be appropriate for hospice.  Will ask palliative to assist with goals of care as high risk of readmission    Total time spent with patient: 28 [address] discussed with: Patient and Nursing Staff    Discussed:  Care Plan    Prophylaxis:  Eliquis     Disposition:  TBD           ___________________________________________________    Attending Physician: Ricky Zaidi MD

## 2021-05-24 NOTE — PROGRESS NOTES
Neurology - Inpatient Progress Note     Name:   Derrell Sood  MRN:    [id number]  [address] [facility] Date:   5/22/2021    Follow up:   stroke    Interval History     Daughter at bedside  Pt is more alert today  Speaks short phrases, no dysarthria.   Minimal right lower facial droop at rest  Smile is symmetric  LUE/ LLE: 5/5  RUE 4+/5  RLE 5/5      Brief ROS: As noted above         Allergies   Allergen Reactions    Shellfish Containing Products Swelling       Current Facility-Administered Medications:     metoprolol tartrate (LOPRESSOR) tablet 50 mg, 50 mg, Oral, Q6H, Suzette Wilder NP, 50 mg at 05/24/21 1001    apixaban (ELIQUIS) tablet 2.5 mg, 2.5 mg, Oral, BID, Suzette Pichardo NP, 2.5 mg at 05/24/21 1013    amiodarone (CORDARONE) 375 mg in dextrose 5% 250 mL infusion, 0.5-1 mg/min, IntraVENous, TITRATE, Abdulkadir Zarate MD, Last Rate: 20 mL/hr at 05/24/21 1044, 0.5 mg/min at 05/24/21 1044    atorvastatin (LIPITOR) tablet 10 mg, 10 mg, Oral, QHS, James Vital MD    metoprolol (LOPRESSOR) injection 2.5 mg, 2.5 mg, IntraVENous, Q4H, Shelly Theodore MD, 2.5 mg at 05/24/21 2837    labetaloL (NORMODYNE;TRANDATE) 20 mg/4 mL (5 mg/mL) injection 10 mg, 10 mg, IntraVENous, Q4H PRN, William Puente MD    prochlorperazine (COMPAZINE) injection 10 mg, 10 mg, IntraVENous, Q6H PRN, William Puente MD    levothyroxine (SYNTHROID) tablet 100 mcg, 100 mcg, Oral, Do DOSHI Khoi B, MD, 100 mcg at 05/24/21 1001    [Held by provider] losartan (COZAAR) tablet 50 mg, 50 mg, Oral, DAILY, William Puente MD    pantoprazole (PROTONIX) 40 mg in 0.9% sodium chloride 10 mL injection, 40 mg, IntraVENous, Do DOSHI Khoi B, MD, 40 mg at 05/24/21 0613    ondansetron (ZOFRAN) injection 4 mg, 4 mg, IntraVENous, Q6H PRN, William Puente MD, 4 mg at 05/23/21 2204    0.9% sodium chloride infusion, 100 mL/hr, IntraVENous, CONTINUOUS, She Do MD, Last Rate: 100 mL/hr at 05/24/21 1015, 100 mL/hr at 05/24/21 1015    sodium chloride (NS) flush 5-40 mL, 5-40 mL, IntraVENous, Q8H, William Puente MD, 10 mL at 21 8881    sodium chloride (NS) flush 5-40 mL, 5-40 mL, IntraVENous, PRN, William Puente MD    0.9% sodium chloride infusion 25 mL, 25 mL, IntraVENous, PRN, William Puente MD    acetaminophen (TYLENOL) tablet 650 mg, 650 mg, Oral, Q6H PRN **OR** acetaminophen (TYLENOL) suppository 650 mg, 650 mg, Rectal, Q6H PRN, William Puente MD, 650 mg at 21 0447    bisacodyL (DULCOLAX) suppository 10 mg, 10 mg, Rectal, DAILY PRN, Ana Puente MD      PMHx: has a past medical history of BPPV (benign paroxysmal positional vertigo), HTN (hypertension), completed stroke, Hyperlipidemia, Hypothyroid, PAF (paroxysmal atrial fibrillation) ([facility] Rheumatoid arthritis ([facility] [address] [facility] and Scoliosis. She also has no past medical history of Sun-damaged skin. PSHx: has a past surgical history that includes hx orthopaedic and hx  section. Impression / Plan       ICD-10-CM ICD-9-CM   1. Cerebrovascular accident (CVA), unspecified mechanism ([facility]  I63.9 434.91   2. Acute CVA (cerebrovascular accident) ([facility]  I63.9 434.91   3. Chronic a-fib (HCC)  I48.20 427.31     Hx A Fib  Small to medium size acute ischemia in left medial temporal lobe (primarily cortical location)  Consistent with acute aphasia and right sided weakness  S/p tpA on 2021 (around 1130)  post-tpA head CT without any evidence of ICH    D/w Cardiology NP SHARAN Wilder via [place]; pt can be started on anticoagulant from my standpoint to reduce chance of future TIA or stroke.      Continue statin    Follow up with me in clinic in 4 weeks    Signed By: Pricilla Machuca MD     May 24, 2021

## 2021-05-24 NOTE — PROGRESS NOTES
0700 Bedside and Verbal shift change report given to [address] (oncoming nurse) by Samantha Pickard (offgoing nurse). Report included the following information SBAR, Kardex, ED Summary, Procedure Summary, Intake/Output, MAR and Cardiac Rhythm A fib. This patient was assisted with Intentional Toileting every 2 hours during this shift. Documentation of ambulation and output reflected on Flowsheet. 0800 Notified of aPTT 104.5. Heparin stopped at 0811. 0813 notified Dr. Cathy Wren. 7438 aPTT drawn and sent to lab. No further orders at this time. 0845 Heparin discontinued, switching to Eliquis. 1247 Amiodarone infusion stopped. [phone] Notified by telemetry tech that patient heart rate was in the 120s. Notified Dr. Cathy Wren. Scheduled metoprolol given. Will continue to assess. No further orders at this time. 1930 Bedside and Verbal shift change report given to [address] [place] [address] (oncoming nurse) by Denis Godinez RN (offgoing nurse). Report included the following information SBAR, Kardex, Intake/Output, MAR, Recent Results and Cardiac Rhythm A fib.

## 2021-05-24 NOTE — PROGRESS NOTES
Problem: Dysphagia (Adult)  Goal: *Acute Goals and Plan of Care (Insert Text)  Description: Swallowing goals initiated 5-24-21:  1) tolerate dysphagia 1, nectars without s/s aspiration by 5-27-21  2) MBS to determine severity of dysphagia by 5-28-21  Outcome: Progressing Towards Goal   SPEECH LANGUAGE PATHOLOGY BEDSIDE SWALLOW EVALUATION  Patient: Neda Alcala [de-identified]80 y.o. female)  Date: 5/24/2021  Primary Diagnosis: Acute CVA (cerebrovascular accident) [facility]) [I63.9]        Precautions: aspiration       ASSESSMENT :  Based on the objective data described below, the patient presents with moderate oral dysphagia and R oral residue with poor awareness. Mild-moderate pharyngeal dysphagia suspected due to delay in mild delay in swallowing, mild weakness and coughing with thins frequently( plus occasionally s/p other consistencies). She had poor insight into deficits and showed impulsive eating/drinking habits. Will need supervision at meals and modified diet. ADmitted 5-22-21 with R facial droop, aphasia, R weakness. MRII: small acute infarct in L insular cortex and posterior L frontal lobe. Severe microvascular changes . PMH:  a-fib, CVA, DM,  HTN, CHF, BPPV, hypothyroid, RA, scoliosis, speaks [place], lives with family. .    Patient will benefit from skilled intervention to address the above impairments. Patients rehabilitation potential is considered to be Fair     PLAN :  Recommendations and Planned Interventions:  Start dysphagia 1, nectars. Supervision at meals. Crush pills if able to check for L pocketing. Frequency/Duration: Patient will be followed by speech-language pathology 3 times a week to address goals. Discharge Recommendations: Home Health, Inpatient Rehab, and Skilled Nursing Facility     SUBJECTIVE:   Patient interested in PO. Daughter reports that she did not have any dysphagia prior to admission and was on regular diet.  .    OBJECTIVE:     Past Medical History:   Diagnosis Date BPPV (benign paroxysmal positional vertigo)     HTN (hypertension)     Hx of completed stroke     Hyperlipidemia     Hypothyroid     PAF (paroxysmal atrial fibrillation) (HCC)     Rheumatoid arthritis ([facility] [address]     Scoliosis      Past Surgical History:   Procedure Laterality Date    HX  SECTION      HX ORTHOPAEDIC      bunions removed x 2     Prior Level of Function/Home Situation: lives with family. Home Situation  Home Environment: Private residence  One/Two Story Residence: One story  Living Alone: No  Support Systems: Child(shyla), Family member(s)  Patient Expects to be Discharged to[de-identified] Private residence  Current DME Used/Available at Home: [address] prior to admission: regular, thins  Current Diet:  NPO   Cognitive and Communication Status:  Neurologic State:  (alert)  Orientation Level: Unable to verbalize  Cognition: Impaired decision making  Perception: Appears intact  Perseveration: No perseveration noted  Safety/Judgement: Lack of insight into deficits  Oral Assessment:  Oral Assessment  Labial: Right droop (moderate at rest.  ROM good for natural smile)  Dentition: Edentulous (has dentures at home)  Oral Hygiene: WFL  Lingual:  (mild R lingual deviation)  Velum: No impairment  Mandible: No impairment  P.O. Trials:  Patient Position: upright in bed  Vocal quality prior to P.O.:  (moderate dysarthria with lingual thrusting and poor consonant spirantization. Daughter reports difficulty understanding her [place] and that she is having word retrieval issues.)  Consistency Presented: Solid; Thin liquid;Puree  How Presented: SLP-fed/presented;Spoon;Straw;Successive swallows      ORAL PHASE:   Large impulsive sips noted,e lisandro with cues. Had to place straw on L side of mouth. Mild-moderate oral residue on tongue with purees   All of cracker in L lateral sulcus-no awareness and had to be removed by finger sweep. Poor to no chewing noted. PHARYNGEAL PHASE:   Mild delay in swallow.    Mild-moderate weakness. Coughing frequently s/p thins and occasionally after other consistencies. Unable to r/o aspiration at bedside. SLP saw this patient last in Aug 2019: at that time she had mild-moderate oral dysphagia with poor teeth and mild oral residue. Mild pharyngeal dysphagia noted. No dysarthria. REcommmended minced diet, thin liquids. At that time, She had CVA in R temporal/parietal area. NOMS:   The NOMS functional outcome measure was used to quantify this patient's level of swallowing impairment. Based on the NOMS, the patient was determined to be at level 4 for swallow function       NOMS Swallowing Levels:  Level 1 (CN): NPO  Level 2 (CM): NPO but takes consistency in therapy  Level 3 (CL): Takes less than 50% of nutrition p.o. and continues with nonoral feedings; and/or safe with mod cues; and/or max diet restriction  Level 4 (CK): Safe swallow but needs mod cues; and/or mod diet restriction; and/or still requires some nonoral feeding/supplements  Level 5 (CJ): Safe swallow with min diet restriction; and/or needs min cues  Level 6 (CI): Independent with p.o.; rare cues; usually self cues; may need to avoid some foods or needs extra time  Level 7 ([address] [place] [address]): Independent for all p.o.  MAYRA. (2003). National Outcomes Measurement System (NOMS): Adult Speech-Language Pathology User's Guide. Pain:  Pain Scale 1: Numeric (0 - 10)  Pain Intensity 1: 0       After treatment:   Patient left in no apparent distress in bed and Nursing notified    COMMUNICATION/EDUCATION:   Patient was educated regarding her deficit(s) of dysphagia, dysarthria as this relates to her diagnosis of CVA. She demonstrated Guarded understanding as evidenced by poor insight into deficits. Daughter present and understood. .    The patient's plan of care including recommendations, planned interventions, and recommended diet changes were discussed with: Registered nurse.      Patient/family have participated as able in goal setting and plan of care. Patient/family agree to work toward stated goals and plan of care.     Thank you for this referral.  Kolby Harper, ANDRE  Time Calculation: 20 mins

## 2021-05-24 NOTE — PROGRESS NOTES
1900 Shift change:     Bedside and Verbal shift change report given to [address] [place] [address] (oncoming nurse) by Ciro Gaytan RN (offgoing nurse). Report included the following information SBAR, Kardex, Intake/Output, MAR, and Recent Results. Shift Summary: This patient was assisted with Intentional Toileting every 2 hours during this shift. Documentation of ambulation and output reflected on Flowsheet. 2300 Shift change:     Bedside and Verbal shift change report given to Shefali Cortez (oncoming nurse) by [address] [place] [address] (offgoing nurse). Report included the following information SBAR, Kardex, Intake/Output, MAR, and Recent Results.

## 2021-05-24 NOTE — PROGRESS NOTES
Problem: Mobility Impaired (Adult and Pediatric)  Goal: *Acute Goals and Plan of Care (Insert Text)  Outcome: Progressing Towards Goal  Note:   PHYSICAL THERAPY TREATMENT  Patient: Jose Luis Shultz [de-identified]80 y.o. female)  Date: 5/24/2021  Diagnosis: Acute CVA (cerebrovascular accident) [facility]) [I63.9] Acute CVA (cerebrovascular accident) ([facility] [address]       Precautions:    Chart, physical therapy assessment, plan of care and goals were reviewed. ASSESSMENT  Patient continues with skilled PT services and is progressing towards goals. Patient received supine in bed with daughter present. Patient able to follow verbal and tactile commands for activity, still with aphasia. Patient able to demonstrate improved static sitting at edge of bed, initially required MIN A due to right and posterior lean. Patient improved with time. She was able to perform reaching outside base of support activities in sitting. Then able to transfer to chair. Current Level of Function Impacting Discharge (mobility/balance): Min A for supine- sit, but required MOD A to scoot. MOD A x2 for sit-stand and MAX A x1 for transfer to chair. Other factors to consider for discharge:          PLAN :  Patient continues to benefit from skilled intervention to address the above impairments. Continue treatment per established plan of care. to address goals.     Recommendation for discharge: (in order for the patient to meet his/her long term goals)  To be determined: home health with 24 hour assist vs rehab if no family to provide that level of physical assist    This discharge recommendation:  A follow-up discussion with the attending provider and/or case management is planned    IF patient discharges home will need the following DME: to be determined (TBD)       SUBJECTIVE:   Patient stated hi.    OBJECTIVE DATA SUMMARY:   Critical Behavior:  Neurologic State:  (alert)  Orientation Level: Unable to verbalize  Cognition: Impaired decision making  Safety/Judgement: Lack of insight into deficits  Functional Mobility Training:  Bed Mobility:     Supine to Sit: Minimum assistance     Scooting: Moderate assistance        Transfers:  Sit to Stand: Moderate assistance  Stand to Sit: Moderate assistance;Assist x1;Additional time        Bed to Chair: Maximum assistance;Assist x1;Additional time                     Neuro Re-Education:   Dynamic sitting reaching tasks with Right UE in all directions outside base of support x10 min   Pain Rating:  None reported    Activity Tolerance:   Fair    After treatment patient left in no apparent distress:   Sitting in chair, Call bell within reach, and Bed / chair alarm activated    COMMUNICATION/COLLABORATION:   The patients plan of care was discussed with: Registered nurse.      Rena Mace, PT, DPT   Time Calculation: 25 mins

## 2021-05-24 NOTE — PROGRESS NOTES
[facility] Dosing Services: 05/24/21  Zosyn dose change per renal P&T protocol  Physician Dr Jim Rutledge  Indication: Aspiration Pneumonia  Previous Regimen Zosyn 3.375 gm IV q8hr   Serum Creatinine Lab Results   Component Value Date/Time    Creatinine 1.45 (H) 05/24/2021 06:33 AM    Creatinine (POC) 0.8 08/08/2012 09:53 AM      Creatinine Clearance Estimated Creatinine Clearance: 20.4 mL/min (A) (based on SCr of 1.45 mg/dL (H)).    BUN Lab Results   Component Value Date/Time    BUN 35 (H) 05/24/2021 06:33 AM    BUN (POC) 25 (H) 08/08/2012 09:53 AM         Plan: Changed Zosyn to 3.375 gm IV q12hr for KAREN    Thanks  Frederick Connors, PharmD  [phone]

## 2021-05-24 NOTE — PROGRESS NOTES
Problem: Self Care Deficits Care Plan (Adult)  Goal: *Acute Goals and Plan of Care (Insert Text)  Description: FUNCTIONAL STATUS PRIOR TO ADMISSION: At baseline, patient uses a rolling walker and assistance needed for ADLs. HOME SUPPORT PRIOR TO ADMISSION: The patient lived with her daughter and received caregiver support 8 hrs/day. She lives in a two story home, showers on the second floor, bedroom on first.  6 steps to enter ; Patient has assistance from family for stairs. Occupational Therapy Goals  Initiated 5/24/2021  1. Patient will perform grooming with minimum assistance within 7 day(s). 2.  Patient will perform upper body dressing and bathing with minimum assistance within 7 day(s). 3.  Patient will perform lower body dressing and bathing with moderate assistance within 7 day(s). 4.  Patient will perform toilet transfers with minimum assistance to [facility] using best technique within 7 day(s). 5   Patient will participate in upper extremity therapeutic exercise/activities with minimal assistance with focus on R UE strength and coordination within 7 day(s). 6.  Patient will participate in full Fugl-Smith Assessment of Upper Extremity within 7 days. Outcome: Progressing Towards Goal   OCCUPATIONAL THERAPY EVALUATION  Patient: Sally Ball (15 y.o. female)  Date: 5/24/2021  Primary Diagnosis: Acute CVA (cerebrovascular accident) [facility]) [I63.9]        Precautions: fall       ASSESSMENT  Based on the objective data described below, the patient presents with decreased activity tolerance, aphasia, impaired balance, and weakness (R UE > L UE) following admission on 5/22 for CVA s/p TPA. Patient is undergoing neuro workup; MRI revealed minimal acute infarction of the posterior left insular cortex and posterior left frontal lobe. Patient is primarily [address] speaking; Language line and family used for translation. Patient limited in participation this afternoon d/t increased fatigue.   She declined engagement in full Fugl-Smith Assessment d/t fatigue and requested to return to supine; Will attempt at later tx. Patient performed transfers with mod A. Sitting balance was poor overall d/t R lateral and posterior lean. Patient with poor engagement in ADLs; She needs increased assist for LB tasks and unsafe to attempt standing ADLs this date. Patient would benefit from continued skilled OT to progress towards goals and improve overall independence. Current Level of Function Impacting Discharge (ADLs/self-care): Patient required mod A for functional mobility. Patient required min to max A for UB ADLs and total A for LB ADLs. Functional Outcome Measure: The patient was unable to engage in full Fugl-Smith Assessment d/t fatigue. Patient scored a 5/100 for the Barthel Index outcome measure. Patient will benefit from skilled therapy intervention to address the above noted impairments. PLAN :  Recommendations and Planned Interventions: self care training, functional mobility training, therapeutic exercise, balance training, therapeutic activities, endurance activities, neuromuscular re-education, patient education, home safety training, and family training/education    Frequency/Duration: Patient will be followed by occupational therapy 5 times a week to address goals. Recommendation for discharge: (in order for the patient to meet his/her long term goals)  Occupational therapy at least 2 days/week in the home AND ensure assist and/or supervision; Family confirms they want to take patient home and will provide the level of assist she requires      This discharge recommendation:  Has been made in collaboration with the attending provider and/or case management    IF patient discharges home will need the following DME: none       SUBJECTIVE:   Patient agreeable to activity but declined activity once she became fatigued.       OBJECTIVE DATA SUMMARY:   HISTORY:   Past Medical History:   Diagnosis Date    BPPV (benign paroxysmal positional vertigo)     HTN (hypertension)     Hx of completed stroke     Hyperlipidemia     Hypothyroid     PAF (paroxysmal atrial fibrillation) (HCC)     Rheumatoid arthritis ([facility] [address]     Scoliosis      Past Surgical History:   Procedure Laterality Date    HX  SECTION      HX ORTHOPAEDIC      bunions removed x 2       Expanded or extensive additional review of patient history:     Home Situation  Home Environment: Private residence  One/Two Story Residence: One story  Living Alone: No  Support Systems: Family member(s), Child(shyla)  Patient Expects to be Discharged to[de-identified] Private residence  Current DME Used/Available at Home: None    Hand dominance: Right    EXAMINATION OF PERFORMANCE DEFICITS:  Cognitive/Behavioral Status:  Neurologic State: Eyes open spontaneously; Alert  Orientation Level: Unable to verbalize;Oriented to person  Cognition: Follows commands  Perception: Appears intact  Perseveration: No perseveration noted  Safety/Judgement: Awareness of environment    Skin: Intact in the uppers    Edema: None noted in the uppers    Hearing:   Auditory  Auditory Impairment: None    Vision/Perceptual:    Tracking: Able to track stimulus in all quadrants w/o difficulty    Diplopia: No    Acuity: Within Defined Limits    Corrective Lenses: Glasses    Range of Motion:  WDL in the uppers                            Strength:  Decreased but functional in the uppers  R UE 2-/5 to 3/5 overall as observed; L UE 3+/5 to 4/5 overall    Coordination:  Fine Motor Skills-Upper: Left Impaired;Right Impaired    Gross Motor Skills-Upper: Left Impaired;Right Impaired    Tone & Sensation:  Tone: normal  Sensation: intact    Balance:  Sitting: Impaired  Sitting - Static: Poor (constant support)  Sitting - Dynamic: Poor (constant support)    Functional Mobility and Transfers for ADLs:  Bed Mobility:  Supine to Sit: Moderate assistance;Assist x1;Additional time  Sit to Supine: Moderate assistance;Assist x1;Additional time  Scooting: Moderate assistance;Assist x1;Additional time    Transfers:  Sit to Stand: Moderate assistance;Assist x1;Additional time  Stand to Sit: Moderate assistance;Assist x1;Additional time  Bed to Chair: Maximum assistance;Assist x1;Additional time    ADL Assessment:  Feeding: Minimum assistance    Oral Facial Hygiene/Grooming: Moderate assistance    Bathing: Total assistance    Upper Body Dressing: Maximum assistance    Lower Body Dressing: Total assistance    Toileting: Total assistance    Cognitive Retraining  Safety/Judgement: Awareness of environment    Functional Measure:  Fugl-Smith Assessment of Motor Recovery after Stroke:   Reflex Activity  Flexors/Biceps/Fingers: Can be elicited  Extensors/Triceps: Can be elicited  Reflex Subtotal: 4         Volitional Movement Mixing Synergies  Hand to Lumbar Spine: Partial  Shoulder Flexion (0-90 degrees): Full  Pronation-Supination: Full  Subtotal: 5    Volitional Movement With Little or No Synergy  Shoulder Abduction (0-90 degrees): Partial  Shoulder Flexion ( degrees): Partial  Pronation/Supination: Partial  Subtotal : 3    Normal Reflex Activity  Biceps, Triceps, Finger Flexors: Full  Subtotal : 2         Wrist  Stability at 15 Degree Dorsiflexion: Partial  Repeated Dorsiflexion/ Volar Flexion: Partial  Stability at 15 Degree Dorsiflexion: Partial  Repeated Dorsiflexion/ Volar Flexion: Partial  Circumduction: Partial  Wrist Total: 5    Hand  Mass Flexion: Full  Mass Extension: Full          /66     This is a reliable/valid measure of arm function after a neurological event. It has established value to characterize functional status and for measuring spontaneous and therapy-induced recovery; tests proximal and distal motor functions. Fugl-Smith Assessment - UE scores recorded between five and 30 days post neurologic event can be used to predict UE recovery at six months post neurologic event.   Severe = 0-21 points   Moderately Severe = 22-33 points   Moderate = 34-47 points   Mild = 48-66 points  JEFRY Curiel, MARY Alaniz, & RINA Sauer (1992). Measurement of motor recovery after stroke: Outcome assessment and sample size requirements. Stroke, 23, pp. 1257-6283.   ------------------------------------------------------------------------------------------------------------------------------------------------------------------  MCID:  Stroke:   Sally Pereira et al, 2001; n = 171; mean age 79 (6) years; assessed within 16 (12) days of stroke, Acute Stroke)  FMA Motor Scores from Admission to Discharge   10 point increase in FMA Upper Extremity = 1.5 change in discharge FIM   10 point increase in FMA Lower Extremity = 1.9 change in discharge FIM  MDC:   Stroke:   Piotr Matos et al, 2008, n = 14, mean age = 59.9 (14.6) years, assessed on average 14 (6.5) months post stroke, Chronic Stroke)   FMA = 5.2 points for the Upper Extremity portion of the assessment     Barthel Index:    Bathin  Bladder: 0  Bowels: 0  Groomin  Dressin  Feedin  Mobility: 0  Stairs: 0  Toilet Use: 0  Transfer (Bed to Chair and Back): 5  Total: 5/100        The Barthel ADL Index: Guidelines  1. The index should be used as a record of what a patient does, not as a record of what a patient could do. 2. The main aim is to establish degree of independence from any help, physical or verbal, however minor and for whatever reason. 3. The need for supervision renders the patient not independent. 4. A patient's performance should be established using the best available evidence. Asking the patient, friends/relatives and nurses are the usual sources, but direct observation and common sense are also important. However direct testing is not needed. 5. Usually the patient's performance over the preceding 24-48 hours is important, but occasionally longer periods will be relevant.   6. Middle categories imply that the patient supplies over 50 per cent of the effort. 7. Use of aids to be independent is allowed. Nissa Vieira., Barthel, D.W. (6858). Functional evaluation: the Barthel Index. [facility] (14)2. CODIE Perez, Shun Rodriguez., Dara Gomesct.[facility], [address] [place] [address] (1999). Measuring the change indisability after inpatient rehabilitation; comparison of the responsiveness of the Barthel Index and Functional [place] Measure. Journal of Neurology, Neurosurgery, and Psychiatry, 66(4), 490-194. Katie Ball, MARYANN, THERESA Celeste, & Marysol Goodwin M.A. (2004.) Assessment of post-stroke quality of life in cost-effectiveness studies: The usefulness of the Barthel Index and the EuroQoL-5D. Quality of Life Research, 15, 006-62      Occupational Therapy Evaluation Charge Determination   History Examination Decision-Making   LOW Complexity : Brief history review  LOW Complexity : 1-3 performance deficits relating to physical, cognitive , or psychosocial skils that result in activity limitations and / or participation restrictions  LOW Complexity : No comorbidities that affect functional and no verbal or physical assistance needed to complete eval tasks       Based on the above components, the patient evaluation is determined to be of the following complexity level: LOW     Activity Tolerance:   Fair and Poor    After treatment patient left in no apparent distress:    Supine in bed and Bed / chair alarm activated    COMMUNICATION/EDUCATION:   The patients plan of care was discussed with: Physical therapist, Registered nurse, and patient . Patient was educated regarding her deficit(s) as this relates to her diagnosis of CVA. She demonstrated Guarded understanding. Patient and/or family was verbally educated on the BE FAST acronym for signs/symptoms of CVA and TIA. Informed patient to refer to the Stroke Binder for further BE FAST information.      Home safety education was provided and the patient/caregiver indicated understanding., Patient/family have participated as able in goal setting and plan of care. , and Patient/family agree to work toward stated goals and plan of care. This patients plan of care is appropriate for delegation to JERARDO.     Thank you for this referral.  Ilda Toribio, OTR/L  Time Calculation: 42 mins

## 2021-05-24 NOTE — PROGRESS NOTES
Bedside and Verbal shift change report given to Henri (oncoming nurse) by [facility] RN (offgoing nurse). Report included the following information SBAR, Kardex, Intake/Output, MAR, Recent Results and Cardiac Rhythm A-fib RVR.

## 2021-05-24 NOTE — PROGRESS NOTES
[facility] [address] [id number]  Mobile [phone]    Cardiology Progress  Note    Date of  Admission: 2021 11:06 AM  PCP- MD Reynold Gonsales MD  :  10/12/1930   MRN:  [id number]       Admission Diagnosis: Acute CVA (cerebrovascular accident) [facility]) [I63.9]    Joe Conley is a 80 y.o. female admitted for Acute CVA (cerebrovascular accident) ([facility] [address] [I63.9]. Consult requested by Cindy Castro MD       Subjective:   Acute CVA (cerebrovascular accident) [facility]) [I63.9]        Impression Plan/Recommendation   1. Acute CVA-left medial temporal lobe  s/p TPA  2. Dysphagia   3. PAF  4. Decline in LVEF, concern for infiltrative CM  5. Hypertension   6. Fe Def anemia   7. KAREN               -In April, Cardiology stopped Eliquis (due to iron defic anemia) after long discussion with the family with plans of patient going into hospice potentially   - neurology okay w resuming DOAC- currently on heparin gtt  -safe for pureed diet, will order pills to be crushed. Start PO lopressor for rate control, statin, eliquis. Will start losartan pending renal function is better tomorrow   --160s  -Echo shows decline in LVEF 50-55%>> 25%, GDMT as above                Subjective:  Joe Conley denies chest pain or SOB.  Daughter at bedside             Past Medical History:   Diagnosis Date    BPPV (benign paroxysmal positional vertigo)     HTN (hypertension)     Hx of completed stroke     Hyperlipidemia     Hypothyroid     PAF (paroxysmal atrial fibrillation) (HCC)     Rheumatoid arthritis ([facility] [address]     Scoliosis       Past Surgical History:   Procedure Laterality Date    HX  SECTION      HX ORTHOPAEDIC      bunions removed x 2       Hospital Medications:  Current Facility-Administered Medications   Medication Dose Route Frequency    amiodarone (CORDARONE) 375 mg in dextrose 5% 250 mL infusion  0.5-1 mg/min IntraVENous TITRATE    [Held by provider] atorvastatin (LIPITOR) tablet 10 mg  10 mg Oral QHS    heparin 25,000 units in D5W 250 ml infusion  12-25 Units/kg/hr IntraVENous TITRATE    metoprolol (LOPRESSOR) injection 2.5 mg  2.5 mg IntraVENous Q4H    labetaloL (NORMODYNE;TRANDATE) 20 mg/4 mL (5 mg/mL) injection 10 mg  10 mg IntraVENous Q4H PRN    prochlorperazine (COMPAZINE) injection 10 mg  10 mg IntraVENous Q6H PRN    [Held by provider] dilTIAZem ER (CARDIZEM CD) capsule 240 mg  240 mg Oral DAILY    [Held by provider] levothyroxine (SYNTHROID) tablet 100 mcg  100 mcg Oral ACB    [Held by provider] losartan (COZAAR) tablet 50 mg  50 mg Oral DAILY    pantoprazole (PROTONIX) 40 mg in 0.9% sodium chloride 10 mL injection  40 mg IntraVENous ACB    ondansetron (ZOFRAN) injection 4 mg  4 mg IntraVENous Q6H PRN    0.9% sodium chloride infusion  100 mL/hr IntraVENous CONTINUOUS    sodium chloride (NS) flush 5-40 mL  5-40 mL IntraVENous Q8H    sodium chloride (NS) flush 5-40 mL  5-40 mL IntraVENous PRN    0.9% sodium chloride infusion 25 mL  25 mL IntraVENous PRN    acetaminophen (TYLENOL) tablet 650 mg  650 mg Oral Q6H PRN    Or    acetaminophen (TYLENOL) suppository 650 mg  650 mg Rectal Q6H PRN    bisacodyL (DULCOLAX) suppository 10 mg  10 mg Rectal DAILY PRN     No current facility-administered medications on file prior to encounter. Current Outpatient Medications on File Prior to Encounter   Medication Sig Dispense Refill    dilTIAZem ER (CARDIZEM LA) 240 mg tablet Take 240 mg by mouth daily.  ferrous gluconate 324 mg (37.5 mg iron) tablet Take 1 Tab by mouth Daily (before breakfast). 30 Tab 0    levothyroxine (SYNTHROID) 100 mcg tablet Take 1 Tab by mouth Daily (before breakfast). 30 Tab 0    losartan (COZAAR) 50 mg tablet Take 50 mg by mouth daily.       furosemide (LASIX) 20 mg tablet Take 1 tablet by mouth once daily 30 Tab 0    pantoprazole (PROTONIX) 40 mg tablet Take 1 Tab by mouth daily. 30 Tab 1         Allergies   Allergen Reactions    Shellfish Containing Products Swelling             Review of Symptoms:  All other systems reviewed are negative except as above. Physical Exam    Visit Vitals  BP (!) 147/88 (BP 1 Location: Left arm, BP Patient Position: At rest)   Pulse (!) 110   Temp 97.6 °F (36.4 °C)   Resp 16   Ht 5' 2\" (1.575 m)   Wt 114 lb 4.8 oz (51.8 kg)   SpO2 91%   BMI 20.91 kg/m²     General Appearance:  Well developed, elderly, frail, alert , and individual in no acute distress. Aphasic. Right sided facial droop   Ears/Nose/Mouth/Throat:   Normal oral mucosa,no scleral icterus. Neck: Supple no JVD    Chest:   Shallow breaths, diminished bases   Cardiovascular:  irregular rate and rhythm, 2/6 Murmur. Abdomen:   Soft, non-tender, bowel sounds are active. Extremities: No edema bilaterally. Pulses detected, no varicosities   Skin: Warm and dry. No bruising  Neuro  Moves all extermities and neurologically intact                                                       Cardiographics    Telemetry: Af v rate 90-100s      Cardiac testing      Recent Labs     05/22/21  1141   TROIQ <0.05       Recent Labs     05/24/21  0633 05/23/21  0433 05/22/21  1141    140 137   K 4.5 4.4 3.8   CO2 18* 24 26   BUN 35* 27* 33*   CREA 1.45* 0.86 1.18*   GLU 95 110* 151*   PHOS  --  2.5*  --    MG 2.7* 2.4  --    WBC 10.4 6.4 5.1   HGB 10.0* 8.8* 8.8*   HCT 35.1 30.1* 30.5*    182 198       I have discussed the diagnosis with the patient and the intended plan as seen in the above orders. Questions were answered concerning future plans. I have discussed medication side effects and warnings with the patient as well. Flako Ingram is in agreement to the plan listed above and wishes to proceed. she  was instructed not to smoke, eat heart healthy diet  and to exercise. Thank you for this consult.     Bridger Hernandez NP

## 2021-05-25 ENCOUNTER — APPOINTMENT (OUTPATIENT)
Dept: GENERAL RADIOLOGY | Age: 86
DRG: 061 | End: 2021-05-25
Attending: INTERNAL MEDICINE
Payer: MEDICARE

## 2021-05-25 LAB
ANION GAP SERPL CALC-SCNC: 9 MMOL/L (ref 5–15)
APPEARANCE UR: CLEAR
BACTERIA URNS QL MICRO: NEGATIVE /HPF
BASOPHILS # BLD: 0.1 K/UL (ref 0–0.1)
BASOPHILS NFR BLD: 0 % (ref 0–1)
BILIRUB UR QL: NEGATIVE
BUN SERPL-MCNC: 49 MG/DL (ref 6–20)
BUN/CREAT SERPL: 31 (ref 12–20)
CALCIUM SERPL-MCNC: 8.9 MG/DL (ref 8.5–10.1)
CHLORIDE SERPL-SCNC: 107 MMOL/L (ref 97–108)
CO2 SERPL-SCNC: 21 MMOL/L (ref 21–32)
COLOR UR: ABNORMAL
CREAT SERPL-MCNC: 1.58 MG/DL (ref 0.55–1.02)
CREAT UR-MCNC: 84 MG/DL
DIFFERENTIAL METHOD BLD: ABNORMAL
EOSINOPHIL # BLD: 0 K/UL (ref 0–0.4)
EOSINOPHIL NFR BLD: 0 % (ref 0–7)
EPITH CASTS URNS QL MICRO: ABNORMAL /LPF
ERYTHROCYTE [DISTWIDTH] IN BLOOD BY AUTOMATED COUNT: 19.7 % (ref 11.5–14.5)
GLUCOSE BLD STRIP.AUTO-MCNC: 102 MG/DL (ref 65–117)
GLUCOSE BLD STRIP.AUTO-MCNC: 172 MG/DL (ref 65–117)
GLUCOSE BLD STRIP.AUTO-MCNC: 189 MG/DL (ref 65–117)
GLUCOSE BLD STRIP.AUTO-MCNC: 191 MG/DL (ref 65–117)
GLUCOSE SERPL-MCNC: 92 MG/DL (ref 65–100)
GLUCOSE UR STRIP.AUTO-MCNC: NEGATIVE MG/DL
HCT VFR BLD AUTO: 32.6 % (ref 35–47)
HEMOCCULT STL QL: POSITIVE
HGB BLD-MCNC: 9.5 G/DL (ref 11.5–16)
HGB UR QL STRIP: NEGATIVE
IMM GRANULOCYTES # BLD AUTO: 0.1 K/UL (ref 0–0.04)
IMM GRANULOCYTES NFR BLD AUTO: 1 % (ref 0–0.5)
KETONES UR QL STRIP.AUTO: NEGATIVE MG/DL
LEUKOCYTE ESTERASE UR QL STRIP.AUTO: NEGATIVE
LYMPHOCYTES # BLD: 1.7 K/UL (ref 0.8–3.5)
LYMPHOCYTES NFR BLD: 14 % (ref 12–49)
MAGNESIUM SERPL-MCNC: 2.6 MG/DL (ref 1.6–2.4)
MCH RBC QN AUTO: 20.6 PG (ref 26–34)
MCHC RBC AUTO-ENTMCNC: 29.1 G/DL (ref 30–36.5)
MCV RBC AUTO: 70.6 FL (ref 80–99)
MONOCYTES # BLD: 1 K/UL (ref 0–1)
MONOCYTES NFR BLD: 8 % (ref 5–13)
NEUTS SEG # BLD: 9.3 K/UL (ref 1.8–8)
NEUTS SEG NFR BLD: 77 % (ref 32–75)
NITRITE UR QL STRIP.AUTO: NEGATIVE
NRBC # BLD: 0 K/UL (ref 0–0.01)
NRBC BLD-RTO: 0 PER 100 WBC
PH UR STRIP: 6 [PH] (ref 5–8)
PLATELET # BLD AUTO: 188 K/UL (ref 150–400)
POTASSIUM SERPL-SCNC: 4.4 MMOL/L (ref 3.5–5.1)
PROCALCITONIN SERPL-MCNC: <0.05 NG/ML
PROT UR STRIP-MCNC: 30 MG/DL
RBC # BLD AUTO: 4.62 M/UL (ref 3.8–5.2)
RBC #/AREA URNS HPF: ABNORMAL /HPF (ref 0–5)
SERVICE CMNT-IMP: ABNORMAL
SERVICE CMNT-IMP: NORMAL
SODIUM SERPL-SCNC: 137 MMOL/L (ref 136–145)
SODIUM UR-SCNC: 31 MMOL/L
SP GR UR REFRACTOMETRY: 1.02 (ref 1–1.03)
UA: UC IF INDICATED,UAUC: ABNORMAL
UROBILINOGEN UR QL STRIP.AUTO: 1 EU/DL (ref 0.2–1)
UUN UR-MCNC: 1444 MG/DL
WBC # BLD AUTO: 12 K/UL (ref 3.6–11)
WBC URNS QL MICRO: ABNORMAL /HPF (ref 0–4)

## 2021-05-25 PROCEDURE — 84300 ASSAY OF URINE SODIUM: CPT

## 2021-05-25 PROCEDURE — 80048 BASIC METABOLIC PNL TOTAL CA: CPT

## 2021-05-25 PROCEDURE — 74230 X-RAY XM SWLNG FUNCJ C+: CPT

## 2021-05-25 PROCEDURE — 84540 ASSAY OF URINE/UREA-N: CPT

## 2021-05-25 PROCEDURE — C9113 INJ PANTOPRAZOLE SODIUM, VIA: HCPCS | Performed by: INTERNAL MEDICINE

## 2021-05-25 PROCEDURE — 74011250636 HC RX REV CODE- 250/636: Performed by: INTERNAL MEDICINE

## 2021-05-25 PROCEDURE — 81001 URINALYSIS AUTO W/SCOPE: CPT

## 2021-05-25 PROCEDURE — 74011250637 HC RX REV CODE- 250/637: Performed by: INTERNAL MEDICINE

## 2021-05-25 PROCEDURE — 92611 MOTION FLUOROSCOPY/SWALLOW: CPT

## 2021-05-25 PROCEDURE — 74011250637 HC RX REV CODE- 250/637: Performed by: PSYCHIATRY & NEUROLOGY

## 2021-05-25 PROCEDURE — 36415 COLL VENOUS BLD VENIPUNCTURE: CPT

## 2021-05-25 PROCEDURE — 82570 ASSAY OF URINE CREATININE: CPT

## 2021-05-25 PROCEDURE — 84145 PROCALCITONIN (PCT): CPT

## 2021-05-25 PROCEDURE — 82272 OCCULT BLD FECES 1-3 TESTS: CPT

## 2021-05-25 PROCEDURE — 82962 GLUCOSE BLOOD TEST: CPT

## 2021-05-25 PROCEDURE — 99223 1ST HOSP IP/OBS HIGH 75: CPT | Performed by: INTERNAL MEDICINE

## 2021-05-25 PROCEDURE — 74011000258 HC RX REV CODE- 258: Performed by: INTERNAL MEDICINE

## 2021-05-25 PROCEDURE — 85025 COMPLETE CBC W/AUTO DIFF WBC: CPT

## 2021-05-25 PROCEDURE — 83735 ASSAY OF MAGNESIUM: CPT

## 2021-05-25 PROCEDURE — 74011250637 HC RX REV CODE- 250/637: Performed by: NURSE PRACTITIONER

## 2021-05-25 PROCEDURE — 74011636637 HC RX REV CODE- 636/637: Performed by: INTERNAL MEDICINE

## 2021-05-25 PROCEDURE — 65660000000 HC RM CCU STEPDOWN

## 2021-05-25 RX ORDER — PANTOPRAZOLE SODIUM 40 MG/1
40 TABLET, DELAYED RELEASE ORAL
Status: DISCONTINUED | OUTPATIENT
Start: 2021-05-25 | End: 2021-05-28 | Stop reason: HOSPADM

## 2021-05-25 RX ORDER — PANTOPRAZOLE SODIUM 40 MG/1
40 TABLET, DELAYED RELEASE ORAL
Status: DISCONTINUED | OUTPATIENT
Start: 2021-05-26 | End: 2021-05-25

## 2021-05-25 RX ORDER — METOPROLOL SUCCINATE 50 MG/1
100 TABLET, EXTENDED RELEASE ORAL DAILY
Status: DISCONTINUED | OUTPATIENT
Start: 2021-05-25 | End: 2021-05-26

## 2021-05-25 RX ADMIN — APIXABAN 2.5 MG: 2.5 TABLET, FILM COATED ORAL at 08:30

## 2021-05-25 RX ADMIN — Medication 10 ML: at 22:07

## 2021-05-25 RX ADMIN — LEVOTHYROXINE SODIUM 100 MCG: 0.1 TABLET ORAL at 08:30

## 2021-05-25 RX ADMIN — ATORVASTATIN CALCIUM 10 MG: 10 TABLET, FILM COATED ORAL at 22:07

## 2021-05-25 RX ADMIN — PANTOPRAZOLE SODIUM 40 MG: 40 INJECTION, POWDER, FOR SOLUTION INTRAVENOUS at 08:30

## 2021-05-25 RX ADMIN — METOPROLOL TARTRATE 50 MG: 50 TABLET, FILM COATED ORAL at 05:13

## 2021-05-25 RX ADMIN — PIPERACILLIN AND TAZOBACTAM 3.38 G: 3; .375 INJECTION, POWDER, LYOPHILIZED, FOR SOLUTION INTRAVENOUS at 22:07

## 2021-05-25 RX ADMIN — PANTOPRAZOLE SODIUM 40 MG: 40 TABLET, DELAYED RELEASE ORAL at 16:46

## 2021-05-25 RX ADMIN — INSULIN LISPRO 2 UNITS: 100 INJECTION, SOLUTION INTRAVENOUS; SUBCUTANEOUS at 16:46

## 2021-05-25 RX ADMIN — PIPERACILLIN AND TAZOBACTAM 3.38 G: 3; .375 INJECTION, POWDER, LYOPHILIZED, FOR SOLUTION INTRAVENOUS at 08:30

## 2021-05-25 RX ADMIN — Medication 10 ML: at 05:13

## 2021-05-25 RX ADMIN — APIXABAN 2.5 MG: 2.5 TABLET, FILM COATED ORAL at 19:00

## 2021-05-25 RX ADMIN — METOPROLOL SUCCINATE 100 MG: 50 TABLET, EXTENDED RELEASE ORAL at 11:37

## 2021-05-25 RX ADMIN — Medication 10 ML: at 16:47

## 2021-05-25 RX ADMIN — INSULIN LISPRO 2 UNITS: 100 INJECTION, SOLUTION INTRAVENOUS; SUBCUTANEOUS at 11:37

## 2021-05-25 NOTE — PROGRESS NOTES
[facility] [address] [id number]  Mobile [phone]    Cardiology Progress  Note    Date of  Admission: 2021 11:06 AM  PCP- MD Taya Laboy MD  :  10/12/1930   MRN:  [id number]       Admission Diagnosis: Acute CVA (cerebrovascular accident) [facility]) [I63.9]    Nanda Bennett is a 80 y.o. female admitted for Acute CVA (cerebrovascular accident) ([facility] [address] [I63.9]. Consult requested by Barry Ayala MD       Subjective:   Acute CVA (cerebrovascular accident) [facility]) [I63.9]        Impression Plan/Recommendation   1. Acute CVA-left medial temporal lobe  s/p TPA  2. Dysphagia   3. PAF  4. Decline in LVEF, concern for infiltrative CM  5. Hypertension   6. Fe Def anemia   7. KAREN               -eliquis 2.5 mg bid   - PO lopressor for rate control- 50 mg q6h, v rate 100-110's. Change to toprol 100 mg and will increase as prn. Hold off on adding back losartan until rate is controlled  -Echo shows decline in LVEF 50-55%>> 25%, GDMT as above     Reviewed above plan w the daughter. Subjective:  Nanad Bennett denies chest pain or SOB.  Daughter at bedside             Past Medical History:   Diagnosis Date    BPPV (benign paroxysmal positional vertigo)     HTN (hypertension)     Hx of completed stroke     Hyperlipidemia     Hypothyroid     PAF (paroxysmal atrial fibrillation) (HCC)     Rheumatoid arthritis ([facility] [address]     Scoliosis       Past Surgical History:   Procedure Laterality Date    HX  SECTION      HX ORTHOPAEDIC      bunions removed x 2       Hospital Medications:  Current Facility-Administered Medications   Medication Dose Route Frequency    metoprolol tartrate (LOPRESSOR) tablet 50 mg  50 mg Oral Q6H    apixaban (ELIQUIS) tablet 2.5 mg  2.5 mg Oral BID    piperacillin-tazobactam (ZOSYN) 3.375 g in 0.9% sodium chloride (MBP/ADV) 100 mL MBP  3.375 g IntraVENous Q12H    insulin lispro (HUMALOG) injection   SubCUTAneous AC&HS    glucose chewable tablet 16 g  4 Tablet Oral PRN    dextrose (D50W) injection syrg 12.5-25 g  12.5-25 g IntraVENous PRN    glucagon (GLUCAGEN) injection 1 mg  1 mg IntraMUSCular PRN    atorvastatin (LIPITOR) tablet 10 mg  10 mg Oral QHS    prochlorperazine (COMPAZINE) injection 10 mg  10 mg IntraVENous Q6H PRN    levothyroxine (SYNTHROID) tablet 100 mcg  100 mcg Oral ACB    [Held by provider] losartan (COZAAR) tablet 50 mg  50 mg Oral DAILY    pantoprazole (PROTONIX) 40 mg in 0.9% sodium chloride 10 mL injection  40 mg IntraVENous ACB    ondansetron (ZOFRAN) injection 4 mg  4 mg IntraVENous Q6H PRN    sodium chloride (NS) flush 5-40 mL  5-40 mL IntraVENous Q8H    sodium chloride (NS) flush 5-40 mL  5-40 mL IntraVENous PRN    0.9% sodium chloride infusion 25 mL  25 mL IntraVENous PRN    acetaminophen (TYLENOL) tablet 650 mg  650 mg Oral Q6H PRN    Or    acetaminophen (TYLENOL) suppository 650 mg  650 mg Rectal Q6H PRN    bisacodyL (DULCOLAX) suppository 10 mg  10 mg Rectal DAILY PRN     No current facility-administered medications on file prior to encounter. Current Outpatient Medications on File Prior to Encounter   Medication Sig Dispense Refill    dilTIAZem ER (CARDIZEM LA) 240 mg tablet Take 240 mg by mouth daily.  ferrous gluconate 324 mg (37.5 mg iron) tablet Take 1 Tab by mouth Daily (before breakfast). 30 Tab 0    levothyroxine (SYNTHROID) 100 mcg tablet Take 1 Tab by mouth Daily (before breakfast). 30 Tab 0    losartan (COZAAR) 50 mg tablet Take 50 mg by mouth daily.  furosemide (LASIX) 20 mg tablet Take 1 tablet by mouth once daily 30 Tab 0    pantoprazole (PROTONIX) 40 mg tablet Take 1 Tab by mouth daily. 30 Tab 1         Allergies   Allergen Reactions    Shellfish Containing Products Swelling             Review of Symptoms:  All other systems reviewed are negative except as above.     Physical Exam    Visit Vitals  BP (!) 137/92 (BP 1 Location: Left upper arm, BP Patient Position: At rest)   Pulse 94   Temp 98.1 °F (36.7 °C)   Resp 16   Ht 5' 2\" (1.575 m)   Wt 114 lb 12.8 oz (52.1 kg)   SpO2 90%   BMI 21.00 kg/m²     General Appearance:  Well developed, elderly, frail, alert , and individual in no acute distress. Aphasic. Right sided facial droop   Ears/Nose/Mouth/Throat:   Normal oral mucosa,no scleral icterus. Neck: Supple no JVD    Chest:   Shallow breaths, diminished bases   Cardiovascular:  irregular rate and rhythm, 2/6 Murmur. Abdomen:   Soft, non-tender, bowel sounds are active. Extremities: No edema bilaterally. Pulses detected, no varicosities   Skin: Warm and dry. No bruising  Neuro  Moves all extermities and neurologically intact                                                       Cardiographics    Telemetry: Af v rate 90-100s, occ up to 120      Cardiac testing      Recent Labs     05/22/21  1141   TROIQ <0.05       Recent Labs     05/25/21  0530 05/24/21  0633 05/23/21  0433    138 140   K 4.4 4.5 4.4   CO2 21 18* 24   BUN 49* 35* 27*   CREA 1.58* 1.45* 0.86   GLU 92 95 110*   PHOS  --   --  2.5*   MG 2.6* 2.7* 2.4   WBC 12.0* 10.4 6.4   HGB 9.5* 10.0* 8.8*   HCT 32.6* 35.1 30.1*    201 182       I have discussed the diagnosis with the patient and the intended plan as seen in the above orders. Questions were answered concerning future plans. I have discussed medication side effects and warnings with the patient as well. Radha Tovar is in agreement to the plan listed above and wishes to proceed. she  was instructed not to smoke, eat heart healthy diet  and to exercise. Thank you for this consult.     Marissa Pierson NP

## 2021-05-25 NOTE — CONSULTS
Palliative Medicine Consult  [place]: [phone])    Patient Name: Johnny Hicks  YOB: 1930    Date of Initial Consult: 5/25/21  Reason for Consult: Care decisions  Requesting Provider: Dr. Sammy Leon  Primary Care Physician: Su Figueroa MD     SUMMARY:   Johnny Hicks is a 80 y.o. 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 MRI confirmed diagnosis. She was admitted in April with CHF exacerbation, also found to have iron deficiency anemia. Eliquis was held after at discharge as she required a unit of blood, no endoscopy performed. Repeat TTE this admit now shows reduced EF of 20-25%. Current medical issues leading to Palliative Medicine involvement include: multiple comorbid conditions, care decisions. Ms. Castillo's speech has reportedly improved but she does have new dysphagia. MBS today 5/25 confirms mild oral dysphagia and mild pharyngeal issues. Dysphagia 1, nectar thick is advised. Prior to this admission- pt lives at home with daughter Yogi Crane staying with her overnight, niece Kasey present during the day. Dtfadi Stinson lives across the street. She ambulates with close supervision. Was eating well with preserved appetite. No reported cognitive changes but she has had an issue the past few years which family describes as a perceived abnormal skin sensation or burning sensation of her limbs. This causes her to swat at perceived bugs or something that is not present. This delusion has been progressive and worsening with family unable to appease her or reassure her. Family was under the impression it was a side effect of medication but no doctor has ever stated which.        Psychosocial Hx- Born in [place] but raised in [place]. . She had three children but son has passed away. Her two daughters Shantell and Nicole Moreno live close by to her and ensure she is cared for 24/7. PALLIATIVE DIAGNOSES:   1. Goals of care counseling/discussion  2. Acute CVA- small on left side  3. Mild oropharyngeal dysphagia as a result of CVA  4. HFrEF, EF 20-25%, mild valvular disease by TTE 5/23/21  5. Afib   6. Iron deficiency anemia- Eliquis now resumed after being held since last month  7. KAREN  8. Delusional infestation     PLAN:   1. Patient and daughter Shantell known to me from December admission. At that time her cognition was intact and we had a conversation about code status. She signed a DDNR form reflecting her decision. She was being discharged home on that day after an admission for diastolic HF excacerbation. 2. Met with her daughter Shantell and niece Claudine Arango at bedside, dtr Nicole Moreno by phone. Reviewed that her functional status has remained stable since December but she has struggled with worsening peripheral edema and doesn't like to elevate her legs or comply with daughters requests for her to rest.  We also spent a significant amount of time reviewing what sounds to be a worsening presentation of delusional infestation- she perceives bugs or an abnormal sensation on her skin and swats at them throughout the house on a daily basis. Family is persistently cleaning the house to appease her to no avail. 3. Together we reviewed the acute stroke with new dysphagia diagnosis, new diet recs as well as expected increase in debility. Daughter states she did well with therapy yesterday but the reality is her alertness and cognition may wax and wane and I am concerned she will begin a decline. Also reviewed new finding of systolic HF this admission. All co-morbidites combined place her at high risk for general decline and/or new acute events.     4. Dtr Nicole Moreno states patient enjoyed therapy yesterday and has done well with home health in the past so we made a plan for her to go home with home health therapy. Discussed that she does qualify for Hospice care and this can be considered at any point if she is not thriving at home. As she is going with [facility] , she could transition into their Hospice program with a physician order. 5. Family appreciative of information and aware of options. 6. RN to reach out to dietician to provide education and guidance to family at bedside. 7. Code status- a DDNR was completed in Dec, on file. 8. Psychosocial- pt has excellent family support, they are already providing 24/7 care. 9. Thank you for the opportunity to participate in the care of Adeel Villarreal. Please call if needed. 10. Communicated plan of care with: Palliative IDTAvinash 192 Team     GOALS OF CARE / TREATMENT PREFERENCES:     GOALS OF CARE:  Patient/Health Care Proxy Stated Goals: Rehabilitation    TREATMENT PREFERENCES:   Code Status: DNR    Advance Care Planning:  [x] The [facility] Interdisciplinary Team has updated the ACP Navigator with Health Care Decision Maker and Patient Capacity      Primary Decision Maker: Will Jack - Daughter - [phone]    Primary Decision Maker: Ricky Fatou - Daughter - [phone]     Advance Care Planning 5/22/2021   Patient's Healthcare Decision Maker is: -   Confirm Advance Directive Yes, not on file   Patient Would Like to Complete Advance Directive -   Does the patient have other document types Do Not Resuscitate       Medical Interventions: Limited additional interventions           Other:    As far as possible, the palliative care team has discussed with patient / health care proxy about goals of care / treatment preferences for patient.      HISTORY:     History obtained from:  Daughters, niece and chart review    CHIEF COMPLAINT: fatigue, weakness    HPI/SUBJECTIVE:    The patient is:   [] Verbal and participatory  [x] Non-participatory due to:  Somnolence at time of visit. Clinical Pain Assessment (nonverbal scale for severity on nonverbal patients):   Clinical Pain Assessment  Severity: 0     Activity (Movement): Lying quietly, normal position    Duration: for how long has pt been experiencing pain (e.g., 2 days, 1 month, years)  Frequency: how often pain is an issue (e.g., several times per day, once every few days, constant)     FUNCTIONAL ASSESSMENT:     Palliative Performance Scale (PPS):  PPS: 40 (30-40% post CVA)       PSYCHOSOCIAL/SPIRITUAL SCREENING:     Palliative IDT has assessed this patient for cultural preferences / practices and a referral made as appropriate to needs (Cultural Services, Patient Advocacy, Ethics, etc.)    Any spiritual / Uatsdin concerns:  [] Yes /  [x] No    Caregiver Burnout:  [] Yes /  [x] No /  [] No Caregiver Present      Anticipatory grief assessment:   [x] Normal  / [] Maladaptive       ESAS Anxiety: Anxiety: 0    ESAS Depression:          REVIEW OF SYSTEMS:     Positive and pertinent negative findings in ROS are noted above in HPI. The following systems were [x] reviewed / [] unable to be reviewed as noted in HPI  Other findings are noted below. Systems: constitutional, ears/nose/mouth/throat, respiratory, gastrointestinal, genitourinary, musculoskeletal, integumentary, neurologic, psychiatric, endocrine. Positive findings noted below. Modified ESAS Completed by: provider   Fatigue: 10 Drowsiness: 9     Pain: 0   Anxiety: 0 Nausea: 0   Anorexia: 0 Dyspnea: 0     Constipation: No     Stool Occurrence(s): 1        PHYSICAL EXAM:     From RN flowsheet:  Wt Readings from Last 3 Encounters:   05/25/21 114 lb 12.8 oz (52.1 kg)   04/05/21 117 lb (53.1 kg)   03/24/21 108 lb 9.6 oz (49.3 kg)     Blood pressure 134/78, pulse (!) 104, temperature 97.6 °F (36.4 °C), resp. rate 16, height 5' 2\" (1.575 m), weight 114 lb 12.8 oz (52.1 kg), SpO2 92 %.     Pain Scale 1: Numeric (0 - 10)  Pain Intensity 1: 0                 Last bowel movement, if known:     Frail elderly woman lying in bed upright, appears comfortable at rest  Eyes open brief- sclerae anicteric  Respirations unlabored on RA  BS are clear anteriorly  Abdomen, soft, ntnd, bs active  Ext thin w/o edema  She initially roused to stimulation on first visit, but later was somnolent and did not wake as I talked at length with her family right at bedside. No clear verbalization. Calm, not agitated or distressed. HISTORY:     Principal Problem:    Acute CVA (cerebrovascular accident) ([facility] [address] (2021)    Active Problems:    Diabetic retinopathy ([facility] [address] (7/15/2011)      Hypothyroidism (7/15/2011)      Rheumatoid arthritis ([facility] [address] ()      DM type 2 causing vascular disease (HCC) ()      Iron deficiency anemia (3/24/2021)      Chronic a-fib ([facility] [address] (3/24/2021)      Past Medical History:   Diagnosis Date    BPPV (benign paroxysmal positional vertigo)     HTN (hypertension)     Hx of completed stroke     Hyperlipidemia     Hypothyroid     PAF (paroxysmal atrial fibrillation) (HCC)     Rheumatoid arthritis ([facility] [address]     Scoliosis       Past Surgical History:   Procedure Laterality Date    HX  SECTION      HX ORTHOPAEDIC      bunions removed x 2      Family History   Problem Relation Age of Onset    Stroke Father       History reviewed, no pertinent family history.   Social History     Tobacco Use    Smoking status: Never Smoker    Smokeless tobacco: Never Used   Substance Use Topics    Alcohol use: No     Allergies   Allergen Reactions    Shellfish Containing Products Swelling      Current Facility-Administered Medications   Medication Dose Route Frequency    metoprolol succinate (TOPROL-XL) XL tablet 100 mg  100 mg Oral DAILY    pantoprazole (PROTONIX) tablet 40 mg  40 mg Oral ACB&D    apixaban (ELIQUIS) tablet 2.5 mg  2.5 mg Oral BID    piperacillin-tazobactam (ZOSYN) 3.375 g in 0.9% sodium chloride (MBP/ADV) 100 mL MBP  3.375 g IntraVENous Q12H    insulin lispro (HUMALOG) injection   SubCUTAneous AC&HS    glucose chewable tablet 16 g  4 Tablet Oral PRN    dextrose (D50W) injection syrg 12.5-25 g  12.5-25 g IntraVENous PRN    glucagon (GLUCAGEN) injection 1 mg  1 mg IntraMUSCular PRN    atorvastatin (LIPITOR) tablet 10 mg  10 mg Oral QHS    prochlorperazine (COMPAZINE) injection 10 mg  10 mg IntraVENous Q6H PRN    levothyroxine (SYNTHROID) tablet 100 mcg  100 mcg Oral ACB    ondansetron (ZOFRAN) injection 4 mg  4 mg IntraVENous Q6H PRN    sodium chloride (NS) flush 5-40 mL  5-40 mL IntraVENous Q8H    sodium chloride (NS) flush 5-40 mL  5-40 mL IntraVENous PRN    acetaminophen (TYLENOL) tablet 650 mg  650 mg Oral Q6H PRN    Or    acetaminophen (TYLENOL) suppository 650 mg  650 mg Rectal Q6H PRN    bisacodyL (DULCOLAX) suppository 10 mg  10 mg Rectal DAILY PRN          LAB AND IMAGING FINDINGS:     Lab Results   Component Value Date/Time    WBC 12.0 (H) 05/25/2021 05:30 AM    HGB 9.5 (L) 05/25/2021 05:30 AM    PLATELET 134 00/46/1630 05:30 AM     Lab Results   Component Value Date/Time    Sodium 137 05/25/2021 05:30 AM    Potassium 4.4 05/25/2021 05:30 AM    Chloride 107 05/25/2021 05:30 AM    CO2 21 05/25/2021 05:30 AM    BUN 49 (H) 05/25/2021 05:30 AM    Creatinine 1.58 (H) 05/25/2021 05:30 AM    Calcium 8.9 05/25/2021 05:30 AM    Magnesium 2.6 (H) 05/25/2021 05:30 AM    Phosphorus 2.5 (L) 05/23/2021 04:33 AM      Lab Results   Component Value Date/Time    Alk.  phosphatase 164 (H) 05/23/2021 04:33 AM    Protein, total 7.2 05/23/2021 04:33 AM    Albumin 3.0 (L) 05/23/2021 04:33 AM    Globulin 4.2 (H) 05/23/2021 04:33 AM     Lab Results   Component Value Date/Time    INR 1.2 (H) 05/22/2021 11:41 AM    Prothrombin time 12.6 (H) 05/22/2021 11:41 AM    aPTT 81.2 (H) 05/24/2021 08:40 AM      Lab Results   Component Value Date/Time    Iron 18 (L) 05/22/2021 11:41 AM    TIBC 480 (H) 05/22/2021 11:41 AM    Iron % saturation 4 (L) 05/22/2021 11:41 AM    Ferritin 19 (L) 05/22/2021 11:41 AM      No results found for: PH, PCO2, PO2  No components found for: [place]   Lab Results   Component Value Date/Time    CK 52 10/29/2013 10:45 AM    CK - MB 0.9 10/29/2013 10:45 AM                Total time: 70m  Counseling / coordination time, spent as noted above: 50m  > 50% counseling / coordination?: y    Prolonged service was provided for  []30 min   []75 min in face to face time in the presence of the patient, spent as noted above. Time Start:   Time End:   Note: this can only be billed with 06320 (initial) or 40008 (follow up). If multiple start / stop times, list each separately.

## 2021-05-25 NOTE — PROGRESS NOTES
Occupational therapy note:  Chart reviewed. Attempted to see patient for OT treatment. Patient currently off the floor for swallow study. Will follow up patient at later time for OT treatment. Rukhsana Yeboah MS OTR/L

## 2021-05-25 NOTE — ROUTINE PROCESS
SPEECH PATHOLOGY MODIFIED BARIUM SWALLOW STUDY Patient: Nanda Bennett [de-identified]80 y.o. female) Date: 2021 Primary Diagnosis: Acute CVA (cerebrovascular accident) ([facility] [address] [I63.9] Precautions: aspiration ASSESSMENT : 
Based on the objective data described below, the patient presents with intermittent AMS during MBS. Once she awakened, she had mild oral dysphagia and mild pharyngeal issues. Oral holding at times with cues to swallow. Mild residue coating after bolus cleared oral cavity. Pharyngeal phase: mild delay; unable to achieve sustained laryngeal elevation for multiple sips at once. Aspirated thins at this point. Additional aspiration risk due to oral stage dysphagia and AMS. Patient will benefit from skilled intervention to address the above impairments. Patients rehabilitation potential is considered to be Fair PLAN : 
Recommendations and Planned Interventions: 
Continue dysphagia 1, nectars. Frequency/Duration: Patient will be followed by speech-language pathology 3 times a week to address goals. Discharge Recommendations: Home Health and [facility] SUBJECTIVE:  
Patient initially lethargic, then awoke and accepted boluses better. . 
 
OBJECTIVE:  
 
Past Medical History:  
Diagnosis Date  BPPV (benign paroxysmal positional vertigo)  HTN (hypertension)  Hx of completed stroke  Hyperlipidemia  Hypothyroid  PAF (paroxysmal atrial fibrillation) ([facility] [address]  Rheumatoid arthritis ([facility] [address]  Scoliosis Past Surgical History:  
Procedure Laterality Date  HX  SECTION    
 HX ORTHOPAEDIC    
 bunions removed x 2 Prior Level of Function/Home Situation:  
Home Situation Home Environment: Private residence One/Two Story Residence: One story Living Alone: No 
Support Systems: Family member(s), Child(shyla) Patient Expects to be Discharged to[de-identified] Private residence Current DME Used/Available at Home: None Diet prior to admission: regular, thins Current Diet:  Dysphagia 1, nectars Radiologist: Linnette Cummings Film Views: Lateral 
Patient Position: upright in Hausted chair Trial 1: Trial 2:  
Consistency Presented: Puree; Thin liquid; Nectar thick liquid How Presented: Self-fed/presented; Successive swallows;Straw;Spoon ORAL PHASE:     
Bolus Acceptance:  (initially she was sleepy and could not accept boluses from straw or cup with SLP feeding. finally accepted spoon and then woke up and accepted bolus other ways) Bolus Formation/Control: Impaired (oral holding-cued to swallow ; improved wiht trials): Anterior;Posterior  :    
Propulsion: Delayed (# of seconds) Oral Residue:  (mild coating throughout oral cavity) PHARYNGEAL PHASE: l    
Initiation of Swallow: Triggered at vallecula Timing: Pooling 1-5 sec Penetration: During swallow; To cords (wiht thins 1/5x) Aspiration/Timing: During (with thins in mild-moderate amounts during the swallow due to inability to keep airway closed with multiple sips from straw) Pharyngeal Clearance: No residue Trial 3: Trial 4:  
     
     
     
     
 :    :    
     
     
    
     
     
     
     
     
     
     
     
 
Decreased Tongue Base Retraction?: No 
Laryngeal Elevation: Reduced excursion with laryngeal vestibule gap (mild- noted difficulty with sustained laryngeal elevation) Pharyngeal Dysfunction:  (noted some reflux of material to upper esophagus during study) NOMS:  
The NOMS functional outcome measure was used to quantify this patient's level of swallowing impairment. Based on the NOMS, the patient was determined to be at level 4 for swallow function NOMS Swallowing Levels: 
Level 1 (CN): NPO Level 2 (CM): NPO but takes consistency in therapy Level 3 (CL): Takes less than 50% of nutrition p.o. and continues with nonoral feedings; and/or safe with mod cues; and/or max diet restriction Level 4 (CK): Safe swallow but needs mod cues; and/or mod diet restriction; and/or still requires some nonoral feeding/supplements Level 5 (CJ): Safe swallow with min diet restriction; and/or needs min cues Level 6 (CI): Independent with p.o.; rare cues; usually self cues; may need to avoid some foods or needs extra time Level 7 (CH): Independent for all p.o. MAYRA. (2003). National Outcomes Measurement System (NOMS): Adult Speech-Language Pathology User's Guide. COMMUNICATION/EDUCATION:  
Patient was educated regarding Her deficit(s) of DYSPHAGIA  as this relates to Her diagnosis of CVA. She demonstrated Guarded understanding as evidenced by AMS. The patients plan of care including findings from MBS, recommendations, planned interventions, and recommended diet changes were discussed with: NONE. Patient is unable to participate in goal setting and plan of care. Thank you for this referral. 
Elaine Agosto SLP Time Calculation: 15 mins

## 2021-05-25 NOTE — PROGRESS NOTES
3:47 PM  CM received a no response from [facility], they are out of beds. Will send to [facility] to check to see if they have availability of beds. Lisa [place]    3:34 PM  ALIE sent referral to [facility] for the hospital bed via [employer], awaiting response. Lisa [place]    3:11 PM    ALIE discussed pt with palliative MD who said family is interested in a hospital bed and BSC for dc. ALIE spoke to patients daughter, Mayo Casas about the hospital bed and BSC. She has been working with the patients PCP and [facility] for the [facility] since January 2021 and will call them to follow up since she had heard it was approved by her insurance. She is interested in a hospital bed if insurance will cover it. ALIE spoke to patients attending MD and she is in agreement that it would be beneficial. CM will send the referral to [facility] per agreement from pt's daughter. Adrian    Transition of Care Plan - RUR 34% (high risk):  1. CM following  2. Patient to discharge home when stable with family assistance and home health provided by [facility]. 3. Outpatient follow-up, PCP virtual appointment scheduled on 6/1 and added to AVS  4. PT/OT following  5. Palliative following  6.  Family will provide transportation at discharge

## 2021-05-25 NOTE — PROGRESS NOTES
Accessed d/t MEWS 3. Per Primary RN, Patient is doing well. No need to assess at this time. Will continue to follow.

## 2021-05-25 NOTE — PROGRESS NOTES
[facility] Dosing Services: IV to PO Conversion    The pharmacist has determined that this patient meets P & T approved criteria for conversion from IV to oral therapy for the following medication:Pantoprazole 40 mg IV q24hr    The pharmacist has written the following order for the patient: Pantoprazole 40 mg po q24hr  The pharmacist will continue to monitor the patient's status and advise the physician if conversion back to IV therapy is recommended.     Signed Rogerio George Contact information:  [phone]

## 2021-05-25 NOTE — CONSULTS
NEPHROLOGY CONSULT NOTE           Patient: Mateus Hoang MRN: [id number]  PCP: Sheryl Montiel MD   :     10/12/1930  Age:   80 y.o. Sex:  female      Referring physician: Herb Gutierrez MD      REASON FOR CONSULT:  KAREN    HPI:  Mateus Hoang is a 80 y.o. female with a history of hypertension, diabetes, diastolic congestive heart failure, chronic atrial fibrillation who admitted to the hospitalist service on  with acute left-sided ischemic CVA. At the time of admission serum creatinine was 1.1. During the admission she was also found rapid A. fib with RVR. Patient has extensive stroke work-up including CTA head and neck with IV contrast.  2D echo shows EF was 20 to 25%. Serum creatinine was on  0.86. On  serum creatinine up trended to 1.45 and today is 1.56. Patient urine output is diminished. Nephrology is contacted for the management of acute kidney injury. Patient is very lethargic and confused. Family at bedside.   Review of Systems  Unable to obtain    Past Medical History:   Diagnosis Date    BPPV (benign paroxysmal positional vertigo)     HTN (hypertension)     Hx of completed stroke     Hyperlipidemia     Hypothyroid     PAF (paroxysmal atrial fibrillation) (HCC)     Rheumatoid arthritis ([facility] [address]     Scoliosis        Past Surgical History:   Procedure Laterality Date    HX  SECTION      HX ORTHOPAEDIC      bunions removed x 2       Allergies   Allergen Reactions    Shellfish Containing Products Swelling       Current Facility-Administered Medications   Medication Dose Route Frequency    metoprolol tartrate (LOPRESSOR) tablet 50 mg  50 mg Oral Q6H    apixaban (ELIQUIS) tablet 2.5 mg  2.5 mg Oral BID    piperacillin-tazobactam (ZOSYN) 3.375 g in 0.9% sodium chloride (MBP/ADV) 100 mL MBP  3.375 g IntraVENous Q12H    insulin lispro (HUMALOG) injection   SubCUTAneous AC&HS    glucose chewable tablet 16 g  4 Tablet Oral PRN    dextrose (D50W) injection syrg 12.5-25 g  12.5-25 g IntraVENous PRN    glucagon (GLUCAGEN) injection 1 mg  1 mg IntraMUSCular PRN    atorvastatin (LIPITOR) tablet 10 mg  10 mg Oral QHS    prochlorperazine (COMPAZINE) injection 10 mg  10 mg IntraVENous Q6H PRN    levothyroxine (SYNTHROID) tablet 100 mcg  100 mcg Oral ACB    [Held by provider] losartan (COZAAR) tablet 50 mg  50 mg Oral DAILY    pantoprazole (PROTONIX) 40 mg in 0.9% sodium chloride 10 mL injection  40 mg IntraVENous ACB    ondansetron (ZOFRAN) injection 4 mg  4 mg IntraVENous Q6H PRN    sodium chloride (NS) flush 5-40 mL  5-40 mL IntraVENous Q8H    sodium chloride (NS) flush 5-40 mL  5-40 mL IntraVENous PRN    0.9% sodium chloride infusion 25 mL  25 mL IntraVENous PRN    acetaminophen (TYLENOL) tablet 650 mg  650 mg Oral Q6H PRN    Or    acetaminophen (TYLENOL) suppository 650 mg  650 mg Rectal Q6H PRN    bisacodyL (DULCOLAX) suppository 10 mg  10 mg Rectal DAILY PRN       Family History   Problem Relation Age of Onset    Stroke Father        Social History     Socioeconomic History    Marital status:      Spouse name: Not on file    Number of children: Not on file    Years of education: Not on file    Highest education level: Not on file   Occupational History    Not on file   Tobacco Use    Smoking status: Never Smoker    Smokeless tobacco: Never Used   Substance and Sexual Activity    Alcohol use: No    Drug use: No    Sexual activity: Never   Other Topics Concern    Not on file   Social History Narrative    Not on file     Social Determinants of Health     Financial Resource Strain:     Difficulty of Paying Living Expenses:    Food Insecurity:     Worried About Running Out of Food in the Last Year:     Ran Out of Food in the Last Year:    Transportation Needs:     Lack of Transportation (Medical):      Lack of Transportation (Non-Medical):    Physical Activity:     Days of Exercise per Week:     Minutes of Exercise per Session:    Stress:     Feeling of Stress :    Social Connections:     Frequency of Communication with Friends and Family:     Frequency of Social Gatherings with Friends and Family:     Attends [employer] Services:     Active Member of Clubs or Organizations:     Attends Club or Organization Meetings:     Marital Status:    Intimate Partner Violence:     Fear of Current or Ex-Partner:     Emotionally Abused:     Physically Abused:     Sexually Abused:        Vitals:    05/25/21 0352 05/25/21 0511 05/25/21 0700 05/25/21 0810   BP: 130/82 132/80  (!) 137/92   Pulse: (!) 121 (!) 123 (!) 108 94   Resp: 18   16   Temp: 97.5 °F (36.4 °C)   98.1 °F (36.7 °C)   SpO2: 93%   90%   Weight: 52.1 kg (114 lb 12.8 oz)      Height:             Intake/Output Summary (Last 24 hours) at 5/25/2021 1002  Last data filed at 5/25/2021 0356  Gross per 24 hour   Intake 440 ml   Output 200 ml   Net 240 ml       PHYSICAL EXAM:  GENERAL : Chronically ill looking female lying down in bed with no acute distress  HEENT: AT NC PEERLA   NECK: Supple no JVP  CVS: S1 S2 irregularly irregular, no murmur or gallops heard  RS: Diminished breath sounds on both bases  ABDOMEN: soft NT ND positive BS  EXTREMITY: No edema clubbing or cyanosis, pedal pulse +  NEUROLOGY: Confused disoriented        LABS/STUDIES:  BMP:   Lab Results   Component Value Date/Time     05/25/2021 05:30 AM    K 4.4 05/25/2021 05:30 AM     05/25/2021 05:30 AM    CO2 21 05/25/2021 05:30 AM    AGAP 9 05/25/2021 05:30 AM    GLU 92 05/25/2021 05:30 AM    BUN 49 (H) 05/25/2021 05:30 AM    CREA 1.58 (H) 05/25/2021 05:30 AM    GFRAA 37 (L) 05/25/2021 05:30 AM    GFRNA 31 (L) 05/25/2021 05:30 AM        CBC:    Lab Results   Component Value Date/Time    WBC 12.0 (H) 05/25/2021 05:30 AM    HGB 9.5 (L) 05/25/2021 05:30 AM    HCT 32.6 (L) 05/25/2021 05:30 AM  05/25/2021 05:30 AM       Lab Results   Component Value Date/Time    Microalb/Creat ratio (ug/mg creat.) 5.3 04/09/2014 10:12 AM       Lab Results   Component Value Date/Time    Color YELLOW/STRAW 03/22/2021 01:15 PM    Appearance CLEAR 03/22/2021 01:15 PM    Specific gravity 1.006 03/22/2021 01:15 PM    pH (UA) 7.5 03/22/2021 01:15 PM    Protein Negative 03/22/2021 01:15 PM    Glucose Negative 03/22/2021 01:15 PM    Ketone Negative 03/22/2021 01:15 PM    Bilirubin Negative 03/22/2021 01:15 PM    Urobilinogen 0.2 03/22/2021 01:15 PM    Nitrites Negative 03/22/2021 01:15 PM    Leukocyte Esterase Negative 03/22/2021 01:15 PM    Epithelial cells FEW 03/22/2021 01:15 PM    Bacteria Negative 03/22/2021 01:15 PM    WBC 0-4 03/22/2021 01:15 PM    RBC 10-20 03/22/2021 01:15 PM         ASSESSMENT/PLAN:    -Acute kidney injury: Multifactorial; due to contrast-induced nephropathy with recent IV contrast exposure, component of cardiorenal syndrome with a EF of 20 to 25%  -Rapid A. fib with RVR  -Congestive heart failure  -Left-sided ischemic CVA  -Hypertension    Plan:  -Check urine electrolytes to calculate FENa and FE urea  -I will avoid IV fluid hydration in the setting of low EF  -Her urine output is diminished  -Overall prognosis is guarded  -I have discussed her guarded prognosis with daughter at bedside.  -I will manage conservatively. She is not a candidate for any type of RRT with her age and multiple co morbidities  -Palliative care input appreciated  -Renally dose all medication with current GFR      Lorena Enrique MD  5/25/2021    [facility]:  [email or web address]. [email or web address] Manager  [facility] [place] office:  [address] [place] [address], [address] [place] [address] [address]  [place], [address] [facility]  Phone: [phone]  Fax :     [phone]     [facility] office:  [address] [facility]  Phone - [phone]  Fax - [phone]

## 2021-05-25 NOTE — PROGRESS NOTES
[facility] V[facility] [address]  [address] [place] [address]  [phone]      Medical Progress Note      NAME: Carole Zuluaga   :  10/12/1930  MRM:  [id number]    Date/Time: 2021  3:35 PM         Subjective:     Chief Complaint:  Pt aphasic     Pt seen and examined. Still aphasic. MBS shows aspiration of thin liquids    ROS:  (bold if positive, if negative)             Objective:       Vitals:          Last 24hrs VS reviewed since prior progress note. Most recent are:    Visit Vitals  /80 (BP 1 Location: Left upper arm, BP Patient Position: At rest)   Pulse 95   Temp 97.7 °F (36.5 °C)   Resp 16   Ht 5' 2\" (1.575 m)   Wt 52.1 kg (114 lb 12.8 oz)   SpO2 93%   BMI 21.00 kg/m²     SpO2 Readings from Last 6 Encounters:   21 93%   21 99%   21 94%   21 94%   21 99%   20 98%            Intake/Output Summary (Last 24 hours) at 2021 1417  Last data filed at 2021 1126  Gross per 24 hour   Intake 250 ml   Output 400 ml   Net -150 ml          Exam:     Physical Exam:    Gen: Thin elderly female. R sided facial droop. HEENT:  Pink conjunctivae, PERRL, hearing intact to voice, moist mucous membranes  Neck:  Supple, without masses, thyroid non-tender  Resp: Diffuse crackles. Card:  No murmurs, normal S1, S2 without thrills, bruits or peripheral edema  Abd:  Soft, non-tender, non-distended, normoactive bowel sounds are present  Musc:  No cyanosis or clubbing  Skin:  No rashes or ulcers, skin turgor is good  Neuro: See above. Aphasic.  Past pointing on exam. RUE weakness 4/   Psych: AAOx3    Medications Reviewed: (see below)    Lab Data Reviewed: (see below)    ______________________________________________________________________    Medications:     Current Facility-Administered Medications   Medication Dose Route Frequency    metoprolol succinate (TOPROL-XL) XL tablet 100 mg  100 mg Oral DAILY    [START ON 2021] pantoprazole (PROTONIX) tablet 40 mg  40 mg Oral ACB    apixaban (ELIQUIS) tablet 2.5 mg  2.5 mg Oral BID    piperacillin-tazobactam (ZOSYN) 3.375 g in 0.9% sodium chloride (MBP/ADV) 100 mL MBP  3.375 g IntraVENous Q12H    insulin lispro (HUMALOG) injection   SubCUTAneous AC&HS    glucose chewable tablet 16 g  4 Tablet Oral PRN    dextrose (D50W) injection syrg 12.5-25 g  12.5-25 g IntraVENous PRN    glucagon (GLUCAGEN) injection 1 mg  1 mg IntraMUSCular PRN    atorvastatin (LIPITOR) tablet 10 mg  10 mg Oral QHS    prochlorperazine (COMPAZINE) injection 10 mg  10 mg IntraVENous Q6H PRN    levothyroxine (SYNTHROID) tablet 100 mcg  100 mcg Oral ACB    ondansetron (ZOFRAN) injection 4 mg  4 mg IntraVENous Q6H PRN    sodium chloride (NS) flush 5-40 mL  5-40 mL IntraVENous Q8H    sodium chloride (NS) flush 5-40 mL  5-40 mL IntraVENous PRN    acetaminophen (TYLENOL) tablet 650 mg  650 mg Oral Q6H PRN    Or    acetaminophen (TYLENOL) suppository 650 mg  650 mg Rectal Q6H PRN    bisacodyL (DULCOLAX) suppository 10 mg  10 mg Rectal DAILY PRN            Lab Review:     Recent Labs     05/25/21  0530 05/24/21  0633 05/23/21  0433   WBC 12.0* 10.4 6.4   HGB 9.5* 10.0* 8.8*   HCT 32.6* 35.1 30.1*    201 182     Recent Labs     05/25/21  0530 05/24/21  0633 05/23/21  0433    138 140   K 4.4 4.5 4.4    107 107   CO2 21 18* 24   GLU 92 95 110*   BUN 49* 35* 27*   CREA 1.58* 1.45* 0.86   CA 8.9 9.3 9.1   MG 2.6* 2.7* 2.4   PHOS  --   --  2.5*   ALB  --   --  3.0*   ALT  --   --  27     No components found for: [place]    MRI =>   Minimal acute infarction posterior left insular cortex and posterior left  frontal lobe. No superimposed hemorrhage, midline shift or mass effect.     Severe chronic microvascular ischemic change and atrophy.     Moderate left maxillary sinus disease.        Assessment / Plan:    Acute CVA (cerebrovascular accident) ([facility] [address] (5/22/2021) - s/p tPA. MRI as above.  Repeat head CT WITHOUT e/o bleeding -initially on heparin gtt as was pending speech eval; now on eliquis   -on statin  -PT/OT/speech eval => family would like her home   -appreciate palliative assistance       Hypothyroidism (7/15/2011) - TSH elevated. ?compliance with levothyroxine. If compliant then will need a dose increase  -free T4 WNL   -continue levothyroxine        Rheumatoid arthritis (HCC) ()  -not on DMARDS      DM type 2 causing vascular disease (HCC) () - A1c 6.9. With recent CVA ideally needs to be optimized a bit more but with age, concern for hypoglycemia as well   -can consider low dose metformin at discharge if would tolerate       Iron deficiency anemia (3/24/2021)   -stool blood pending       Acute on Chronic a-fib ([facility] [address] (3/24/2021)  -s/p amiodarone and heparin gtt  -transitioned to metoprolol and eliquis     Acute systolic CHF - decompensated. EF now 20-25% on recent TTE from 5/23  -holding lasix due to KAREN   -on metoprolol   -will need ARB if Cr stabilizes     KAREN - ?SHAYLA ? cardiorenal   -appreciate nephrology eval  -strict I's and O's   -prognosis guarded considering age and co-morbidities    Hemoccult (+) stools - since hemoglobin has been mostly stable in house and considering risk of recurrent CVA in a-fib, recent CVA risk OF NO anticoagulation is extremely high   -continue eliquis for now   -increase PPI to BID   -watch hemoglobin closely       Diabetic retinopathy ([facility] [address] (7/15/2011)  -outpt follow-up    Dispo   -family would like her home with home PT/OT/speech. Considering a-fib, CHF, CVA and age, certainly would be appropriate for hospice. Palliative assistance appreciated. At this time pt not amenable to hospice    Patient requires a hospital bed for the following reason:     Pt requires the head of the bed to be elevated more than 30 degrees most of the time due to risk of aspiration. Pillows and wedges have been considered.  Also requires frequent changes in body position     Total time spent with patient: 35 Minutes Care Plan discussed with: Patient and Nursing Staff    Discussed:  Care Plan    Prophylaxis:  Eliquis     Disposition:  TBD           ___________________________________________________    Attending Physician: Chauncey Palomino MD

## 2021-05-25 NOTE — PROGRESS NOTES
1900 Shift change:     Bedside and Verbal shift change report given to [address] [place] [address] (oncoming nurse) by Stef Kahn RN (offgoing nurse). Report included the following information SBAR, Kardex, Intake/Output, MAR, and Recent Results. Shift Summary: This patient was assisted with Intentional Toileting every 2 hours during this shift. Documentation of ambulation and output reflected on Flowsheet. 0730 Shift change:     Bedside and Verbal shift change report given to Shira (oncoming nurse) by [address] [place] [address] (offgoing nurse). Report included the following information SBAR, Kardex, Intake/Output, MAR, and Recent Results.

## 2021-05-25 NOTE — PROGRESS NOTES
Physical Therapy  Chart reviewed. Patient currently off the floor for MBS. We will re-attempt later as able. Thank you.   Danis Colbert PT,DPT,NCS

## 2021-05-25 NOTE — PROGRESS NOTES
0700 Bedside and Verbal shift change report given to [address] (oncoming nurse) by Celso Brown (offgoing nurse). Report included the following information SBAR, Kardex, ED Summary, Intake/Output, MAR, Recent Results and Cardiac Rhythm A fib. This patient was assisted with Intentional Toileting every 2 hours during this shift. Documentation of ambulation and output reflected on Flowsheet. 1930 Bedside and Verbal shift change report given to [address] [place] [address] (oncoming nurse) by [facility] RN (offgoing nurse). Report included the following information SBAR, Kardex, Intake/Output, MAR, Recent Results and Cardiac Rhythm A fib.

## 2021-05-25 NOTE — PROGRESS NOTES
Palliative Medicine      Code Status: DNR    Advance Care Planning:  Advance Care Planning 5/25/2021   Patient's Healthcare Decision Maker is: Legal Next of Kin   Confirm Advance Directive None   Patient Would Like to Complete Advance Directive Unable   Does the patient have other document types Do Not Resuscitate       Patient / Family Encounter Documentation    Participants (names):  Pt, dtr Dahiana Elizabeth by phone, dtr vira Stinson, Palliative Medicine (Dr. Layton Collet, Frances Marshall)      Narrative: Palliative team visited with pt prior to family's arrival; pt is known to team from December 2020 hospitalization. Pt was lethargic, weak, able to engage only minimally, speech was difficult to understand following acute CVA. Returned to room shortly after; dtr Shantell and vira Estrada were at bedside, dtr Dahiana Elizabeth joined conversation by phone. Pt lives with dtr Dahiana Elizabeth, dtr Shantell and vira Estrada assist with pt's care while Dahiana Elizabeth is at work. Family reports pt is seen weekly by home health nurse, was ambulatory prior to hospitalization, enjoyed sitting outside, was eating well. Family described pt as being very strong willed, enjoys spirited debates though family has learned to agree with and/or redirect rather than argue with pt. Family was updated re: current medical issues, expressed hope that pt will gain strength back with assistance of home health, including SLP support. Dtr Dahiana Elizabeth was at bedside this morning, reports pt was more alert, at all of her breakfast.  Family is aware that, should pt not thrive as hoped in the days/weeks ahead, hospice services can be arranged to support pt/family in home setting. Family was made aware that [facility] offers both HH and hospice care. Pt does not have AMD in place, is currently unable to complete; dtrs Jyothiia and Dahiana Elizabeth are legal NOK/surrogate decision makers.   Pt signed DDNR during December 2020 hospitalization; copy has been scanned into medical record. Psychosocial Issues Identified/ Resilience Factors:  Dtfadi Stinson shared on two separate occasions during this visit that pt has experienced a burning/itching feeling on her arms for years but has become increasingly fixated on this sensation, will fan self with cardboard for long periods of time, has family vacuuming/changing sheets/steam cleaning carpets regularly to assuage pt's belief that things are coming off her skin. Dtr was educated re: possibility that pt is experiencing a fixed delusion rather than a medication side effect, as dtr believed. No spiritual concerns identified at this time. Family is very devoted. Goals of Care / Plan: Family is not interested in SNF, prefer that pt spend whatever time she has left at home with family. Home health services have been arranged through [facility]; family is aware that pt can transition to hospice at any time if condition does not improve as hoped. Pt has previously signed DDNR in place. Palliative Medicine will continue to follow for support. Discussed with Care Manager. Thank you for including Palliative Medicine in the care of Ms. Castillo.     Alonzo  JAE Lyons, [facility]-  [phone])

## 2021-05-26 ENCOUNTER — APPOINTMENT (OUTPATIENT)
Dept: GENERAL RADIOLOGY | Age: 86
DRG: 061 | End: 2021-05-26
Attending: INTERNAL MEDICINE
Payer: MEDICARE

## 2021-05-26 LAB
ANION GAP SERPL CALC-SCNC: 4 MMOL/L (ref 5–15)
BASOPHILS # BLD: 0.1 K/UL (ref 0–0.1)
BASOPHILS NFR BLD: 1 % (ref 0–1)
BNP SERPL-MCNC: ABNORMAL PG/ML
BUN SERPL-MCNC: 48 MG/DL (ref 6–20)
BUN/CREAT SERPL: 36 (ref 12–20)
CALCIUM SERPL-MCNC: 8.9 MG/DL (ref 8.5–10.1)
CHLORIDE SERPL-SCNC: 110 MMOL/L (ref 97–108)
CO2 SERPL-SCNC: 26 MMOL/L (ref 21–32)
CREAT SERPL-MCNC: 1.32 MG/DL (ref 0.55–1.02)
DIFFERENTIAL METHOD BLD: ABNORMAL
EOSINOPHIL # BLD: 0.1 K/UL (ref 0–0.4)
EOSINOPHIL NFR BLD: 1 % (ref 0–7)
ERYTHROCYTE [DISTWIDTH] IN BLOOD BY AUTOMATED COUNT: 19.4 % (ref 11.5–14.5)
GLUCOSE BLD STRIP.AUTO-MCNC: 128 MG/DL (ref 65–117)
GLUCOSE BLD STRIP.AUTO-MCNC: 131 MG/DL (ref 65–117)
GLUCOSE BLD STRIP.AUTO-MCNC: 145 MG/DL (ref 65–117)
GLUCOSE BLD STRIP.AUTO-MCNC: 221 MG/DL (ref 65–117)
GLUCOSE SERPL-MCNC: 105 MG/DL (ref 65–100)
HCT VFR BLD AUTO: 35.5 % (ref 35–47)
HGB BLD-MCNC: 9.9 G/DL (ref 11.5–16)
IMM GRANULOCYTES # BLD AUTO: 0.1 K/UL (ref 0–0.04)
IMM GRANULOCYTES NFR BLD AUTO: 1 % (ref 0–0.5)
LYMPHOCYTES # BLD: 1.7 K/UL (ref 0.8–3.5)
LYMPHOCYTES NFR BLD: 14 % (ref 12–49)
MCH RBC QN AUTO: 20 PG (ref 26–34)
MCHC RBC AUTO-ENTMCNC: 27.9 G/DL (ref 30–36.5)
MCV RBC AUTO: 71.6 FL (ref 80–99)
MONOCYTES # BLD: 0.8 K/UL (ref 0–1)
MONOCYTES NFR BLD: 7 % (ref 5–13)
NEUTS SEG # BLD: 9.1 K/UL (ref 1.8–8)
NEUTS SEG NFR BLD: 76 % (ref 32–75)
NRBC # BLD: 0.03 K/UL (ref 0–0.01)
NRBC BLD-RTO: 0.3 PER 100 WBC
PLATELET # BLD AUTO: 176 K/UL (ref 150–400)
POTASSIUM SERPL-SCNC: 4.1 MMOL/L (ref 3.5–5.1)
RBC # BLD AUTO: 4.96 M/UL (ref 3.8–5.2)
RBC MORPH BLD: ABNORMAL
SERVICE CMNT-IMP: ABNORMAL
SODIUM SERPL-SCNC: 140 MMOL/L (ref 136–145)
WBC # BLD AUTO: 11.9 K/UL (ref 3.6–11)

## 2021-05-26 PROCEDURE — 65660000000 HC RM CCU STEPDOWN

## 2021-05-26 PROCEDURE — 74011250636 HC RX REV CODE- 250/636: Performed by: INTERNAL MEDICINE

## 2021-05-26 PROCEDURE — 74011000258 HC RX REV CODE- 258: Performed by: INTERNAL MEDICINE

## 2021-05-26 PROCEDURE — 85025 COMPLETE CBC W/AUTO DIFF WBC: CPT

## 2021-05-26 PROCEDURE — 92526 ORAL FUNCTION THERAPY: CPT

## 2021-05-26 PROCEDURE — 82962 GLUCOSE BLOOD TEST: CPT

## 2021-05-26 PROCEDURE — 74011000250 HC RX REV CODE- 250: Performed by: INTERNAL MEDICINE

## 2021-05-26 PROCEDURE — 83880 ASSAY OF NATRIURETIC PEPTIDE: CPT

## 2021-05-26 PROCEDURE — 80048 BASIC METABOLIC PNL TOTAL CA: CPT

## 2021-05-26 PROCEDURE — 36415 COLL VENOUS BLD VENIPUNCTURE: CPT

## 2021-05-26 PROCEDURE — 74011250637 HC RX REV CODE- 250/637: Performed by: INTERNAL MEDICINE

## 2021-05-26 PROCEDURE — 74011636637 HC RX REV CODE- 636/637: Performed by: INTERNAL MEDICINE

## 2021-05-26 PROCEDURE — 74011250637 HC RX REV CODE- 250/637: Performed by: PSYCHIATRY & NEUROLOGY

## 2021-05-26 PROCEDURE — 74011250637 HC RX REV CODE- 250/637: Performed by: NURSE PRACTITIONER

## 2021-05-26 PROCEDURE — 77010033678 HC OXYGEN DAILY

## 2021-05-26 PROCEDURE — 71045 X-RAY EXAM CHEST 1 VIEW: CPT

## 2021-05-26 RX ORDER — METRONIDAZOLE 500 MG/100ML
500 INJECTION, SOLUTION INTRAVENOUS EVERY 12 HOURS
Status: DISCONTINUED | OUTPATIENT
Start: 2021-05-26 | End: 2021-05-28

## 2021-05-26 RX ORDER — FUROSEMIDE 40 MG/1
40 TABLET ORAL ONCE
Status: COMPLETED | OUTPATIENT
Start: 2021-05-26 | End: 2021-05-26

## 2021-05-26 RX ORDER — METOPROLOL SUCCINATE 50 MG/1
150 TABLET, EXTENDED RELEASE ORAL DAILY
Status: DISCONTINUED | OUTPATIENT
Start: 2021-05-26 | End: 2021-05-28 | Stop reason: HOSPADM

## 2021-05-26 RX ADMIN — PANTOPRAZOLE SODIUM 40 MG: 40 TABLET, DELAYED RELEASE ORAL at 06:19

## 2021-05-26 RX ADMIN — PANTOPRAZOLE SODIUM 40 MG: 40 TABLET, DELAYED RELEASE ORAL at 16:47

## 2021-05-26 RX ADMIN — Medication 10 ML: at 06:18

## 2021-05-26 RX ADMIN — Medication 10 ML: at 13:56

## 2021-05-26 RX ADMIN — APIXABAN 2.5 MG: 2.5 TABLET, FILM COATED ORAL at 08:41

## 2021-05-26 RX ADMIN — FUROSEMIDE 40 MG: 40 TABLET ORAL at 17:41

## 2021-05-26 RX ADMIN — INSULIN LISPRO 4 UNITS: 100 INJECTION, SOLUTION INTRAVENOUS; SUBCUTANEOUS at 21:25

## 2021-05-26 RX ADMIN — Medication 10 ML: at 21:25

## 2021-05-26 RX ADMIN — APIXABAN 2.5 MG: 2.5 TABLET, FILM COATED ORAL at 17:41

## 2021-05-26 RX ADMIN — ATORVASTATIN CALCIUM 10 MG: 10 TABLET, FILM COATED ORAL at 21:25

## 2021-05-26 RX ADMIN — CEFEPIME HYDROCHLORIDE 2 G: 2 INJECTION, POWDER, FOR SOLUTION INTRAVENOUS at 16:47

## 2021-05-26 RX ADMIN — LEVOTHYROXINE SODIUM 100 MCG: 0.1 TABLET ORAL at 06:19

## 2021-05-26 RX ADMIN — PIPERACILLIN AND TAZOBACTAM 3.38 G: 3; .375 INJECTION, POWDER, LYOPHILIZED, FOR SOLUTION INTRAVENOUS at 08:37

## 2021-05-26 RX ADMIN — METOPROLOL SUCCINATE 150 MG: 50 TABLET, EXTENDED RELEASE ORAL at 08:41

## 2021-05-26 RX ADMIN — METRONIDAZOLE 500 MG: 500 INJECTION, SOLUTION INTRAVENOUS at 16:47

## 2021-05-26 RX ADMIN — VANCOMYCIN HYDROCHLORIDE 750 MG: 750 INJECTION, POWDER, LYOPHILIZED, FOR SOLUTION INTRAVENOUS at 17:41

## 2021-05-26 NOTE — PROGRESS NOTES
Telemetry reviewed - AF v rate 90's occ 100's  -150's  Increase toprol 150 mg  Okay for d/c CV wise  Will follow up w Dr Kassy Cooper

## 2021-05-26 NOTE — PROGRESS NOTES
Problem: Dysphagia (Adult)  Goal: *Acute Goals and Plan of Care (Insert Text)  Description: Swallowing goals initiated 5-24-21:  1) tolerate dysphagia 1, nectars without s/s aspiration by 5-27-21  2) MBS to determine severity of dysphagia by 5-28-21  Outcome: Progressing Towards Goal   SPEECH LANGUAGE PATHOLOGY DYSPHAGIA TREATMENT  Patient: Jason Pate (86 y.o. female)  Date: 5/26/2021  Diagnosis: Acute CVA (cerebrovascular accident) ([facility] [address] [I63.9] Acute CVA (cerebrovascular accident) ([facility] [address]       Precautions: aspiration      ASSESSMENT:  Patient tolerating dysphagia 1, nectars with occasional cough. Educated family about MBS results. PLAN:  Recommendations and Planned Interventions:  Continue dysphagia 1, nectars  Patient continues to benefit from skilled intervention to address the above impairments. Continue treatment per established plan of care. Discharge Recommendations:  Home Health     SUBJECTIVE:   Patient's daughter and caregiver waiting for patient to get taken to bathroom. OBJECTIVE:   Cognitive and Communication Status:  Neurologic State:  (alert, restless.)  Orientation Level: Unable to verbalize  Cognition: Impulsive, Memory loss  Perception: Appears intact  Perseveration: No perseveration noted  Safety/Judgement: Awareness of environment  Dysphagia Treatment:  Oral Assessment:     P.O. Trials:  Patient Position: upright in bed  Vocal quality prior to P.O.: No impairment (slightly less dysarthria)  Consistency Presented: Nectar thick liquid      ORAL PHASE:   ABLE TO suck from straw. PHARYNGEAL PHASE:   Fairly strong. Minimal delay. Weak attempt to cough. Explained MBS results to daughter and caregiver; weak and delayed swallow led to silent aspiration of thins. They voiced understanding.                                                Exercises:  Laryngeal Exercises: Pain:  Pain Scale 1: Numeric (0 - 10)  Pain Intensity 1: 0       After treatment:   Patient left in no apparent distress in bed, Nursing notified, and Caregiver / family present    COMMUNICATION/EDUCATION:   Patient was educated regarding her deficit(s) of dysphagia, dysarthria as this relates to her diagnosis of CVA. She demonstrated Guarded understanding as evidenced by poor insight. Daughter and caregiver understood. .    The patient's plan of care including recommendations, planned interventions, and recommended diet changes were discussed with: Case management.      ANDRE Rico  Time Calculation: 15 mins

## 2021-05-26 NOTE — PROGRESS NOTES
[facility] Dosing Services: Antimicrobial Stewardship Progress Note    Consult for antibiotic dosing of vancomycin, cefepime, and metronidazole by Dr. Zoe Domínguez  Pharmacist reviewed antibiotic appropriateness for 80year old , female  for indication of HAP  Day of Therapy: 1    Plan:  Vancomycin therapy:  Start vancomycin therapy, with loading dose of: no loading dose due to CrCl less than 30 mL/min, SCr slightly elevated in 80year old  Follow with maintenance dose of 750 mg IV every 24 hours (~15 mg/kg)  Dose calculated to approximate a therapeutic trough of 15-20 mcg/mL. Assessment/Plan:  Avoid loading dose due to CrCl less than 30 mL/min and 80years old, electing not to do dosing by levels so can order a trough prior to the 3rd total dose (SCr is improving)  If SCr increases with tomorrow AM labs, can change to dosing by levels and order a random level 24 hours after the first dose. BMP is ordered for tomorrow AM, will follow to ensure SCr is ordered daily after that  Pharmacy to follow daily and will make changes to dose and/or frequency based on clinical status. Date Dose & Interval Measured (mcg/mL) Extrapolated (mcg/mL)   ? ? ? ?   ? ? ? ?   ? ? ? ? Non-Kinetic Antimicrobial Dosing:   Current Regimen:  D/C Zosyn  Recommendation: Order cefepime 2 g IV every 24 hours and metronidazole 500 mg IV every 12 hours    Other Antimicrobial  (not dosed by pharmacist)   None   Cultures     None yet   Serum Creatinine     Lab Results   Component Value Date/Time    Creatinine 1.32 (H) 05/26/2021 03:43 AM    Creatinine (POC) 0.8 08/08/2012 09:53 AM       Creatinine Clearance Estimated Creatinine Clearance: 22.4 mL/min (A) (based on SCr of 1.32 mg/dL (H)).      Procalcitonin    Lab Results   Component Value Date/Time    Procalcitonin <0.05 05/25/2021 08:55 AM        Temp   97.5 °F (36.4 °C)    WBC   Lab Results   Component Value Date/Time    WBC 11.9 (H) 05/26/2021 03:43 AM       H/H   Lab Results   Component Value Date/Time    HGB 9.9 (L) 05/26/2021 03:43 AM      Platelets Lab Results   Component Value Date/Time    PLATELET 701 97/50/3101 03:43 AM            Pharmacist: IZZY LimD Contact information:

## 2021-05-26 NOTE — PROGRESS NOTES
0700 Bedside shift change report given to JENNIFER Franklin (oncoming nurse) by Laurel Lockett RN (offgoing nurse). Report included the following information SBAR, Kardex, ED Summary, Intake/Output, MAR, Recent Results and Cardiac Rhythm A fib.    1900 Bedside shift change report given to Jasen Schilling RN (oncoming nurse) by Alma Rosa Monte RN (offgoing nurse). Report included the following information SBAR, Kardex, ED Summary, Intake/Output, MAR, Recent Results and Cardiac Rhythm A fib. This patient was assisted with Intentional Toileting every 2 hours during this shift. Documentation of ambulation and output reflected on Flowsheet.

## 2021-05-26 NOTE — PALLIATIVE CARE DISCHARGE
The Palliative Medicine team was consulted as part of your / your loved one's care in the hospital. Our team is a supportive service; we strive to relieve suffering and improve quality of life. You identified the following goal(s) as your main focus for healthcare: Patient/Health Care Proxy Stated Goals: Rehabilitation We reviewed advance care planning information, which includes the following: 
Advance Care Planning 5/25/2021 Patient's Healthcare Decision Maker is: Legal Next of Kin Confirm Advance Directive None Patient Would Like to Complete Advance Directive Unable Does the patient have other document types Do Not Resuscitate We reviewed / discussed your code status as: DNR 
   Full Code means perform CPR in the event of cardiac arrest 
   [facility] means do NOT perform CPR in the event of cardiac arrest 
   Partial Code means you have specific preferences, please discuss with your health care team 
   No Order means this issue was not addressed / resolved during your stay You have a Durable Do Not Resuscitate Order in place, which should travel with you. When you are in a facility, this form should be placed on your chart. Once you are home, it is recommended that the [facility] form be placed in a visible location such as on the refrigerator or bedroom door. Because of the importance of this information, we are providing you with a printed copy to share with other healthcare providers after this hospitalization is complete. If any of the above information is incomplete or incorrect, please contact the Palliative Medicine team at [phone].

## 2021-05-26 NOTE — ACP (ADVANCE CARE PLANNING)
Left message at [facility] to see if pt had his study there and if they could fax us the results. Phone and fax number left.    Primary Decision MakerLanae Sade Hodge Daughter - [phone]    Primary Decision Maker: Yokasta Steinberg - [phone]  Advance Care Planning 5/25/2021   Patient's Healthcare Decision Maker is: Legal Next of Kin   Confirm Advance Directive None   Patient Would Like to Complete Advance Directive Unable   Does the patient have other document types Do Not Resuscitate     Pt does not have AMD in place, is currently unable to complete; dtsakina Stinson and Jose Enrique Baron are legal NOK/surrogate decision makers.  Pt signed DDNR during December 2020 hospitalization; copy has been scanned into medical record.

## 2021-05-26 NOTE — PROGRESS NOTES
NEPHROLOGY PROGRESS NOTE         Ismael Cowan                   YXK:[id number]   :10/12/1930    Chief complaints: f/u KAREN    Subjective: more awake and alert    24 hr interval history: scr down 1.3 from 1.5 UOP increase        Objective:  Vitals:    21 0306 21 0700 21 0731 21 0800   BP: 129/76  (!) 141/99    Pulse: 98 96 97 86   Resp: 18  19    Temp: 97.8 °F (36.6 °C)  97.5 °F (36.4 °C)    SpO2: 93%  96%    Weight: 50 kg (110 lb 4.8 oz)      Height:           Intake/Output Summary (Last 24 hours) at 2021 1122  Last data filed at 2021 1928  Gross per 24 hour   Intake 150 ml   Output 600 ml   Net -450 ml       PHYSICAL EXAM:  GENERAL : Chronically ill looking female lying down in bed with no acute distress  HEENT: AT NC PEERLA   NECK: Supple no JVP  CVS: S1 S2 irregularly irregular, no murmur or gallops heard  RS: Diminished breath sounds on both bases  ABDOMEN: soft NT ND positive BS  EXTREMITY: No edema clubbing or cyanosis, pedal pulse +  NEUROLOGY: AAA X3  Labs:  CBC:    Lab Results   Component Value Date/Time    WBC 11.9 (H) 2021 03:43 AM    HGB 9.9 (L) 2021 03:43 AM    HCT 35.5 2021 03:43 AM     2021 03:43 AM     BMP:   Lab Results   Component Value Date/Time     2021 03:43 AM    K 4.1 2021 03:43 AM     (H) 2021 03:43 AM    CO2 26 2021 03:43 AM    AGAP 4 (L) 2021 03:43 AM     (H) 2021 03:43 AM    BUN 48 (H) 2021 03:43 AM    CREA 1.32 (H) 2021 03:43 AM    GFRAA 46 (L) 2021 03:43 AM    GFRNA 38 (L) 2021 03:43 AM        Lab Results   Component Value Date/Time    Color YELLOW/STRAW 2021 06:58 PM    Appearance CLEAR 2021 06:58 PM    Specific gravity 1.025 2021 06:58 PM    Specific gravity 1.006 2021 01:15 PM    pH (UA) 6.0 2021 06:58 PM    Protein 30 (A) 2021 06:58 PM    Glucose Negative 2021 06:58 PM    Ketone Negative 05/25/2021 06:58 PM    Bilirubin Negative 05/25/2021 06:58 PM    Urobilinogen 1.0 05/25/2021 06:58 PM    Nitrites Negative 05/25/2021 06:58 PM    Leukocyte Esterase Negative 05/25/2021 06:58 PM    Epithelial cells FEW 05/25/2021 06:58 PM    Bacteria Negative 05/25/2021 06:58 PM    WBC 0-4 05/25/2021 06:58 PM    RBC 0-5 05/25/2021 06:58 PM       Imaging study:    Assessment &Plan    KAREN likely d/t prerenal state  HTN  L side CVA    Plan:  Scr is better  UOP is improved  Urine lytes reviewed. Renally dose all med's         Care Plan discussed with:   Medical Team    Lorena Adrian MD  5/26/2021    [facility]:  [email or web address]  Maddie Mahajan office:  [address] [address]  [place], [address] [place] [address]  Phone: [phone]  Fax :     [phone]     [place] office:  [address] [facility], [address]  Phone - [phone]  Fax - [phone]

## 2021-05-26 NOTE — PROGRESS NOTES
[facility] [address]  [address] [place] [address], [address] [place] [address]  [phone]      Medical Progress Note      NAME: Joe Conley   :  10/12/1930  MRM:  [id number]    Date/Time: 2021  3:35 PM         Subjective:     Chief Complaint:  Pt aphasic     Pt seen and examined. No complaints     ROS:  (bold if positive, if negative)             Objective:       Vitals:          Last 24hrs VS reviewed since prior progress note. Most recent are:    Visit Vitals  BP (!) 129/94 (BP 1 Location: Left upper arm, BP Patient Position: At rest)   Pulse 98   Temp 97.5 °F (36.4 °C)   Resp 18   Ht 5' 2\" (1.575 m)   Wt 50 kg (110 lb 4.8 oz)   SpO2 95%   BMI 20.17 kg/m²     SpO2 Readings from Last 6 Encounters:   21 95%   21 99%   21 94%   21 94%   21 99%   20 98%    O2 Flow Rate (L/min): 2 l/min       Intake/Output Summary (Last 24 hours) at 2021 1318  Last data filed at 2021 1928  Gross per 24 hour   Intake 50 ml   Output 600 ml   Net -550 ml          Exam:     Physical Exam:    Gen: Thin elderly female. R sided facial droop. HEENT:  Pink conjunctivae, PERRL, hearing intact to voice, moist mucous membranes  Neck:  Supple, without masses, thyroid non-tender  Resp: Diffuse crackles. Card:  No murmurs, normal S1, S2 without thrills, bruits or peripheral edema  Abd:  Soft, non-tender, non-distended, normoactive bowel sounds are present  Musc:  No cyanosis or clubbing  Skin:  No rashes or ulcers, skin turgor is good  Neuro: See above. Aphasic.  Past pointing on exam. RUE weakness 4/   Psych: AAOx3    Medications Reviewed: (see below)    Lab Data Reviewed: (see below)    ______________________________________________________________________    Medications:     Current Facility-Administered Medications   Medication Dose Route Frequency    metoprolol succinate (TOPROL-XL) XL tablet 150 mg  150 mg Oral DAILY    pantoprazole (PROTONIX) tablet 40 mg  40 mg Oral ACB&D    apixaban (ELIQUIS) tablet 2.5 mg  2.5 mg Oral BID    piperacillin-tazobactam (ZOSYN) 3.375 g in 0.9% sodium chloride (MBP/ADV) 100 mL MBP  3.375 g IntraVENous Q12H    insulin lispro (HUMALOG) injection   SubCUTAneous AC&HS    glucose chewable tablet 16 g  4 Tablet Oral PRN    dextrose (D50W) injection syrg 12.5-25 g  12.5-25 g IntraVENous PRN    glucagon (GLUCAGEN) injection 1 mg  1 mg IntraMUSCular PRN    atorvastatin (LIPITOR) tablet 10 mg  10 mg Oral QHS    prochlorperazine (COMPAZINE) injection 10 mg  10 mg IntraVENous Q6H PRN    levothyroxine (SYNTHROID) tablet 100 mcg  100 mcg Oral ACB    ondansetron (ZOFRAN) injection 4 mg  4 mg IntraVENous Q6H PRN    sodium chloride (NS) flush 5-40 mL  5-40 mL IntraVENous Q8H    sodium chloride (NS) flush 5-40 mL  5-40 mL IntraVENous PRN    acetaminophen (TYLENOL) tablet 650 mg  650 mg Oral Q6H PRN    Or    acetaminophen (TYLENOL) suppository 650 mg  650 mg Rectal Q6H PRN    bisacodyL (DULCOLAX) suppository 10 mg  10 mg Rectal DAILY PRN            Lab Review:     Recent Labs     05/26/21  0343 05/25/21  0530 05/24/21  0633   WBC 11.9* 12.0* 10.4   HGB 9.9* 9.5* 10.0*   HCT 35.5 32.6* 35.1    188 201     Recent Labs     05/26/21  0343 05/25/21  0530 05/24/21  0633    137 138   K 4.1 4.4 4.5   * 107 107   CO2 26 21 18*   * 92 95   BUN 48* 49* 35*   CREA 1.32* 1.58* 1.45*   CA 8.9 8.9 9.3   MG  --  2.6* 2.7*     No components found for: [place]    MRI =>   Minimal acute infarction posterior left insular cortex and posterior left  frontal lobe. No superimposed hemorrhage, midline shift or mass effect.     Severe chronic microvascular ischemic change and atrophy.     Moderate left maxillary sinus disease.        Assessment / Plan:    Acute CVA (cerebrovascular accident) ([address] (5/22/2021) - s/p tPA. MRI as above.  Repeat head CT WITHOUT e/o bleeding   -initially on heparin gtt as was pending speech eval; now on eliquis   -on statin  -PT/OT/speech eval => family would like her home   -appreciate palliative assistance       Hypothyroidism (7/15/2011) - TSH elevated. ?compliance with levothyroxine. If compliant then will need a dose increase  -free T4 WNL   -continue levothyroxine        Rheumatoid arthritis (HCC) ()  -not on DMARDS      DM type 2 causing vascular disease (HCC) () - A1c 6.9. With recent CVA ideally needs to be optimized a bit more but with age, concern for hypoglycemia as well   -can consider low dose metformin at discharge if would tolerate       Iron deficiency anemia (3/24/2021) - hemoglobin stable. Stool blood (+). Risk of procedures of this 81 yo female with cardiac history and recent CVA great especially since hemoglobin stable. Increase PPI BID. Would continue anticoagulation as risk of no anticoagulation considering recent CVA and a-fib too great       Acute on Chronic a-fib ([facility] (3/24/2021)  -s/p amiodarone and heparin gtt  -transitioned to metoprolol and eliquis; metoprolol increased by cardiology     Acute systolic CHF - decompensated. EF now 20-25% on recent TTE from 5/23  -lasix if Cr stable  -on metoprolol   -will need ARB if Cr stabilizes     KAREN - ?SHAYLA ? cardiorenal. Improved today   -appreciate nephrology eval  -strict I's and O's   -prognosis guarded considering age and co-morbidities    Abnormal x-ray - ?aspiration v. Volume   -repeat x-ray today   -if volume (+); start diuretics if ok with renal   -if no e/o PNA stop zosyn       Diabetic retinopathy ([facility] (7/15/2011)  -outpt follow-up    Dispo   -family would like her home with home PT/OT/speech. Considering a-fib, CHF, CVA and age, certainly would be appropriate for hospice. Palliative assistance appreciated.  At this time pt not amenable to hospice    Total time spent with patient: 28 Minutes                   Care Plan discussed with: Patient and Nursing Staff    Discussed:  Care Plan    Prophylaxis:  Eliquis     Disposition: TBD           ___________________________________________________    Attending Physician: Keerthi Vincent MD

## 2021-05-26 NOTE — PROGRESS NOTES
2pm: CM s/w Fernando Chang at [facility]. Referral information approved. They will contact patient's daughter, Nicole Moreno, tomorrow morning to schedule delivery of the bed. Patient's daughter was notified. 9am:  Orders for hospital bed and bsc noted. Per covering CM on 5/25, patient's daughter will follow-up with [facility] on bsc that was ordered previously. Referrals for hospital bed were sent to [facility] and they do not have any beds. CM contacted [facility] this morning. They do not service the [facility]. CM contacted [facility]. They do have beds but will not be able to deliver until tomorrow. Referral initiated and faxed to [facility] at [phone]. Transition of Care Plan - RUR 29% (high risk):  1. CM following  2. Patient to discharge home when stable with family assistance and home health provided by [facility]. 3. DME ordered- referral for hospital bed sent to [facility]  4. Outpatient follow-up, PCP virtual appointment scheduled on 6/1 and added to AVS  5. PT/OT following  6. Palliative following  7.  Transport at d/c- D    Mehreen Talamantes LCSW

## 2021-05-26 NOTE — PROGRESS NOTES
Patient needs positioning that cannot be done in a regular bed due to a stroke and needs constant changed of positioning for pressure relief

## 2021-05-27 LAB
ANION GAP SERPL CALC-SCNC: 6 MMOL/L (ref 5–15)
B PERT DNA SPEC QL NAA+PROBE: NOT DETECTED
BASOPHILS # BLD: 0.1 K/UL (ref 0–0.1)
BASOPHILS NFR BLD: 1 % (ref 0–1)
BORDETELLA PARAPERTUSSIS PCR, BORPAR: NOT DETECTED
BUN SERPL-MCNC: 34 MG/DL (ref 6–20)
BUN/CREAT SERPL: 39 (ref 12–20)
C PNEUM DNA SPEC QL NAA+PROBE: NOT DETECTED
CALCIUM SERPL-MCNC: 8.2 MG/DL (ref 8.5–10.1)
CHLORIDE SERPL-SCNC: 109 MMOL/L (ref 97–108)
CO2 SERPL-SCNC: 26 MMOL/L (ref 21–32)
CREAT SERPL-MCNC: 0.88 MG/DL (ref 0.55–1.02)
DIFFERENTIAL METHOD BLD: ABNORMAL
EOSINOPHIL # BLD: 0.1 K/UL (ref 0–0.4)
EOSINOPHIL NFR BLD: 1 % (ref 0–7)
ERYTHROCYTE [DISTWIDTH] IN BLOOD BY AUTOMATED COUNT: 19.4 % (ref 11.5–14.5)
FLUAV H1 2009 PAND RNA SPEC QL NAA+PROBE: NOT DETECTED
FLUAV H1 RNA SPEC QL NAA+PROBE: NOT DETECTED
FLUAV H3 RNA SPEC QL NAA+PROBE: NOT DETECTED
FLUAV SUBTYP SPEC NAA+PROBE: NOT DETECTED
FLUBV RNA SPEC QL NAA+PROBE: NOT DETECTED
GLUCOSE BLD STRIP.AUTO-MCNC: 110 MG/DL (ref 65–117)
GLUCOSE BLD STRIP.AUTO-MCNC: 111 MG/DL (ref 65–117)
GLUCOSE BLD STRIP.AUTO-MCNC: 188 MG/DL (ref 65–117)
GLUCOSE BLD STRIP.AUTO-MCNC: 201 MG/DL (ref 65–117)
GLUCOSE SERPL-MCNC: 80 MG/DL (ref 65–100)
HADV DNA SPEC QL NAA+PROBE: NOT DETECTED
HCOV 229E RNA SPEC QL NAA+PROBE: NOT DETECTED
HCOV HKU1 RNA SPEC QL NAA+PROBE: NOT DETECTED
HCOV NL63 RNA SPEC QL NAA+PROBE: NOT DETECTED
HCOV OC43 RNA SPEC QL NAA+PROBE: NOT DETECTED
HCT VFR BLD AUTO: 31.6 % (ref 35–47)
HGB BLD-MCNC: 9.2 G/DL (ref 11.5–16)
HMPV RNA SPEC QL NAA+PROBE: NOT DETECTED
HPIV1 RNA SPEC QL NAA+PROBE: NOT DETECTED
HPIV2 RNA SPEC QL NAA+PROBE: NOT DETECTED
HPIV3 RNA SPEC QL NAA+PROBE: NOT DETECTED
HPIV4 RNA SPEC QL NAA+PROBE: NOT DETECTED
IMM GRANULOCYTES # BLD AUTO: 0.1 K/UL (ref 0–0.04)
IMM GRANULOCYTES NFR BLD AUTO: 1 % (ref 0–0.5)
LYMPHOCYTES # BLD: 1.7 K/UL (ref 0.8–3.5)
LYMPHOCYTES NFR BLD: 19 % (ref 12–49)
M PNEUMO DNA SPEC QL NAA+PROBE: NOT DETECTED
MAGNESIUM SERPL-MCNC: 2 MG/DL (ref 1.6–2.4)
MCH RBC QN AUTO: 20.2 PG (ref 26–34)
MCHC RBC AUTO-ENTMCNC: 29.1 G/DL (ref 30–36.5)
MCV RBC AUTO: 69.3 FL (ref 80–99)
MONOCYTES # BLD: 0.8 K/UL (ref 0–1)
MONOCYTES NFR BLD: 9 % (ref 5–13)
NEUTS SEG # BLD: 5.9 K/UL (ref 1.8–8)
NEUTS SEG NFR BLD: 69 % (ref 32–75)
NRBC # BLD: 0.02 K/UL (ref 0–0.01)
NRBC BLD-RTO: 0.2 PER 100 WBC
PHOSPHATE SERPL-MCNC: 1.6 MG/DL (ref 2.6–4.7)
PLATELET # BLD AUTO: 177 K/UL (ref 150–400)
POTASSIUM SERPL-SCNC: 3.7 MMOL/L (ref 3.5–5.1)
PROCALCITONIN SERPL-MCNC: <0.05 NG/ML
RBC # BLD AUTO: 4.56 M/UL (ref 3.8–5.2)
RBC MORPH BLD: ABNORMAL
RSV RNA SPEC QL NAA+PROBE: NOT DETECTED
RV+EV RNA SPEC QL NAA+PROBE: NOT DETECTED
SARS-COV-2 PCR, COVPCR: NOT DETECTED
SERVICE CMNT-IMP: ABNORMAL
SERVICE CMNT-IMP: ABNORMAL
SERVICE CMNT-IMP: NORMAL
SERVICE CMNT-IMP: NORMAL
SODIUM SERPL-SCNC: 141 MMOL/L (ref 136–145)
WBC # BLD AUTO: 8.7 K/UL (ref 3.6–11)

## 2021-05-27 PROCEDURE — 97530 THERAPEUTIC ACTIVITIES: CPT

## 2021-05-27 PROCEDURE — 99231 SBSQ HOSP IP/OBS SF/LOW 25: CPT | Performed by: INTERNAL MEDICINE

## 2021-05-27 PROCEDURE — 0202U NFCT DS 22 TRGT SARS-COV-2: CPT

## 2021-05-27 PROCEDURE — 74011250637 HC RX REV CODE- 250/637: Performed by: INTERNAL MEDICINE

## 2021-05-27 PROCEDURE — 74011000250 HC RX REV CODE- 250: Performed by: INTERNAL MEDICINE

## 2021-05-27 PROCEDURE — 65660000000 HC RM CCU STEPDOWN

## 2021-05-27 PROCEDURE — 80048 BASIC METABOLIC PNL TOTAL CA: CPT

## 2021-05-27 PROCEDURE — 74011636637 HC RX REV CODE- 636/637: Performed by: INTERNAL MEDICINE

## 2021-05-27 PROCEDURE — 97110 THERAPEUTIC EXERCISES: CPT

## 2021-05-27 PROCEDURE — 83735 ASSAY OF MAGNESIUM: CPT

## 2021-05-27 PROCEDURE — 85025 COMPLETE CBC W/AUTO DIFF WBC: CPT

## 2021-05-27 PROCEDURE — 74011250636 HC RX REV CODE- 250/636: Performed by: INTERNAL MEDICINE

## 2021-05-27 PROCEDURE — 84145 PROCALCITONIN (PCT): CPT

## 2021-05-27 PROCEDURE — 84100 ASSAY OF PHOSPHORUS: CPT

## 2021-05-27 PROCEDURE — 74011250637 HC RX REV CODE- 250/637: Performed by: NURSE PRACTITIONER

## 2021-05-27 PROCEDURE — 36415 COLL VENOUS BLD VENIPUNCTURE: CPT

## 2021-05-27 PROCEDURE — 82962 GLUCOSE BLOOD TEST: CPT

## 2021-05-27 PROCEDURE — 74011250637 HC RX REV CODE- 250/637: Performed by: PSYCHIATRY & NEUROLOGY

## 2021-05-27 RX ORDER — SODIUM,POTASSIUM PHOSPHATES 280-250MG
2 POWDER IN PACKET (EA) ORAL EVERY 4 HOURS
Status: COMPLETED | OUTPATIENT
Start: 2021-05-27 | End: 2021-05-27

## 2021-05-27 RX ADMIN — Medication 10 ML: at 05:43

## 2021-05-27 RX ADMIN — BISACODYL 10 MG: 10 SUPPOSITORY RECTAL at 11:57

## 2021-05-27 RX ADMIN — Medication 10 ML: at 12:24

## 2021-05-27 RX ADMIN — POTASSIUM & SODIUM PHOSPHATES POWDER PACK 280-160-250 MG 2 PACKET: 280-160-250 PACK at 11:57

## 2021-05-27 RX ADMIN — APIXABAN 2.5 MG: 2.5 TABLET, FILM COATED ORAL at 09:33

## 2021-05-27 RX ADMIN — POTASSIUM & SODIUM PHOSPHATES POWDER PACK 280-160-250 MG 2 PACKET: 280-160-250 PACK at 09:33

## 2021-05-27 RX ADMIN — METRONIDAZOLE 500 MG: 500 INJECTION, SOLUTION INTRAVENOUS at 04:26

## 2021-05-27 RX ADMIN — APIXABAN 2.5 MG: 2.5 TABLET, FILM COATED ORAL at 17:00

## 2021-05-27 RX ADMIN — ATORVASTATIN CALCIUM 10 MG: 10 TABLET, FILM COATED ORAL at 22:23

## 2021-05-27 RX ADMIN — METOPROLOL SUCCINATE 150 MG: 50 TABLET, EXTENDED RELEASE ORAL at 09:33

## 2021-05-27 RX ADMIN — PANTOPRAZOLE SODIUM 40 MG: 40 TABLET, DELAYED RELEASE ORAL at 17:00

## 2021-05-27 RX ADMIN — Medication 10 ML: at 22:23

## 2021-05-27 RX ADMIN — ACETAMINOPHEN 650 MG: 325 TABLET ORAL at 19:39

## 2021-05-27 RX ADMIN — CEFEPIME HYDROCHLORIDE 2 G: 2 INJECTION, POWDER, FOR SOLUTION INTRAVENOUS at 15:34

## 2021-05-27 RX ADMIN — LEVOTHYROXINE SODIUM 100 MCG: 0.1 TABLET ORAL at 05:43

## 2021-05-27 RX ADMIN — INSULIN LISPRO 2 UNITS: 100 INJECTION, SOLUTION INTRAVENOUS; SUBCUTANEOUS at 12:23

## 2021-05-27 RX ADMIN — METRONIDAZOLE 500 MG: 500 INJECTION, SOLUTION INTRAVENOUS at 15:35

## 2021-05-27 RX ADMIN — INSULIN LISPRO 4 UNITS: 100 INJECTION, SOLUTION INTRAVENOUS; SUBCUTANEOUS at 22:23

## 2021-05-27 RX ADMIN — PANTOPRAZOLE SODIUM 40 MG: 40 TABLET, DELAYED RELEASE ORAL at 05:42

## 2021-05-27 NOTE — PROGRESS NOTES
Physician Progress Note      Marcello Cole  [facility] #:                  [id number]  :                       10/12/1930  ADMIT DATE:       2021 11:06 AM  DISCH DATE:  RESPONDING  PROVIDER #:        Calvin Cano MD          QUERY TEXT:    Dear Hospitalist Team,  Patient admitted with acute CVA. It's noted documentation of aspiration PNA within progress note on  and suspect Pulmonary edema over PNA within progress note on . If possible, please document in progress notes and discharge summary if you are evaluating and /or treating any of the following: The medical record reflects the following:    Risk Factors: 80 Yr F admitted with Acute CVA    Clinical Indicators: Patient arrived to the ED with c/o left facial droop, aphasia and r. sided weakness. Patient was Code Stroke. Work up in the ED revealed an Acute CVA within the left frontal lobe. Work up also revealed Moderate right pleural effusion, probable small left pleural effusion. Suspect small to moderate pericardial effusion. The lungs are incompletely visualized, however suspect mild pulmonary edema. Bronchial wall thickening could also reflect pulmonary edema, however component of infection or inflammation of the airways is not excluded. Patient afebrile at 97.5. WBC WNL at 5.1 with slight rise on  12.0, 11.9 now back at 8.7. Progress note on  states, Abnormal x-ray - ?aspiration v. Volume. repeat x-ray today. If volume (+); start diuretics if ok with renal. If no e/o PNA stop Zosyn. Procalcitonin since admission x's 3 has been <0.05. CXR on  reveals unchanged patchy bilateral airspace disease and small left pleural effusion. MBS on  shows impression: Aspiration for water consistencies. Patient receiving Cefepime 2 G IV Q 24 hrs and Flagyl 500 mg IV Q 12 hrs. Progress note on  states, Suspect pulmonary edema and less likely bacterial vs viral PNA, given no fevers or leukocytosis.  If low, will plan to stop cefepime and Flagyl soon as well. Treatment: Daily CBC/BMP, repeat CXR, procalcitonin labs x's 3, frequent monitoring/vital signs, MBS study, Cefepime 2 G IV Q 24 hrs and Flagyl 500 mg IV Q 12 hrs. Thank you,  Ghulam Juarez RN, [facility], [address] [facility]  Options provided:  -- Aspiration PNA confirmed and pulmonary edema ruled out  -- Pulmonary edema confirmed and Aspiration PNA ruled out  -- Other - I will add my own diagnosis  -- Disagree - Not applicable / Not valid  -- Disagree - Clinically unable to determine / Unknown  -- Refer to Clinical Documentation Reviewer    PROVIDER RESPONSE TEXT:    See progress note. Query created by:  Evelyn Araya on 5/27/2021 2:31 PM      Electronically signed by:  Juni Erickson MD 5/27/2021 3:38 PM

## 2021-05-27 NOTE — PROGRESS NOTES
Comprehensive Nutrition Assessment      Type and Reason for Visit: Initial, RD nutrition re-screen/LOS    Nutrition Recommendations/Plan:   1. Continue Pureed diet/Nectar thick per SLP recs  2. Document PO intake, monitor wt, monitor need for ONS if wt trends down. Nutrition Assessment:       Pt admitted for Acute CVA (cerebrovascular accident) ([facility] [I63.9]. Pt  has a past medical history of BPPV (benign paroxysmal positional vertigo), HTN (hypertension), completed stroke, Hyperlipidemia, Hypothyroid, PAF (paroxysmal atrial fibrillation) ([facility] Rheumatoid arthritis ([facility] and Scoliosis. Pt screened for LOS. Pt on pureed diet currently per SLP. Pt eating fair/well at this time. Pt with significant wt changes over last ~6 months, ranging from  lbs. Currently 110 lbs per chart. Appetite is good, no n/v, c/d. Pt currently on Nectar thick liquids- unable to add Ensure Enlive strawberry per pt preference but will trial Mighty shake to meet consistency needs.           Wt Readings from Last 10 Encounters:   05/27/21 50.1 kg (110 lb 8 oz)   04/05/21 53.1 kg (117 lb)   03/24/21 49.3 kg (108 lb 9.6 oz)   02/02/21 46.7 kg (103 lb)   01/31/21 44.9 kg (99 lb)   12/20/20 45 kg (99 lb 3.2 oz)   12/01/20 47.6 kg (105 lb)   11/17/20 47.9 kg (105 lb 9.6 oz)   07/06/20 49.9 kg (110 lb)   12/27/19 49.9 kg (110 lb)       Malnutrition Assessment:  Malnutrition Status:  No malnutrition        Estimated Daily Nutrient Needs:  Energy (kcal):  1386 (BMRx1.3) +250  Protein (g):  50-60       Fluid (ml/day):  1500 mL for elderly    Documented meal intake:   Patient Vitals for the past 168 hrs:   % Diet Eaten   05/27/21 0942 76 - 100%   05/26/21 1759 1 - 25%   05/26/21 1400 26 - 50%   05/26/21 0815 1 - 25%   05/25/21 1928 51 - 75%   05/25/21 1126 1 - 25%   05/25/21 1007 26 - 50%   05/24/21 1815 51 - 75%   05/24/21 1415 26 - 50%   05/24/21 1044 51 - 75%       Documented Supplement intake:  Patient Vitals for the past 168 hrs: Supplement intake %   05/25/21 1928 0   05/25/21 1126 0   05/25/21 1007 0   05/24/21 1815 0   05/24/21 1415 0   05/24/21 1044 0       Nutrition Related Findings:    last BM 5/25, no edema      Nutritionally Significant Medications:   Eliquis, Lipitor, Cefepime, Humalog, Synthroid, Flagyl, Protonix. Wounds:    None       Current Nutrition Therapies:  DIET DYSPHAGIA PUREED (NDD1)  2 [place]/2 Mildly Thick    Anthropometric Measures:  · Height:  5' 2.01\" (157.5 cm)  · Current Body Wt:  50.1 kg (110 lb 7.2 oz)   · Admission Body Wt:   53.4 kg (117 lbs)    · Usual Body Wt:    110 lbs     · Ideal Body Wt:  110 lbs:  100.4 %    · BMI Category:  Underweight (BMI less than 22) age over 72       Nutrition Diagnosis:   · Swallowing difficulty related to cognitive or neurological impairment as evidenced by  (SLP recs for Pureed diet after CVA)      Nutrition Interventions:   Food and/or Nutrient Delivery: Continue current diet  Nutrition Education and Counseling: No recommendations at this time  Coordination of Nutrition Care: Continue to monitor while inpatient, Interdisciplinary rounds    Goals:  Pt will continue to consume >50% of meals and maintain wt +/- 2 lbs within 3-5 days       Nutrition Monitoring and Evaluation:   Behavioral-Environmental Outcomes: None identified  Food/Nutrient Intake Outcomes: Food and nutrient intake  Physical Signs/Symptoms Outcomes: Biochemical data, Weight, Chewing or swallowing    Discharge Planning:     Too soon to determine     Electronically signed by Christopher Garcia, [address]     Contact: [phone]

## 2021-05-27 NOTE — PROGRESS NOTES
Problem: Mobility Impaired (Adult and Pediatric)  Goal: *Acute Goals and Plan of Care (Insert Text)  Outcome: Progressing Towards Goal  Note:   PHYSICAL THERAPY TREATMENT  Patient: Masoud Slaughter [de-identified]80 y.o. female)  Date: 5/27/2021  Diagnosis: Acute CVA (cerebrovascular accident) [facility]) [I63.9] Acute CVA (cerebrovascular accident) ([facility] [address]       Precautions:    Chart, physical therapy assessment, plan of care and goals were reviewed. ASSESSMENT  Patient continues with skilled PT services and is progressing towards goals. Patient able to perform supine LE exercises, increased verbal cuing for participation. Family not present today due to new restrictions (COVID rule out) and so more cuing to participate but able to follow visual and verbal commands. She then transferred with MAX A from bed to bedside commode. Dependent for hygiene. .     Current Level of Function Impacting Discharge (mobility/balance): MOD A for bed mobility, MAX A for SPT    Other factors to consider for discharge:          PLAN :  Patient continues to benefit from skilled intervention to address the above impairments. Continue treatment per established plan of care. to address goals. Recommendation for discharge: (in order for the patient to meet his/her long term goals)  To be determined: SNF vs home health with 24 hour assistance    This discharge recommendation:  A follow-up discussion with the attending provider and/or case management is planned    IF patient discharges home will need the following DME: to be determined (TBD)       SUBJECTIVE:   Patient stated .    OBJECTIVE DATA SUMMARY:   Critical Behavior:  Neurologic State: Alert, Confused  Orientation Level: Unable to verbalize  Cognition: Unable to assess (comment)  Safety/Judgement: Awareness of environment  Functional Mobility Training:  Bed Mobility:     Supine to Sit: Moderate assistance  Sit to Supine: Moderate assistance  Scooting:  Moderate assistance Transfers:  Sit to Stand: Maximum assistance  Stand to Sit: Maximum assistance        Bed to Chair: Maximum assistance                          Pain Rating:  None reported    Activity Tolerance:   Fair    After treatment patient left in no apparent distress:   Supine in bed, Call bell within reach, and Bed / chair alarm activated    COMMUNICATION/COLLABORATION:   The patients plan of care was discussed with: Registered nurse.      Boy High, PT, DPT   Time Calculation: 30 mins

## 2021-05-27 NOTE — PROGRESS NOTES
Problem: Patient Education: Go to Patient Education Activity  Goal: Patient/Family Education  Outcome: Progressing Towards Goal     Problem: TIA/CVA Stroke: 0-24 hours  Goal: Off Pathway (Use only if patient is Off Pathway)  Outcome: Progressing Towards Goal  Goal: Activity/Safety  Outcome: Progressing Towards Goal  Goal: Consults, if ordered  Outcome: Progressing Towards Goal  Goal: Diagnostic Test/Procedures  Outcome: Progressing Towards Goal  Goal: Nutrition/Diet  Outcome: Progressing Towards Goal  Goal: Discharge Planning  Outcome: Progressing Towards Goal  Goal: Medications  Outcome: Progressing Towards Goal  Goal: Respiratory  Outcome: Progressing Towards Goal  Goal: Treatments/Interventions/Procedures  Outcome: Progressing Towards Goal  Goal: Minimize risk of bleeding post-thrombolytic infusion  Outcome: Progressing Towards Goal  Goal: Monitor for complications post-thrombolytic infusion  Outcome: Progressing Towards Goal  Goal: Psychosocial  Outcome: Progressing Towards Goal  Goal: *Hemodynamically stable  Outcome: Progressing Towards Goal  Goal: *Neurologically stable  Description: Absence of additional neurological deficits    Outcome: Progressing Towards Goal  Goal: *Verbalizes anxiety and depression are reduced or absent  Outcome: Progressing Towards Goal  Goal: *Absence of Signs of Aspiration on Current Diet  Outcome: Progressing Towards Goal  Goal: *Absence of deep venous thrombosis signs and symptoms(Stroke Metric)  Outcome: Progressing Towards Goal  Goal: *Ability to perform ADLs and demonstrates progressive mobility and function  Outcome: Progressing Towards Goal  Goal: *Stroke education started(Stroke Metric)  Outcome: Progressing Towards Goal  Goal: *Dysphagia screen performed(Stroke Metric)  Outcome: Progressing Towards Goal  Goal: *Rehab consulted(Stroke Metric)  Outcome: Progressing Towards Goal     Problem: TIA/CVA Stroke: Day 2 Until Discharge  Goal: Off Pathway (Use only if patient is Off Pathway)  Outcome: Progressing Towards Goal  Goal: Activity/Safety  Outcome: Progressing Towards Goal  Goal: Diagnostic Test/Procedures  Outcome: Progressing Towards Goal  Goal: Nutrition/Diet  Outcome: Progressing Towards Goal  Goal: Discharge Planning  Outcome: Progressing Towards Goal  Goal: Medications  Outcome: Progressing Towards Goal  Goal: Respiratory  Outcome: Progressing Towards Goal  Goal: Treatments/Interventions/Procedures  Outcome: Progressing Towards Goal  Goal: Psychosocial  Outcome: Progressing Towards Goal  Goal: *Verbalizes anxiety and depression are reduced or absent  Outcome: Progressing Towards Goal  Goal: *Absence of aspiration  Outcome: Progressing Towards Goal  Goal: *Absence of deep venous thrombosis signs and symptoms(Stroke Metric)  Outcome: Progressing Towards Goal  Goal: *Optimal pain control at patient's stated goal  Outcome: Progressing Towards Goal  Goal: *Tolerating diet  Outcome: Progressing Towards Goal  Goal: *Ability to perform ADLs and demonstrates progressive mobility and function  Outcome: Progressing Towards Goal  Goal: *Stroke education continued(Stroke Metric)  Outcome: Progressing Towards Goal     Problem: Ischemic Stroke: Discharge Outcomes  Goal: *Verbalizes anxiety and depression are reduced or absent  Outcome: Progressing Towards Goal  Goal: *Verbalize understanding of risk factor modification(Stroke Metric)  Outcome: Progressing Towards Goal  Goal: *Hemodynamically stable  Outcome: Progressing Towards Goal  Goal: *Absence of aspiration pneumonia  Outcome: Progressing Towards Goal  Goal: *Aware of needed dietary changes  Outcome: Progressing Towards Goal  Goal: *Verbalize understanding of prescribed medications including anti-coagulants, anti-lipid, and/or anti-platelets(Stroke Metric)  Outcome: Progressing Towards Goal  Goal: *Tolerating diet  Outcome: Progressing Towards Goal  Goal: *Aware of follow-up diagnostics related to anticoagulants  Outcome: Progressing Towards Goal  Goal: *Ability to perform ADLs and demonstrates progressive mobility and function  Outcome: Progressing Towards Goal  Goal: *Absence of DVT(Stroke Metric)  Outcome: Progressing Towards Goal  Goal: *Absence of aspiration  Outcome: Progressing Towards Goal  Goal: *Optimal pain control at patient's stated goal  Outcome: Progressing Towards Goal  Goal: *Home safety concerns addressed  Outcome: Progressing Towards Goal  Goal: *Describes available resources and support systems  Outcome: Progressing Towards Goal  Goal: *Verbalizes understanding of activation of EMS(911) for stroke symptoms(Stroke Metric)  Outcome: Progressing Towards Goal  Goal: *Understands and describes signs and symptoms to report to providers(Stroke Metric)  Outcome: Progressing Towards Goal  Goal: *Neurolgocially stable (absence of additional neurological deficits)  Outcome: Progressing Towards Goal  Goal: *Verbalizes importance of follow-up with primary care physician(Stroke Metric)  Outcome: Progressing Towards Goal  Goal: *Smoking cessation discussed,if applicable(Stroke Metric)  Outcome: Progressing Towards Goal  Goal: *Depression screening completed(Stroke Metric)  Outcome: Progressing Towards Goal     Problem: Pressure Injury - Risk of  Goal: *Prevention of pressure injury  Description: Document August Scale and appropriate interventions in the flowsheet.   Outcome: Progressing Towards Goal  Note: Pressure Injury Interventions:  Sensory Interventions: Assess changes in LOC, Avoid rigorous massage over bony prominences, Check visual cues for pain, Keep linens dry and wrinkle-free, Minimize linen layers    Moisture Interventions: Apply protective barrier, creams and emollients, Absorbent underpads, Internal/External urinary devices    Activity Interventions: Pressure redistribution bed/mattress(bed type)    Mobility Interventions: HOB 30 degrees or less, Pressure redistribution bed/mattress (bed type)    Nutrition Interventions: Document food/fluid/supplement intake, Discuss nutritional consult with provider    Friction and Shear Interventions: Apply protective barrier, creams and emollients, Feet elevated on foot rest, HOB 30 degrees or less                Problem: Patient Education: Go to Patient Education Activity  Goal: Patient/Family Education  Outcome: Progressing Towards Goal     Problem: Falls - Risk of  Goal: *Absence of Falls  Description: Document Jared Fall Risk and appropriate interventions in the flowsheet. Outcome: Progressing Towards Goal  Note: Fall Risk Interventions:  Mobility Interventions: Bed/chair exit alarm, Communicate number of staff needed for ambulation/transfer, Patient to call before getting OOB    Mentation Interventions: Bed/chair exit alarm, Evaluate medications/consider consulting pharmacy, More frequent rounding, Reorient patient    Medication Interventions: Bed/chair exit alarm, Patient to call before getting OOB, Teach patient to arise slowly    Elimination Interventions: Bed/chair exit alarm, Call light in reach, Patient to call for help with toileting needs, Stay With Me (per policy)              Problem: Patient Education: Go to Patient Education Activity  Goal: Patient/Family Education  Outcome: Progressing Towards Goal     Problem: Diabetes Self-Management  Goal: *Disease process and treatment process  Description: Define diabetes and identify own type of diabetes; list 3 options for treating diabetes. Outcome: Progressing Towards Goal  Goal: *Incorporating nutritional management into lifestyle  Description: Describe effect of type, amount and timing of food on blood glucose; list 3 methods for planning meals. Outcome: Progressing Towards Goal  Goal: *Incorporating physical activity into lifestyle  Description: State effect of exercise on blood glucose levels.   Outcome: Progressing Towards Goal  Goal: *Developing strategies to promote health/change behavior  Description: Define the ABC's of diabetes; identify appropriate screenings, schedule and personal plan for screenings. Outcome: Progressing Towards Goal  Goal: *Using medications safely  Description: State effect of diabetes medications on diabetes; name diabetes medication taking, action and side effects. Outcome: Progressing Towards Goal  Goal: *Monitoring blood glucose, interpreting and using results  Description: Identify recommended blood glucose targets  and personal targets. Outcome: Progressing Towards Goal  Goal: *Prevention, detection, treatment of acute complications  Description: List symptoms of hyper- and hypoglycemia; describe how to treat low blood sugar and actions for lowering  high blood glucose level. Outcome: Progressing Towards Goal  Goal: *Prevention, detection and treatment of chronic complications  Description: Define the natural course of diabetes and describe the relationship of blood glucose levels to long term complications of diabetes.   Outcome: Progressing Towards Goal  Goal: *Developing strategies to address psychosocial issues  Description: Describe feelings about living with diabetes; identify support needed and support network  Outcome: Progressing Towards Goal  Goal: *Insulin pump training  Outcome: Progressing Towards Goal  Goal: *Sick day guidelines  Outcome: Progressing Towards Goal  Goal: *Patient Specific Goal (EDIT GOAL, INSERT TEXT)  Outcome: Progressing Towards Goal     Problem: Patient Education: Go to Patient Education Activity  Goal: Patient/Family Education  Outcome: Progressing Towards Goal

## 2021-05-27 NOTE — PROGRESS NOTES
Palliative Medicine Consult  [place]: [phone] ([phone])    Patient Name: Elsie Dozier  YOB: 1930    Date of Initial Consult: 5/25/21  Reason for Consult: Care decisions  Requesting Provider: Dr. Katie Barajas  Primary Care Physician: Lisa Marin MD     SUMMARY:   Elsie Dozier is a 80 y.o. 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 MRI confirmed diagnosis. She was admitted in April with CHF exacerbation, also found to have iron deficiency anemia. Eliquis was held after at discharge as she required a unit of blood, no endoscopy performed. Repeat TTE this admit now shows reduced EF of 20-25%. Current medical issues leading to Palliative Medicine involvement include: multiple comorbid conditions, care decisions. Ms. Castillo's speech has reportedly improved but she does have new dysphagia. MBS today 5/25 confirms mild oral dysphagia and mild pharyngeal issues. Dysphagia 1, nectar thick is advised. Prior to this admission- pt lives at home with daughter Joseph Maya staying with her overnight, niece Kasey present during the day. Dtfadi Stinson lives across the street. She ambulates with close supervision. Was eating well with preserved appetite. No reported cognitive changes but she has had an issue the past few years which family describes as a perceived abnormal skin sensation or burning sensation of her limbs. This causes her to swat at perceived bugs or something that is not present. This delusion has been progressive and worsening with family unable to appease her or reassure her. Family was under the impression it was a side effect of medication but no doctor has ever stated which.        Psychosocial Hx- Born in [place] but raised in [place]. . She had three children but son has passed away. Her two daughters [address] and Jose Armando Travis live close by to her and ensure she is cared for 24/7. PALLIATIVE DIAGNOSES:   1. Goals of care counseling/discussion  2. Acute CVA- small on left side  3. Mild oropharyngeal dysphagia as a result of CVA  4. HFrEF, EF 20-25%, mild valvular disease by TTE 5/23/21  5. Afib   6. Iron deficiency anemia- Eliquis now resumed after being held since last month  7. KAREN  8. Delusional infestation     PLAN:   1. Follow up visit this afternoon to patient- now COVID PUI due to patchy infiltrates on CXR. She is on room air and breathing comfortably. Now also on abx. Diuresed last night as this may be edema. 2. Goals are clear to make attempts at therapy and receive speech therapy at home with [facility] once cleared for discharge. Had a lengthy conversation on Tuesday and daughters, niece provide 24/7 care for her already and are quite in tune with her care needs and quality of life. They are willing to entertain a transition to [facility] care should she stop benefiting or participating with therapy at home. They are aware of options. 3. Family has been educated by dietician and SLP about new dysphagia diet recommendations. 4. Code status- a DDNR was completed in Dec, on file. 5. Psychosocial- pt has excellent family support, they are already providing 24/7 care. 6. Thank you for the opportunity to participate in the care of Sally Ball. Please call if needed. I will follow her peripherally in case she experiences respiratory decline but hopefully she is able to go home soon.    7. Communicated plan of care with: Palliative [facility] Team     GOALS OF CARE / TREATMENT PREFERENCES:     GOALS OF CARE:  Patient/Health Care Proxy Stated Goals: Rehabilitation    TREATMENT PREFERENCES:   Code Status: DNR    Advance Care Planning:  [x] The hospitals Med Interdisciplinary Team has updated the ACP Navigator with Health Care Decision Maker and Patient Capacity      Primary Decision Maker: Gisele Almonte - Daughter - [phone]    Primary Decision Maker: Holden Reina - Daughter - [phone]     Advance Care Planning 5/25/2021   Patient's Healthcare Decision Maker is: Legal Next of Kin   Confirm Advance Directive None   Patient Would Like to Complete Advance Directive Unable   Does the patient have other document types Do Not Resuscitate       Medical Interventions: Limited additional interventions           Other:    As far as possible, the palliative care team has discussed with patient / health care proxy about goals of care / treatment preferences for patient. HISTORY:     History obtained from:  Daughters, niece and chart review    CHIEF COMPLAINT: fatigue, weakness    HPI/SUBJECTIVE:    The patient is:   [x] Verbal and participatory  [] Non-participatory due to:      Pt alert, follows commands and responds to my questions. Difficult to understand given dysarthria. Reports feeling cold and I adjust her blankets. No pain, nausea.      Clinical Pain Assessment (nonverbal scale for severity on nonverbal patients):   Clinical Pain Assessment  Severity: 0     Activity (Movement): Lying quietly, normal position    Duration: for how long has pt been experiencing pain (e.g., 2 days, 1 month, years)  Frequency: how often pain is an issue (e.g., several times per day, once every few days, constant)     FUNCTIONAL ASSESSMENT:     Palliative Performance Scale (PPS):  PPS: 40 (30-40% post CVA)       PSYCHOSOCIAL/SPIRITUAL SCREENING:     Palliative IDT has assessed this patient for cultural preferences / practices and a referral made as appropriate to needs (Cultural Services, Patient Advocacy, Ethics, etc.)    Any spiritual / Church concerns:  [] Yes /  [x] No    Caregiver Burnout:  [] Yes /  [] No /  [x] No Caregiver Present      Anticipatory grief assessment:   [x] Normal  / [] Maladaptive       ESAS Anxiety: Anxiety: 0    ESAS Depression:          REVIEW OF SYSTEMS:     Positive and pertinent negative findings in ROS are noted above in HPI. The following systems were [x] reviewed / [] unable to be reviewed as noted in HPI  Other findings are noted below. Systems: constitutional, ears/nose/mouth/throat, respiratory, gastrointestinal, genitourinary, musculoskeletal, integumentary, neurologic, psychiatric, endocrine. Positive findings noted below. Modified ESAS Completed by: provider   Fatigue: 10 Drowsiness: 9     Pain: 0   Anxiety: 0 Nausea: 0   Anorexia: 0 Dyspnea: 0     Constipation: No     Stool Occurrence(s): 0        PHYSICAL EXAM:     From RN flowsheet:  Wt Readings from Last 3 Encounters:   05/27/21 110 lb 8 oz (50.1 kg)   04/05/21 117 lb (53.1 kg)   03/24/21 108 lb 9.6 oz (49.3 kg)     Blood pressure 135/73, pulse 85, temperature 98.7 °F (37.1 °C), resp. rate 16, height 5' 2.01\" (1.575 m), weight 110 lb 8 oz (50.1 kg), SpO2 99 %. Pain Scale 1: Numeric (0 - 10)  Pain Intensity 1: 0                 Last bowel movement, if known:     Frail elderly woman lying in bed, appears comfortable at rest  sclerae anicteric  Respirations unlabored on RA  BS are clear anteriorly  Abdomen, soft, ntnd, bs active  Ext thin w/o edema  Alert and oriented, follows commands. Calm, not agitated or distressed.       HISTORY:     Principal Problem:    Acute CVA (cerebrovascular accident) ([facility] [address] (5/22/2021)    Active Problems:    Diabetic retinopathy ([facility] [address] (7/15/2011)      Hypothyroidism (7/15/2011)      Rheumatoid arthritis ([facility] [address] ()      DM type 2 causing vascular disease (HCC) ()      Iron deficiency anemia (3/24/2021)      Chronic a-fib ([address] (3/24/2021)      Past Medical History:   Diagnosis Date    BPPV (benign paroxysmal positional vertigo)     HTN (hypertension)     Hx of completed stroke     Hyperlipidemia     Hypothyroid     PAF (paroxysmal atrial fibrillation) (HCC)     Rheumatoid arthritis ([address]     Scoliosis       Past Surgical History:   Procedure Laterality Date    HX  SECTION      HX ORTHOPAEDIC      bunions removed x 2      Family History   Problem Relation Age of Onset    Stroke Father       History reviewed, no pertinent family history.   Social History     Tobacco Use    Smoking status: Never Smoker    Smokeless tobacco: Never Used   Substance Use Topics    Alcohol use: No     Allergies   Allergen Reactions    Shellfish Containing Products Swelling      Current Facility-Administered Medications   Medication Dose Route Frequency    metoprolol succinate (TOPROL-XL) XL tablet 150 mg  150 mg Oral DAILY    cefepime (MAXIPIME) 2 g in sterile water (preservative free) 10 mL IV syringe  2 g IntraVENous Q24H    metroNIDAZOLE (FLAGYL) IVPB premix 500 mg  500 mg IntraVENous Q12H    pantoprazole (PROTONIX) tablet 40 mg  40 mg Oral ACB&D    apixaban (ELIQUIS) tablet 2.5 mg  2.5 mg Oral BID    insulin lispro (HUMALOG) injection   SubCUTAneous AC&HS    glucose chewable tablet 16 g  4 Tablet Oral PRN    dextrose (D50W) injection syrg 12.5-25 g  12.5-25 g IntraVENous PRN    glucagon (GLUCAGEN) injection 1 mg  1 mg IntraMUSCular PRN    atorvastatin (LIPITOR) tablet 10 mg  10 mg Oral QHS    prochlorperazine (COMPAZINE) injection 10 mg  10 mg IntraVENous Q6H PRN    levothyroxine (SYNTHROID) tablet 100 mcg  100 mcg Oral ACB    ondansetron (ZOFRAN) injection 4 mg  4 mg IntraVENous Q6H PRN    sodium chloride (NS) flush 5-40 mL  5-40 mL IntraVENous Q8H    sodium chloride (NS) flush 5-40 mL  5-40 mL IntraVENous PRN    acetaminophen (TYLENOL) tablet 650 mg  650 mg Oral Q6H PRN    Or    acetaminophen (TYLENOL) suppository 650 mg  650 mg Rectal Q6H PRN    bisacodyL (DULCOLAX) suppository 10 mg  10 mg Rectal DAILY PRN          LAB AND IMAGING FINDINGS:     Lab Results   Component Value Date/Time    WBC 8.7 2021 12:37 AM    HGB 9.2 (L) 2021 12:37 AM    PLATELET 971 05/27/2021 12:37 AM     Lab Results   Component Value Date/Time    Sodium 141 05/27/2021 12:37 AM    Potassium 3.7 05/27/2021 12:37 AM    Chloride 109 (H) 05/27/2021 12:37 AM    CO2 26 05/27/2021 12:37 AM    BUN 34 (H) 05/27/2021 12:37 AM    Creatinine 0.88 05/27/2021 12:37 AM    Calcium 8.2 (L) 05/27/2021 12:37 AM    Magnesium 2.0 05/27/2021 12:37 AM    Phosphorus 1.6 (L) 05/27/2021 12:37 AM      Lab Results   Component Value Date/Time    Alk. phosphatase 164 (H) 05/23/2021 04:33 AM    Protein, total 7.2 05/23/2021 04:33 AM    Albumin 3.0 (L) 05/23/2021 04:33 AM    Globulin 4.2 (H) 05/23/2021 04:33 AM     Lab Results   Component Value Date/Time    INR 1.2 (H) 05/22/2021 11:41 AM    Prothrombin time 12.6 (H) 05/22/2021 11:41 AM    aPTT 81.2 (H) 05/24/2021 08:40 AM      Lab Results   Component Value Date/Time    Iron 18 (L) 05/22/2021 11:41 AM    TIBC 480 (H) 05/22/2021 11:41 AM    Iron % saturation 4 (L) 05/22/2021 11:41 AM    Ferritin 19 (L) 05/22/2021 11:41 AM      No results found for: PH, PCO2, PO2  No components found for: [place]   Lab Results   Component Value Date/Time    CK 52 10/29/2013 10:45 AM    CK - MB 0.9 10/29/2013 10:45 AM                Total time: 20  Counseling / coordination time, spent as noted above: 10  > 50% counseling / coordination?: y    Prolonged service was provided for  []30 min   []75 min in face to face time in the presence of the patient, spent as noted above. Time Start:   Time End:   Note: this can only be billed with 33464 (initial) or 02706 (follow up). If multiple start / stop times, list each separately.

## 2021-05-27 NOTE — PROGRESS NOTES
Transition of Care Plan - RUR 34% (high risk):  1. CM following- Cardiac bundle. BCPI letter provided on 5/22/21. 2. Patient to discharge home when stable with family assistance and home health provided by [email or web address]. 3. DME ordered- patient's hospital bed has been delivered by [facility] to patient's home  4. Outpatient follow-up, PCP virtual appointment scheduled on 6/1 and added to AVS  5. PT/OT following  6. Palliative following  7.  Transport at d/c- wheel chair van with [facility] (on stand-by for transport on 5/28)    Travis Chinchilla

## 2021-05-27 NOTE — PROGRESS NOTES
CXR from 5/26 reviewed,   \"Unchanged patchy bilateral airspace disease and small left pleural effusion. \"    Suspect pulmonary edema and less likely bacterial vs viral PNA, given no fevers or leukocytosis. Broad-spectrum abx ordered by Dr. Dallas Simmons yesterday afternoon. We will stop vanc. Check procalcitonin  If low, will plan to stop cefepime and Flagyl soon as well.   Out of prudence, will check viral resp panel with SARS-CoV-2

## 2021-05-27 NOTE — PROGRESS NOTES
Chart reviewed, patient cleared for OT services by nursing. Patient received in bed and in spite of max cues, offers to assist with toileting, simple mobility tasks, or engage in light exercises, patient refused all activity at time of attempt. Will re-attempt per POC.  Lev Shepard, OTR/L

## 2021-05-27 NOTE — PROGRESS NOTES
NEPHROLOGY PROGRESS NOTE         Luther Lloyd                   HYD:[id number]   :10/12/1930    Chief complaints: f/u KAREN    Subjective: more awake and alert    24 hr interval history: scr 0.8 from 2.4 UOP increase.  S/p Lasix 40 mg po x1        Objective:  Vitals:    21 0304 21 0400 21 0700 21 0832   BP: (!) 144/70   (!) 145/74   Pulse: 81 87 71 80   Resp: 17   16   Temp: 98.1 °F (36.7 °C)   97.5 °F (36.4 °C)   SpO2: 96%   97%   Weight: 50.1 kg (110 lb 8 oz)      Height:           Intake/Output Summary (Last 24 hours) at 2021 1009  Last data filed at 2021 0942  Gross per 24 hour   Intake 715 ml   Output 2300 ml   Net -1585 ml       PHYSICAL EXAM:  GENERAL : Chronically ill looking female lying down in bed with no acute distress  HEENT: AT NC PEERLA   NECK: Supple no JVP  CVS: S1 S2 irregularly irregular, no murmur or gallops heard  RS: Diminished breath sounds on both bases  ABDOMEN: soft NT ND positive BS  EXTREMITY: No edema clubbing or cyanosis, pedal pulse +  NEUROLOGY: AAA X3  Labs:  CBC:    Lab Results   Component Value Date/Time    WBC 8.7 2021 12:37 AM    HGB 9.2 (L) 2021 12:37 AM    HCT 31.6 (L) 2021 12:37 AM     2021 12:37 AM     BMP:   Lab Results   Component Value Date/Time     2021 12:37 AM    K 3.7 2021 12:37 AM     (H) 2021 12:37 AM    CO2 26 2021 12:37 AM    AGAP 6 2021 12:37 AM    GLU 80 2021 12:37 AM    BUN 34 (H) 2021 12:37 AM    CREA 0.88 2021 12:37 AM    GFRAA >60 2021 12:37 AM    GFRNA >60 2021 12:37 AM        Lab Results   Component Value Date/Time    Color YELLOW/STRAW 2021 06:58 PM    Appearance CLEAR 2021 06:58 PM    Specific gravity 1.025 2021 06:58 PM    Specific gravity 1.006 2021 01:15 PM    pH (UA) 6.0 2021 06:58 PM    Protein 30 (A) 2021 06:58 PM    Glucose Negative 2021 06:58 PM    Ketone Negative 05/25/2021 06:58 PM    Bilirubin Negative 05/25/2021 06:58 PM    Urobilinogen 1.0 05/25/2021 06:58 PM    Nitrites Negative 05/25/2021 06:58 PM    Leukocyte Esterase Negative 05/25/2021 06:58 PM    Epithelial cells FEW 05/25/2021 06:58 PM    Bacteria Negative 05/25/2021 06:58 PM    WBC 0-4 05/25/2021 06:58 PM    RBC 0-5 05/25/2021 06:58 PM       Imaging study:    Assessment &Plan    KAREN likely d/t prerenal state  HTN  L side CVA    Plan:  Scr is nrmalize  UOP is improved  I will sign off        Care Plan discussed with:   Medical Team    Lorena Enrique MD  5/27/2021    [facility]:  [email or web address]  [facility] office:  [address] [place] [address], [address] [place] [address],[address]  [place], [address] [place] [address]  Phone: [phone]  Fax :     [phone]     [place] office:  [address] [facility]  Phone - [phone]  Fax - [phone]

## 2021-05-27 NOTE — PROGRESS NOTES
0730 Bedside and Verbal shift change report given to April Bonnie Dancer RN (oncoming nurse) by Stan Villalpando RN (offgoing nurse). Report included the following information SBAR and Kardex. This patient was assisted with Intentional Toileting every 2 hours during this shift. Documentation of ambulation and output reflected on Flowsheet. 1930 Bedside and Verbal shift change report given to Lorena Hollins RN (oncoming nurse) by Amberly Dickson RN (offgoing nurse). Report included the following information SBAR and Kardex.

## 2021-05-28 VITALS
HEART RATE: 72 BPM | WEIGHT: 104.2 LBS | SYSTOLIC BLOOD PRESSURE: 130 MMHG | BODY MASS INDEX: 19.17 KG/M2 | TEMPERATURE: 97.5 F | RESPIRATION RATE: 19 BRPM | HEIGHT: 62 IN | DIASTOLIC BLOOD PRESSURE: 75 MMHG | OXYGEN SATURATION: 99 %

## 2021-05-28 LAB
ALBUMIN SERPL-MCNC: 2.5 G/DL (ref 3.5–5)
ANION GAP SERPL CALC-SCNC: 4 MMOL/L (ref 5–15)
BASOPHILS # BLD: 0 K/UL (ref 0–0.1)
BASOPHILS NFR BLD: 0 % (ref 0–1)
BUN SERPL-MCNC: 25 MG/DL (ref 6–20)
BUN/CREAT SERPL: 31 (ref 12–20)
CALCIUM SERPL-MCNC: 8.3 MG/DL (ref 8.5–10.1)
CHLORIDE SERPL-SCNC: 107 MMOL/L (ref 97–108)
CO2 SERPL-SCNC: 29 MMOL/L (ref 21–32)
CREAT SERPL-MCNC: 0.8 MG/DL (ref 0.55–1.02)
DIFFERENTIAL METHOD BLD: ABNORMAL
EOSINOPHIL # BLD: 0.1 K/UL (ref 0–0.4)
EOSINOPHIL NFR BLD: 1 % (ref 0–7)
ERYTHROCYTE [DISTWIDTH] IN BLOOD BY AUTOMATED COUNT: 18.8 % (ref 11.5–14.5)
GLUCOSE BLD STRIP.AUTO-MCNC: 231 MG/DL (ref 65–117)
GLUCOSE BLD STRIP.AUTO-MCNC: 83 MG/DL (ref 65–117)
GLUCOSE SERPL-MCNC: 79 MG/DL (ref 65–100)
HCT VFR BLD AUTO: 31.6 % (ref 35–47)
HGB BLD-MCNC: 9.1 G/DL (ref 11.5–16)
IMM GRANULOCYTES # BLD AUTO: 0 K/UL (ref 0–0.04)
IMM GRANULOCYTES NFR BLD AUTO: 0 % (ref 0–0.5)
LYMPHOCYTES # BLD: 1.3 K/UL (ref 0.8–3.5)
LYMPHOCYTES NFR BLD: 17 % (ref 12–49)
MAGNESIUM SERPL-MCNC: 2.1 MG/DL (ref 1.6–2.4)
MCH RBC QN AUTO: 20 PG (ref 26–34)
MCHC RBC AUTO-ENTMCNC: 28.8 G/DL (ref 30–36.5)
MCV RBC AUTO: 69.6 FL (ref 80–99)
MONOCYTES # BLD: 0.7 K/UL (ref 0–1)
MONOCYTES NFR BLD: 9 % (ref 5–13)
NEUTS SEG # BLD: 5.8 K/UL (ref 1.8–8)
NEUTS SEG NFR BLD: 73 % (ref 32–75)
NRBC # BLD: 0.03 K/UL (ref 0–0.01)
NRBC BLD-RTO: 0.4 PER 100 WBC
PHOSPHATE SERPL-MCNC: 2.2 MG/DL (ref 2.6–4.7)
PLATELET # BLD AUTO: 158 K/UL (ref 150–400)
PMV BLD AUTO: 10.3 FL (ref 8.9–12.9)
POTASSIUM SERPL-SCNC: 3.6 MMOL/L (ref 3.5–5.1)
RBC # BLD AUTO: 4.54 M/UL (ref 3.8–5.2)
RBC MORPH BLD: ABNORMAL
RBC MORPH BLD: ABNORMAL
SERVICE CMNT-IMP: ABNORMAL
SERVICE CMNT-IMP: NORMAL
SODIUM SERPL-SCNC: 140 MMOL/L (ref 136–145)
WBC # BLD AUTO: 7.9 K/UL (ref 3.6–11)

## 2021-05-28 PROCEDURE — 74011250637 HC RX REV CODE- 250/637: Performed by: INTERNAL MEDICINE

## 2021-05-28 PROCEDURE — 85025 COMPLETE CBC W/AUTO DIFF WBC: CPT

## 2021-05-28 PROCEDURE — 74011250637 HC RX REV CODE- 250/637: Performed by: NURSE PRACTITIONER

## 2021-05-28 PROCEDURE — 97530 THERAPEUTIC ACTIVITIES: CPT

## 2021-05-28 PROCEDURE — 74011636637 HC RX REV CODE- 636/637: Performed by: INTERNAL MEDICINE

## 2021-05-28 PROCEDURE — 92526 ORAL FUNCTION THERAPY: CPT

## 2021-05-28 PROCEDURE — 94760 N-INVAS EAR/PLS OXIMETRY 1: CPT

## 2021-05-28 PROCEDURE — 82962 GLUCOSE BLOOD TEST: CPT

## 2021-05-28 PROCEDURE — 74011250636 HC RX REV CODE- 250/636: Performed by: INTERNAL MEDICINE

## 2021-05-28 PROCEDURE — 36415 COLL VENOUS BLD VENIPUNCTURE: CPT

## 2021-05-28 PROCEDURE — 97535 SELF CARE MNGMENT TRAINING: CPT | Performed by: OCCUPATIONAL THERAPIST

## 2021-05-28 PROCEDURE — 80069 RENAL FUNCTION PANEL: CPT

## 2021-05-28 PROCEDURE — 83735 ASSAY OF MAGNESIUM: CPT

## 2021-05-28 RX ORDER — METOPROLOL SUCCINATE 50 MG/1
150 TABLET, EXTENDED RELEASE ORAL DAILY
Qty: 90 TABLET | Refills: 0 | Status: SHIPPED | OUTPATIENT
Start: 2021-05-28 | End: 2021-06-14

## 2021-05-28 RX ADMIN — METRONIDAZOLE 500 MG: 500 INJECTION, SOLUTION INTRAVENOUS at 04:05

## 2021-05-28 RX ADMIN — APIXABAN 2.5 MG: 2.5 TABLET, FILM COATED ORAL at 09:15

## 2021-05-28 RX ADMIN — Medication 10 ML: at 06:47

## 2021-05-28 RX ADMIN — PANTOPRAZOLE SODIUM 40 MG: 40 TABLET, DELAYED RELEASE ORAL at 06:46

## 2021-05-28 RX ADMIN — LEVOTHYROXINE SODIUM 100 MCG: 0.1 TABLET ORAL at 06:47

## 2021-05-28 RX ADMIN — INSULIN LISPRO 4 UNITS: 100 INJECTION, SOLUTION INTRAVENOUS; SUBCUTANEOUS at 12:46

## 2021-05-28 RX ADMIN — Medication 10 ML: at 14:00

## 2021-05-28 RX ADMIN — METOPROLOL SUCCINATE 150 MG: 50 TABLET, EXTENDED RELEASE ORAL at 09:15

## 2021-05-28 NOTE — PROGRESS NOTES
Problem: Dysphagia (Adult)  Goal: *Acute Goals and Plan of Care (Insert Text)  Description: Swallowing goals initiated 5-24-21:  1) tolerate dysphagia 1, nectars without s/s aspiration by 5-27-21  2) MBS to determine severity of dysphagia by 5-28-21-met    5/28/2021 1057 by Abebe Atkinson SLP  Outcome: Progressing Towards Goal  5/28/2021 1056 by Abebe Atkinson, SLP  Outcome: Progressing Towards Goal   SPEECH [address] TREATMENT  Patient: Alize Ruiz (13 y.o. female)  Date: 5/28/2021  Diagnosis: Acute CVA (cerebrovascular accident) [facility]) [I63.9] Acute CVA (cerebrovascular accident) ([facility] [address]       Precautions: aspiration      ASSESSMENT:  Patient tolerating purees, nectars, pills without overt aspiration. Silent aspiration risks based on MBS and age, which predisposes her for esophageal peristaltic issues or reflux. No family in session for education. PLAN:  Recommendations and Planned Interventions:  Continue dysphagia 1, nectars when awake and alert. Patient continues to benefit from skilled intervention to address the above impairments. Continue treatment per established plan of care. Discharge Recommendations:  Home Health     SUBJECTIVE:   Patient stated Peepee! . OBJECTIVE:   Cognitive and Communication Status:  Neurologic State: Alert, Confused  Orientation Level: Oriented to person  Cognition: Follows commands  Perception: Appears intact  Perseveration: No perseveration noted  Safety/Judgement: Awareness of environment  Dysphagia Treatment:  Oral Assessment:     P.O.  Trials:  Patient Position: upright in bed  Vocal quality prior to P.O.: No impairment (still mild/moderate dysarthria)  Consistency Presented: Nectar thick liquid  How Presented: SLP-fed/presented;Straw;Successive swallows   ORAL PHASE:   Bolus Acceptance: No impairment  Bolus Formation/Control: No impairment     Propulsion: No impairment  Oral Residue: None  PHARYNGEAL PHASE:   Initiation of Swallow: No impairment  Laryngeal Elevation: Functional  Aspiration Signs/Symptoms: Delayed cough/throat clear (? reflux as it occurred delayed or several min after PO. she is 80years old, so has high risk for reflux/esopagel transit issues)                      Exercises:  Laryngeal Exercises:                                                                                                                                   Pain:  Pain Scale 1: Visual  Pain Intensity 1: 0       After treatment:   Patient left in no apparent distress in bed and Nursing notified    COMMUNICATION/EDUCATION:   Patient was educated regarding her deficit(s) of DYSPHAGIA  as this relates to her diagnosis of CVA. She demonstrated Guarded understanding as evidenced by EVANS, ESL. The patient's plan of care including recommendations, planned interventions, and recommended diet changes were discussed with: Registered nurse.      Nelsy Fuentes, SLP  Time Calculation: 10 mins

## 2021-05-28 NOTE — PROGRESS NOTES
Yang Vazquez [facility] [place] [address]  [address] [facility], [place], [address] [place] [address]  [phone]      Medical Progress Note      NAME: Jose Luis Shultz   :  10/12/1930  MRM:  [id number]    Date/Time: 2021        Assessment / Plan:     81 yo F w/ hx of AF, DM, hypothyroidism presenting with R-sided weakness, admitted for acute CVA. Hospital course complicated by problems as listed below:    Acute CVA (cerebrovascular accident): s/p tPA on admission. MRI brain showed minimal acute infarction posterior left insular cortex and posterior left frontal lobe. Cont Eliquis. PT/OT/speech eval. Family would like patient to be home with [facility]     Acute on Chronic a-fib: s/p amiodarone and heparin gtt, now on metoprolol and Eliquis     Acute systolic CHF: decompensated. EF now 20-25% on TTE . Cont metoprolol. Diuresis PRN    Abnormal CXR: Unchanged patchy bilateral airspace disease and small left pleural effusion on CXR. Suspect pulmonary edema and less likely bacterial vs viral PNA, given no fevers or leukocytosis. Broad-spectrum abx ordered by Dr. Chase Novak yesterday afternoon. We will stop vanc. Check procalcitonin. If low, will plan to stop cefepime and Flagyl soon as well. Out of prudence, will check viral resp panel with SARS-CoV-2      KAREN: likely prerenal in etiology. Resolved. Follow BMP    DM type 2 causing vascular disease:  A1c 6.9. No metformin due to cardiomyopathy. Suggest dietary control    Iron deficiency anemia: hemoglobin stable. FOBT (+). Risk of procedures outweighs potential benefit at present, in light of cardiac history and recent CVA. On PPI BID. Monitor closely on Eliquis    Hypothyroidism: TSH high however FT4 WNR. TSH elevated. ?compliance. Continue levothyroxine. Repeat levels in 4 weeks        Rheumatoid arthritis: not on DMARDS.  Outpatient follow up    Diabetic retinopathy: outpt follow-up     Dispo: home tomorrow w/ HH PT/OT/speech, at family's request        Total time spent:  28 minutes  Time spent in the care of this patient including reviewing records, discussing with nursing and/or other providers on the treatment team, obtaining history and examining the patient, and discussing treatment plans. Care Plan discussed with: Patient, Nursing Staff and >50% of time spent in counseling and coordination of care    Discussed:  Care Plan    Prophylaxis:  Eliquis    Disposition:   PT, OT, RN         Subjective:     Chief Complaint:  Follow up abnormal CXR    Chart/notes/labs/studies reviewed, patient examined. No fever. Reports feeling cold. Feels \"ok\"          Objective:       Vitals:        Last 24hrs VS reviewed since prior progress note. Most recent are:    Visit Vitals  /71 (BP 1 Location: Left upper arm, BP Patient Position: At rest)   Pulse 77   Temp 97.9 °F (36.6 °C)   Resp 16   Ht 5' 2.01\" (1.575 m)   Wt 50.1 kg (110 lb 8 oz)   SpO2 95%   BMI 20.21 kg/m²     SpO2 Readings from Last 6 Encounters:   05/27/21 95%   04/05/21 99%   03/24/21 94%   02/02/21 94%   01/31/21 99%   12/20/20 98%    O2 Flow Rate (L/min): 2 l/min       Intake/Output Summary (Last 24 hours) at 5/27/2021 2227  Last data filed at 5/27/2021 1831  Gross per 24 hour   Intake 478 ml   Output 1400 ml   Net -922 ml          Exam:     Physical Exam:    Gen:  Elderly, frail, chronically ill-appearing. NAD  HEENT:  Atraumatic, normocephalic. Sclerae nonicteric, hearing intact to voice  Neck:  Supple, without apparent masses. Resp:  No accessory muscle use, few bibasilar rales  Card: irreg HR 80s, without m/r/g. Trac BLE edema  Abd:  +bowel sounds, soft, NTTP, nondistended.  No HSM  Neuro: mild facial droop, dysarthric speech, follows commands appropriately, mild R-sided weakness  Psych:  Alert, oriented to self and hospital. Fair insight     Medications Reviewed: (see below)    Lab Data Reviewed: (see below)    ______________________________________________________________________    Medications: Current Facility-Administered Medications   Medication Dose Route Frequency    metoprolol succinate (TOPROL-XL) XL tablet 150 mg  150 mg Oral DAILY    cefepime (MAXIPIME) 2 g in sterile water (preservative free) 10 mL IV syringe  2 g IntraVENous Q24H    metroNIDAZOLE (FLAGYL) IVPB premix 500 mg  500 mg IntraVENous Q12H    pantoprazole (PROTONIX) tablet 40 mg  40 mg Oral ACB&D    apixaban (ELIQUIS) tablet 2.5 mg  2.5 mg Oral BID    insulin lispro (HUMALOG) injection   SubCUTAneous AC&HS    glucose chewable tablet 16 g  4 Tablet Oral PRN    dextrose (D50W) injection syrg 12.5-25 g  12.5-25 g IntraVENous PRN    glucagon (GLUCAGEN) injection 1 mg  1 mg IntraMUSCular PRN    atorvastatin (LIPITOR) tablet 10 mg  10 mg Oral QHS    prochlorperazine (COMPAZINE) injection 10 mg  10 mg IntraVENous Q6H PRN    levothyroxine (SYNTHROID) tablet 100 mcg  100 mcg Oral ACB    ondansetron (ZOFRAN) injection 4 mg  4 mg IntraVENous Q6H PRN    sodium chloride (NS) flush 5-40 mL  5-40 mL IntraVENous Q8H    sodium chloride (NS) flush 5-40 mL  5-40 mL IntraVENous PRN    acetaminophen (TYLENOL) tablet 650 mg  650 mg Oral Q6H PRN    Or    acetaminophen (TYLENOL) suppository 650 mg  650 mg Rectal Q6H PRN    bisacodyL (DULCOLAX) suppository 10 mg  10 mg Rectal DAILY PRN            Lab Review:     Recent Labs     05/27/21  0037 05/26/21  0343 05/25/21  0530   WBC 8.7 11.9* 12.0*   HGB 9.2* 9.9* 9.5*   HCT 31.6* 35.5 32.6*    176 188     Recent Labs     05/27/21  0037 05/26/21  0343 05/25/21  0530    140 137   K 3.7 4.1 4.4   * 110* 107   CO2 26 26 21   GLU 80 105* 92   BUN 34* 48* 49*   CREA 0.88 1.32* 1.58*   CA 8.2* 8.9 8.9   MG 2.0  --  2.6*   PHOS 1.6*  --   --      No components found for: GLPOC  No results for input(s): PH, PCO2, PO2, HCO3, FIO2 in the last 72 hours. No results for input(s): INR, INREXT in the last 72 hours.   No results found for: SDES  Lab Results   Component Value Date/Time Culture result: NO GROWTH 5 DAYS 11/30/2020 01:40 PM    Culture result: NO GROWTH 1 DAY 04/20/2015 09:45 PM              ___________________________________________________    Attending Physician: Maria Victoria Zarate MD

## 2021-05-28 NOTE — DISCHARGE INSTRUCTIONS
HOSPITALIST DISCHARGE INSTRUCTIONS  NAME: Bao Abdalla   :  10/12/1930   MRN:  [id number]     Date/Time:  2021 8:41 AM    ADMIT DATE: 2021     DISCHARGE DATE: 2021     PRINCIPAL DISCHARGE DIAGNOSES:  Stroke  Systolic congestive heart failure  Atrial fibrillation      MEDICATIONS:  · It is important that you take the medication exactly as they are prescribed. Note the changes and additions to your medications. Be sure you understand these changes before you are discharged today. · Keep your medication in the bottles provided by the pharmacist and keep a list of the medication names, dosages, and times to be taken in your wallet. · Do not take other medications without consulting your doctor. Pain Management: per above medications    What to do at Home    Recommended diet:  Cardiac Diet and Diabetic Diet    Recommended activity: PT/OT Eval and Treat    If you experience any of the following symptoms then please call your primary care physician or return to the emergency room if you cannot get hold of your doctor:  severe chest pain, shortness of breath, dizziness, palpitations, speech difficulties, facial droop, severe headache, confusion, weakness, numbness, or other severe concerning symptoms    Follow Up: Follow-up Information     Follow up With Specialties Details Why Marti Dougherty MD Neurology, Pain Management In 3 weeks Hospital Follow up [facility]  [address]  [facility] [address] [address] [address]    [phone]    Miriam Harding MD Internal Medicine On 2021 Virtual appointment is at 9:32a.m. RN will call you 10-15 minutes prior to the appointment and provide a link for the appointment. Lisa Peoples DO Cardiology On 2021 [address]  [address]  [phone]              Information obtained by :   I understand that if any problems occur once I am at home I am to contact my physician. I understand and acknowledge receipt of the instructions indicated above.                                                                                                                                            Physician's or R.N.'s Signature                                                                  Date/Time                                                                                                                                              Patient or Representative Signature                                                          Date/Time

## 2021-05-28 NOTE — PROGRESS NOTES
Problem: Self Care Deficits Care Plan (Adult)  Goal: *Acute Goals and Plan of Care (Insert Text)  Description: FUNCTIONAL STATUS PRIOR TO ADMISSION: At baseline, patient uses a rolling walker and assistance needed for ADLs. HOME SUPPORT PRIOR TO ADMISSION: The patient lived with her daughter and received caregiver support 8 hrs/day. She lives in a two story home, showers on the second floor, bedroom on first.  6 steps to enter ; Patient has assistance from family for stairs. Occupational Therapy Goals  Initiated 5/24/2021  1. Patient will perform grooming with minimum assistance within 7 day(s). 2.  Patient will perform upper body dressing and bathing with minimum assistance within 7 day(s). 3.  Patient will perform lower body dressing and bathing with moderate assistance within 7 day(s). 4.  Patient will perform toilet transfers with minimum assistance to [facility] using best technique within 7 day(s). 5   Patient will participate in upper extremity therapeutic exercise/activities with minimal assistance with focus on R UE strength and coordination within 7 day(s). 6.  Patient will participate in full Fugl-Smith Assessment of Upper Extremity within 7 days. Outcome: Progressing Towards Goal       OCCUPATIONAL THERAPY TREATMENT  Patient: Poli Reeves (87 y.o. female)  Date: 5/28/2021  Diagnosis: Acute CVA (cerebrovascular accident) [facility]) [I63.9] Acute CVA (cerebrovascular accident) [facility])       Precautions:    Chart, occupational therapy assessment, plan of care, and goals were reviewed. ASSESSMENT  Patient continues with skilled OT services and is progressing towards goals. Demonstrated good initiation to use bilateral UE's, transferred with min assist of 2 to bedside commode, participated in UE and LE dressing and transferred to chair.  Plan for discharge this date and patient likely will do better in her home environment     Current Level of Function Impacting Discharge (ADLs): mint to mod assist UE and LE ADL's, max to total assist toileting, min to mod assist stand pivot transfers    Other factors to consider for discharge: to discharge this date         PLAN :  Patient continues to benefit from skilled intervention to address the above impairments. Continue treatment per established plan of care to address goals. Recommendation for discharge: (in order for the patient to meet his/her long term goals)  Occupational therapy at least 2 days/week in the home AND ensure assist and/or supervision for safety with ADL's and mobility    This discharge recommendation:  Has been made in collaboration with the attending provider and/or case management    IF patient discharges home will need the following DME:        SUBJECTIVE:   Patient stated: pointed to chair     OBJECTIVE DATA SUMMARY:   Cognitive/Behavioral Status:  Neurologic State: Alert;Confused  Orientation Level: Oriented to person  Cognition: Follows commands  Perception: Appears intact          Functional Mobility and Transfers for ADLs:  Bed Mobility:  Supine to Sit: Minimum assistance  Sit to Supine: Moderate assistance    Transfers:  Sit to Stand: Moderate assistance  Functional Transfers  Toilet Transfer : Minimum assistance; Moderate assistance;Assist x1  Bed to Chair: Moderate assistance;Assist x2; Additional time    Balance:  Sitting: Intact  Sitting - Static: Good (unsupported)  Sitting - Dynamic: Good (unsupported)  Standing: Impaired; With support  Standing - Static: Constant support; Fair    ADL Intervention:          Upper Body Dressing Assistance  Dressing Assistance: Set-up; Minimum assistance  Hospital Gown: Supervision  Pullover Shirt: Minimum assistance    Lower Body Dressing Assistance  Dressing Assistance: Moderate assistance;Maximum assistance  Protective Undergarmet: Total assistance (dependent)  Pants With Elastic Waist: Moderate assistance;Maximum assistance  Socks: Moderate assistance; Compensatory technique training  Leg Crossed Method Used: No  Position Performed: Seated in chair      Toileting  Toileting Assistance: Total assistance(dependent)  Bowel Hygiene: Total assistance (dependent) (in standing with assist of 2nd)      Pain:  Moaning throughout, encouraged relaxation/deep breathing    Activity Tolerance:   Good    After treatment patient left in no apparent distress:   Sitting in chair, Call bell within reach, and Bed / chair alarm activated    COMMUNICATION/COLLABORATION:   The patients plan of care was discussed with: Physical therapist and Registered nurse.      NAN Badillo/L  Time Calculation: 12 mins

## 2021-05-28 NOTE — PROGRESS NOTES
Problem: Mobility Impaired (Adult and Pediatric)  Goal: *Acute Goals and Plan of Care (Insert Text)  Outcome: Progressing Towards Goal  Note:   PHYSICAL THERAPY TREATMENT  Patient: Jose Luis Shultz [de-identified]80 y.o. female)  Date: 5/28/2021  Diagnosis: Acute CVA (cerebrovascular accident) [facility]) [I63.9] Acute CVA (cerebrovascular accident) ([facility] [address]       Precautions:    Chart, physical therapy assessment, plan of care and goals were reviewed. ASSESSMENT  Patient continues with skilled PT services and is progressing towards goals. Patient performs multiple sit-stand and SPT to transfer to chair and bedside commode. Patient with improved LE sequencing and able to tranfer to chair with hand held assist x2. Patient upset upon first entering the room, calming strategies used and called her Triston Avalos. Patient will be discharged later today. Vitals stable- 97% on room air and heart rate: 52 bpm.     Current Level of Function Impacting Discharge (mobility/balance): MOD x1-2    Other factors to consider for discharge:          PLAN :  Patient continues to benefit from skilled intervention to address the above impairments. Continue treatment per established plan of care. to address goals. Recommendation for discharge: (in order for the patient to meet his/her long term goals)  Physical therapy at least 2 days/week in the home AND ensure assist and/or supervision for safety with all upright activity and ADL's    This discharge recommendation:  A follow-up discussion with the attending provider and/or case management is planned    IF patient discharges home will need the following DME: hospital bed and bedside commode ordered       SUBJECTIVE:   Patient stated Thais Senior! Tonia Speedy    OBJECTIVE DATA SUMMARY:   Critical Behavior:  Neurologic State: Alert, Confused  Orientation Level: Oriented to person  Cognition: Follows commands  Safety/Judgement: Awareness of environment  Functional Mobility Training:  Bed Mobility:     Supine to Sit: Minimum assistance  Sit to Supine: Moderate assistance           Transfers:  Sit to Stand: Moderate assistance  Stand to Sit: Moderate assistance        Bed to Chair: Moderate assistance;Assist x2; Additional time                    Pain Rating:  None reported    Activity Tolerance:   Fair    After treatment patient left in no apparent distress:   Sitting in chair, Call bell within reach, and Bed / chair alarm activated    COMMUNICATION/COLLABORATION:   The patients plan of care was discussed with: Occupational therapist and Registered nurse.      Ian Wolf PT, DPT   Time Calculation: 30 mins

## 2021-05-28 NOTE — PROGRESS NOTES
5/28/2021  11:11 AM    RUR:  34%  Risk Level: []Low []Moderate [x]High  Value-based purchasing: [] Yes [x] No  Bundle patient: [x] Yes [] No   Specify:  [facility] CHF    Transition of care plan:  1. Patient medically stable with discharge order  2. Discharge home - [address] PT/OT/RN/SLP set-up  [facility] time of 330-4pm today (patient does not have family at home until 330pm today)  4. Outpatient follow-up - PCP appt 6/1/21  5. Hospital bed delivered by [facility] 5/27    Care Management Interventions  PCP Verified by CM: Yes Wilver Tena MD)  Last Visit to PCP: 05/19/21  Mode of Transport at Discharge:  Other (see comment) (family)  Transition of Care Consult (CM Consult): Discharge Planning  MyChart Signup: No  Discharge Durable Medical Equipment: No  Physical Therapy Consult: No  Occupational Therapy Consult: No  Speech Therapy Consult: No  Current Support Network: Has Personal Caregivers, Relative's Home  Confirm Follow Up Transport: Family  The Patient and/or Patient Representative was Provided with a Choice of Provider and Agrees with the Discharge Plan?: Yes  Freedom of Choice List was Provided with Basic Dialogue that Supports the Patient's Individualized Plan of Care/Goals, Treatment Preferences and Shares the Quality Data Associated with the Providers?: Yes  Discharge Location  Discharge Placement: Home with home health    Josy Calvo RN

## 2021-05-28 NOTE — PROGRESS NOTES
Problem: TIA/CVA Stroke: 0-24 hours  Goal: Activity/Safety  Outcome: Progressing Towards Goal  Goal: Nutrition/Diet  Outcome: Progressing Towards Goal  Goal: Discharge Planning  Outcome: Progressing Towards Goal  Goal: Medications  Outcome: Progressing Towards Goal  Goal: Respiratory  Outcome: Progressing Towards Goal  Goal: Treatments/Interventions/Procedures  Outcome: Progressing Towards Goal     Problem: Pressure Injury - Risk of  Goal: *Prevention of pressure injury  Description: Document August Scale and appropriate interventions in the flowsheet. Outcome: Progressing Towards Goal  Note: Pressure Injury Interventions:  Sensory Interventions: Assess changes in LOC, Keep linens dry and wrinkle-free, Maintain/enhance activity level, Minimize linen layers, Pad between skin to skin, Monitor skin under medical devices    Moisture Interventions: Absorbent underpads, Internal/External urinary devices, Limit adult briefs, Maintain skin hydration (lotion/cream), Minimize layers    Activity Interventions: Increase time out of bed    Mobility Interventions: HOB 30 degrees or less, Turn and reposition approx. every two hours(pillow and wedges)    Nutrition Interventions: Document food/fluid/supplement intake    Friction and Shear Interventions: HOB 30 degrees or less, Minimize layers                Problem: Falls - Risk of  Goal: *Absence of Falls  Description: Document Jared Fall Risk and appropriate interventions in the flowsheet.   Outcome: Progressing Towards Goal  Note: Fall Risk Interventions:  Mobility Interventions: Bed/chair exit alarm, Patient to call before getting OOB    Mentation Interventions: Bed/chair exit alarm, Room close to nurse's station    Medication Interventions: Bed/chair exit alarm, Teach patient to arise slowly, Patient to call before getting OOB    Elimination Interventions: Bed/chair exit alarm, Call light in reach, Toileting schedule/hourly rounds

## 2021-05-28 NOTE — PROGRESS NOTES
Bedside and Verbal shift change report given to Rupinder Whitaker RN (oncoming nurse) by Clementina Castellano RN (offgoing nurse). Report included the following information SBAR, Kardex, ED Summary, MAR, Recent Results and Cardiac Rhythm A Fib. This patient was assisted with Intentional Toileting every 2 hours during this shift. Documentation of ambulation and output reflected on Flowsheet. I have reviewed discharge instructions with the caregiver. The caregiver verbalized understanding. Simple: Patient demonstrates quick and easy understanding

## 2021-05-28 NOTE — DISCHARGE SUMMARY
Physician Discharge Summary     Patient ID:  Varsha Villagran  [id number]  3 y.o.  10/12/1930    Admit date: 5/22/2021    Discharge date: 5/28/2021    Admission Diagnoses: Acute CVA (cerebrovascular accident) [facility]) [I63.9]    Principal Discharge Diagnoses:    Acute CVA  HFrEF    OTHER PROBLEMS ADDRESSEDS  Principal Diagnosis Acute CVA (cerebrovascular accident) [facility])                                            Principal Problem:    Acute CVA (cerebrovascular accident) ([facility] (5/22/2021)    Active Problems:    Diabetic retinopathy ([facility] (7/15/2011)      Hypothyroidism (7/15/2011)      Rheumatoid arthritis ([facility] ()      DM type 2 causing vascular disease ([facility] ()      Iron deficiency anemia (3/24/2021)      Chronic a-fib ([facility] (3/24/2021)       Patient Active Problem List   Diagnosis Code    HTN (hypertension) I10    GERD (gastroesophageal reflux disease) K21.9    Osteopenia M85.80    Diabetic retinopathy ([facility] E11.319    Hypothyroidism E03.9    Hyperlipidemia E78.5    Menieres disease H81.09    Scoliosis M41.9    Rheumatoid arthritis ([facility] M06.9    Hypothyroid E03.9    Hx of completed stroke Z80.78    DM type 2 causing vascular disease ([facility] E11.59    Hypokalemia T54.8    Diastolic CHF, acute on chronic (HCC) I50.33    Pleural effusion J90    Iron deficiency anemia D50.9    Chronic a-fib ([facility]) I48.20    Acute CVA (cerebrovascular accident) [facility]) I63.9         Hospital Course:   Ms. Balwinder Hawk is a 81 yo F w/ hx of AF, DM, hypothyroidism presenting with R-sided weakness, admitted for acute CVA. Hospital course complicated by problems as listed below:     Acute CVA (cerebrovascular accident): s/p tPA on admission. MRI brain showed minimal acute infarction posterior left insular cortex and posterior left frontal lobe. Cont Eliquis.  PT/OT/speech eval. Family would like patient to be home with [facility], discussed with daughter today     Acute on Chronic a-fib: s/p amiodarone and heparin gtt, now on metoprolol and Eliquis. Per daughter, pt had been on this medication prior and they understand risk of bleeding     Acute systolic CHF: decompensated. EF now 20-25% on TTE 5/23. Cont metoprolol, diuretics     Abnormal CXR: Unchanged patchy bilateral airspace disease and small left pleural effusion on CXR. Suspect pulmonary edema and NOT  bacterial or viral PNA, given no fevers or leukocytosis. Procalcitonin low. Viral respiratory panel negative. Stop abx. Continue diuretics     KAREN: likely prerenal in etiology. Resolved. Monitor on diuretics     DM type 2 causing vascular disease:  A1c 6.9. No metformin for now due to cardiomyopathy. Suggest dietary control     Iron deficiency anemia: hemoglobin stable. FOBT (+). Risk of procedures outweighs potential benefit at present, in light of cardiac history and recent CVA. Cont  PPI. Monitor closely on Eliquis     Hypothyroidism: TSH high however FT4 WNR. TSH elevated. ?compliance. Continue levothyroxine. Repeat levels in 4 weeks        Rheumatoid arthritis: not on DMARDs. Outpatient follow up     Diabetic retinopathy: outpt follow-up     Dispo: home today w/ HH PT/OT/speech, at family's request. Prognosis is poor and certainly hospice-appropriate however family not ready to make this transition. High risk for re-admission       Pt discharged in improved and stable condition. Procedures performed: see above    Imaging studies: see above  XR SWALLOW FUNC VIDEO    Result Date: 5/25/2021  impression: Aspiration for water consistencies. MRI BRAIN WO CONT    Result Date: 5/22/2021  Minimal acute infarction posterior left insular cortex and posterior left frontal lobe. No superimposed hemorrhage, midline shift or mass effect. Severe chronic microvascular ischemic change and atrophy. Moderate left maxillary sinus disease. CT HEAD WO CONT    Result Date: 5/23/2021  No intracranial hemorrhage status post TPA administration. No other interval change.      XR CHEST PORT    Result Date: 5/26/2021  Unchanged patchy bilateral airspace disease and small left pleural effusion. XR CHEST PORT    Result Date: 5/24/2021  Small right pleural effusion. Overall interstitial edema is not significant. Patchy airspace opacities. CTA CODE NEURO HEAD AND NECK W CONT    Result Date: 5/22/2021  1. No acute vascular abnormality. There is a 5 cc volume of mismatch delayed perfusion in the left posterior frontal lobe/parietal lobe, without vascular correlate. 2. Cerebral atrophy and sequela of chronic small vessel ischemic disease. 3. Moderate right pleural effusion, probable small left pleural effusion. Suspect small to moderate pericardial effusion. The lungs are incompletely visualized, however suspect mild pulmonary edema. Bronchial wall thickening could also reflect pulmonary edema, however component of infection or inflammation of the airways is not excluded. CT CODE NEURO HEAD WO CONTRAST    Result Date: 5/22/2021  Moderate to severe chronic perivascular ischemic change and cerebral atrophy. No acute process identified. .      CT CODE NEURO PERF W CBF    Result Date: 5/22/2021  1. No acute vascular abnormality. There is a 5 cc volume of mismatch delayed perfusion in the left posterior frontal lobe/parietal lobe, without vascular correlate. 2. Cerebral atrophy and sequela of chronic small vessel ischemic disease. 3. Moderate right pleural effusion, probable small left pleural effusion. Suspect small to moderate pericardial effusion. The lungs are incompletely visualized, however suspect mild pulmonary edema. Bronchial wall thickening could also reflect pulmonary edema, however component of infection or inflammation of the airways is not excluded.       PCP: Kassi Potts MD    Consults: Cardiology, Pulmonary/Intensive care, Nephrology and Neurology    Discharge Exam:  Visit Vitals  /64 (BP 1 Location: Left upper arm, BP Patient Position: At rest)   Pulse 86   Temp 97.7 °F (36.5 °C)   Resp 20   Ht 5' 2.01\" (1.575 m)   Wt 47.3 kg (104 lb 3.2 oz)   SpO2 96%   BMI 19.05 kg/m²     Gen:  Elderly, frail, chronically ill-appearing. NAD  HEENT:  Atraumatic, normocephalic. Sclerae nonicteric, hearing intact to voice  Neck:  Supple, without apparent masses. Resp:  No accessory muscle use, mildly diminished in bases otherwise CTA  Card: irreg HR 80s, without m/r/g. no BLE edema  Abd:  +bowel sounds, soft, NTTP, nondistended. No HSM  Neuro: mild facial droop, dysarthric speech, follows commands appropriately, mild R-sided weakness  Psych:  Alert, oriented to self and hospital. Fair insight      Disposition: home    Patient Instructions:   Current Discharge Medication List      START taking these medications    Details   apixaban (ELIQUIS) 2.5 mg tablet Take 1 Tablet by mouth two (2) times a day. Qty: 60 Tablet, Refills: 0  Start date: 5/28/2021      metoprolol succinate (TOPROL-XL) 50 mg XL tablet Take 3 Tablets by mouth daily for 30 days. Qty: 90 Tablet, Refills: 0  Start date: 5/28/2021, End date: 6/27/2021         CONTINUE these medications which have NOT CHANGED    Details   ferrous gluconate 324 mg (37.5 mg iron) tablet Take 1 Tab by mouth Daily (before breakfast). Qty: 30 Tab, Refills: 0      levothyroxine (SYNTHROID) 100 mcg tablet Take 1 Tab by mouth Daily (before breakfast). Qty: 30 Tab, Refills: 0      losartan (COZAAR) 50 mg tablet Take 50 mg by mouth daily. furosemide (LASIX) 20 mg tablet Take 1 tablet by mouth once daily  Qty: 30 Tab, Refills: 0      pantoprazole (PROTONIX) 40 mg tablet Take 1 Tab by mouth daily.   Qty: 30 Tab, Refills: 1    Associated Diagnoses: Elevated cholesterol         STOP taking these medications       dilTIAZem ER (CARDIZEM LA) 240 mg tablet Comments:   Reason for Stopping:               Activity: See discharge instructions  Diet: See discharge instructions  Wound Care: See discharge instructions    Follow-up Information     Follow up With Specialties Details Why Yvette Houser MD Neurology, Pain Management In 3 weeks Hospital Follow up [address] [place]  [address]  [facility] [address] [facility]    [phone]    Michela Arriaga MD Internal Medicine On 6/1/2021 Virtual appointment is at 9:32a.m. RN will call you 10-15 minutes prior to the appointment and provide a link for the appointment. Lisa Maria DO Cardiology On 6/18/2021 [id number] [place] [phone]            I spent 35 minutes on this discharge. Signed:  Martin Mathews MD  5/28/2021  8:42 AM    CC: PCP Dr. Daniel Lazo

## 2021-06-01 ENCOUNTER — PATIENT OUTREACH (OUTPATIENT)
Dept: CASE MANAGEMENT | Age: 86
End: 2021-06-01

## 2021-06-01 NOTE — PROGRESS NOTES
Care Transitions Initial Call    Call within 2 business days of discharge: Yes     Patient: Jose Luis Shultz Patient : 10/12/1930 MRN: [id number]    Last Discharge [address]       Complaint Diagnosis Description Type Department Provider    21 Stroke Cerebrovascular accident (CVA), unspecified mechanism ([facility] . .. ED to Hosp-Admission (Discharged) (ADMIT) Dotty Burkett MD; Harish Campuzano. .. Was this an external facility discharge? No     Challenges to be reviewed by the provider   Additional needs identified to be addressed with provider: yes    Per hospital discharge note:    Acute CVA (cerebrovascular accident): s/p tPA on admission. MRI brain showed minimal acute infarction posterior left insular cortex and posterior left frontal lobe. Cont Eliquis. PT/OT/speech eval. Family would like patient to be home with [facility], declined hospice this admission     [facility] PT/OT/speech, at family's request. Prognosis is poor and certainly hospice-appropriate however family not ready to make this transition. High risk for re-admission    Hypothyroidism: TSH high however FT4 WNR. TSH elevated. ?compliance. Continue levothyroxine. Repeat levels in 4 weeks     Acute systolic CHF: decompensated. EF now 20-25% on TTE     Iron deficiency anemia: hemoglobin stable. FOBT (+). Risk of procedures outweighs potential benefit at present, in light of cardiac history and recent CVA. Cont  PPI. Monitor closely on Eliquis      DM type 2 causing vascular disease:  A1c 6.9. No metformin for now due to cardiomyopathy. Suggest dietary control    Family has been educated by dietician and SLP about new dysphagia diet recommendations         Method of communication with provider : phone    Discussed [phone] related testing which was available at this time. Test results were negative.    Patient informed of results, if available? yes     Advance Care Planning:   Does patient have an Advance Directive: per chart patient has DDNR on file. Inpatient Readmission Risk score: Unplanned Readmit Risk Score: 29    Was this a readmission? yes   Patient stated reason for the admission: Patient had CVA per chart review    Patients top risk factors for readmission: medical condition-Patient with multiple comorbidlities, recent CVA, family declining hospice at this time   Interventions to address risk factors: CTN unable to reach family after calls X 2 days, will discuss when family reached. Care Transition Nurse (CTN) unable to reach pt or family at time of call X 2 days    CTN unable to review discharge instructions, medical action plan and red flags with family who verbalized understanding. Medication reconciliation was not performed with family,     Home Health/Outpatient orders at discharge: home health care  [address] [place] [address] Way: [facility]  Date of initial visit: 5/29/2021    Durable Medical Equipment ordered at discharge: None    Covid Risk Education was not performed, unable to reach family      Discussed follow-up appointments. If no appointment was previously scheduled, appointment scheduling offered: no. Is follow up appointment scheduled within 7 days of discharge? yes. [facility] follow up appointment(s):   Future Appointments   Date Time Provider Issac Ram   6/18/2021 11:20 AM Zonia BERGER,  [facility] BS AMB     Non-[facility] follow up appointment(s): PCP---6/1/2021, CTN confirmed with PCP office patient was seen. Plan for follow-up call in 5-7 days based on severity of symptoms and risk factors. Plan for next call: [facility]/Med Rec  CTN provided contact information for future needs. Goals      Prevent complications post hospitalization. 03/25/21  CTN spoke to daughter Kendrick Fuentes to review discharge instructions/red falgs to prevent readmission    Appts:    PCP--appt made while IP for 3/30 at 9:10, daughter aware and will take pt    Cards Dr Ortiz Mention scheduled appt  Monday 4/5/21  11:00.  Per [facility] at [facility], this is the soonest appt available. Gena Workman notified of date /time. GI Dr WILKES---CTN called MD office, they will call CTN back after confirming they can accept insurance. [place] ---per [facility] office, they have pt scheduled to start services today 3/25/21     Per Gena Workman patient is doing well, denies SOB or CP this morning.  CTN reviewed medication changes and new meds, per Gena Workman pt picked up new meds yesterday.  CTN reviewed avoiding constipation with taking iron, instructed her to get OC stool softener if patient c/o difficulty having BM. Encouraged ambulation and drinking water.  CTN reviewed daily weights and instructed daughter to call Dr Laure Cruz office to report gains of greater than 3lbs/day and 5 lbs/week. Watch for edema in feet and ankles . Meribeth Cool understanding.  Review low sodium diet, foods to avoid, reading labels to kep intake less than 2000mg day. Meribeth Cool understanding.  Instructed [facility] to take pt BP and HR twice a day and to call Dr Laure Cruz with concerns/abnormal readings.  [facility] health information provided. 1316 update---CTN has not heard back from from GI office yet to obtain appt. CTN called daughter Gena Workman to see if MD office called her directly, LM on . ---mkrw    03/26/21  CTN received call back from Dr Demi Michael office that they do not accept pt insurance. CTN called [facility] they will accept appt and informed CTN that any balance will be pt responsibility. Appt scheduled for 4/21/2021 telehealth Dr Fransisco Mayers 10:45. Text message with link. Per chart notes:  Iron def anemia: s/p 1u RBCs and IV iron. Hgb stable on discharge. GI recommended EGD/colon if stool blood positive. Continue to hold Eliquis for now. CTN tried to reach Gena Workman again, call went direcly to . CTN will call back later today. ---mkrw    UPDATE:    CTN received call from daughter Sinai--CTN gave her appts information for Dr Laure Cruz and Dr Fransisco Mayers, GI, explaining that Dr Demi Michael was unable to take appt but that Dr Bridget Jesus office will bill pt for any balance that insurance does no cover. Beryl Diane agreeable to this. CTN will follow up next week---ivan    06/01/21  CTN unable to reach patient or family on phone, LM on both VM. Patient will need the following appts:    PCP Dr Arely Martell contact PCP office , was informed that patient already attended appt this morning 6/1/21. Neuro Dr Collette Roberts ---3 weeks    Dr Brenda Jones, cards---6/16 at 11:20    [facility] ---per [employer] staff patient [facility] 5/29/2021 06/02/21  CTN unable to reach family on phone again today, LM on both VM again.   CTN will call again next week if no call received---ivan

## 2021-06-07 ENCOUNTER — APPOINTMENT (OUTPATIENT)
Dept: GENERAL RADIOLOGY | Age: 86
DRG: 065 | End: 2021-06-07
Attending: EMERGENCY MEDICINE
Payer: MEDICARE

## 2021-06-07 ENCOUNTER — APPOINTMENT (OUTPATIENT)
Dept: CT IMAGING | Age: 86
DRG: 065 | End: 2021-06-07
Attending: EMERGENCY MEDICINE
Payer: MEDICARE

## 2021-06-07 ENCOUNTER — HOSPITAL ENCOUNTER (INPATIENT)
Age: 86
LOS: 1 days | Discharge: HOME HEALTH | DRG: 065 | End: 2021-06-09
Attending: EMERGENCY MEDICINE | Admitting: HOSPITALIST
Payer: MEDICARE

## 2021-06-07 ENCOUNTER — APPOINTMENT (OUTPATIENT)
Dept: MRI IMAGING | Age: 86
DRG: 065 | End: 2021-06-07
Attending: EMERGENCY MEDICINE
Payer: MEDICARE

## 2021-06-07 DIAGNOSIS — R79.89 LFTS ABNORMAL: Primary | ICD-10-CM

## 2021-06-07 DIAGNOSIS — E87.6 HYPOKALEMIA: ICD-10-CM

## 2021-06-07 DIAGNOSIS — I63.9 DYSARTHRIA DUE TO ACUTE STROKE (HCC): ICD-10-CM

## 2021-06-07 DIAGNOSIS — M19.90 ARTHRITIS: ICD-10-CM

## 2021-06-07 DIAGNOSIS — I63.9 CEREBROVASCULAR ACCIDENT (CVA), UNSPECIFIED MECHANISM (HCC): ICD-10-CM

## 2021-06-07 DIAGNOSIS — R47.1 DYSARTHRIA DUE TO ACUTE STROKE (HCC): ICD-10-CM

## 2021-06-07 DIAGNOSIS — N28.9 RENAL INSUFFICIENCY: ICD-10-CM

## 2021-06-07 LAB
ALBUMIN SERPL-MCNC: 2.8 G/DL (ref 3.5–5)
ALBUMIN/GLOB SERPL: 0.6 {RATIO} (ref 1.1–2.2)
ALP SERPL-CCNC: 118 U/L (ref 45–117)
ALT SERPL-CCNC: 184 U/L (ref 12–78)
ANION GAP SERPL CALC-SCNC: 5 MMOL/L (ref 5–15)
AST SERPL-CCNC: 41 U/L (ref 15–37)
BASOPHILS # BLD: 0 K/UL (ref 0–0.1)
BASOPHILS NFR BLD: 0 % (ref 0–1)
BILIRUB SERPL-MCNC: 0.4 MG/DL (ref 0.2–1)
BUN SERPL-MCNC: 23 MG/DL (ref 6–20)
BUN/CREAT SERPL: 18 (ref 12–20)
CALCIUM SERPL-MCNC: 9.1 MG/DL (ref 8.5–10.1)
CHLORIDE SERPL-SCNC: 102 MMOL/L (ref 97–108)
CO2 SERPL-SCNC: 32 MMOL/L (ref 21–32)
CREAT SERPL-MCNC: 1.31 MG/DL (ref 0.55–1.02)
DIFFERENTIAL METHOD BLD: ABNORMAL
EOSINOPHIL # BLD: 0 K/UL (ref 0–0.4)
EOSINOPHIL NFR BLD: 0 % (ref 0–7)
ERYTHROCYTE [DISTWIDTH] IN BLOOD BY AUTOMATED COUNT: 19.9 % (ref 11.5–14.5)
GLOBULIN SER CALC-MCNC: 4.5 G/DL (ref 2–4)
GLUCOSE SERPL-MCNC: 157 MG/DL (ref 65–100)
HCT VFR BLD AUTO: 35.5 % (ref 35–47)
HGB BLD-MCNC: 10.2 G/DL (ref 11.5–16)
IMM GRANULOCYTES # BLD AUTO: 0 K/UL (ref 0–0.04)
IMM GRANULOCYTES NFR BLD AUTO: 0 % (ref 0–0.5)
LYMPHOCYTES # BLD: 1.3 K/UL (ref 0.8–3.5)
LYMPHOCYTES NFR BLD: 20 % (ref 12–49)
MAGNESIUM SERPL-MCNC: 2.3 MG/DL (ref 1.6–2.4)
MCH RBC QN AUTO: 20.3 PG (ref 26–34)
MCHC RBC AUTO-ENTMCNC: 28.7 G/DL (ref 30–36.5)
MCV RBC AUTO: 70.7 FL (ref 80–99)
MONOCYTES # BLD: 0.7 K/UL (ref 0–1)
MONOCYTES NFR BLD: 11 % (ref 5–13)
NEUTS SEG # BLD: 4.7 K/UL (ref 1.8–8)
NEUTS SEG NFR BLD: 69 % (ref 32–75)
NRBC # BLD: 0 K/UL (ref 0–0.01)
NRBC BLD-RTO: 0 PER 100 WBC
PLATELET # BLD AUTO: 178 K/UL (ref 150–400)
PMV BLD AUTO: 10.2 FL (ref 8.9–12.9)
POTASSIUM SERPL-SCNC: 3 MMOL/L (ref 3.5–5.1)
PROT SERPL-MCNC: 7.3 G/DL (ref 6.4–8.2)
RBC # BLD AUTO: 5.02 M/UL (ref 3.8–5.2)
RBC MORPH BLD: ABNORMAL
SODIUM SERPL-SCNC: 139 MMOL/L (ref 136–145)
TROPONIN I SERPL-MCNC: <0.05 NG/ML
WBC # BLD AUTO: 6.7 K/UL (ref 3.6–11)

## 2021-06-07 PROCEDURE — 80053 COMPREHEN METABOLIC PANEL: CPT

## 2021-06-07 PROCEDURE — 99285 EMERGENCY DEPT VISIT HI MDM: CPT

## 2021-06-07 PROCEDURE — 73630 X-RAY EXAM OF FOOT: CPT

## 2021-06-07 PROCEDURE — 93005 ELECTROCARDIOGRAM TRACING: CPT

## 2021-06-07 PROCEDURE — 74011250636 HC RX REV CODE- 250/636: Performed by: EMERGENCY MEDICINE

## 2021-06-07 PROCEDURE — 70551 MRI BRAIN STEM W/O DYE: CPT

## 2021-06-07 PROCEDURE — 36415 COLL VENOUS BLD VENIPUNCTURE: CPT

## 2021-06-07 PROCEDURE — 70450 CT HEAD/BRAIN W/O DYE: CPT

## 2021-06-07 PROCEDURE — 84484 ASSAY OF TROPONIN QUANT: CPT

## 2021-06-07 PROCEDURE — 85025 COMPLETE CBC W/AUTO DIFF WBC: CPT

## 2021-06-07 PROCEDURE — 83735 ASSAY OF MAGNESIUM: CPT

## 2021-06-07 RX ORDER — POTASSIUM CHLORIDE 20MEQ/15ML
40 LIQUID (ML) ORAL
Status: DISCONTINUED | OUTPATIENT
Start: 2021-06-07 | End: 2021-06-07 | Stop reason: CLARIF

## 2021-06-07 RX ADMIN — SODIUM CHLORIDE 500 ML: 9 INJECTION, SOLUTION INTRAVENOUS at 20:32

## 2021-06-07 NOTE — ED PROVIDER NOTES
Is a 25-year-old female with past medical history significant for hypertension, A. fib, rheumatoid arthritis and vertigo who presents emergency department for evaluation of a left-sided facial droop noted by speech therapist.  Of note approximately 2 weeks ago patient had a stroke that left her with dysarthria and right-sided facial droop. Family notes compliance with medication denies any trauma. Patient denies any complaint states she is having no pain. Patient is primarily Tajik-speaking and  used for history and physical.  Family at bedside does note that there has been some swelling around the left eye. Past Medical History:   Diagnosis Date    BPPV (benign paroxysmal positional vertigo)     HTN (hypertension)     Hx of completed stroke     Hyperlipidemia     Hypothyroid     PAF (paroxysmal atrial fibrillation) (HCC)     Rheumatoid arthritis (HCC)     Scoliosis        Past Surgical History:   Procedure Laterality Date    HX  SECTION      HX ORTHOPAEDIC      bunions removed x 2         Family History:   Problem Relation Age of Onset    Stroke Father        Social History     Socioeconomic History    Marital status:      Spouse name: Not on file    Number of children: Not on file    Years of education: Not on file    Highest education level: Not on file   Occupational History    Not on file   Tobacco Use    Smoking status: Never Smoker    Smokeless tobacco: Never Used   Substance and Sexual Activity    Alcohol use: No    Drug use: No    Sexual activity: Never   Other Topics Concern    Not on file   Social History Narrative    Not on file     Social Determinants of Health     Financial Resource Strain:     Difficulty of Paying Living Expenses:    Food Insecurity:     Worried About Running Out of Food in the Last Year:     Ran Out of Food in the Last Year:    Transportation Needs:     Lack of Transportation (Medical):      Lack of Transportation (Non-Medical):    Physical Activity:     Days of Exercise per Week:     Minutes of Exercise per Session:    Stress:     Feeling of Stress :    Social Connections:     Frequency of Communication with Friends and Family:     Frequency of Social Gatherings with Friends and Family:     Attends Faith Services:     Active Member of Clubs or Organizations:     Attends Club or Organization Meetings:     Marital Status:    Intimate Partner Violence:     Fear of Current or Ex-Partner:     Emotionally Abused:     Physically Abused:     Sexually Abused: ALLERGIES: Shellfish containing products    Review of Systems   Constitutional: Negative for chills and fever. HENT: Positive for trouble swallowing (On puréed foods). Eyes: Negative for photophobia. Respiratory: Negative for shortness of breath and stridor. Gastrointestinal: Negative for vomiting. Musculoskeletal: Negative for neck pain and neck stiffness. Skin: Negative for rash. Neurological: Positive for speech difficulty. Negative for syncope and headaches. Hematological: Does not bruise/bleed easily. Psychiatric/Behavioral: Negative. There were no vitals filed for this visit. Physical Exam  Vitals and nursing note reviewed. Constitutional:       Comments: Elderly, deconditioned   HENT:      Head: Normocephalic and atraumatic. Right Ear: External ear normal.      Left Ear: External ear normal.      Nose: Nose normal.      Mouth/Throat:      Mouth: Mucous membranes are moist.   Eyes:      General: No scleral icterus. Cardiovascular:      Rate and Rhythm: Normal rate. Rhythm irregular. Heart sounds: Murmur heard. No friction rub. Pulmonary:      Effort: Pulmonary effort is normal.      Breath sounds: Normal breath sounds. Abdominal:      Palpations: Abdomen is soft. Musculoskeletal:         General: No deformity. Cervical back: Neck supple. Right lower leg: No edema.       Left lower leg: No edema. Skin:     General: Skin is warm and dry. Neurological:      Mental Status: She is alert. Comments: Right-sided facial droop appearance somewhat complicated by the fact that the patient has no teeth. Is able to smile fairly equally however. Patient is dysarthric which apparently is at her baseline. Psychiatric:         Mood and Affect: Mood normal.         Behavior: Behavior normal.          MDM  Number of Diagnoses or Management Options  Arthritis  Cerebrovascular accident (CVA), unspecified mechanism ([facility] [address]): new and requires workup  Hypokalemia: new and requires workup  LFTs abnormal: new and requires workup  Renal insufficiency: new and requires workup  Diagnosis management comments: Unfortunately MRI demonstrates another stroke as such we will admit for further observation       Amount and/or Complexity of Data Reviewed  Clinical lab tests: reviewed and ordered  Tests in the radiology section of CPT®: reviewed and ordered  Decide to obtain previous medical records or to obtain history from someone other than the patient: yes  Discuss the patient with other providers: yes  Independent visualization of images, tracings, or specimens: yes    Risk of Complications, Morbidity, and/or Mortality  Presenting problems: high  Diagnostic procedures: high  Management options: high  General comments: Question embolic source for strokes. Patient denies any chest pain or shortness of breath. No fevers. Patient Progress  Patient progress: stable    ED Course as of Jun 07 2319   Mon Jun 07, 2021   1707 She obtained at 1641 showing atrial fibrillation, rate 72, T wave inversion in anterior leads which is new    [JE]      ED Course User Index  [JE] Kwame Burgess MD       Procedures      Perfect Serve Consult for Admission  11:19 PM    ED Room Number: ER14/14  Patient Name and age:  Wilian Chambers 80 y.o.  female  Working Diagnosis:   1. LFTs abnormal    2. Renal insufficiency    3.  Arthritis 4. Hypokalemia    5. Cerebrovascular accident (CVA), unspecified mechanism ([address]        COVID-19 Suspicion:  no  Sepsis present:  no  Reassessment needed: no  Code Status:  Do Not Resuscitate  Readmission: yes  Isolation Requirements:  no  Recommended Level of Care:  telemetry  Department:[facility] ED - [phone]  Other: A 57-year-old female with past medical history significant for stroke about 2 weeks ago that left her dysarthric who presents the emergency department for left-sided facial droop noted by speech therapist.  Unfortunately MRI shows acute infarct. Patient has been on Eliquis since discharge.

## 2021-06-07 NOTE — ED TRIAGE NOTES
Patient arrives via EMS for left sided facial droop, unknown LKW. Right sided weakness with right facial droop  with slurred speech as a residual from CVA 2 weeks ago. right  droop not noted at this time.  per EMS. History of A. Fib, takes elequis. Patient A&O to person and possible place, patient is Mongolian speaking, interpretor 0537 used for triage. Per interpretor unable to determine answers due to aphasia.

## 2021-06-08 ENCOUNTER — APPOINTMENT (OUTPATIENT)
Dept: NON INVASIVE DIAGNOSTICS | Age: 86
DRG: 065 | End: 2021-06-08
Attending: HOSPITALIST
Payer: MEDICARE

## 2021-06-08 PROBLEM — R29.810 FACIAL DROOP: Status: ACTIVE | Noted: 2021-06-08

## 2021-06-08 PROBLEM — R79.89 ELEVATED LFTS: Status: ACTIVE | Noted: 2021-06-08

## 2021-06-08 PROBLEM — I50.32 DIASTOLIC CHF, CHRONIC (HCC): Status: ACTIVE | Noted: 2020-12-18

## 2021-06-08 PROBLEM — I48.20 CHRONIC ATRIAL FIBRILLATION (HCC): Status: ACTIVE | Noted: 2021-03-24

## 2021-06-08 LAB
ATRIAL RATE: 241 BPM
CALCULATED R AXIS, ECG10: -51 DEGREES
CALCULATED T AXIS, ECG11: -44 DEGREES
DIAGNOSIS, 93000: NORMAL
ECHO IVC PROX: 2.08 CM
ECHO LA MAJOR AXIS: 3.63 CM
ECHO LA MINOR AXIS: 2.5 CM
ECHO LV INTERNAL DIMENSION DIASTOLIC: 3.2 CM (ref 3.9–5.3)
ECHO LV INTERNAL DIMENSION SYSTOLIC: 2.22 CM
ECHO LV IVSD: 0.8 CM (ref 0.6–0.9)
ECHO LV MASS 2D: 64.8 G (ref 67–162)
ECHO LV MASS INDEX 2D: 44.7 G/M2 (ref 43–95)
ECHO LV POSTERIOR WALL DIASTOLIC: 0.79 CM (ref 0.6–0.9)
ECHO TV REGURGITANT MAX VELOCITY: 252.6 CM/S
ECHO TV REGURGITANT PEAK GRADIENT: 25.52 MMHG
GLUCOSE BLD STRIP.AUTO-MCNC: 107 MG/DL (ref 65–117)
GLUCOSE BLD STRIP.AUTO-MCNC: 169 MG/DL (ref 65–117)
GLUCOSE BLD STRIP.AUTO-MCNC: 215 MG/DL (ref 65–117)
GLUCOSE BLD STRIP.AUTO-MCNC: 216 MG/DL (ref 65–117)
Q-T INTERVAL, ECG07: 452 MS
QRS DURATION, ECG06: 80 MS
QTC CALCULATION (BEZET), ECG08: 494 MS
SERVICE CMNT-IMP: ABNORMAL
SERVICE CMNT-IMP: NORMAL
VENTRICULAR RATE, ECG03: 72 BPM

## 2021-06-08 PROCEDURE — 74011636637 HC RX REV CODE- 636/637: Performed by: HOSPITALIST

## 2021-06-08 PROCEDURE — 65660000000 HC RM CCU STEPDOWN

## 2021-06-08 PROCEDURE — 97116 GAIT TRAINING THERAPY: CPT

## 2021-06-08 PROCEDURE — 82962 GLUCOSE BLOOD TEST: CPT

## 2021-06-08 PROCEDURE — 51798 US URINE CAPACITY MEASURE: CPT

## 2021-06-08 PROCEDURE — 93325 DOPPLER ECHO COLOR FLOW MAPG: CPT | Performed by: INTERNAL MEDICINE

## 2021-06-08 PROCEDURE — 74011250637 HC RX REV CODE- 250/637: Performed by: INTERNAL MEDICINE

## 2021-06-08 PROCEDURE — 97162 PT EVAL MOD COMPLEX 30 MIN: CPT

## 2021-06-08 PROCEDURE — 99223 1ST HOSP IP/OBS HIGH 75: CPT | Performed by: PSYCHIATRY & NEUROLOGY

## 2021-06-08 PROCEDURE — 74011250637 HC RX REV CODE- 250/637: Performed by: HOSPITALIST

## 2021-06-08 PROCEDURE — 97535 SELF CARE MNGMENT TRAINING: CPT

## 2021-06-08 PROCEDURE — 93321 DOPPLER ECHO F-UP/LMTD STD: CPT

## 2021-06-08 PROCEDURE — 92610 EVALUATE SWALLOWING FUNCTION: CPT | Performed by: SPEECH-LANGUAGE PATHOLOGIST

## 2021-06-08 PROCEDURE — 97165 OT EVAL LOW COMPLEX 30 MIN: CPT

## 2021-06-08 PROCEDURE — 74011250636 HC RX REV CODE- 250/636: Performed by: HOSPITALIST

## 2021-06-08 PROCEDURE — 77030038269 HC DRN EXT URIN PURWCK BARD -A

## 2021-06-08 PROCEDURE — 93308 TTE F-UP OR LMTD: CPT | Performed by: INTERNAL MEDICINE

## 2021-06-08 PROCEDURE — 97530 THERAPEUTIC ACTIVITIES: CPT

## 2021-06-08 PROCEDURE — 93321 DOPPLER ECHO F-UP/LMTD STD: CPT | Performed by: INTERNAL MEDICINE

## 2021-06-08 RX ORDER — METOPROLOL SUCCINATE 50 MG/1
150 TABLET, EXTENDED RELEASE ORAL DAILY
Status: DISCONTINUED | OUTPATIENT
Start: 2021-06-09 | End: 2021-06-08

## 2021-06-08 RX ORDER — ASPIRIN 300 MG/1
300 SUPPOSITORY RECTAL DAILY
Status: DISCONTINUED | OUTPATIENT
Start: 2021-06-08 | End: 2021-06-08 | Stop reason: SDUPTHER

## 2021-06-08 RX ORDER — LABETALOL HCL 20 MG/4 ML
5 SYRINGE (ML) INTRAVENOUS
Status: DISCONTINUED | OUTPATIENT
Start: 2021-06-08 | End: 2021-06-09 | Stop reason: HOSPADM

## 2021-06-08 RX ORDER — GUAIFENESIN 100 MG/5ML
81 LIQUID (ML) ORAL DAILY
Status: DISCONTINUED | OUTPATIENT
Start: 2021-06-08 | End: 2021-06-09 | Stop reason: HOSPADM

## 2021-06-08 RX ORDER — FERROUS GLUCONATE 324(38)MG
1 TABLET ORAL
Status: DISCONTINUED | OUTPATIENT
Start: 2021-06-09 | End: 2021-06-09 | Stop reason: HOSPADM

## 2021-06-08 RX ORDER — ACETAMINOPHEN 650 MG/1
650 SUPPOSITORY RECTAL
Status: DISCONTINUED | OUTPATIENT
Start: 2021-06-08 | End: 2021-06-09 | Stop reason: HOSPADM

## 2021-06-08 RX ORDER — MAGNESIUM SULFATE 100 %
4 CRYSTALS MISCELLANEOUS AS NEEDED
Status: DISCONTINUED | OUTPATIENT
Start: 2021-06-08 | End: 2021-06-09 | Stop reason: HOSPADM

## 2021-06-08 RX ORDER — METOPROLOL SUCCINATE 50 MG/1
150 TABLET, EXTENDED RELEASE ORAL DAILY
Status: DISCONTINUED | OUTPATIENT
Start: 2021-06-08 | End: 2021-06-09 | Stop reason: HOSPADM

## 2021-06-08 RX ORDER — ATORVASTATIN CALCIUM 20 MG/1
80 TABLET, FILM COATED ORAL
Status: DISCONTINUED | OUTPATIENT
Start: 2021-06-08 | End: 2021-06-09 | Stop reason: HOSPADM

## 2021-06-08 RX ORDER — TRAZODONE HYDROCHLORIDE 50 MG/1
50 TABLET ORAL
Status: DISCONTINUED | OUTPATIENT
Start: 2021-06-08 | End: 2021-06-09 | Stop reason: HOSPADM

## 2021-06-08 RX ORDER — INSULIN LISPRO 100 [IU]/ML
INJECTION, SOLUTION INTRAVENOUS; SUBCUTANEOUS
Status: DISCONTINUED | OUTPATIENT
Start: 2021-06-08 | End: 2021-06-09 | Stop reason: HOSPADM

## 2021-06-08 RX ORDER — FACIAL-BODY WIPES
10 EACH TOPICAL DAILY PRN
Status: DISCONTINUED | OUTPATIENT
Start: 2021-06-08 | End: 2021-06-09 | Stop reason: HOSPADM

## 2021-06-08 RX ORDER — DEXTROSE 50 % IN WATER (D50W) INTRAVENOUS SYRINGE
25-50 AS NEEDED
Status: DISCONTINUED | OUTPATIENT
Start: 2021-06-08 | End: 2021-06-09 | Stop reason: HOSPADM

## 2021-06-08 RX ORDER — ACETAMINOPHEN 325 MG/1
650 TABLET ORAL
Status: DISCONTINUED | OUTPATIENT
Start: 2021-06-08 | End: 2021-06-09 | Stop reason: HOSPADM

## 2021-06-08 RX ORDER — SODIUM CHLORIDE AND POTASSIUM CHLORIDE .9; .15 G/100ML; G/100ML
SOLUTION INTRAVENOUS CONTINUOUS
Status: DISCONTINUED | OUTPATIENT
Start: 2021-06-08 | End: 2021-06-08

## 2021-06-08 RX ORDER — SODIUM,POTASSIUM PHOSPHATES 280-250MG
2 POWDER IN PACKET (EA) ORAL EVERY 4 HOURS
Status: COMPLETED | OUTPATIENT
Start: 2021-06-08 | End: 2021-06-08

## 2021-06-08 RX ORDER — LEVOTHYROXINE SODIUM 100 UG/1
100 TABLET ORAL
Status: DISCONTINUED | OUTPATIENT
Start: 2021-06-09 | End: 2021-06-09 | Stop reason: HOSPADM

## 2021-06-08 RX ORDER — ONDANSETRON 2 MG/ML
4 INJECTION INTRAMUSCULAR; INTRAVENOUS
Status: DISCONTINUED | OUTPATIENT
Start: 2021-06-08 | End: 2021-06-09 | Stop reason: HOSPADM

## 2021-06-08 RX ADMIN — ATORVASTATIN CALCIUM 80 MG: 20 TABLET, FILM COATED ORAL at 00:31

## 2021-06-08 RX ADMIN — POTASSIUM BICARBONATE 40 MEQ: 782 TABLET, EFFERVESCENT ORAL at 00:31

## 2021-06-08 RX ADMIN — INSULIN LISPRO 2 UNITS: 100 INJECTION, SOLUTION INTRAVENOUS; SUBCUTANEOUS at 11:31

## 2021-06-08 RX ADMIN — TRAZODONE HYDROCHLORIDE 50 MG: 50 TABLET ORAL at 22:09

## 2021-06-08 RX ADMIN — ASPIRIN 81 MG: 81 TABLET, CHEWABLE ORAL at 08:45

## 2021-06-08 RX ADMIN — POTASSIUM & SODIUM PHOSPHATES POWDER PACK 280-160-250 MG 2 PACKET: 280-160-250 PACK at 13:47

## 2021-06-08 RX ADMIN — ACETAMINOPHEN 650 MG: 325 TABLET ORAL at 20:41

## 2021-06-08 RX ADMIN — METOPROLOL SUCCINATE 150 MG: 50 TABLET, EXTENDED RELEASE ORAL at 20:41

## 2021-06-08 RX ADMIN — POTASSIUM & SODIUM PHOSPHATES POWDER PACK 280-160-250 MG 2 PACKET: 280-160-250 PACK at 18:08

## 2021-06-08 RX ADMIN — APIXABAN 2.5 MG: 2.5 TABLET, FILM COATED ORAL at 18:08

## 2021-06-08 RX ADMIN — POTASSIUM CHLORIDE AND SODIUM CHLORIDE: 900; 150 INJECTION, SOLUTION INTRAVENOUS at 00:39

## 2021-06-08 RX ADMIN — ATORVASTATIN CALCIUM 80 MG: 20 TABLET, FILM COATED ORAL at 22:09

## 2021-06-08 NOTE — PROGRESS NOTES
Occupational Therapy:  06/08/21    Orders received, chart reviewed and patient evaluated by occupational therapy. Pending progression with skilled acute occupational therapy, recommend:  Therapy up to 5 days/week in SNF setting or an intensive home health therapy program pending ability for family to provide assistance. Pt would require 24 hour support/assistance if home. Recommend with nursing ADLs with supervision/setup, OOB to chair 3x/day and toileting via beside commode with 1 person assist and walker. Thank you for completing as able in order to maintain patient strength, endurance and independence. Full evaluation to follow.      Thank you,  Immanuel Braden, OTR/L

## 2021-06-08 NOTE — PROGRESS NOTES
1600   Notified Attending MD regarding patient's c/c itching and eye scratching (dryness). Patient also bladder scanned since family reports that patient has the urge to urinate and hurting, but when patient was checked, she stated that she already urinated and denies any pain. Attending MD also notified.

## 2021-06-08 NOTE — PROGRESS NOTES
Admission Medication Reconciliation:     Information obtained from:    Patient's daughter via phone call Mayito Parson  [facility] data available¹:  YES    Comments/Recommendations: The patient was discharged from [facility] on 5/28/21. The patient has her discharge summary in the room with her but she is unable to answer questions about the medications. The pharmacist called the patient's daughter who reports no changes to the medication list since discharge. It is reported that the patient has been compliant with the prescribed regimen with last doses yesterday. ¹[employer] pharmacy benefit data reflects medications filled and processed through the patient's insurance, however   this data does NOT capture whether the medication was picked up or is currently being taken by the patient. Prior to Admission Medications   Prescriptions Last Dose Informant Taking? apixaban (ELIQUIS) 2.5 mg tablet 6/7/2021 at Unknown time Child Yes   Sig: Take 1 Tablet by mouth two (2) times a day. ferrous gluconate 324 mg (37.5 mg iron) tablet 6/7/2021 at Unknown time Child Yes   Sig: Take 1 Tab by mouth Daily (before breakfast). furosemide (LASIX) 20 mg tablet 6/7/2021 at Unknown time Child Yes   Sig: Take 1 tablet by mouth once daily   levothyroxine (SYNTHROID) 100 mcg tablet 6/7/2021 at Unknown time Child Yes   Sig: Take 1 Tab by mouth Daily (before breakfast). losartan (COZAAR) 50 mg tablet 6/7/2021 at Unknown time Child Yes   Sig: Take 50 mg by mouth daily. metoprolol succinate (TOPROL-XL) 50 mg XL tablet 6/7/2021 at Unknown time Child Yes   Sig: Take 3 Tablets by mouth daily for 30 days. pantoprazole (PROTONIX) 40 mg tablet 6/7/2021 at Unknown time Child Yes   Sig: Take 1 Tab by mouth daily.       Facility-Administered Medications: None          Please contact the main inpatient pharmacy with any questions or concerns at [phone] and we will direct you to the clinical pharmacist covering this patient's care while in-house.    Merry Le, PharmD, BCPS

## 2021-06-08 NOTE — PROGRESS NOTES
Yang Vazquez [facility] [address]  [address] [facility], [address] [place] [address]  [phone]      Medical Progress Note      NAME:         Alize Ruzi   :        10/12/1930  MRM:        [id number]    Date of service: 2021      Subjective: Patient has been seen and examined as a follow up for multiple medical issues. Chart, labs, diagnostics reviewed. Patient remains weak. Discussed with her daughters. No new symptoms. No nausea or vomiting    Objective:    Vital Signs:    Visit Vitals  /72   Pulse 95   Temp 97.8 °F (36.6 °C)   Resp 18   Ht 5' 2\" (1.575 m)   Wt 47.6 kg (104 lb 15 oz)   SpO2 99%   BMI 19.19 kg/m²          Intake/Output Summary (Last 24 hours) at 2021 1309  Last data filed at 2021 1128  Gross per 24 hour   Intake 1491.25 ml   Output    Net 1491.25 ml        Physical Examination:    General:   Weak and frail looking, not in any acute distress   Eyes:   pink conjunctivae, PERRLA with no discharge. ENT:   no ottorrhea or rhinorrhea with dry mucous membranes  Neck: no masses, thyroid non-tender and trachea central.  Pulm:  clear breath sounds without crackles or wheezes  Card:  no JVD or murmurs, has atrial fibrillation, without thrills, bruits or peripheral edema  Abd:  Soft, non-tender, non-distended, normoactive bowel sounds   Musc:  No cyanosis, clubbing, atrophy or deformities. Skin:  No rashes, bruising or ulcers. Neuro: Awake and alert.  Speech slurred, facial droop, generally frail   Psych:  Has little insight to her illness     Current Facility-Administered Medications   Medication Dose Route Frequency    ondansetron (ZOFRAN) injection 4 mg  4 mg IntraVENous Q6H PRN    aspirin chewable tablet 81 mg  81 mg Oral DAILY    atorvastatin (LIPITOR) tablet 80 mg  80 mg Oral QHS    labetaloL (NORMODYNE;TRANDATE) 20 mg/4 mL (5 mg/mL) injection 5 mg  5 mg IntraVENous Q10MIN PRN    bisacodyL (DULCOLAX) suppository 10 mg  10 mg Rectal DAILY PRN    acetaminophen (TYLENOL) tablet 650 mg  650 mg Oral Q4H PRN    Or    acetaminophen (TYLENOL) solution 650 mg  650 mg Per NG tube Q4H PRN    Or    acetaminophen (TYLENOL) suppository 650 mg  650 mg Rectal Q4H PRN    0.9% sodium chloride with KCl 20 mEq/L infusion   IntraVENous CONTINUOUS    glucose chewable tablet 16 g  4 Tablet Oral PRN    dextrose (D50W) injection syrg 12.5-25 g  25-50 mL IntraVENous PRN    glucagon (GLUCAGEN) injection 1 mg  1 mg IntraMUSCular PRN    insulin lispro (HUMALOG) injection   SubCUTAneous AC&HS    apixaban (ELIQUIS) tablet 2.5 mg  2.5 mg Oral BID    . PHARMACY TO SUBSTITUTE PER PROTOCOL (Reordered from: ferrous gluconate 324 mg (37.5 mg iron) tablet)    Per Protocol    [START ON 6/9/2021] levothyroxine (SYNTHROID) tablet 100 mcg  100 mcg Oral ACB    potassium, sodium phosphates (NEUTRA-PHOS) packet 2 Packet  2 Packet Oral Q4H     Current Outpatient Medications   Medication Sig    apixaban (ELIQUIS) 2.5 mg tablet Take 1 Tablet by mouth two (2) times a day.  metoprolol succinate (TOPROL-XL) 50 mg XL tablet Take 3 Tablets by mouth daily for 30 days.  ferrous gluconate 324 mg (37.5 mg iron) tablet Take 1 Tab by mouth Daily (before breakfast).  levothyroxine (SYNTHROID) 100 mcg tablet Take 1 Tab by mouth Daily (before breakfast).  losartan (COZAAR) 50 mg tablet Take 50 mg by mouth daily.  furosemide (LASIX) 20 mg tablet Take 1 tablet by mouth once daily    pantoprazole (PROTONIX) 40 mg tablet Take 1 Tab by mouth daily.         Laboratory data and review:    Recent Labs     06/07/21  1649   WBC 6.7   HGB 10.2*   HCT 35.5        Recent Labs     06/07/21  1649      K 3.0*      CO2 32   *   BUN 23*   CREA 1.31*   CA 9.1   MG 2.3   ALB 2.8*   *     No components found for: [place]    Diagnostics: Imaging studies have been reviewed    Telemetry reviewed by me: atrial fibrillation      Assessment and Plan:    Acute CVA (cerebrovascular accident) ([facility] [address] (5/22/2021) POA: Trevor Darling brain showed a small foci of diffusion restriction in the left parietal deep white matter consistent with acute ischemia. Seen by neurology. Continue Asprin, Lipitor, Eliquis. PT, OT and CM for discharge planning to home with [facility]    Hypokalemia due to loss of potassium (12/18/2020) POA: replete K+ and follow lytes    Systolic CHF, chronic ([address] (12/18/2020) POA: recent Echo done 5/2021 showed an EF 20-25%. DC IV fluids. Resume Furosemide. Follow repeat Echo    Chronic atrial fibrillation ([address] (3/24/2021) POA: rate controlled. Resume Eliquis. Hold Toprol today    Elevated LFTs (6/8/2021) POA: mild. Monitor with statin therapy    HTN (hypertension), benign (7/15/2011) POA: allow permissive HTN today given new CVA. Resume Metoprolol in AM    GERD (gastroesophageal reflux disease) (7/15/2011) POA: resume PPi at discharge    Hypothyroidism (7/15/2011) POA: repeat TSH. Resume Levothyroxine    Hyperlipidemia (8/15/2011) POA: check LDL. Continue Lipitor    Rheumatoid arthritis ([address] POA: stable. Tylenol PRN    DM type 2 causing vascular disease (HCC) POA: last A1c 6.9. Diet controlled.  Monitor    Total time spent for the patient's care: [facility] discussed with: Patient, Family, Care Manager and Nursing Staff    Discussed:  Care Plan and D/C Planning    Prophylaxis:  Eliquis    Anticipated Disposition:  SNF/LTC           ___________________________________________________    Attending Physician:   Soctty Martinez MD

## 2021-06-08 NOTE — ROUTINE PROCESS
TRANSFER - IN REPORT: 
 
Verbal report received from Sidra Macdonald RN(name) on Sara Moapa  being received from ED(unit) for routine progression of care Report consisted of patients Situation, Background, Assessment and  
Recommendations(SBAR). Information from the following report(s) SBAR was reviewed with the receiving nurse. Opportunity for questions and clarification was provided. Assessment completed upon patients arrival to unit and care assumed. 1845 Received patient to [facility], room 321. Patient alert, able to tell her name and follow commands, patient placed on monitor, given CHG bath and placed on monitor. NIHSS performed at bedside upon arrival, facial presents with left side facial droop and some slurred speech, difficult to assess as there is a language barrier, however patient able to name various objects shown such as phone and pen. Follows commands and demonstrates no drift in any limbs. No noted visual field cuts. 1900 Bedside and Verbal shift change report given to JENNIFER Iniguez (oncoming nurse) by Cathy Calderon RN (offgoing nurse). Report included the following information SBAR, Kardex and Cardiac Rhythm atrial fibrillation.

## 2021-06-08 NOTE — PROGRESS NOTES
Problem: Mobility Impaired (Adult and Pediatric)  Goal: *Acute Goals and Plan of Care (Insert Text)  Outcome: Progressing Towards Goal  Note: FUNCTIONAL STATUS PRIOR TO ADMISSION: Patient was CGA using a rolling walker for functional mobility. Patient required moderate assistance for basic and instrumental ADLs with dtr assist.    HOME SUPPORT PRIOR TO ADMISSION: The patient lived with dtr to provide assistance. Physical Therapy Goals  Initiated 6/8/2021  1. Patient will move from supine to sit and sit to supine  in bed with minimal assistance/contact guard assist within 7 day(s). 2.  Patient will transfer from bed to chair and chair to bed with minimal assistance/contact guard assist using the least restrictive device within 7 day(s). 3.  Patient will perform sit to stand with minimal assistance/contact guard assist within 7 day(s). 4.  Patient will ambulate with minimal assistance/contact guard assist for 150 feet with the least restrictive device within 7 day(s). 5.  Patient will ascend/descend 5 stairs with 1 handrail(s) with minimal assistance/contact guard assist within 7 day(s). PHYSICAL THERAPY EVALUATION  Patient: Tyler Grimes (79 y.o. female)  Date: 6/8/2021  Primary Diagnosis: Acute CVA (cerebrovascular accident) [facility]) [I63.9]  Facial droop [R29.810]        Precautions:   Fall, DNR    ASSESSMENT  Based on the objective data described below, the patient presents with admission due to 2nd CVA in last 2wks. First presentation with RHP and R facial droop/dysphagia with R parietal infarct. This admission due to L facial droop and UE weakness/L parietal infarct. Pt received supine in ER bed. She is both [place] and [place] speaking from [address] [place] [address] wears dentures of which she does not have therefore greater slurring noted. 50% LUE sh flexion noted with AROM, good coordination noted. BLE appearing intact. Supine to sit with Min A.  Good sitting balance on EOB.  Sit to stand with Min A.  Gait of 6' with RW and Mod A. Pt is HIGH fall risk on Alexander Balance Test.  Toileted with Min A. Attempted self care yet needed assistance. Returned to supine. Recommending rehab vs HHPT due to 2 CVA's in short period of time. If chooses HHPT will need 5xwk. Current Level of Function Impacting Discharge (mobility/balance): Mod A/fair    Functional Outcome Measure: The patient scored 7/56 on the Alexander outcome measure which is indicative of HIGH fall risk. Other factors to consider for discharge: per above      Patient will benefit from skilled therapy intervention to address the above noted impairments. PLAN :  Recommendations and Planned Interventions: bed mobility training, transfer training, gait training, therapeutic exercises, neuromuscular re-education, edema management/control, patient and family training/education, and therapeutic activities      Frequency/Duration: Patient will be followed by physical therapy:  5 times a week to address goals. Recommendation for discharge: (in order for the patient to meet his/her long term goals)  Therapy up to 5 days/week in SNF setting or intensive home health therapy program    This discharge recommendation:  Has not yet been discussed the attending provider and/or case management    IF patient discharges home will need the following DME: has RW         SUBJECTIVE:   Patient stated I live with my dtr.     OBJECTIVE DATA SUMMARY:   HISTORY:    Past Medical History:   Diagnosis Date    BPPV (benign paroxysmal positional vertigo)     HTN (hypertension)     Hx of completed stroke     Hyperlipidemia     Hypothyroid     PAF (paroxysmal atrial fibrillation) (HCC)     Rheumatoid arthritis ([facility] [address]     Scoliosis      Past Surgical History:   Procedure Laterality Date    HX  SECTION      HX ORTHOPAEDIC      bunions removed x 2       Personal factors and/or comorbidities impacting plan of care: per above and below    [address] [facility] Environment: Private residence  # Steps to Enter: 5  Living Alone: No  Support Systems: Child(shyla)  Current DME Used/Available at Home: Commode, bedside, Walker, rolling    EXAMINATION/PRESENTATION/DECISION MAKING:   Critical Behavior:  Neurologic State: Alert  Orientation Level: Oriented to person  Cognition: Decreased attention/concentration     Hearing:     Skin:  IV  Edema:  WNL  Range Of Motion:  AROM: Generally decreased, functional                       Strength:    Strength: Generally decreased, functional                    Tone & Sensation:   Tone: Normal              Sensation: Intact               Coordination:  Coordination: Generally decreased, functional  Vision:      Functional Mobility:  Bed Mobility:  Rolling: Minimum assistance  Supine to Sit: Moderate assistance  Sit to Supine: Moderate assistance  Scooting: Minimum assistance  Transfers:  Sit to Stand: Minimum assistance;Assist x2  Stand to Sit: Minimum assistance;Assist x2                       Balance:   Sitting: Intact; High guard  Standing: Impaired  Standing - Static: Constant support; Fair  Standing - Dynamic : Constant support;Poor  Ambulation/Gait Training:  Distance (ft): 6 Feet (ft)  Assistive Device: Walker, rolling;Gait belt  Ambulation - Level of Assistance:  Moderate assistance        Gait Abnormalities: Decreased step clearance        Base of Support: Narrowed     Speed/Amber: Slow;Pace decreased (<100 feet/min)                   Functional Measure:  Alexander Balance Test:    Sitting to Standin  Standing Unsupported: 0  Sitting with Back Unsupported: 3  Standing to Sittin  Transfers: 0  Standing Unsupported with Eyes Closed: 0  Standing Unsupported with Feet Together: 0  Reach Forward with Outstretched Arm: 0   Object: 0  Turn to Look Over Shoulders: 0  Turn 360 Degrees: 0  Alternate Foot on Step/Stool: 0  Standing Unsupported One Foot in Front: 0  Stand on One Le  Total: 7/56         56=Maximum possible score; 0-20=High fall risk  21-40=Moderate fall risk   41-56=Low fall risk               Physical Therapy Evaluation Charge Determination   History Examination Presentation Decision-Making   HIGH Complexity :3+ comorbidities / personal factors will impact the outcome/ POC  MEDIUM Complexity : 3 Standardized tests and measures addressing body structure, function, activity limitation and / or participation in recreation  MEDIUM Complexity : Evolving with changing characteristics  Other outcome measures seth  HIGH       Based on the above components, the patient evaluation is determined to be of the following complexity level: MEDIUM      Activity Tolerance:   Good    After treatment patient left in no apparent distress:   Supine in bed, Call bell within reach, and Side rails x 3    COMMUNICATION/EDUCATION:   The patients plan of care was discussed with: Occupational therapist and Registered nurse. Fall prevention education was provided and the patient/caregiver indicated understanding., Patient/family have participated as able in goal setting and plan of care. , and Patient/family agree to work toward stated goals and plan of care.     Thank you for this referral.  Lamona Holstein, PT   Time Calculation: 39 mins

## 2021-06-08 NOTE — PROGRESS NOTES
Care Management Readmission Assessment        NAME:   Carole Zuluaga   :     10/12/1930   MRN:     [id number]             RUR Score/Risk Level:       RUR:  29%  Risk Level:  High   Bundle: Yes- CHF    Assessment:  Via phone with daughter Lorine Epley    Reason for Readmission:  Ms. Luz Barrow is a 80 y.o. female with history that includes DM, CVA, AFIB, HTN, HLD and hypothyroidism  who was emergently admitted for:  acute CVA    Patient Active Problem List   Diagnosis Code    HTN (hypertension), benign I10    GERD (gastroesophageal reflux disease) K21.9    Osteopenia M85.80    Diabetic retinopathy ([facility] [address] E11.319    Hypothyroidism E03.9    Hyperlipidemia E78.5    Menieres disease H81.09    Scoliosis M41.9    Rheumatoid arthritis ([facility] [address] M06.9    Hypothyroid E03.9    Hx of completed stroke Z80.78    DM type 2 causing vascular disease ([facility] [address] E11.59    Hypokalemia due to loss of potassium Z27.2    Diastolic CHF, chronic (HCC) I50.32    Pleural effusion J90    Iron deficiency anemia D50.9    Chronic atrial fibrillation (HCC) I48.20    Acute CVA (cerebrovascular accident) ([facility] [address] I63.9    Facial droop R29.810    Elevated LFTs R79.89       Has any pertinent information changed since previous Care Management Assessment? Living arrangements:  Pt lives at home with her daughter, Lorine Epley   ADLs:  Pt is dependent with all ADLs   DME:   Pt has rolling walker and hospital bed   Pharmacy:  [facility]. Lorine Epley denies problems with Pt obtaining/taking meds. Insurer:  Payor: [place] MEDICARE / Plan: [place] MEDICARE PART A & B / Product Type: Medicare /      BCPI-A letter regarding Heart Failure has been delivered to the patient/caregiver.     PCP: Mitzy Espinoza MD   Name of Practice:  [address] [place] [address]   Current patient: Yes   Approximate date of last visit: 21   Access to virtual PCP visits:   No    Is a Care Conference indicated:   No    Did you attend your follow up appointment(s):  Yes  If not, why not: Resources/supports as identified by patient/family:  Pt has a supportive family         Top Challenges facing patient (as identified by patient/family and CM): Finances/Medication cost?  None identified  Transportation? None identified       Support system or lack thereof? None identified     Living arrangements? None identified         Self-care/ADLs/Cognition? None identified       Advance Directive:  DNR, does not have an advance directive. Plan for utilizing home health:  Yes- Pt is currently open to [facility] services. Transition of Care Plan:      Home with Home Health - Provider: Piedad    Additional information:  Pt was discharged from [facility] on 05/28. CM completed initial assessment with Pt's daughter, [facility]. Pt lives at home with [facility]. Pt has 24/7 care between family and personal care services. Pt has personal care 8hrs/day. [facility] is unable recall name of personal care agency. Pt has [facility] through [employer]. Pt has no hx of home O2. Pt has a rolling walker and hospital bed. Pt is dependent on family/caregivers for ADLs. Pt's PCP is Deanna Douglass MD. Last appt was 06/01/21. Next appt is scheduled for 06/28/21. Pt's Cardiologist is Dr. Beena Madrigal. Last appt was 04/05/21. Next appt is scheduled for 06/18/21. Pt's GI MD is Dr. Alphonse Rain. [facility] could not recall specific date of last GI appt but reports that it was after last hospital dc on 5/28. Discharge plan is for Pt to return home and resume [facility] services. [facility] states family will transport Pt home. CM spoke with [facility] [place] ([phone]). Pt is open for [facility] PT/OT/SN/SLP.     4:24 PM  CM received resume HH orders. CM sent orders and medicals via [employer] with message indicating probable discharge tomorrow.  CM received message back confirming [facility] will accept upon discharge.    _____________________________________  [facility] - Care Management  6/8/2021   10:12 AM     Readmission Assessment  Number of days since last admission?: 8-30 days  Previous disposition: Home with Home Health  Who is being interviewed?: Caregiver  What was the patient's/caregiver's perception as to why they think they needed to return back to the hospital?: Other (Comment) (Pt was dehydrated)  Did you visit your Primary Care Physician after you left the hospital, before you returned this time?: Yes  Did you see a specialist, such as Cardiac, Pulmonary, Orthopedic Physician, etc. after you left the hospital?: Yes  Who advised the patient to return to the hospital?: Physician  Does the patient report anything that got in the way of taking their medications?: No  In our efforts to provide the best possible care to you and others like you, can you think of anything that we could have done to help you after you left the hospital the first time, so that you might not have needed to return so soon?: Other (Comment) (\"she just got sick again\")      Care Management Interventions  PCP Verified by CM: Yes Tremayne Frost MD)  Last Visit to PCP: 06/01/21  Mode of Transport at Discharge:  Other (see comment) (daughter)  Transition of Care Consult (CM Consult): [address]: No  Reason Outside [place]: Patient already serviced by other home care/hospice agency  MyChart Signup: No  Discharge Durable Medical Equipment: No  Physical Therapy Consult: Yes  Occupational Therapy Consult: Yes  Speech Therapy Consult: Yes  Current Support Network: Relative's Home  Confirm Follow Up Transport: Family  Discharge Location  Discharge Placement: Home with home health

## 2021-06-08 NOTE — PROGRESS NOTES
Problem: Dysphagia (Adult)  Goal: *Acute Goals and Plan of Care (Insert Text)  Description: Initiated 6/8/2021  1. Patient will tolerate puree diet, mildly thick liquids free of s/s of aspiration or respiratory sequelae within 7 days. Outcome: Not Met   SPEECH LANGUAGE PATHOLOGY BEDSIDE SWALLOW EVALUATION  Patient: Tim Castanon (80 y.o. female)  Date: 6/8/2021  Primary Diagnosis: Acute CVA (cerebrovascular accident) ([facility] [address] [I63.9]  Facial droop [R29.810]        Precautions:    Fall, DNR    ASSESSMENT :  Based on the objective data described below, the patient presents with mild oral and suspected mild to moderate pharyngeal dysphagia. Prolonged oral manipulation for purees with functional oral clearance. Solids deferred given oral dysphagia and lack of dentures. No overt s/s of aspiration noted with nectar thickened liquids. Thins deferred given recent MBS showing aspiration of thin liquids 5/25/21. Patient s/p recent CVA, in because her SLP noticed L sided facial droop. Head CT: 1.  Small foci of diffusion restriction in the left parietal deep white matter consistent with acute ischemia. 2.  Extensive chronic small vessel ischemic disease. Speech imprecise. Unclear whether this is related to her current medical picture, or lack of dentition. Will follow for further assessment if this is appropriate. Patient will benefit from skilled intervention to address the above impairments. Patients rehabilitation potential is considered to be Good     PLAN :  Recommendations and Planned Interventions:  Puree diet, mildly thick liquids  SLP will follow for diet advancement as appropriate. Frequency/Duration: Patient will be followed by speech-language pathology 3 times a week to address goals. Discharge Recommendations: To Be Determined     SUBJECTIVE:   Patient stated Gladys Villar, \" when asked her name.     OBJECTIVE:     Past Medical History:   Diagnosis Date    BPPV (benign paroxysmal positional vertigo) HTN (hypertension)     Hx of completed stroke     Hyperlipidemia     Hypothyroid     PAF (paroxysmal atrial fibrillation) (HCC)     Rheumatoid arthritis ([facility] [address]     Scoliosis      Past Surgical History:   Procedure Laterality Date    HX  SECTION      HX ORTHOPAEDIC      bunions removed x 2     Prior Level of Function/Home Situation:    Home Situation  Home Environment: Private residence  # Steps to Enter: 5  Living Alone: No  Support Systems: Child(shyla)  Current DME Used/Available at Home: [employer], bedside, Walker, rolling  Diet prior to admission: puree, mildly thick  Current Diet:  puree, mildly thick   Cognitive and Communication Status:  Neurologic State: Alert  Orientation Level: Oriented to person  Cognition: Follows commands     Perseveration: No perseveration noted     Oral Assessment:  Oral Assessment  Labial: Right droop  Dentition: Edentulous  Oral Hygiene: yellow coating on tongue  Lingual: No impairment  Mandible: No impairment  P.O. Trials:  Patient Position: up in bed  Vocal quality prior to P.O.: No impairment  Consistency Presented: Nectar thick liquid;Puree  How Presented: SLP-fed/presented;Spoon;Straw     Bolus Acceptance: No impairment  Bolus Formation/Control: Impaired (prolonged)  Type of Impairment: Delayed  Propulsion: Delayed (# of seconds)  Oral Residue: None        Aspiration Signs/Symptoms: None                        NOMS:   The NOMS functional outcome measure was used to quantify this patient's level of swallowing impairment. Based on the NOMS, the patient was determined to be at level 4 for swallow function       NOMS Swallowing Levels:  Level 1 (CN): NPO  Level 2 (CM): NPO but takes consistency in therapy  Level 3 (CL): Takes less than 50% of nutrition p.o. and continues with nonoral feedings; and/or safe with mod cues; and/or max diet restriction  Level 4 (CK):  Safe swallow but needs mod cues; and/or mod diet restriction; and/or still requires some nonoral feeding/supplements  Level 5 (CJ): Safe swallow with min diet restriction; and/or needs min cues  Level 6 (CI): Independent with p.o.; rare cues; usually self cues; may need to avoid some foods or needs extra time  Level 7 ([address] [place] [address]): Independent for all p.o.  MAYRA. (2003). National Outcomes Measurement System (NOMS): Adult Speech-Language Pathology User's Guide. Pain:  Pain Scale 1: Numeric (0 - 10)  Pain Intensity 1: 0       After treatment:   Patient left in no apparent distress in bed, Call bell within reach, and Nursing notified    COMMUNICATION/EDUCATION:   Patient was educated regarding purpose of SLP visit. She agreed to participate. The patient's plan of care including recommendations, planned interventions, and recommended diet changes were discussed with: Registered nurse. Patient is unable to participate in goal setting and plan of care.     Thank you for this referral.  Madeleine Del Real, SLP  Time Calculation: 13 mins

## 2021-06-08 NOTE — H&P
[facility]  [address], [facility] [address]  [phone]     Hospitalist Admission Note      NAME: Joseph Trimble   :  10/12/1930   MRN:  [id number]     Date/Time:  2021 12:15 AM    Patient PCP: Jak García MD    Emergency Contact:    Extended Emergency Contact Information  Primary Emergency Contact: [address] Phone: [phone]  Relation: Daughter  Secondary Emergency Contact: [address] Phone: [phone]  Relation: Daughter      Code: DNR    Isolation Precautions: There are currently no Active Isolations        Subjective:     CHIEF COMPLAINT: Left-sided facial droop    HISTORY OF PRESENT ILLNESS:     Ms. Molly Lau is a 80 y.o. female with history that includes diet-controlled diabetes, atrial fibrillation on Eliquis, HTN, hypothyroidism, and recent CVA discharged from this facility on  returns today to the ER because the speech therapist noticed a left-sided facial droop. Patient reports no new symptoms, however, she was found to have what appears to have acute left sided CVA. Daughter Stan Pinto who was at bedside reports that she had been on Eliquis for her A. fib which was stopped last month and soon after had a CVA and is back on the Eliquis now. Patient has no other complaints. On my exam the patient did have a right-sided droop I had a hard time seeing if she had a left-sided droop but she does not have her dentures in at this time. Allergies   Allergen Reactions    Shellfish Containing Products Swelling       Prior to Admission medications    Medication Sig Start Date End Date Taking? Authorizing Provider   apixaban (ELIQUIS) 2.5 mg tablet Take 1 Tablet by mouth two (2) times a day. 21   Alan Paiz MD   metoprolol succinate (TOPROL-XL) 50 mg XL tablet Take 3 Tablets by mouth daily for 30 days.  21  Alan Paiz MD   ferrous gluconate 324 mg (37.5 mg iron) tablet Take 1 Tab by mouth Daily (before breakfast). 3/24/21   Frances Puente MD   levothyroxine (SYNTHROID) 100 mcg tablet Take 1 Tab by mouth Daily (before breakfast). 3/25/21   Frances Puente MD   losartan (COZAAR) 50 mg tablet Take 50 mg by mouth daily. Provider, Historical   furosemide (LASIX) 20 mg tablet Take 1 tablet by mouth once daily 21   Susan Eduardo,    pantoprazole (PROTONIX) 40 mg tablet Take 1 Tab by mouth daily. 14   Ameya Stein MD       Past Medical History:   Diagnosis Date    BPPV (benign paroxysmal positional vertigo)     HTN (hypertension)     Hx of completed stroke     Hyperlipidemia     Hypothyroid     PAF (paroxysmal atrial fibrillation) (HCC)     Rheumatoid arthritis ([facility] [address]     Scoliosis         Past Surgical History:   Procedure Laterality Date    HX  SECTION      HX ORTHOPAEDIC      bunions removed x 2       Social History     Tobacco Use    Smoking status: Never Smoker    Smokeless tobacco: Never Used   Substance Use Topics    Alcohol use: No        Family History   Problem Relation Age of Onset    Stroke Father          [address] and medications were reviewed.     Review of Systems (14 point ROS):  (bold if positive, if negative)    Gen:   , , , , , , Eyes:  , , ENT:   , , , , CVS:   , , , , , , , Pulm: , , , , GI:       , , , , , , , :     , , , , MS:     , , , Skin:   , , , , Psy:    , , , , , , Endo: , , , Hem:  , , , Jesus Manuel:   , , , , Maame:   , , right lower extremity weakness from previous stroke, , , , ,         Objective:      Visit Vitals  BP (!) 157/81   Pulse 68   Temp 98 °F (36.7 °C)   Resp 19   Wt 47.6 kg (105 lb)   SpO2 99%   BMI 19.20 kg/m²       Exam:     Physical Exam:    General: alert, well appearing, and in no distress  Head: Normocephalic, atraumatic  Eyes: PERRL and EOMI sclera clear  ENT: Lips, mucosa, and tongue normal.   Neck: supple, no tenderness  Lungs: normal symmetric expansion - clear to auscultation, no stridor, rales, rhonchi, or wheezes heard  Heart: S1-S2, irregularly irregular  Abd: SNTBS(+)  Ext: no cyanosis, no  edema   Pulses: 2+ and symmetric  Skin: Skin color, texture, turgor normal. No rashes or lesions  Neuro: Alert and oriented x3, cranial nerves II through XII grossly intact, right-sided 3 out of 5 weakness in the right lower extremity. Psych: Not anxious, cooperative, normal affect    Labs:    Recent Labs     06/07/21  1649   WBC 6.7   HGB 10.2*   HCT 35.5        Recent Labs     06/07/21  1649      K 3.0*      CO2 32   *   BUN 23*   CREA 1.31*   CA 9.1   MG 2.3   ALB 2.8*   TBILI 0.4   *     Lab Results   Component Value Date/Time    Glucose (POC) 231 (H) 05/28/2021 12:04 PM    Glucose (POC) 83 05/28/2021 07:59 AM       Radiology and EKG reviewed:       XR FOOT LT MIN 3 V    Result Date: 6/7/2021  No acute abnormality. Chronic and postsurgical changes as above. XR FOOT RT MIN 3 V    Result Date: 6/7/2021  No acute abnormality. Chronic and postsurgical changes as above. CT HEAD WO CONT    Result Date: 6/7/2021  No evidence for acute intracranial abnormality. Mild left maxillary sinus disease      MRI CODE NEURO BRAIN WO CONT    Result Date: 6/7/2021  1. Small foci of diffusion restriction in the left parietal deep white matter consistent with acute ischemia. 2.  Extensive chronic small vessel ischemic disease. I personally reviewed and interpreted the imaging studies and agree with official reading. **Chart reviewed in [facility]**       Assessment/Plan:      Acute CVA / Facial droop:  Admit for further work-up and treatment. Stroke order set. Permissive hypertension for the first 24 hours. NPO until bedside evaluation is performed. Oxygen per protocol with pulse oximetry. Aspirin, statin, antiemetics, and supportive care. Labs:  CMP, CBC, already had most of the stroke order set labs last month.   Imaging studies: MRI of brain  Consults: Neurology, speech eric, PT OT, pharmacy for med rec. Chronic a-fib: Continue with Eliquis. Hold off on rate control medications due to permissive hypertension    DM type 2 causing vascular disease: Diet controlled. We will go ahead and order glucose monitoring along with insulin sliding scale coverage.      Body mass index is 19.2 kg/m².: 18.5 - 24.9 Normal weight        Risk of deterioration: high      Total time spent with patient: 79 Minutes **I personally saw and examined the patient during this time period**    Discussed:  Code Status and Care Plan discussed with:  [x]Patient  [x]Family  []Care Giver  [x]ED Doc  [x]RN  []Specialist  []Care Manager     Prophylaxis:  SCD's    Probable disposition:  Home w/Family and HH PT, OT, RN    Date of service:    6/8/2021                ___________________________________________________    Admitting Physician: Clarissa Coombs MD

## 2021-06-08 NOTE — CONSULTS
Neurology Consult - Inpatient      Name:   Pawel Mariee  MRN:    [id number]    Date of Admission:  6/7/2021    Date of Consultation:  06/08/21       HISTORY OF PRESENT ILLNESS:     This is a 80 y.o. female with  has a past medical history of BPPV (benign paroxysmal positional vertigo), HTN (hypertension), completed stroke, Hyperlipidemia, Hypothyroid, PAF (paroxysmal atrial fibrillation) ([address] Rheumatoid arthritis ([address] and Scoliosis. She also has no past medical history of Sun-damaged skin. [facility] for new stroke symptoms. Pt is sitting up bed, eating breakfast.      Daughter says that pt's speech therapist visited her on Saturday, saying she appeared dehydrated. Sent a message to PCP. PCP called back yesterday and advised Dtr to bring pt back to ER for evaluation of possble dehydration. Daughter says that pt's home caregiver noticed that left eye was closed/ slow to open/ and the night before she had some gravelly sensation in left eye. Other than that, no neurologic changes. Her last exam showed no dysarthria, had minimal right facial droop at rest, smile was symmetric, mild weakness in right arm 4+/5, other extremities were 5/5. MRI Brain shows small foci of diffusion restriction in left parietal lobe, consistent with acute ischemia. I reviewed the recent MRI images and compared to her last Brain MRI from May 2021 when she had last stroke (minimal acute infarction in the posterior left insular cortex, and posterior left frontal lobe). Current/ Recent stroke looks to be adjacent to last stroke. Pertinent home meds include: Eliquis 2.5 mg BID (due to hx of anemia, blood loss) and a betablocker. ASA 81 mg/ day and Lipitor 80 mg QHS was added during this admission.       Daughter also reports pt has had insomnia since being discharged after last stroke      Complete Review of Systems: reviewed on admission H&P    ====================================     Allergies Allergen Reactions    Shellfish Containing Products Swelling       Current Outpatient Medications   Medication Instructions    apixaban (ELIQUIS) 2.5 mg, Oral, 2 TIMES DAILY    ferrous gluconate 324 mg (37.5 mg iron) tablet 1 Tablet, Oral, DAILY BEFORE BREAKFAST    furosemide (LASIX) 20 mg tablet Take 1 tablet by mouth once daily    levothyroxine (SYNTHROID) 100 mcg, Oral, DAILY BEFORE BREAKFAST    losartan (COZAAR) 50 mg, Oral, DAILY    metoprolol succinate (TOPROL-XL) 150 mg, Oral, DAILY    pantoprazole (PROTONIX) 40 mg, Oral, DAILY       Current Facility-Administered Medications   Medication Dose Route Frequency Provider Last Rate Last Admin    ondansetron (ZOFRAN) injection 4 mg  4 mg IntraVENous Q6H PRN Jose Bautista MD        aspirin chewable tablet 81 mg  81 mg Oral DAILY Jose Bautista MD   81 mg at 06/08/21 0845    atorvastatin (LIPITOR) tablet 80 mg  80 mg Oral QHS Jose Bautista MD   80 mg at 06/08/21 0031    labetaloL (NORMODYNE;TRANDATE) 20 mg/4 mL (5 mg/mL) injection 5 mg  5 mg IntraVENous Q10MIN PRN Jose Bautista MD        bisacodyL (DULCOLAX) suppository 10 mg  10 mg Rectal DAILY PRN Jose Bautista MD        acetaminophen (TYLENOL) tablet 650 mg  650 mg Oral Q4H PRN Jose Bautista MD        Or    acetaminophen (TYLENOL) solution 650 mg  650 mg Per NG tube Q4H PRAJ Bautista MD        Or    acetaminophen (TYLENOL) suppository 650 mg  650 mg Rectal Q4H PRAJ Bautista MD        0.9% sodium chloride with KCl 20 mEq/L infusion   IntraVENous CONTINUOUS Jose Bautista MD 75 mL/hr at 06/08/21 0039 New Bag at 06/08/21 0039    glucose chewable tablet 16 g  4 Tablet Oral PRAJ Bautista MD        dextrose (D50W) injection syrg 12.5-25 g  25-50 mL IntraVENous PRAJ Bautista MD        glucagon [facility]) injection 1 mg  1 mg IntraMUSCular PRAJ Bautista MD        insulin lispro (HUMALOG) injection   SubCUTAneous AC&HS Bradley Verma MD         Current Outpatient Medications   Medication Sig Dispense Refill    apixaban (ELIQUIS) 2.5 mg tablet Take 1 Tablet by mouth two (2) times a day. 60 Tablet 0    metoprolol succinate (TOPROL-XL) 50 mg XL tablet Take 3 Tablets by mouth daily for 30 days. 90 Tablet 0    ferrous gluconate 324 mg (37.5 mg iron) tablet Take 1 Tab by mouth Daily (before breakfast). 30 Tab 0    levothyroxine (SYNTHROID) 100 mcg tablet Take 1 Tab by mouth Daily (before breakfast). 30 Tab 0    losartan (COZAAR) 50 mg tablet Take 50 mg by mouth daily.  furosemide (LASIX) 20 mg tablet Take 1 tablet by mouth once daily 30 Tab 0    pantoprazole (PROTONIX) 40 mg tablet Take 1 Tab by mouth daily. 30 Tab 1       PSHx  has a past surgical history that includes hx orthopaedic and hx  section. SocHx  reports that she has never smoked. She has never used smokeless tobacco. She reports that she does not drink alcohol and does not use drugs. FHx family history includes Stroke in her father. PHYSICAL EXAM    Patient Vitals for the past 4 hrs:   Temp Pulse Resp BP SpO2   21 0806 97.8 °F (36.6 °C) 95 18 (!) 151/93 99 %         General: Head: normocephalic, atraumatic. Eyes: conjunctiva clear. Neck: supple. Lungs: not examined. CV: not examined. Extremities: no edema. Skin: No rashes    Neurologic Exam    Mental status: alert. Orientation: cannot assess due to dysarthria. Speech: dysarthria; no aphasia     Cranial Nerves:  CN 1: not tested. CN 2: PERRL, Visual Fields normal bilaterally. CN 3/ CN 4/ CN 6: EOMI, No ROGERIO, No ptosis. CN 5: intact LT V1-2-3 on right; intact LT V1-2-3 on left. CN 7: right lower facial weakness (chronic). CN 8: normal hearing. CN 9/ CN 10: not examined. CN 11: shrug is symmetric.  CN 12: not examined    Motor: 5/5 on left. 4+/5 on right arm and leg    Sensory: intact LT in all exts      Cerebellar: no resting, postural, or intention tremors.   DTRs: 1+ .  Plantar response: not tested. Gait: not tested. Romberg: not tested      Labs/ Radiology     Labs:      Recent Results (from the past 24 hour(s))   CBC WITH AUTOMATED DIFF    Collection Time: 06/07/21  4:49 PM   Result Value Ref Range    WBC 6.7 3.6 - 11.0 K/uL    RBC 5.02 3.80 - 5.20 M/uL    HGB 10.2 (L) 11.5 - 16.0 g/dL    HCT 35.5 35.0 - 47.0 %    MCV 70.7 (L) 80.0 - 99.0 FL    MCH 20.3 (L) 26.0 - 34.0 PG    MCHC 28.7 (L) 30.0 - 36.5 g/dL    RDW 19.9 (H) 11.5 - 14.5 %    PLATELET 957 033 - 787 K/uL    MPV 10.2 8.9 - 12.9 FL    NRBC 0.0 0  WBC    ABSOLUTE NRBC 0.00 0.00 - 0.01 K/uL    NEUTROPHILS 69 32 - 75 %    LYMPHOCYTES 20 12 - 49 %    MONOCYTES 11 5 - 13 %    EOSINOPHILS 0 0 - 7 %    BASOPHILS 0 0 - 1 %    IMMATURE GRANULOCYTES 0 0.0 - 0.5 %    ABS. NEUTROPHILS 4.7 1.8 - 8.0 K/UL    ABS. LYMPHOCYTES 1.3 0.8 - 3.5 K/UL    ABS. MONOCYTES 0.7 0.0 - 1.0 K/UL    ABS. EOSINOPHILS 0.0 0.0 - 0.4 K/UL    ABS. BASOPHILS 0.0 0.0 - 0.1 K/UL    ABS. IMM. GRANS. 0.0 0.00 - 0.04 K/UL    DF AUTOMATED      RBC COMMENTS ANISOCYTOSIS  1+        RBC COMMENTS MICROCYTOSIS  1+        RBC COMMENTS HYPOCHROMIA  1+        RBC COMMENTS OVALOCYTES  PRESENT        RBC COMMENTS RBC FRAGMENTS     METABOLIC PANEL, COMPREHENSIVE    Collection Time: 06/07/21  4:49 PM   Result Value Ref Range    Sodium 139 136 - 145 mmol/L    Potassium 3.0 (L) 3.5 - 5.1 mmol/L    Chloride 102 97 - 108 mmol/L    CO2 32 21 - 32 mmol/L    Anion gap 5 5 - 15 mmol/L    Glucose 157 (H) 65 - 100 mg/dL    BUN 23 (H) 6 - 20 MG/DL    Creatinine 1.31 (H) 0.55 - 1.02 MG/DL    BUN/Creatinine ratio 18 12 - 20      GFR est AA 46 (L) >60 ml/min/1.73m2    GFR est non-AA 38 (L) >60 ml/min/1.73m2    Calcium 9.1 8.5 - 10.1 MG/DL    Bilirubin, total 0.4 0.2 - 1.0 MG/DL    ALT (SGPT) 184 (H) 12 - 78 U/L    AST (SGOT) 41 (H) 15 - 37 U/L    Alk.  phosphatase 118 (H) 45 - 117 U/L    Protein, total 7.3 6.4 - 8.2 g/dL    Albumin 2.8 (L) 3.5 - 5.0 g/dL    Globulin 4.5 (H) 2.0 - 4.0 g/dL    A-G Ratio 0.6 (L) 1.1 - 2.2     TROPONIN I    Collection Time: 06/07/21  4:49 PM   Result Value Ref Range    Troponin-I, Qt. <0.05 <0.05 ng/mL   MAGNESIUM    Collection Time: 06/07/21  4:49 PM   Result Value Ref Range    Magnesium 2.3 1.6 - 2.4 mg/dL   GLUCOSE, POC    Collection Time: 06/08/21  8:05 AM   Result Value Ref Range    Glucose (POC) 107 65 - 117 mg/dL    Performed by Maciel Small        Imaging:    XR FOOT LT MIN 3 V    Result Date: 6/7/2021  No acute abnormality. Chronic and postsurgical changes as above. XR FOOT RT MIN 3 V    Result Date: 6/7/2021  No acute abnormality. Chronic and postsurgical changes as above. CT HEAD WO CONT    Result Date: 6/7/2021  No evidence for acute intracranial abnormality. Mild left maxillary sinus disease      MRI CODE NEURO BRAIN WO CONT    Result Date: 6/7/2021  1. Small foci of diffusion restriction in the left parietal deep white matter consistent with acute ischemia. 2.  Extensive chronic small vessel ischemic disease. Assessment/ Plan       ICD-10-CM ICD-9-CM    1. LFTs abnormal  R94.5 790.6    2. Renal insufficiency  N28.9 593.9    3. Arthritis  M19.90 716.90     feet   4. Hypokalemia  E87.6 276.8    5.  Cerebrovascular accident (CVA), unspecified mechanism ([facility] [address]  I63.9 434.91        Discussed MRI findings with daughter (small new stroke, adjacent to last stroke)  D/w her that pt's speech sounds more slurred than last visit; consistent with new stroke  ASA 81 mg/day and Lipitor 80 mg QHS were added during this admission  On Eliquis 2.5 mg BID for hx of A Fib (low dose due to hx of anemia)  Added Trazodone 50 mg QHS to address post-stroke insomnia  PT-OT-ST to evaluate  No additional neurology workup planned  Could be d/c back home today if Therapy think she can continue with her current outpatient PT regimen    Follow up with me in 3 weeks in Neurology clinic                Thank you for asking the Neurology Service to evaluate [facility].       Signed By: Sheree Chandler MD     June 8, 2021

## 2021-06-08 NOTE — PROGRESS NOTES
Received order on this admission for swallowing evaluation. Patient was last seen on 5/28/21 and recommended diet was dysphagia 1, nectars. Adjusted diet to this consistency and will re-evaluate later today.

## 2021-06-08 NOTE — PROGRESS NOTES
Problem: Self Care Deficits Care Plan (Adult)  Goal: *Acute Goals and Plan of Care (Insert Text)  Description: FUNCTIONAL STATUS PRIOR TO ADMISSION: Patient was CGA using a rolling walker for functional mobility. Patient required up to moderate assistance for basic and instrumental ADLs with dtr assist. Pt with recent CVA that had residual R side deficits. HOME SUPPORT PRIOR TO ADMISSION: The patient lived with dtr to provide assistance. Occupational Therapy Goals  Initiated 6/8/2021   1. Patient will perform grooming with supervision/set-up within 7 day(s). 2.  Patient will perform upper body dressing with minimal assistance/contact guard assist within 7 day(s). 3.  Patient will perform lower body dressing with moderate assistance  within 7 day(s). 4.  Patient will perform toilet transfers to [facility] with minimal assistance/contact guard assist within 7 day(s). 5.  Patient will perform all aspects of toileting with minimal assistance/contact guard assist within 7 day(s). 6.  Patient will participate in upper extremity therapeutic exercise/activities with minimal assistance/contact guard assist within 7 day(s). 7.  Patient will improve their Fugl Smith score by 5 points in prep for ADLs within 7 days. Outcome: Progressing Towards Goal     OCCUPATIONAL THERAPY EVALUATION  Patient: Joseph Trimble (89 y.o. female)  Date: 6/8/2021  Primary Diagnosis: Acute CVA (cerebrovascular accident) [facility]) [I63.9]  Facial droop [R29.810]        Precautions:  Fall, DNR    ASSESSMENT  Based on the objective data described below, the patient presents with decreased activity tolerance, impaired ROM and coordination, generalized weakness, impaired balance, and slurred speech following admission for L facial droop and L UE weakness. First CVA with residual R side deficits, including R facial droop and UE weakness. Imaging this admission shows L parietal CVA (previous was R parietal).  Pt is received in bed, agreeable to participate, noted with significant facial droop, but symmetrical smile, and garbled speech (note, dentures not in). She has decreased ROM in L shoulder but with generally decreased strength and coordination bilaterally. During t/f to [facility], pt requires up to min A x2 and performs hygiene with mod A. At this time, pt is functioning below her baseline and will benefit from continued skilled OT services. Current Level of Function Impacting Discharge (ADLs/self-care): up to mod/max A for ADLs; min A x 2 for ADL transfers    Functional Outcome Measure: The patient scored Total A-D  Total A-D (Motor Function): 45/66 on the Fugl-Smith Assessment which is indicative of mild impairment in upper extremity functional status. Other factors to consider for discharge: unclear baseline; has support of her dtr per pt; acute CVA with recent CVA     Patient will benefit from skilled therapy intervention to address the above noted impairments. PLAN :  Recommendations and Planned Interventions: self care training, functional mobility training, therapeutic exercise, balance training, visual/perceptual training, therapeutic activities, cognitive retraining, endurance activities, neuromuscular re-education, patient education, home safety training and family training/education    Frequency/Duration: Patient will be followed by occupational therapy 5 times a week to address goals. Recommendation for discharge: (in order for the patient to meet his/her long term goals)  Therapy up to 5 days/week in SNF setting or an intensive home health therapy program    This discharge recommendation:  A follow-up discussion with the attending provider and/or case management is planned    IF patient discharges home will need the following DME: TBD       SUBJECTIVE:   Patient stated I will try.     OBJECTIVE DATA SUMMARY:   HISTORY:   Past Medical History:   Diagnosis Date    BPPV (benign paroxysmal positional vertigo)     HTN (hypertension)     Hx of completed stroke     Hyperlipidemia     Hypothyroid     PAF (paroxysmal atrial fibrillation) (HCC)     Rheumatoid arthritis ([facility] [address]     Scoliosis      Past Surgical History:   Procedure Laterality Date    HX  SECTION      HX ORTHOPAEDIC      bunions removed x 2       Expanded or extensive additional review of patient history:     Home Situation  Home Environment: Private residence  # Steps to Enter: 5  Living Alone: No  Support Systems: Child(shyla)  Current DME Used/Available at Home: Commode, bedside, Walker, rolling    Hand dominance: Right    EXAMINATION OF PERFORMANCE DEFICITS:  Cognitive/Behavioral Status:  Neurologic State: Alert  Orientation Level: Oriented to person  Cognition: Follows commands  Perception: Appears intact  Perseveration: No perseveration noted  Safety/Judgement: Fall prevention    Hearing: Auditory  Auditory Impairment: None    Vision/Perceptual:    Tracking: Able to track left of midline; Able to track right of midline; Requires cues, head turns, or add eye shifts to track    Diplopia: No (per pt report)      Range of Motion:  AROM: Generally decreased, functional    Strength:  Strength: Generally decreased, functional     Coordination:  Coordination: Generally decreased, functional  Fine Motor Skills-Upper: Left Impaired;Right Impaired    Gross Motor Skills-Upper: Left Impaired;Right Impaired    Tone & Sensation:  Tone: Normal  Sensation: Intact    Balance:  Sitting: Intact; High guard  Standing: Impaired  Standing - Static: Constant support; Fair  Standing - Dynamic : Constant support;Poor    Functional Mobility and Transfers for ADLs:  Bed Mobility:  Rolling: Minimum assistance  Supine to Sit: Moderate assistance  Sit to Supine:  Moderate assistance  Scooting: Minimum assistance    Transfers:  Sit to Stand: Minimum assistance;Assist x2  Stand to Sit: Minimum assistance;Assist x2  Toilet Transfer : Minimum assistance;Assist x2 (for SPT to [facility])    ADL Assessment:  Feeding: Setup;Minimum assistance    Oral Facial Hygiene/Grooming: Minimum assistance    Bathing: Moderate assistance    Upper Body Dressing: Minimum assistance    Lower Body Dressing: Moderate assistance;Maximum assistance    Toileting: Moderate assistance    ADL Intervention and task modifications:    Lower Body Dressing Assistance  Socks: Total assistance (dependent)  Leg Crossed Method Used: No  Position Performed: Supine    Toileting  Bladder Hygiene: Contact guard assistance (seated)  Bowel Hygiene: Moderate assistance (for stability and for thoroughness)  Clothing Management: Maximum assistance  Cues: Physical assistance for pants down;Physical assistance for pants up;Verbal cues provided (physical A for thoroughness of hygiene)  Adaptive Equipment: Courtney Fallow; Other (comment) (BS)    Cognitive Retraining  Safety/Judgement: Fall prevention    Functional Measure:  Fugl-Smith Assessment of Motor Recovery after Stroke:   Reflex Activity  Flexors/Biceps/Fingers: Can be elicited  Extensors/Triceps: Can be elicited  Reflex Subtotal: 4    Volitional Movement Within Synergies  Shoulder Retraction: Partial  Shoulder Elevation: Full  Shoulder Abduction (90 degrees): Partial  Shoulder External Rotation: Full  Elbow Flexion: Full  Forearm Supination: Full  Shoulder Adduction/Internal Rotation: Partial  Elbow Extension: Full  Forearm Pronation: Full  Subtotal: 15    Volitional Movement Mixing Synergies  Hand to Lumbar Spine: Partial  Shoulder Flexion (0-90 degrees): Partial  Pronation-Supination: Full  Subtotal: 4    Volitional Movement With Little or No Synergy  Shoulder Abduction (0-90 degrees): Partial  Shoulder Flexion ( degrees): Partial  Pronation/Supination: Partial  Subtotal : 3    Normal Reflex Activity  Biceps, Triceps, Finger Flexors:  Full  Subtotal : 2    Upper Extremity Total   Upper Extremity Total: 28    Wrist  Stability at 15 Degree Dorsiflexion: Partial  Repeated Dorsiflexion/ Volar Flexion: Partial  Stability at 15 Degree Dorsiflexion: Partial  Repeated Dorsiflexion/ Volar Flexion: Partial  Circumduction: Partial  Wrist Total: 5    Hand  Mass Flexion: Full  Mass Extension: Full  Grasp A: Partial  Grasp B: Partial  Grasp C: Partial  Grasp D: Partial  Grasp E: Partial  Hand Total: 9    Coordination/Speed  Tremor: Slight  Dysmetria: Slight  Time: 2-5s  Coordination/Speed Total : 3    Total A-D  Total A-D (Motor Function): 45/66     This is a reliable/valid measure of arm function after a neurological event. It has established value to characterize functional status and for measuring spontaneous and therapy-induced recovery; tests proximal and distal motor functions. Fugl-Smith Assessment  UE scores recorded between five and 30 days post neurologic event can be used to predict UE recovery at six months post neurologic event. Severe = 0-21 points   Moderately Severe = 22-33 points   Moderate = 34-47 points   Mild = 48-66 points  JEFRY Miller, MARY Alnaiz, & RINA Sauer (1992). Measurement of motor recovery after stroke: Outcome assessment and sample size requirements.  Stroke, 23, pp. 7993-6844.   ------------------------------------------------------------------------------------------------------------------------------------------------------------------  MCID:  Stroke:   Angeline Quesada et al, 2001; n = 171; mean age 79 (5) years; assessed within 16 (12) days of stroke, Acute Stroke)  FMA Motor Scores from Admission to Discharge    10 point increase in FMA Upper Extremity = 1.5 change in discharge FIM    10 point increase in FMA Lower Extremity = 1.9 change in discharge FIM  MDC:   Stroke:   Carlie Gentile et al, 2008, n = 14, mean age = 59.9 (14.6) years, assessed on average 14 (6.5) months post stroke, Chronic Stroke)    FMA = 5.2 points for the Upper Extremity portion of the assessment     Occupational Therapy Evaluation Charge Determination   History Examination Decision-Making   LOW Complexity : Brief history review  HIGH Complexity : 5 or more performance deficits relating to physical, cognitive , or psychosocial skils that result in activity limitations and / or participation restrictions HIGH Complexity : Patient presents with comorbidities that affect occupational performance. Signifigant modification of tasks or assistance (eg, physical or verbal) with assessment (s) is necessary to enable patient to complete evaluation       Based on the above components, the patient evaluation is determined to be of the following complexity level: LOW   Pain Rating:  Pt reporting minimal pain    Activity Tolerance:   Fair    After treatment patient left in no apparent distress:    Supine in bed, Call bell within reach, Bed / chair alarm activated and Side rails x 3    COMMUNICATION/EDUCATION:   The patients plan of care was discussed with: Physical therapist and Registered nurse. Home safety education was provided and the patient/caregiver indicated understanding., Patient/family have participated as able in goal setting and plan of care. and Patient/family agree to work toward stated goals and plan of care. This patients plan of care is appropriate for delegation to JERARDO.     Thank you for this referral.  Fani Jacob OT  Time Calculation: 21 mins

## 2021-06-09 ENCOUNTER — PATIENT OUTREACH (OUTPATIENT)
Dept: CASE MANAGEMENT | Age: 86
End: 2021-06-09

## 2021-06-09 VITALS
HEIGHT: 62 IN | OXYGEN SATURATION: 100 % | TEMPERATURE: 97.8 F | DIASTOLIC BLOOD PRESSURE: 55 MMHG | SYSTOLIC BLOOD PRESSURE: 111 MMHG | WEIGHT: 104.94 LBS | HEART RATE: 96 BPM | BODY MASS INDEX: 19.31 KG/M2 | RESPIRATION RATE: 18 BRPM

## 2021-06-09 LAB
ANION GAP SERPL CALC-SCNC: 4 MMOL/L (ref 5–15)
BUN SERPL-MCNC: 18 MG/DL (ref 6–20)
BUN/CREAT SERPL: 20 (ref 12–20)
CALCIUM SERPL-MCNC: 9.3 MG/DL (ref 8.5–10.1)
CHLORIDE SERPL-SCNC: 102 MMOL/L (ref 97–108)
CHOLEST SERPL-MCNC: 131 MG/DL
CO2 SERPL-SCNC: 32 MMOL/L (ref 21–32)
CREAT SERPL-MCNC: 0.91 MG/DL (ref 0.55–1.02)
ERYTHROCYTE [DISTWIDTH] IN BLOOD BY AUTOMATED COUNT: 20.4 % (ref 11.5–14.5)
GLUCOSE BLD STRIP.AUTO-MCNC: 118 MG/DL (ref 65–117)
GLUCOSE BLD STRIP.AUTO-MCNC: 147 MG/DL (ref 65–117)
GLUCOSE SERPL-MCNC: 73 MG/DL (ref 65–100)
HCT VFR BLD AUTO: 35.4 % (ref 35–47)
HDLC SERPL-MCNC: 50 MG/DL
HDLC SERPL: 2.6 {RATIO} (ref 0–5)
HGB BLD-MCNC: 9.9 G/DL (ref 11.5–16)
LDLC SERPL CALC-MCNC: 72 MG/DL (ref 0–100)
MAGNESIUM SERPL-MCNC: 2.2 MG/DL (ref 1.6–2.4)
MCH RBC QN AUTO: 19.9 PG (ref 26–34)
MCHC RBC AUTO-ENTMCNC: 28 G/DL (ref 30–36.5)
MCV RBC AUTO: 71.1 FL (ref 80–99)
NRBC # BLD: 0 K/UL (ref 0–0.01)
NRBC BLD-RTO: 0 PER 100 WBC
PHOSPHATE SERPL-MCNC: 3.1 MG/DL (ref 2.6–4.7)
PLATELET # BLD AUTO: 143 K/UL (ref 150–400)
POTASSIUM SERPL-SCNC: 3.3 MMOL/L (ref 3.5–5.1)
RBC # BLD AUTO: 4.98 M/UL (ref 3.8–5.2)
SERVICE CMNT-IMP: ABNORMAL
SERVICE CMNT-IMP: ABNORMAL
SODIUM SERPL-SCNC: 138 MMOL/L (ref 136–145)
TRIGL SERPL-MCNC: 45 MG/DL (ref ?–150)
TSH SERPL DL<=0.05 MIU/L-ACNC: 31.9 UIU/ML (ref 0.36–3.74)
VLDLC SERPL CALC-MCNC: 9 MG/DL
WBC # BLD AUTO: 7.2 K/UL (ref 3.6–11)

## 2021-06-09 PROCEDURE — 80048 BASIC METABOLIC PNL TOTAL CA: CPT

## 2021-06-09 PROCEDURE — 74011636637 HC RX REV CODE- 636/637: Performed by: HOSPITALIST

## 2021-06-09 PROCEDURE — 74011250637 HC RX REV CODE- 250/637: Performed by: HOSPITALIST

## 2021-06-09 PROCEDURE — 97530 THERAPEUTIC ACTIVITIES: CPT

## 2021-06-09 PROCEDURE — 97116 GAIT TRAINING THERAPY: CPT

## 2021-06-09 PROCEDURE — 82962 GLUCOSE BLOOD TEST: CPT

## 2021-06-09 PROCEDURE — 84100 ASSAY OF PHOSPHORUS: CPT

## 2021-06-09 PROCEDURE — 84443 ASSAY THYROID STIM HORMONE: CPT

## 2021-06-09 PROCEDURE — 97535 SELF CARE MNGMENT TRAINING: CPT

## 2021-06-09 PROCEDURE — 92526 ORAL FUNCTION THERAPY: CPT

## 2021-06-09 PROCEDURE — 85027 COMPLETE CBC AUTOMATED: CPT

## 2021-06-09 PROCEDURE — 83735 ASSAY OF MAGNESIUM: CPT

## 2021-06-09 PROCEDURE — 36415 COLL VENOUS BLD VENIPUNCTURE: CPT

## 2021-06-09 PROCEDURE — 74011250637 HC RX REV CODE- 250/637: Performed by: INTERNAL MEDICINE

## 2021-06-09 PROCEDURE — 80061 LIPID PANEL: CPT

## 2021-06-09 RX ORDER — ATORVASTATIN CALCIUM 20 MG/1
20 TABLET, FILM COATED ORAL
Qty: 30 TABLET | Refills: 0 | Status: SHIPPED | OUTPATIENT
Start: 2021-06-09 | End: 2021-06-14

## 2021-06-09 RX ORDER — SODIUM,POTASSIUM PHOSPHATES 280-250MG
POWDER IN PACKET (EA) ORAL
Status: DISCONTINUED
Start: 2021-06-09 | End: 2021-06-09 | Stop reason: HOSPADM

## 2021-06-09 RX ORDER — GUAIFENESIN 100 MG/5ML
81 LIQUID (ML) ORAL DAILY
Qty: 30 TABLET | Refills: 0 | Status: SHIPPED | OUTPATIENT
Start: 2021-06-09 | End: 2021-07-09

## 2021-06-09 RX ORDER — SODIUM,POTASSIUM PHOSPHATES 280-250MG
2 POWDER IN PACKET (EA) ORAL EVERY 4 HOURS
Status: DISCONTINUED | OUTPATIENT
Start: 2021-06-09 | End: 2021-06-09

## 2021-06-09 RX ORDER — LEVOTHYROXINE SODIUM 112 UG/1
112 TABLET ORAL
Qty: 30 TABLET | Refills: 0 | Status: SHIPPED | OUTPATIENT
Start: 2021-06-09 | End: 2021-07-09

## 2021-06-09 RX ORDER — POTASSIUM CHLORIDE 750 MG/1
40 TABLET, FILM COATED, EXTENDED RELEASE ORAL ONCE
Status: DISCONTINUED | OUTPATIENT
Start: 2021-06-09 | End: 2021-06-09

## 2021-06-09 RX ORDER — SODIUM,POTASSIUM PHOSPHATES 280-250MG
2 POWDER IN PACKET (EA) ORAL ONCE
Status: COMPLETED | OUTPATIENT
Start: 2021-06-09 | End: 2021-06-09

## 2021-06-09 RX ORDER — TRAZODONE HYDROCHLORIDE 50 MG/1
50 TABLET ORAL
Qty: 30 TABLET | Refills: 0 | Status: SHIPPED | OUTPATIENT
Start: 2021-06-09 | End: 2021-07-09

## 2021-06-09 RX ADMIN — FERROUS GLUCONATE 1 TABLET: 324 TABLET ORAL at 06:32

## 2021-06-09 RX ADMIN — INSULIN LISPRO 1 UNITS: 100 INJECTION, SOLUTION INTRAVENOUS; SUBCUTANEOUS at 00:29

## 2021-06-09 RX ADMIN — LEVOTHYROXINE SODIUM 100 MCG: 0.1 TABLET ORAL at 06:32

## 2021-06-09 RX ADMIN — BISACODYL 10 MG: 10 SUPPOSITORY RECTAL at 06:32

## 2021-06-09 RX ADMIN — POTASSIUM & SODIUM PHOSPHATES POWDER PACK 280-160-250 MG 2 PACKET: 280-160-250 PACK at 10:15

## 2021-06-09 RX ADMIN — APIXABAN 2.5 MG: 2.5 TABLET, FILM COATED ORAL at 09:03

## 2021-06-09 RX ADMIN — POTASSIUM & SODIUM PHOSPHATES POWDER PACK 280-160-250 MG 2 PACKET: 280-160-250 PACK at 06:17

## 2021-06-09 RX ADMIN — ASPIRIN 81 MG: 81 TABLET, CHEWABLE ORAL at 09:04

## 2021-06-09 NOTE — PROGRESS NOTES
Patient is now more than 24 hours since onset of symptoms of stroke symptoms, and so will resume toprol-xl due to elevated heart rates.

## 2021-06-09 NOTE — PROGRESS NOTES
Problem: Self Care Deficits Care Plan (Adult)  Goal: *Acute Goals and Plan of Care (Insert Text)  Description: FUNCTIONAL STATUS PRIOR TO ADMISSION: Patient was CGA using a rolling walker for functional mobility. Patient required up to moderate assistance for basic and instrumental ADLs with dtr assist. Pt with recent CVA that had residual R side deficits. HOME SUPPORT PRIOR TO ADMISSION: The patient lived with dtr to provide assistance. Occupational Therapy Goals  Initiated 6/8/2021   1. Patient will perform grooming with supervision/set-up within 7 day(s). 2.  Patient will perform upper body dressing with minimal assistance/contact guard assist within 7 day(s). 3.  Patient will perform lower body dressing with moderate assistance  within 7 day(s). 4.  Patient will perform toilet transfers to [facility] with minimal assistance/contact guard assist within 7 day(s). 5.  Patient will perform all aspects of toileting with minimal assistance/contact guard assist within 7 day(s). 6.  Patient will participate in upper extremity therapeutic exercise/activities with minimal assistance/contact guard assist within 7 day(s). 7.  Patient will improve their Fugl Smith score by 5 points in prep for ADLs within 7 days. Outcome: Progressing Towards Goal     OCCUPATIONAL THERAPY TREATMENT  Patient: Mitzy Bahena (63 y.o. female)  Date: 6/9/2021  Diagnosis: Acute CVA (cerebrovascular accident) [facility]) [I63.9]  Facial droop [R29.810] Acute CVA (cerebrovascular accident) [facility])       Precautions: Fall, DNR  Chart, occupational therapy assessment, plan of care, and goals were reviewed. ASSESSMENT  Patient continues with skilled OT services and is progressing towards goals. Patient with improvement in balance, strength, and activity tolerance this session. She is received in bed, alert, and agreeable to participate.  She requires assistance for bed level hygiene due to incontinence of bowel upon arrival, and is able to roll with min A for max/total A hygiene. Once seated up in chair, pt able to participate in UB bathing with overall min A and is set up for self feeding (observed to drink thickened drink without difficulty). During OOB activity, pt requires increased cueing and assistance for safety with RW. Continue to recommend 24 hour support/assistance at discharge. Current Level of Function Impacting Discharge (ADLs): overall min to mod A for ADLs; min A for ADL transfers/OOB mobility    Other factors to consider for discharge: lives with supportive family; fall risk         PLAN :  Patient continues to benefit from skilled intervention to address the above impairments. Continue treatment per established plan of care to address goals. Recommend with staff: OOB to chair for meals; mobility to bathroom for toileting    Recommend next OT session: standing task at sink    Recommendation for discharge: (in order for the patient to meet his/her long term goals)  Therapy up to 5 days/week in SNF setting or an intensive home health therapy program with 24 hour assistance    This discharge recommendation:  Has been made in collaboration with the attending provider and/or case management    IF patient discharges home will need the following DME: patient owns DME required for discharge       SUBJECTIVE:   Patient stated I remember you.     OBJECTIVE DATA SUMMARY:   Cognitive/Behavioral Status:  Neurologic State: Alert;Drowsy  Orientation Level: Oriented to person;Disoriented to place; Disoriented to situation;Disoriented to time  Cognition: Follows commands  Perception: Appears intact  Perseveration: No perseveration noted  Safety/Judgement: Decreased awareness of environment; Fall prevention    Functional Mobility and Transfers for ADLs:  Bed Mobility:  Rolling: Minimum assistance  Supine to Sit: Moderate assistance  Scooting: Contact guard assistance    Transfers:  Sit to Stand: Minimum assistance;Contact guard assistance    Balance:  Sitting: Intact  Standing: Impaired; With support  Standing - Static: Constant support; Fair  Standing - Dynamic : Constant support;Fair;Poor    ADL Intervention:  Grooming  Grooming Assistance: Set-up; Minimum assistance  Position Performed: Seated in chair  Washing Face: Set-up  Cues: Verbal cues provided    Upper Body Bathing  Bathing Assistance: Set-up; Minimum assistance (pt able to wash under arms and breasts)  Position Performed: Seated in chair  Cues: Verbal cues provided    Toileting  Clothing Management: Maximum assistance (for brief mgmt)    Cognitive Retraining  Safety/Judgement: Decreased awareness of environment; Fall prevention    Pain:  Pt reporting minimal pain    Activity Tolerance:   Fair    After treatment patient left in no apparent distress:   Sitting in chair, Call bell within reach and Bed / chair alarm activated    COMMUNICATION/COLLABORATION:   The patients plan of care was discussed with: Physical therapist and Registered nurse.      Chely Fraga OT  Time Calculation: 26 mins

## 2021-06-09 NOTE — PROGRESS NOTES
Problem: Mobility Impaired (Adult and Pediatric)  Goal: *Acute Goals and Plan of Care (Insert Text)  Outcome: Progressing Towards Goal  FUNCTIONAL STATUS PRIOR TO ADMISSION: Patient was CGA using a rolling walker for functional mobility. Patient required moderate assistance for basic and instrumental ADLs with dtr assist.     HOME SUPPORT PRIOR TO ADMISSION: The patient lived with dtr to provide assistance. Physical Therapy Goals  Initiated 6/8/2021  1. Patient will move from supine to sit and sit to supine  in bed with minimal assistance/contact guard assist within 7 day(s). 2.  Patient will transfer from bed to chair and chair to bed with minimal assistance/contact guard assist using the least restrictive device within 7 day(s). 3.  Patient will perform sit to stand with minimal assistance/contact guard assist within 7 day(s). 4.  Patient will ambulate with minimal assistance/contact guard assist for 150 feet with the least restrictive device within 7 day(s). 5.  Patient will ascend/descend 5 stairs with 1 handrail(s) with minimal assistance/contact guard assist within 7 day(s). PHYSICAL THERAPY TREATMENT  Patient: Jason Pate (81 y.o. female)  Date: 6/9/2021  Diagnosis: Acute CVA (cerebrovascular accident) [facility]) [I63.9]  Facial droop [R29.810] Acute CVA (cerebrovascular accident) [facility])       Precautions: Fall, DNR  Chart, physical therapy assessment, plan of care and goals were reviewed. ASSESSMENT  Patient continues with skilled PT services and is progressing towards goals. Pt received supine in bed. Speech continues to be slurred, yet not dentures at this time. Pt stating she recalls these same PTOT therapists from yesterday. Incontinent of bowel supine in bed. Following all commands to roll with Min A. Cleaned and brief donned. Supine to sit with Mod A.  Verbal and manual cues for hand placement. Sit to stand with Min A.  Gait of 48' with RW and Min A.   Pt needing assist to keep RW around her vs too far anterior. Placed in chair in NAD. Thickened dairy drink provided of which pt drank rapidly without coughing or difficulty. Will benefit from continued PT tx to progress gait, balance, transfers and activity tolerance. Current Level of Function Impacting Discharge (mobility/balance): Mod A/fair    Other factors to consider for discharge: per above         PLAN :  Patient continues to benefit from skilled intervention to address the above impairments. Continue treatment per established plan of care. to address goals. Recommendation for discharge: (in order for the patient to meet his/her long term goals)  Therapy up to 5 days/week in SNF setting or intensive home health therapy program    This discharge recommendation:  Has been made in collaboration with the attending provider and/or case management    IF patient discharges home will need the following DME: has DME       SUBJECTIVE:   Patient stated David Forte.     OBJECTIVE DATA SUMMARY:   Critical Behavior:  Neurologic State: Confused  Orientation Level: Oriented to person  Cognition: Impaired decision making  Safety/Judgement: Fall prevention  Functional Mobility Training:  Bed Mobility:  Rolling: Minimum assistance  Supine to Sit: Moderate assistance     Scooting: Contact guard assistance        Transfers:  Sit to Stand: Minimum assistance;Contact guard assistance  Stand to Sit: Minimum assistance;Contact guard assistance                             Balance:  Sitting: Intact; High guard  Standing: Impaired  Standing - Static: Constant support; Fair  Standing - Dynamic : Constant support;Fair;Poor  Ambulation/Gait Training:  Distance (ft): 50 Feet (ft)  Assistive Device: Walker, rolling;Gait belt  Ambulation - Level of Assistance: Moderate assistance;Minimal assistance        Gait Abnormalities: Decreased step clearance; Path deviations (verbal cues with RW management; keeping too far ant)                                    Activity Tolerance:   Good    After treatment patient left in no apparent distress:   Sitting in chair, Call bell within reach, and Bed / chair alarm activated    COMMUNICATION/COLLABORATION:   The patients plan of care was discussed with: Occupational therapist and Registered nurse.      Doug Dove, PT   Time Calculation: 27 mins

## 2021-06-09 NOTE — PROGRESS NOTES
Problem: Dysphagia (Adult)  Goal: *Acute Goals and Plan of Care (Insert Text)  Description: Initiated 6/8/2021  1. Patient will tolerate puree diet, mildly thick liquids free of s/s of aspiration or respiratory sequelae within 7 days. Outcome: Progressing Towards Goal   SPEECH LANGUAGE PATHOLOGY DYSPHAGIA TREATMENT  Patient: Cat Cruz (86 y.o. female)  Date: 6/9/2021  Diagnosis: Acute CVA (cerebrovascular accident) [facility]) [I63.9]  Facial droop [R29.810] Acute CVA (cerebrovascular accident) ([facility]       Precautions: aspiration Fall, DNR    ASSESSMENT:  Patient tolerating dysphagia 1, nectars without s/s aspiration. Feeding self. MOre alert than on prior admission. PLAN:  Recommendations and Planned Interventions:  CONTINUE PUREES, NECTAR THICK LIQUIDS AT HOME    Patient continues to benefit from skilled intervention to address the above impairments. Continue treatment per established plan of care. Discharge Recommendations:  Home Health SWALLOWING THERAPY     SUBJECTIVE:   Patient stated i'm Pres[place] Given. OBJECTIVE:   Cognitive and Communication Status:  Neurologic State: Confused  Orientation Level: Oriented to person  Cognition: Impaired decision making  Perception: Appears intact  Perseveration: No perseveration noted  Safety/Judgement: Fall prevention  Dysphagia Treatment:  Oral Assessment:     P.O.  Trials:  Patient Position: upright in bed  Vocal quality prior to P.O.: No impairment  Consistency Presented: Nectar thick liquid  How Presented: Self-fed/presented;Straw;Successive swallows   ORAL PHASE:   Bolus Acceptance: No impairment  Bolus Formation/Control: No impairment        Oral Residue: None  PHARYNGEAL PHASE:   Initiation of Swallow: No impairment  Laryngeal Elevation: Functional  Aspiration Signs/Symptoms: None                      Exercises:  Laryngeal Exercises: Pain:  Pain Scale 1: Numeric (0 - 10)  Pain Intensity 1: 0       After treatment:   Patient left in no apparent distress in bed    COMMUNICATION/EDUCATION:   Patient was educated regarding her deficit(s) of [address]  as this relates to her diagnosis of recent CVA. She demonstrated Fair understanding as evidenced by CVA with dementia. The patient's plan of care including recommendations, planned interventions, and recommended diet changes were discussed with:  none .      Araceli Abrams, ANDRE  Time Calculation: 10 mins

## 2021-06-09 NOTE — PROGRESS NOTES
Goals      Prevent complications post hospitalization. 03/25/21  CTN spoke to daughter Yogi Crane to review discharge instructions/red falgs to prevent readmission    Appts:    PCP--appt made while IP for 3/30 at 9:10, daughter aware and will take pt    Cards Dr Tierney Melgoza scheduled appt  Monday 4/5/21  11:00. Per [facility] at [facility], this is the soonest appt available. Yogi Crane notified of date /time. GI Dr WILKES---CTN called MD office, they will call CTN back after confirming they can accept insurance. [facility]---per [facility] office, they have pt scheduled to start services today 3/25/21     Per Yogi Crane patient is doing well, denies SOB or CP this morning.  CTN reviewed medication changes and new meds, per Yogi Crane pt picked up new meds yesterday.  CTN reviewed avoiding constipation with taking iron, instructed her to get OC stool softener if patient c/o difficulty having BM. Encouraged ambulation and drinking water.  CTN reviewed daily weights and instructed daughter to call Dr Kassy Cooper office to report gains of greater than 3lbs/day and 5 lbs/week. Watch for edema in feet and ankles . Tristin Houser understanding.  Review low sodium diet, foods to avoid, reading labels to kep intake less than 2000mg day. Tristin Houser understanding.  Instructed [facility] to take pt BP and HR twice a day and to call Dr Kassy Cooper with concerns/abnormal readings.  Dispatch health information provided. 1316 update---CTN has not heard back from from GI office yet to obtain appt. CTN called daughter Yogi Crane to see if MD office called her directly, LM on VM. ---mkrw    03/26/21  CTN received call back from Dr Ish Schumacher office that they do not accept pt insurance. CTN called [facility], they will accept appt and informed CTN that any balance will be pt responsibility. Appt scheduled for 4/21/2021 telehealth Dr Dave Ordoñez 10:45. Text message with link. Per chart notes:  Iron def anemia: s/p 1u RBCs and IV iron.   Hgb stable on discharge. GI recommended EGD/colon if stool blood positive. Continue to hold Eliquis for now. CTN tried to reach Gena Workman again, call went direcly to . CTN will call back later today. ---mkrw    UPDATE:    CTN received call from daughter Sinai--CTN gave her appts information for Dr Laure rCuz and Dr Fransisco Mayers, GI, explaining that Dr Demi Michael was unable to take appt but that Dr Asif Rizvi office will bill pt for any balance that insurance does no cover. Gena Workman agreeable to this. CTN will follow up next week---mkrw    06/01/21  CTN unable to reach patient or family on phone, LM on both VM. Patient will need the following appts:    PCP Dr Clarissa Whaley contact PCP office , was informed that patient already attended appt this morning 6/1/21. Neuro Dr Irene Person ---3 weeks    Dr Laure Cruz, cards---6/16 at 11:20    [facility] ---per [facility] staff patient [facility] 5/29/2021 06/02/21  CTN unable to reach family on phone again today, LM on both VM again. CTN will call again next week if no call received---mkrw    06/09/21  CTN received notification that patient is readmitted at this time for CVA. CTN has been unable to reach family after leaving several .  Will follow up after patient discharge to attempt to reach family again---mkrw

## 2021-06-09 NOTE — PROGRESS NOTES
1915 Bedside and Verbal shift change report given to JENNIFER Iniguez (oncoming nurse) by  Roberto Iniguez nurse). Report included the following information SBAR, Kardex, Intake/Output, MAR, Recent Results and Cardiac Rhythm Marquis Alexandra RN. This patient was assisted with Intentional Toileting every 2 hours during this shift. Documentation of ambulation and output reflected on Flowsheet. 0745 Bedside and Verbal shift change report given to Mar Collins RN (oncoming nurse) by Marisela Stanford RN (offgoing nurse). Report included the following information SBAR, Kardex, Intake/Output, MAR, Recent Results and Cardiac Rhythm Afib.

## 2021-06-09 NOTE — PROGRESS NOTES
Speech therapy order added to [employer] referral to [facility].     Kirit Perry RN, MSN/Care manager  [phone]

## 2021-06-09 NOTE — PROGRESS NOTES
Patient's daughter requested transport home be scheduled through [place] transportation [phone]. Transportation arranged for  at 2pm today. Ref #[id number].       Ankit Frausto RN, MSN/Care manager  [phone]

## 2021-06-09 NOTE — DISCHARGE INSTRUCTIONS
HOSPITALIST DISCHARGE INSTRUCTIONS    NAME: Karolina Lovell   :  10/12/1930   MRN:  [id number]     Date:     2021    ADMIT DATE: 2021     DISCHARGE DATE: 2021     PRINCIPAL ADMITTING DIAGNOSIS:  Acute CVA (cerebrovascular accident) ([facility] [I63.9]  Facial droop [R29.810]    DISCHARGE DIAGNOSES:  Principal Problem:    [facility] (2021)    HTN (hypertension), benign (7/15/2011)    GERD (gastroesophageal reflux disease) (7/15/2011)    Hypothyroidism (7/15/2011)    Hyperlipidemia (8/15/2011)    Rheumatoid arthritis ([facility]     DM type 2 causing vascular disease ([facility]     Hypokalemia due to loss of potassium ()    Diastolic CHF, chronic ([facility] (2020)    Chronic atrial fibrillation ([facility] (3/24/2021)    Facial droop (2021)    Elevated LFTs (2021)    MEDICATIONS:    · It is important that medications are taken exactly as they are prescribed on the discharge medication instructions and keep them your  in the bottles provided by the pharmacist.   · Keep a list of the medication names, dosages, and times to be taken at all times. · Do not take other medications without consulting your doctor.      Recommended diet:  Pureed Nectar thick liquid diet     Recommended activity: Activity as tolerated    Post discharge care:    Notify follow up health care provider or return to the emergency department if you cannot get hold of your doctor if you feel worse or experience symptoms similar to those that brought you to hospital    Follow-up Information     Follow up With Specialties Details Why Contact Info    Kimberly Giles MD Internal Medicine Call for the regular follow up Lisa Enciso MD Neurology, Pain Management Call to schedule a 3 week follow up and review [address]  [address] [facility]  [phone]            Information obtained by :  I understand that if any problems occur once I am at home I am to contact my physician and I understand and acknowledge receipt of the instructions indicated above.                                                                                                                                            Physician's or R.N.'s Signature                                                                  Date/Time                                                                                                                                              Patient or Representative Signature                                                          Date/Time

## 2021-06-09 NOTE — PROGRESS NOTES
0700: Bedside shift change report given to [address] (oncoming nurse) by Joanna See RN (offgoing nurse). Report included the following information SBAR, Kardex, Procedure Summary, Intake/Output, MAR, Accordion and Cardiac Rhythm A fib. Stroke Education provided to patient and the following topics were discussed    1. Patients personal risk factors for stroke are hypertension, atrial fibrillation, family history and prior stroke    2. Warning signs of Stroke:        * Sudden numbness or weakness of the face, arm or leg, especially on one side of          The body            * Sudden confusion, trouble speaking or understanding        * Sudden trouble seeing in one or both eyes        * Sudden trouble walking, dizziness, loss of balance or coordination        * Sudden severe headache with no known cause      3. Importance of activation Emergency Medical Services ( 9-1-1 ) immediately if experience any warning signs of stroke. 4. Be sure and schedule a follow-up appointment with your primary care doctor or any specialists as instructed. 5. You must take medicine every day to treat your risk factors for stroke. Be sure to take your medicines exactly as your doctor tells you: no more, no less. Know what your medicines are for , what they do. Anti-thrombotics /anticoagulants can help prevent strokes. You are taking the following medicine(s)  eliquis, aspirin    6. Smoking and second-hand smoke greatly increase your risk of stroke, cardiovascular disease and death. Smoking history never    7. Information provided was [facility] Stroke Education Binder    8. Documentation of teaching completed in Patient Education Activity and on Care Plan with teaching response noted? yes      0750: Patient has discharge orders at this time. 1400: Discharge medications reviewed with patient and appropriate educational materials and side effects teaching were provided. Patient had no further questions, patient discharged.

## 2021-06-09 NOTE — PROGRESS NOTES
Patient discharging today. Transport per family. [facility]  notified via [employer]. Patient has been scheduled for follow up PCP TULIO appointment on 6/15/21 at 1040 am/pm with Dr. Ricardo Paula. AVS updated. Care Management Interventions  PCP Verified by CM: Yes Zahida Keyes MD)  Last Visit to PCP: 06/01/21  Mode of Transport at Discharge:  Other (see comment) (daughter)  Transition of Care Consult (CM Consult): [address]: No  Reason Outside [place]: Patient already serviced by other home care/hospice agency  MyChart Signup: No  Discharge Durable Medical Equipment: No  Physical Therapy Consult: Yes  Occupational Therapy Consult: Yes  Speech Therapy Consult: Yes  Current Support Network: Relative's Home  Confirm Follow Up Transport: Family  Discharge Location  Discharge Placement: Home with home health    Landy Blair RN, MSN/Care manager  [phone]

## 2021-06-09 NOTE — DISCHARGE SUMMARY
Yang Vazquez [facility] [address]  [address]  Tel: [phone]    Physician Discharge Summary    Patient ID:    Tyler Grimes  Age:              [address] y.o.    : 10/12/1930  MRN:             [id number]     PCP: Mabel Cole MD     Date of Admission: 2021    Date of Discharge:  2021    Principal admission Diagnosis:   Acute CVA (cerebrovascular accident) [facility]) [I63.9]  Facial droop [R29.810]    Discharge Diagnoses:  Principal Problem:    Acute CVA (cerebrovascular accident) ([address] (2021)    HTN (hypertension), benign (7/15/2011)    GERD (gastroesophageal reflux disease) (7/15/2011)    Hypothyroidism (7/15/2011)    Hyperlipidemia (8/15/2011)    Rheumatoid arthritis ([address] ()    DM type 2 causing vascular disease ([address] ()    Diastolic CHF, chronic ([address] (2020)    Chronic atrial fibrillation ([address] (3/24/2021)    Facial droop (2021)    Elevated LFTs (2021)    Hospital Course:     Ms. Summer Murrieta is a [address] y.[address]. admitted to [facility] and treated for the following:    Acute CVA (cerebrovascular accident) ([address] (2021) POA: MRi brain showed a small foci of diffusion restriction in the left parietal deep white matter consistent with acute ischemia. Seen by neurology. Continue Asprin, Lipitor, Eliquis. PT, OT and she will be discharged home in stable condition for home health care at family request.     Systolic CHF, chronic ([address] (2020) POA: recent Echo done 2021 showed an EF 20-25% but a repeat one showed an EF of 50-55%. She is euvolemic. Given her borderline low BPs, will hold her furosemide. She is due for a follow up with cardiology      Chronic atrial fibrillation ([address] (3/24/2021) POA: rate controlled. Continue Eliquis and Toprol. Hypokalemia due to loss of potassium (2020) POA: repleted prior to discharge. Diuretic discontinued    Elevated LFTs (2021) POA: mild.  Monitor with statin therapy     HTN (hypertension), benign (7/15/2011) POA: Continue Toprol XL, Losartan      GERD (gastroesophageal reflux disease) (7/15/2011) POA: Continue PPi      Hypothyroidism (7/15/2011) POA: repeat TSH. Continue Levothyroxine     Hyperlipidemia (8/15/2011) POA: check LDL. Continue Lipitor     Rheumatoid arthritis ([address] POA: stable. Tylenol PRN     DM type 2 causing vascular disease (HCC) POA: last A1c 6.9. Diet controlled    Discharge Exam:    Visit Vitals  /80   Pulse 93   Temp 97.8 °F (36.6 °C)   Resp 26   Ht 5' 2\" (1.575 m)   Wt 47.6 kg (104 lb 15 oz)   SpO2 90%   BMI 19.19 kg/m²        Patient has been seen and examined. General: weak but not in any acute distress   Pulm: clear breath sounds without wheezes  Card: no murmurs, normal S1, S2 without thrills, bruits   Abd:    soft, non-tender, normoactive bowel sounds  Skin: no rashes and skin turgor is good  Neuro: awake, alert and has a non focal     Activity: Activity as tolerated    Diet: Regular pureed nectar thick liquid Diet    Current Discharge Medication List        START taking these medications    Details   aspirin 81 mg chewable tablet Take 1 Tablet by mouth daily for 30 days. Qty: 30 Tablet, Refills: 0      traZODone (DESYREL) 50 mg tablet Take 1 Tablet by mouth nightly for 30 days. Indications: insomnia  Qty: 30 Tablet, Refills: 0      atorvastatin (LIPITOR) 20 mg tablet Take 1 Tablet by mouth nightly for 30 days. Indications: stroke prevention  Qty: 30 Tablet, Refills: 0           CONTINUE these medications which have CHANGED    Details   levothyroxine (SYNTHROID) 112 mcg tablet Take 1 Tablet by mouth Daily (before breakfast) for 30 days. Indications: a condition with low thyroid hormone levels  Qty: 30 Tablet, Refills: 0           CONTINUE these medications which have NOT CHANGED    Details   apixaban (ELIQUIS) 2.5 mg tablet Take 1 Tablet by mouth two (2) times a day.   Qty: 60 Tablet, Refills: 0      metoprolol succinate (TOPROL-XL) 50 mg XL tablet Take 3 Tablets by mouth daily for 30 days. Qty: 90 Tablet, Refills: 0      ferrous gluconate 324 mg (37.5 mg iron) tablet Take 1 Tab by mouth Daily (before breakfast). Qty: 30 Tab, Refills: 0      losartan (COZAAR) 50 mg tablet Take 50 mg by mouth daily. pantoprazole (PROTONIX) 40 mg tablet Take 1 Tab by mouth daily. Qty: 30 Tab, Refills: 1    Associated Diagnoses: Elevated cholesterol           STOP taking these medications       furosemide (LASIX) 20 mg tablet Comments:   Reason for Stopping: Follow-up Information       Follow up With Specialties Details Why Contact Info    Sheryl Montiel MD Internal Medicine Call for the regular follow up [address] Manisha Greene MD Neurology, Pain Management Call to schedule a 3 week follow up and review [address] [place]  [facility] 99 [address] [facility] Dakins, DO Cardiology On 6/18/2021 11:20 am  [address] [place] [address]  [phone]              Follow-up tests or labs: As noted above if any. Discharge Condition: Stable    Disposition: home    Time taken to arrange discharge:  35 minutes.     Signed:  Brant De Souza MD     [facility]  6/9/2021   7:59 AM

## 2021-06-09 NOTE — PROGRESS NOTES
Nutrition Note    Positive MST for unsure weight loss. Wt hx reviewed. Pt is near recent wts, therefore not a positive MST. Pt is familiar to facility, reviewed notes. SLP adjusted diet order to appropriate diet. Plan to discharge today per Case Mgmt notes. Will follow as clinically appropriate. Wt Readings from Last 10 Encounters:   06/08/21 47.6 kg (104 lb 15 oz)   05/28/21 47.3 kg (104 lb 3.2 oz)   04/05/21 53.1 kg (117 lb)   03/24/21 49.3 kg (108 lb 9.6 oz)   02/02/21 46.7 kg (103 lb)   01/31/21 44.9 kg (99 lb)   12/20/20 45 kg (99 lb 3.2 oz)   12/01/20 47.6 kg (105 lb)   11/17/20 47.9 kg (105 lb 9.6 oz)   07/06/20 49.9 kg (110 lb)   ]  ADULT DIET Dysphagia - Pureed; Mildly Thick (Nectar)    Documented Meal intake:  Patient Vitals for the past 168 hrs:   % Diet Eaten   06/08/21 1208 51 - 75%   06/08/21 0815 51 - 75%     Documentation of supplement intake:  No data found.     Electronically signed by Reddy Terry RD, MS on 6/9/2021 at 9:22 AM  Contact via [employer] or office [phone]

## 2021-06-10 ENCOUNTER — PATIENT OUTREACH (OUTPATIENT)
Dept: CASE MANAGEMENT | Age: 86
End: 2021-06-10

## 2021-06-10 NOTE — PROGRESS NOTES
Goals      Prevent complications post hospitalization. 03/25/21  CTN spoke to daughter Kiara Arevalo to review discharge instructions/red falgs to prevent readmission    Appts:    PCP--appt made while IP for 3/30 at 9:10, daughter aware and will take pt    Cards Dr Markus Mejia scheduled appt  Monday 4/5/21  11:00. Per [facility]acle at [facility], this is the soonest appt available. Kiara Arevalo notified of date /time. GI Dr WILKES---CTN called MD office, they will call CTN back after confirming they can accept insurance. [facility]---per [facility] office, they have pt scheduled to start services today 3/25/21     Per Kiara Arevalo patient is doing well, denies SOB or CP this morning.  CTN reviewed medication changes and new meds, per Kiara Arevalo pt picked up new meds yesterday.  CTN reviewed avoiding constipation with taking iron, instructed her to get OC stool softener if patient c/o difficulty having BM. Encouraged ambulation and drinking water.  CTN reviewed daily weights and instructed daughter to call Dr Marti Posey office to report gains of greater than 3lbs/day and 5 lbs/week. Watch for edema in feet and ankles . Jassi Santo understanding.  Review low sodium diet, foods to avoid, reading labels to kep intake less than 2000mg day. Jassi Santo understanding.  Instructed Sinai to take pt BP and HR twice a day and to call Dr Marti Posey with concerns/abnormal readings.  Dispatch health information provided. 1316 update---CTN has not heard back from from GI office yet to obtain appt. CTN called daughter Kiara Arevalo to see if MD office called her directly, LM on VM. ---mkrw    03/26/21  CTN received call back from Dr Randy Head office that they do not accept pt insurance. CTN called [facility], they will accept appt and informed CTN that any balance will be pt responsibility. Appt scheduled for 4/21/2021 telehealth Dr Alexis Corral 10:45. Text message with link. Per chart notes:  Iron def anemia: s/p 1u RBCs and IV iron.   Hgb stable on discharge. GI recommended EGD/colon if stool blood positive. Continue to hold Eliquis for now. CTN tried to reach Arthiona Pin again, call went direcly to VM. CTN will call back later today. ---r    UPDATE:    CTN received call from daughter Sinai--CTN gave her appts information for Dr Lion Matias and Dr Hosea Jules, GI, explaining that Dr Maria G John was unable to take appt but that Dr Helen Christiansen office will bill pt for any balance that insurance does no cover. Arthiona Pin agreeable to this. CTN will follow up next week---mkrw    06/01/21  CTN unable to reach patient or family on phone, LM on both VM. Patient will need the following appts:    PCP Dr Lopes Bryanna contact PCP office , was informed that patient already attended appt this morning 6/1/21. Neuro Dr Lindy Palomino ---3 weeks    Dr Lion Matias, cards---6/16 at 11:20    [employer] ---per [employer] staff patient [facility] 5/29/2021 06/02/21  CTN unable to reach family on phone again today, LM on both VM again. CTN will call again next week if no call received---rw    06/09/21  CTN received notification that patient is readmitted at this time for CVA. CTN has been unable to reach family after leaving several VM. Will follow up after patient discharge to attempt to reach family again---rw    06/10/21  CTN unable tor each patient or either daughter on phone , LVM requesting return call. CTN contacted [employer] to confirm [facility] orders, office states they have patient on schedule to be seen today. Patient has following appts:    PCP 6/15 at 10:40  Neurologist Dr Baptiste---3 weeks follow up  Cards 6/18 at 11:20    Patient was seen by palliative on last admission 5/27/2021:  Goals are clear to make attempts at therapy and receive speech therapy at home with [facility] once cleared for discharge. Had a lengthy conversation on Tuesday and daughters, niece provide 24/7 care for her already and are quite in tune with her care needs and quality of life.   They are willing to entertain a transition to [facility] care should she stop benefiting or participating with therapy at home. They are aware of options    CTN will readdress gaols if family is reached---CTN has not been successful with engaging family to return calls. ---ivan

## 2021-06-14 ENCOUNTER — HOSPITAL ENCOUNTER (INPATIENT)
Age: 86
LOS: 3 days | Discharge: HOSPICE | DRG: 689 | End: 2021-06-17
Attending: STUDENT IN AN ORGANIZED HEALTH CARE EDUCATION/TRAINING PROGRAM | Admitting: INTERNAL MEDICINE
Payer: MEDICARE

## 2021-06-14 ENCOUNTER — APPOINTMENT (OUTPATIENT)
Dept: GENERAL RADIOLOGY | Age: 86
DRG: 689 | End: 2021-06-14
Attending: STUDENT IN AN ORGANIZED HEALTH CARE EDUCATION/TRAINING PROGRAM
Payer: MEDICARE

## 2021-06-14 DIAGNOSIS — R41.0 DELIRIUM: Primary | ICD-10-CM

## 2021-06-14 DIAGNOSIS — Z71.89 GOALS OF CARE, COUNSELING/DISCUSSION: ICD-10-CM

## 2021-06-14 DIAGNOSIS — N39.0 URINARY TRACT INFECTION WITHOUT HEMATURIA, SITE UNSPECIFIED: ICD-10-CM

## 2021-06-14 DIAGNOSIS — F22 DELUSIONAL DISORDER (HCC): ICD-10-CM

## 2021-06-14 DIAGNOSIS — Z86.73 HX OF COMPLETED STROKE: ICD-10-CM

## 2021-06-14 DIAGNOSIS — I48.20 CHRONIC ATRIAL FIBRILLATION (HCC): ICD-10-CM

## 2021-06-14 DIAGNOSIS — I50.9 ACUTE ON CHRONIC CONGESTIVE HEART FAILURE, UNSPECIFIED HEART FAILURE TYPE (HCC): ICD-10-CM

## 2021-06-14 PROBLEM — G93.40 ENCEPHALOPATHY ACUTE: Status: ACTIVE | Noted: 2021-06-14

## 2021-06-14 LAB
ALBUMIN SERPL-MCNC: 2.6 G/DL (ref 3.5–5)
ALBUMIN/GLOB SERPL: 0.6 {RATIO} (ref 1.1–2.2)
ALP SERPL-CCNC: 131 U/L (ref 45–117)
ALT SERPL-CCNC: 77 U/L (ref 12–78)
ANION GAP SERPL CALC-SCNC: 3 MMOL/L (ref 5–15)
APPEARANCE UR: ABNORMAL
AST SERPL-CCNC: 42 U/L (ref 15–37)
BACTERIA URNS QL MICRO: ABNORMAL /HPF
BASOPHILS # BLD: 0.1 K/UL (ref 0–0.1)
BASOPHILS NFR BLD: 1 % (ref 0–1)
BILIRUB SERPL-MCNC: 0.6 MG/DL (ref 0.2–1)
BILIRUB UR QL: NEGATIVE
BNP SERPL-MCNC: ABNORMAL PG/ML
BUN SERPL-MCNC: 31 MG/DL (ref 6–20)
BUN/CREAT SERPL: 23 (ref 12–20)
CALCIUM SERPL-MCNC: 9.1 MG/DL (ref 8.5–10.1)
CHLORIDE SERPL-SCNC: 103 MMOL/L (ref 97–108)
CO2 SERPL-SCNC: 28 MMOL/L (ref 21–32)
COLOR UR: ABNORMAL
COMMENT, HOLDF: NORMAL
CREAT SERPL-MCNC: 1.36 MG/DL (ref 0.55–1.02)
DIFFERENTIAL METHOD BLD: ABNORMAL
EOSINOPHIL # BLD: 0.1 K/UL (ref 0–0.4)
EOSINOPHIL NFR BLD: 1 % (ref 0–7)
EPITH CASTS URNS QL MICRO: ABNORMAL /LPF
ERYTHROCYTE [DISTWIDTH] IN BLOOD BY AUTOMATED COUNT: 22.6 % (ref 11.5–14.5)
GLOBULIN SER CALC-MCNC: 4.6 G/DL (ref 2–4)
GLUCOSE SERPL-MCNC: 177 MG/DL (ref 65–100)
GLUCOSE UR STRIP.AUTO-MCNC: NEGATIVE MG/DL
HCT VFR BLD AUTO: 36 % (ref 35–47)
HGB BLD-MCNC: 10.5 G/DL (ref 11.5–16)
HGB UR QL STRIP: ABNORMAL
IMM GRANULOCYTES # BLD AUTO: 0.1 K/UL (ref 0–0.04)
IMM GRANULOCYTES NFR BLD AUTO: 1 % (ref 0–0.5)
KETONES UR QL STRIP.AUTO: NEGATIVE MG/DL
LEUKOCYTE ESTERASE UR QL STRIP.AUTO: ABNORMAL
LYMPHOCYTES # BLD: 1.7 K/UL (ref 0.8–3.5)
LYMPHOCYTES NFR BLD: 26 % (ref 12–49)
MAGNESIUM SERPL-MCNC: 2.4 MG/DL (ref 1.6–2.4)
MCH RBC QN AUTO: 21 PG (ref 26–34)
MCHC RBC AUTO-ENTMCNC: 29.2 G/DL (ref 30–36.5)
MCV RBC AUTO: 72.1 FL (ref 80–99)
MONOCYTES # BLD: 0.5 K/UL (ref 0–1)
MONOCYTES NFR BLD: 8 % (ref 5–13)
NEUTS SEG # BLD: 4 K/UL (ref 1.8–8)
NEUTS SEG NFR BLD: 63 % (ref 32–75)
NITRITE UR QL STRIP.AUTO: NEGATIVE
NRBC # BLD: 0 K/UL (ref 0–0.01)
NRBC BLD-RTO: 0 PER 100 WBC
PH UR STRIP: 6 [PH] (ref 5–8)
PLATELET # BLD AUTO: 168 K/UL (ref 150–400)
PMV BLD AUTO: ABNORMAL FL (ref 8.9–12.9)
POTASSIUM SERPL-SCNC: 5 MMOL/L (ref 3.5–5.1)
PROT SERPL-MCNC: 7.2 G/DL (ref 6.4–8.2)
PROT UR STRIP-MCNC: ABNORMAL MG/DL
RBC # BLD AUTO: 4.99 M/UL (ref 3.8–5.2)
RBC #/AREA URNS HPF: ABNORMAL /HPF (ref 0–5)
RBC MORPH BLD: ABNORMAL
SAMPLES BEING HELD,HOLD: NORMAL
SODIUM SERPL-SCNC: 134 MMOL/L (ref 136–145)
SP GR UR REFRACTOMETRY: 1.02 (ref 1–1.03)
TROPONIN I SERPL-MCNC: <0.05 NG/ML
TSH SERPL DL<=0.05 MIU/L-ACNC: 18.4 UIU/ML (ref 0.36–3.74)
UR CULT HOLD, URHOLD: NORMAL
UROBILINOGEN UR QL STRIP.AUTO: 1 EU/DL (ref 0.2–1)
WBC # BLD AUTO: 6.5 K/UL (ref 3.6–11)
WBC URNS QL MICRO: ABNORMAL /HPF (ref 0–4)

## 2021-06-14 PROCEDURE — 87186 SC STD MICRODIL/AGAR DIL: CPT

## 2021-06-14 PROCEDURE — 83735 ASSAY OF MAGNESIUM: CPT

## 2021-06-14 PROCEDURE — 74011250637 HC RX REV CODE- 250/637: Performed by: INTERNAL MEDICINE

## 2021-06-14 PROCEDURE — 83880 ASSAY OF NATRIURETIC PEPTIDE: CPT

## 2021-06-14 PROCEDURE — 87086 URINE CULTURE/COLONY COUNT: CPT

## 2021-06-14 PROCEDURE — 77030038269 HC DRN EXT URIN PURWCK BARD -A

## 2021-06-14 PROCEDURE — 74011000250 HC RX REV CODE- 250: Performed by: STUDENT IN AN ORGANIZED HEALTH CARE EDUCATION/TRAINING PROGRAM

## 2021-06-14 PROCEDURE — 71045 X-RAY EXAM CHEST 1 VIEW: CPT

## 2021-06-14 PROCEDURE — 77030019905 HC CATH URETH INTMIT MDII -A

## 2021-06-14 PROCEDURE — 36415 COLL VENOUS BLD VENIPUNCTURE: CPT

## 2021-06-14 PROCEDURE — 74011250636 HC RX REV CODE- 250/636: Performed by: STUDENT IN AN ORGANIZED HEALTH CARE EDUCATION/TRAINING PROGRAM

## 2021-06-14 PROCEDURE — 80053 COMPREHEN METABOLIC PANEL: CPT

## 2021-06-14 PROCEDURE — 74011250637 HC RX REV CODE- 250/637: Performed by: STUDENT IN AN ORGANIZED HEALTH CARE EDUCATION/TRAINING PROGRAM

## 2021-06-14 PROCEDURE — 81001 URINALYSIS AUTO W/SCOPE: CPT

## 2021-06-14 PROCEDURE — 99285 EMERGENCY DEPT VISIT HI MDM: CPT

## 2021-06-14 PROCEDURE — 85025 COMPLETE CBC W/AUTO DIFF WBC: CPT

## 2021-06-14 PROCEDURE — 65660000000 HC RM CCU STEPDOWN

## 2021-06-14 PROCEDURE — 84443 ASSAY THYROID STIM HORMONE: CPT

## 2021-06-14 PROCEDURE — 87077 CULTURE AEROBIC IDENTIFY: CPT

## 2021-06-14 PROCEDURE — 84484 ASSAY OF TROPONIN QUANT: CPT

## 2021-06-14 PROCEDURE — 93005 ELECTROCARDIOGRAM TRACING: CPT

## 2021-06-14 RX ORDER — LABETALOL HCL 20 MG/4 ML
20 SYRINGE (ML) INTRAVENOUS
Status: DISCONTINUED | OUTPATIENT
Start: 2021-06-14 | End: 2021-06-17 | Stop reason: HOSPADM

## 2021-06-14 RX ORDER — METOPROLOL SUCCINATE 50 MG/1
50 TABLET, EXTENDED RELEASE ORAL
Status: DISCONTINUED | OUTPATIENT
Start: 2021-06-15 | End: 2021-06-17 | Stop reason: HOSPADM

## 2021-06-14 RX ORDER — BUMETANIDE 0.25 MG/ML
0.5 INJECTION INTRAMUSCULAR; INTRAVENOUS 2 TIMES DAILY
Status: DISCONTINUED | OUTPATIENT
Start: 2021-06-15 | End: 2021-06-15

## 2021-06-14 RX ORDER — ONDANSETRON 4 MG/1
4 TABLET, ORALLY DISINTEGRATING ORAL
Status: DISCONTINUED | OUTPATIENT
Start: 2021-06-14 | End: 2021-06-17 | Stop reason: HOSPADM

## 2021-06-14 RX ORDER — POLYETHYLENE GLYCOL 3350 17 G/17G
17 POWDER, FOR SOLUTION ORAL DAILY PRN
Status: DISCONTINUED | OUTPATIENT
Start: 2021-06-14 | End: 2021-06-15

## 2021-06-14 RX ORDER — SODIUM CHLORIDE 0.9 % (FLUSH) 0.9 %
5-40 SYRINGE (ML) INJECTION AS NEEDED
Status: DISCONTINUED | OUTPATIENT
Start: 2021-06-14 | End: 2021-06-17 | Stop reason: HOSPADM

## 2021-06-14 RX ORDER — METOPROLOL SUCCINATE 50 MG/1
50 TABLET, EXTENDED RELEASE ORAL
COMMUNITY

## 2021-06-14 RX ORDER — ATORVASTATIN CALCIUM 20 MG/1
20 TABLET, FILM COATED ORAL DAILY
Status: DISCONTINUED | OUTPATIENT
Start: 2021-06-15 | End: 2021-06-17 | Stop reason: HOSPADM

## 2021-06-14 RX ORDER — PROPRANOLOL HYDROCHLORIDE 10 MG/1
20 TABLET ORAL
Status: COMPLETED | OUTPATIENT
Start: 2021-06-14 | End: 2021-06-14

## 2021-06-14 RX ORDER — ONDANSETRON 2 MG/ML
4 INJECTION INTRAMUSCULAR; INTRAVENOUS
Status: DISCONTINUED | OUTPATIENT
Start: 2021-06-14 | End: 2021-06-17 | Stop reason: HOSPADM

## 2021-06-14 RX ORDER — ATORVASTATIN CALCIUM 20 MG/1
20 TABLET, FILM COATED ORAL DAILY
COMMUNITY

## 2021-06-14 RX ORDER — SODIUM CHLORIDE 0.9 % (FLUSH) 0.9 %
5-40 SYRINGE (ML) INJECTION EVERY 8 HOURS
Status: DISCONTINUED | OUTPATIENT
Start: 2021-06-14 | End: 2021-06-17 | Stop reason: HOSPADM

## 2021-06-14 RX ORDER — PANTOPRAZOLE SODIUM 40 MG/1
40 TABLET, DELAYED RELEASE ORAL DAILY
Status: DISCONTINUED | OUTPATIENT
Start: 2021-06-15 | End: 2021-06-17 | Stop reason: HOSPADM

## 2021-06-14 RX ORDER — METOPROLOL SUCCINATE 50 MG/1
150 TABLET, EXTENDED RELEASE ORAL DAILY
Status: CANCELLED | OUTPATIENT
Start: 2021-06-15

## 2021-06-14 RX ORDER — GUAIFENESIN 100 MG/5ML
81 LIQUID (ML) ORAL DAILY
Status: DISCONTINUED | OUTPATIENT
Start: 2021-06-15 | End: 2021-06-17 | Stop reason: HOSPADM

## 2021-06-14 RX ORDER — FUROSEMIDE 10 MG/ML
40 INJECTION INTRAMUSCULAR; INTRAVENOUS
Status: COMPLETED | OUTPATIENT
Start: 2021-06-14 | End: 2021-06-14

## 2021-06-14 RX ORDER — LEVOTHYROXINE SODIUM 112 UG/1
112 TABLET ORAL
Status: DISCONTINUED | OUTPATIENT
Start: 2021-06-15 | End: 2021-06-17 | Stop reason: HOSPADM

## 2021-06-14 RX ORDER — HYDRALAZINE HYDROCHLORIDE 20 MG/ML
10 INJECTION INTRAMUSCULAR; INTRAVENOUS
Status: DISCONTINUED | OUTPATIENT
Start: 2021-06-14 | End: 2021-06-17 | Stop reason: HOSPADM

## 2021-06-14 RX ORDER — ACETAMINOPHEN 650 MG/1
650 SUPPOSITORY RECTAL
Status: DISCONTINUED | OUTPATIENT
Start: 2021-06-14 | End: 2021-06-17 | Stop reason: HOSPADM

## 2021-06-14 RX ORDER — ACETAMINOPHEN 325 MG/1
650 TABLET ORAL
Status: DISCONTINUED | OUTPATIENT
Start: 2021-06-14 | End: 2021-06-17 | Stop reason: HOSPADM

## 2021-06-14 RX ORDER — LOSARTAN POTASSIUM 50 MG/1
50 TABLET ORAL DAILY
Status: DISCONTINUED | OUTPATIENT
Start: 2021-06-15 | End: 2021-06-17 | Stop reason: HOSPADM

## 2021-06-14 RX ADMIN — POLYETHYLENE GLYCOL 3350 17 G: 17 POWDER, FOR SOLUTION ORAL at 23:02

## 2021-06-14 RX ADMIN — FUROSEMIDE 40 MG: 10 INJECTION, SOLUTION INTRAMUSCULAR; INTRAVENOUS at 17:34

## 2021-06-14 RX ADMIN — Medication 10 ML: at 22:27

## 2021-06-14 RX ADMIN — PROPRANOLOL HYDROCHLORIDE 20 MG: 10 TABLET ORAL at 15:31

## 2021-06-14 RX ADMIN — CEFTRIAXONE 1 G: 1 INJECTION, POWDER, FOR SOLUTION INTRAMUSCULAR; INTRAVENOUS at 17:34

## 2021-06-14 NOTE — PROGRESS NOTES
Advance Care Planning (ACP) Provider Note - Comprehensive      Date of ACP Conversation:  06/14/21    Persons included in Conversation:  patient   Length of ACP Conversation in minutes:  16 minutes     Authorized Decision Maker (if patient is incapable of making informed decisions): This person is: Daughter       General ACP for ALL Patients with Decision Making Capacity:  Importance of advance care planning, including choosing a healthcare agent to communicate patient's healthcare decisions if patient lost the ability to make decisions. Review of Existing Advance Directive:  Patient and family do not have an advance directive readily available for review. Active Diagnoses:   Acute encephalopathy    These active diagnoses are of sufficient risk that focused discussion on advance care planning is indicated in order to allow the patient to thoughtfully consider personal goals of care; and, if situations arise that prevent the ability to personally give input, to ensure appropriate representation of their personal desires for different levels and aggressiveness of care. For Serious or Chronic Illness:  Understanding of medical condition     Reviewed pt's clinical status. Adeel Villarreal has multiple medical problems including PAF, DM, HTN, hyperlipidemia, CVA and is being admitted for acute encephalopathy. We reviewed our treatment plan and anticipated discharge plans. Further, we discussed pt's wishes on how she would like to proceed (aggressive/heroic resuscitation vs not intervening and allowing nature takes its course) if she were to suffer cardiopulmonary arrest.    Understanding of CPR, goals and expected outcomes, benefits and burdens discussed. Information on CPR success provided (many factors lower a patients chance of survival, including advanced age, performance status, malignancy, and presence of multiple comorbidities);  Individual asked to communicate understanding of information in his/her own words. CPR works best to restart the heart when there is a sudden event, like a heart attack, in someone who is otherwise healthy. Unfortunately, CPR does not typically restart the heart for people who have serious health conditions or who are very sick. \"     \"In the event your heart stopped as a result of an underlying serious health condition, would you want attempts to be made to restart your heart (answer \"yes\" for attempt to resuscitate) or would you prefer a natural death (answer \"no\" for do not attempt to resuscitate)? \" NO    Patient made it very clear to me that she would not want heroic measures for resuscitation in the event of cardiopulmonary arrest, including chest compressions, defibrillation, intubation/mechanical ventilation.       Interventions Provided:  Referral made for ACP follow-up assistance to: Palliative care

## 2021-06-14 NOTE — H&P
[place] [id number] [facility], [place] [address]  [phone]    Admission History and Physical      NAME:  Alize Ruiz   :   10/12/1930   MRN:  [id number]     PCP:  Lucy Bernard MD     Date of service:  2021         Subjective:     CHIEF COMPLAINT: Confusion, delirium, shortness of breath    HISTORY OF PRESENT ILLNESS:     Ms. Johnson Found is a 80 y.o.  female who is admitted with acute metabolic encephalopathy. Ms. Castillo with past medical history of CHF, CKD, hypertension, PAF, rheumatoid arthritis was brought to ER due to confusion, hallucinating, shortness of breath, which started this morning. Patient was recently discharged from [facility] where she was admitted for acute CVA. This morning patient became short of breath associated with mild dry cough. Later on, she became confused talking about dead people. She became very weak. Her daughter had difficulty to move her from bed to chair. Became very weak, could not be able to walk by herself. Past Medical History:   Diagnosis Date    BPPV (benign paroxysmal positional vertigo)     HTN (hypertension)     Hx of completed stroke     Hyperlipidemia     Hypothyroid     PAF (paroxysmal atrial fibrillation) (HCC)     Rheumatoid arthritis ([facility]     Scoliosis         Past Surgical History:   Procedure Laterality Date    HX  SECTION      HX ORTHOPAEDIC      bunions removed x 2       Social History     Tobacco Use    Smoking status: Never Smoker    Smokeless tobacco: Never Used   Substance Use Topics    Alcohol use: No        Family History   Problem Relation Age of Onset    Stroke Father         Allergies   Allergen Reactions    Shellfish Containing Products Swelling        Prior to Admission medications    Medication Sig Start Date End Date Taking?  Authorizing Provider   levothyroxine (SYNTHROID) 112 mcg tablet Take 1 Tablet by mouth Daily (before breakfast) for 30 days. Indications: a condition with low thyroid hormone levels 6/9/21 7/9/21  Brian Fernandez MD   aspirin 81 mg chewable tablet Take 1 Tablet by mouth daily for 30 days. 6/9/21 7/9/21  Brian Fernandez MD   traZODone (DESYREL) 50 mg tablet Take 1 Tablet by mouth nightly for 30 days. Indications: insomnia 6/9/21 7/9/21  Brian Fernandez MD   atorvastatin (LIPITOR) 20 mg tablet Take 1 Tablet by mouth nightly for 30 days. Indications: stroke prevention 6/9/21 7/9/21  Brian Fernandez MD   apixaban (ELIQUIS) 2.5 mg tablet Take 1 Tablet by mouth two (2) times a day. 5/28/21   Jesus Duffy MD   ferrous gluconate 324 mg (37.5 mg iron) tablet Take 1 Tab by mouth Daily (before breakfast). 3/24/21   Do, Jaquan Esparza MD   losartan (COZAAR) 50 mg tablet Take 50 mg by mouth daily. Provider, Historical   pantoprazole (PROTONIX) 40 mg tablet Take 1 Tab by mouth daily.  7/7/14   Marilu Traylor MD         Review of Systems:  (bold if positive, if negative)    Gen:  Eyes:  ENT:  CVS:  Pulm:  Cough, dyspneaGI:  :  MS:  Skin:  Psych:  Endo:  Hem:  Renal:  Neuro:            Objective:      VITALS:    Vital signs reviewed; most recent are:    Visit Vitals  BP (!) 147/76   Pulse 94   Temp 97.8 °F (36.6 °C)   Resp 18   Wt 50.3 kg (111 lb)   SpO2 94%   BMI 20.30 kg/m²     SpO2 Readings from Last 6 Encounters:   06/14/21 94%   06/09/21 100%   05/28/21 99%   04/05/21 99%   03/24/21 94%   02/02/21 94%        No intake or output data in the 24 hours ending 06/14/21 5329         Exam:     Physical Exam:    Gen:  Well-developed, well-nourished, in no acute distress  HEENT:  Pink conjunctivae, PERRL, hearing intact to voice, moist mucous membranes  Neck:  Supple, without masses, thyroid non-tender  Resp:  No accessory muscle use, rales BL  Card:  No murmurs, normal S1, S2 without thrills, bruits or peripheral edema  Abd:  Soft, non-tender, non-distended, normoactive bowel sounds are present, no palpable organomegaly and no detectable hernias  Lymph:  No cervical or inguinal adenopathy  Musc:  No cyanosis or clubbing  Skin:  No rashes or ulcers, skin turgor is good  Neuro:  Facial droop  Psych:  poor insight,         Labs:    Recent Labs     06/14/21  1448   WBC 6.5   HGB 10.5*   HCT 36.0        Recent Labs     06/14/21  1448   *   K 5.0      CO2 28   *   BUN 31*   CREA 1.36*   CA 9.1   MG 2.4   ALB 2.6*   TBILI 0.6   ALT 77     Lab Results   Component Value Date/Time    Glucose (POC) 147 (H) 06/09/2021 12:24 PM    Glucose (POC) 118 (H) 06/09/2021 07:49 AM     No results for input(s): PH, PCO2, PO2, HCO3, FIO2 in the last 72 hours. No results for input(s): INR, INREXT in the last 72 hours. Telemetry reviewed: Tachycardia       Assessment/Plan:    1. Encephalopathy acute (6/14/2021) likely due to UTI. Admit to telemetry. Her delirium might worsen in her hospital stay. May require a sitter. Treat underlying cause and monitor clinically. 2.  UTI (urinary tract infection) (6/14/2021)(POA). Check urine culture and continue ceftriaxone empirically. 3.  Diastolic CHF, acute on chronic (HCC) (12/18/2020)/high proBNP. Recent echo with EF of 50 to 55%. Started low-dose of Bumex IV. Check I&O/daily weight. Cardiology consult    4. Chronic atrial fibrillation ([facility] (3/24/2021) POA: rate controlled. Continue Eliquis and Toprol. 5.  HTN (hypertension), benign (7/15/2011), not well controlled. Continue Toprol and metoprolol    6. GERD (gastroesophageal reflux disease) (7/15/2011). Continue PPI    7. Hypothyroidism (7/15/2011). Her recent TSH was high. Will repeat    8. Hyperlipidemia (8/15/2011). Statin    9. DM type 2 causing vascular disease ([address] Diet controlled    10. Hx of CVA. Had CVA twice. Continue aspirin.   PT/OT evaluation    CODE STATUS: DNR(discussed with daughter)       Previous medical records reviewed     Risk of deterioration: high      Total time spent with patient: 79 Minutes                  Care Plan discussed with: Patient, Family, Nursing Staff and >50% of time spent in counseling and coordination of care    Discussed:  Care Plan    Prophylaxis:  Eliquis    Probable Disposition:  Home w/Family           ___________________________________________________    Attending Physician: Marycruz Juarez MD

## 2021-06-14 NOTE — PROGRESS NOTES
[facility]: MED RECONCILIATION      Information obtained from: Previous med rec and patient's daughter   Joseluis Becker [phone]       Allergies: Shellfish containing products    Comments:  Metoprolol Xl - Patient gets 50 mg three times a day with meals per daughter. Previous med rec was done on 6/8 and patient was discharged on 6/9. On discharge, Furosemide was stopped, Levothyroxine was increased and Atorvastatin/Aspirin/Trazodone were started   Daughter confirms implementing these changes      Prior to Admission Medications:     Medication Documentation Review Audit       Reviewed by Katie Hidalgo (Pharmacist) on 06/14/21 at 1736      Medication Sig Documenting Provider Last Dose Status Taking? apixaban (ELIQUIS) 2.5 mg tablet Take 1 Tablet by mouth two (2) times a day. Jayson Osborn MD 6/14/2021 AM Active Yes   aspirin 81 mg chewable tablet Take 1 Tablet by mouth daily for 30 days. Robbie Perez MD 6/14/2021 Active Yes   atorvastatin (Lipitor) 20 mg tablet Take 20 mg by mouth daily. Provider, Historical 6/14/2021 Active Yes   ferrous gluconate 324 mg (37.5 mg iron) tablet Take 1 Tab by mouth Daily (before breakfast). Rachel Singh MD 6/14/2021 Active Yes   levothyroxine (SYNTHROID) 112 mcg tablet Take 1 Tablet by mouth Daily (before breakfast) for 30 days. Indications: a condition with low thyroid hormone levels Robbie Perez MD 6/14/2021 Active Yes   losartan (COZAAR) 50 mg tablet Take 50 mg by mouth daily. Provider, Historical 6/14/2021 Unknown time Active Yes   metoprolol succinate (Toprol XL) 50 mg XL tablet Take 50 mg by mouth three (3) times daily (with meals). Provider, Historical Lunch Active Yes   pantoprazole (PROTONIX) 40 mg tablet Take 1 Tab by mouth daily. Carlton Shore MD 6/14/2021 Active Yes   traZODone (DESYREL) 50 mg tablet Take 1 Tablet by mouth nightly for 30 days.  Indications: insomnia Robbie Perez MD 6/13/2021 Active Yes                      Demar Ibanez PHARMD   Contact: [phone]

## 2021-06-14 NOTE — ED TRIAGE NOTES
Patient arrives from home via EMS with report (by family) of shortness of breath and anxiety since this morning. Patient hospitalizaed approx 1 week ago for CVA -- residual slurred speech and weakness. Family denies any neuro changes since discharge. Aliyah Christiansen

## 2021-06-14 NOTE — PROGRESS NOTES
[facility]: MED RECONCILIATION      Information obtained from: Previous med rec and patient's daughter   Frederick Castanon [phone]       Allergies: Shellfish containing products    Comments:  Metoprolol Xl - Patient gets 50 mg three times a day with meals per daughter. Previous med rec was done on 6/8 and patient was discharged on 6/9. On discharge, Furosemide was stopped, Levothyroxine was increased and Atorvastatin/Aspirin/Trazodone were started   Daughter confirms implementing these changes      Prior to Admission Medications:     Medication Documentation Review Audit       Reviewed by Andrew Santos (Pharmacist) on 06/14/21 at 1736      Medication Sig Documenting Provider Last Dose Status Taking? apixaban (ELIQUIS) 2.5 mg tablet Take 1 Tablet by mouth two (2) times a day. Blanco Green MD 6/14/2021 AM Active Yes   aspirin 81 mg chewable tablet Take 1 Tablet by mouth daily for 30 days. Opal Gomez MD 6/14/2021 Active Yes   atorvastatin (Lipitor) 20 mg tablet Take 20 mg by mouth daily. Provider, Historical 6/14/2021 Active Yes   ferrous gluconate 324 mg (37.5 mg iron) tablet Take 1 Tab by mouth Daily (before breakfast). Kerri Kohli MD 6/14/2021 Active Yes   levothyroxine (SYNTHROID) 112 mcg tablet Take 1 Tablet by mouth Daily (before breakfast) for 30 days. Indications: a condition with low thyroid hormone levels Opal Gomez MD 6/14/2021 Active Yes   losartan (COZAAR) 50 mg tablet Take 50 mg by mouth daily. Provider, Historical 6/14/2021 Unknown time Active Yes   metoprolol succinate (Toprol XL) 50 mg XL tablet Take 50 mg by mouth three (3) times daily (with meals). Provider, Historical Lunch Active Yes   pantoprazole (PROTONIX) 40 mg tablet Take 1 Tab by mouth daily. Kylee Ocasio MD 6/14/2021 Active Yes   traZODone (DESYREL) 50 mg tablet Take 1 Tablet by mouth nightly for 30 days.  Indications: insomnia Opal Gomez MD 6/13/2021 Active Yes                      Kia Ramirez PHARMD   Contact: [phone]

## 2021-06-14 NOTE — ED PROVIDER NOTES
The history is provided by the patient and a relative. Anxiety   This is a new problem. The current episode started 6 to 12 hours ago. The problem has not changed since onset. Duration of episode(s) is 13 hours. The problem occurs constantly. The pain is associated with emotional upset (recent CVA, multiple hospitalizations, ? auditory hallucination (seeing brother who  many years ago), restlessless). The patient is experiencing no pain. Quality: pt denies any pain, +restlessness. Associated symptoms include palpitations and shortness of breath. Pertinent negatives include no abdominal pain, no cough, no fever, no headaches, no leg pain, no nausea and no vomiting. Treatments tried: Trazodone at night, new per daughters. The treatment provided no relief. Past medical history comments: Tanvi, on Eliquis.         Past Medical History:   Diagnosis Date    BPPV (benign paroxysmal positional vertigo)     HTN (hypertension)     Hx of completed stroke     Hyperlipidemia     Hypothyroid     PAF (paroxysmal atrial fibrillation) (HCC)     Rheumatoid arthritis (HCC)     Scoliosis        Past Surgical History:   Procedure Laterality Date    HX  SECTION      HX ORTHOPAEDIC      bunions removed x 2         Family History:   Problem Relation Age of Onset    Stroke Father        Social History     Socioeconomic History    Marital status:      Spouse name: Not on file    Number of children: Not on file    Years of education: Not on file    Highest education level: Not on file   Occupational History    Not on file   Tobacco Use    Smoking status: Never Smoker    Smokeless tobacco: Never Used   Substance and Sexual Activity    Alcohol use: No    Drug use: No    Sexual activity: Never   Other Topics Concern    Not on file   Social History Narrative    Not on file     Social Determinants of Health     Financial Resource Strain:     Difficulty of Paying Living Expenses:    Food Insecurity:     Worried About [facility] in the Last Year:    [address] in the Last Year:    Transportation Needs:     Lack of Transportation (Medical):  Lack of Transportation (Non-Medical):    Physical Activity:     Days of Exercise per Week:     Minutes of Exercise per Session:    Stress:     Feeling of Stress :    Social Connections:     Frequency of Communication with Friends and Family:     Frequency of Social Gatherings with Friends and Family:     Attends Yazidism Services:     Active Member of Clubs or Organizations:     Attends Club or Organization Meetings:     Marital Status:    Intimate Partner Violence:     Fear of Current or Ex-Partner:     Emotionally Abused:     Physically Abused:     Sexually Abused: ALLERGIES: Shellfish containing products    Review of Systems   Constitutional: Negative for fever. Respiratory: Positive for shortness of breath. Negative for cough. Cardiovascular: Positive for palpitations. Gastrointestinal: Negative for abdominal pain, nausea and vomiting. Neurological: Negative for headaches. Psychiatric/Behavioral: Positive for agitation and hallucinations. The patient is nervous/anxious. See HPI     All other systems reviewed and are negative. Vitals:    06/14/21 1422   BP: 139/84   Pulse: 79   Resp: 18   Temp: 97.8 °F (36.6 °C)   SpO2: 98%   Weight: 50.3 kg (111 lb)            Physical Exam  Vitals and nursing note reviewed. Constitutional:       General: She is not in acute distress. Appearance: She is well-developed. HENT:      Head: Normocephalic and atraumatic. Eyes:      Conjunctiva/sclera: Conjunctivae normal.   Cardiovascular:      Rate and Rhythm: Tachycardia present. Rhythm irregular. Pulmonary:      Effort: Pulmonary effort is normal. No respiratory distress. Abdominal:      Palpations: Abdomen is soft. Tenderness: There is no abdominal tenderness. There is no guarding.    Musculoskeletal: Cervical back: Normal range of motion and neck supple. Right lower leg: No tenderness. No edema. Left lower leg: No tenderness. No edema. Skin:     General: Skin is warm and dry. Neurological:      Mental Status: She is alert and oriented to person, place, and time. Cranial Nerves: Facial asymmetry (L facial droop (baseline now s/p stroke)) present. Motor: No abnormal muscle tone. [facility]    EKG interpretation: 14:24  Rhythm: atrial fib; and irregular. Rate (approx.): 91; Axis: normal; Intervals: QTc 484 ms; ST/T wave: non-specific changes, no STEMI; EKG documented and interpreted by Nawaf Vickers MD, ED MD.      Procedures    Perfect Serve Consult for Admission  4:55 PM    ED Room Number: ER16/16  Patient Name and age:  Anahi Robledo 80 y.o.  female  Working Diagnosis:   1. Delirium    2. Acute on chronic congestive heart failure, unspecified heart failure type ([address]    3. Urinary tract infection without hematuria, site unspecified        COVID-19 Suspicion:  no  Sepsis present:  no  Reassessment needed: no  Code Status:  Full Code  Readmission: yes  Isolation Requirements:  no  Recommended Level of Care:  telemetry  Department:[facility] ED - [phone]  Other:  Lasix and Rocephin given.

## 2021-06-15 PROBLEM — R53.81 PHYSICAL DEBILITY: Status: ACTIVE | Noted: 2021-06-15

## 2021-06-15 PROBLEM — G93.41 ACUTE METABOLIC ENCEPHALOPATHY: Status: ACTIVE | Noted: 2021-06-15

## 2021-06-15 LAB
ANION GAP SERPL CALC-SCNC: 4 MMOL/L (ref 5–15)
ATRIAL RATE: 94 BPM
BUN SERPL-MCNC: 29 MG/DL (ref 6–20)
BUN/CREAT SERPL: 23 (ref 12–20)
CALCIUM SERPL-MCNC: 9.2 MG/DL (ref 8.5–10.1)
CALCULATED R AXIS, ECG10: -50 DEGREES
CALCULATED T AXIS, ECG11: -35 DEGREES
CHLORIDE SERPL-SCNC: 101 MMOL/L (ref 97–108)
CO2 SERPL-SCNC: 32 MMOL/L (ref 21–32)
CREAT SERPL-MCNC: 1.26 MG/DL (ref 0.55–1.02)
DIAGNOSIS, 93000: NORMAL
ERYTHROCYTE [DISTWIDTH] IN BLOOD BY AUTOMATED COUNT: 22.4 % (ref 11.5–14.5)
GLUCOSE SERPL-MCNC: 143 MG/DL (ref 65–100)
HCT VFR BLD AUTO: 36.4 % (ref 35–47)
HGB BLD-MCNC: 10.5 G/DL (ref 11.5–16)
MCH RBC QN AUTO: 20.7 PG (ref 26–34)
MCHC RBC AUTO-ENTMCNC: 28.8 G/DL (ref 30–36.5)
MCV RBC AUTO: 71.7 FL (ref 80–99)
NRBC # BLD: 0 K/UL (ref 0–0.01)
NRBC BLD-RTO: 0 PER 100 WBC
PLATELET # BLD AUTO: 184 K/UL (ref 150–400)
POTASSIUM SERPL-SCNC: 3.6 MMOL/L (ref 3.5–5.1)
Q-T INTERVAL, ECG07: 394 MS
QRS DURATION, ECG06: 76 MS
QTC CALCULATION (BEZET), ECG08: 484 MS
RBC # BLD AUTO: 5.08 M/UL (ref 3.8–5.2)
SODIUM SERPL-SCNC: 137 MMOL/L (ref 136–145)
VENTRICULAR RATE, ECG03: 91 BPM
WBC # BLD AUTO: 7.4 K/UL (ref 3.6–11)

## 2021-06-15 PROCEDURE — 74011250637 HC RX REV CODE- 250/637: Performed by: INTERNAL MEDICINE

## 2021-06-15 PROCEDURE — 99223 1ST HOSP IP/OBS HIGH 75: CPT | Performed by: INTERNAL MEDICINE

## 2021-06-15 PROCEDURE — 97161 PT EVAL LOW COMPLEX 20 MIN: CPT

## 2021-06-15 PROCEDURE — 97116 GAIT TRAINING THERAPY: CPT

## 2021-06-15 PROCEDURE — 85027 COMPLETE CBC AUTOMATED: CPT

## 2021-06-15 PROCEDURE — 97530 THERAPEUTIC ACTIVITIES: CPT

## 2021-06-15 PROCEDURE — 74011250636 HC RX REV CODE- 250/636: Performed by: INTERNAL MEDICINE

## 2021-06-15 PROCEDURE — 99222 1ST HOSP IP/OBS MODERATE 55: CPT | Performed by: STUDENT IN AN ORGANIZED HEALTH CARE EDUCATION/TRAINING PROGRAM

## 2021-06-15 PROCEDURE — 65660000000 HC RM CCU STEPDOWN

## 2021-06-15 PROCEDURE — 80048 BASIC METABOLIC PNL TOTAL CA: CPT

## 2021-06-15 PROCEDURE — 36415 COLL VENOUS BLD VENIPUNCTURE: CPT

## 2021-06-15 PROCEDURE — 97165 OT EVAL LOW COMPLEX 30 MIN: CPT

## 2021-06-15 PROCEDURE — 74011000250 HC RX REV CODE- 250: Performed by: INTERNAL MEDICINE

## 2021-06-15 PROCEDURE — 97535 SELF CARE MNGMENT TRAINING: CPT

## 2021-06-15 PROCEDURE — 93005 ELECTROCARDIOGRAM TRACING: CPT

## 2021-06-15 RX ORDER — POLYETHYLENE GLYCOL 3350 17 G/17G
17 POWDER, FOR SOLUTION ORAL 2 TIMES DAILY
Status: DISCONTINUED | OUTPATIENT
Start: 2021-06-15 | End: 2021-06-17 | Stop reason: HOSPADM

## 2021-06-15 RX ORDER — NYSTATIN 100000 [USP'U]/ML
500000 SUSPENSION ORAL 4 TIMES DAILY
Status: DISCONTINUED | OUTPATIENT
Start: 2021-06-15 | End: 2021-06-17 | Stop reason: HOSPADM

## 2021-06-15 RX ORDER — QUETIAPINE FUMARATE 25 MG/1
12.5 TABLET, FILM COATED ORAL ONCE
Status: COMPLETED | OUTPATIENT
Start: 2021-06-15 | End: 2021-06-15

## 2021-06-15 RX ORDER — AMOXICILLIN 250 MG
1 CAPSULE ORAL DAILY
Status: DISCONTINUED | OUTPATIENT
Start: 2021-06-15 | End: 2021-06-17 | Stop reason: HOSPADM

## 2021-06-15 RX ORDER — BUMETANIDE 1 MG/1
0.5 TABLET ORAL DAILY
Status: DISCONTINUED | OUTPATIENT
Start: 2021-06-16 | End: 2021-06-17 | Stop reason: HOSPADM

## 2021-06-15 RX ORDER — FACIAL-BODY WIPES
10 EACH TOPICAL DAILY PRN
Status: DISCONTINUED | OUTPATIENT
Start: 2021-06-15 | End: 2021-06-17 | Stop reason: HOSPADM

## 2021-06-15 RX ADMIN — APIXABAN 2.5 MG: 2.5 TABLET, FILM COATED ORAL at 17:51

## 2021-06-15 RX ADMIN — POLYETHYLENE GLYCOL 3350 17 G: 17 POWDER, FOR SOLUTION ORAL at 08:37

## 2021-06-15 RX ADMIN — NYSTATIN 500000 UNITS: 100000 SUSPENSION ORAL at 21:33

## 2021-06-15 RX ADMIN — PANTOPRAZOLE SODIUM 40 MG: 40 TABLET, DELAYED RELEASE ORAL at 08:37

## 2021-06-15 RX ADMIN — POLYETHYLENE GLYCOL 3350 17 G: 17 POWDER, FOR SOLUTION ORAL at 17:51

## 2021-06-15 RX ADMIN — METOPROLOL SUCCINATE 50 MG: 50 TABLET, EXTENDED RELEASE ORAL at 08:37

## 2021-06-15 RX ADMIN — NYSTATIN 500000 UNITS: 100000 SUSPENSION ORAL at 08:38

## 2021-06-15 RX ADMIN — METOPROLOL SUCCINATE 50 MG: 50 TABLET, EXTENDED RELEASE ORAL at 12:38

## 2021-06-15 RX ADMIN — APIXABAN 2.5 MG: 2.5 TABLET, FILM COATED ORAL at 08:37

## 2021-06-15 RX ADMIN — LOSARTAN POTASSIUM 50 MG: 50 TABLET, FILM COATED ORAL at 08:37

## 2021-06-15 RX ADMIN — Medication 10 ML: at 14:47

## 2021-06-15 RX ADMIN — DOCUSATE SODIUM 50MG AND SENNOSIDES 8.6MG 1 TABLET: 8.6; 5 TABLET, FILM COATED ORAL at 08:37

## 2021-06-15 RX ADMIN — QUETIAPINE FUMARATE 12.5 MG: 25 TABLET ORAL at 21:32

## 2021-06-15 RX ADMIN — NYSTATIN 500000 UNITS: 100000 SUSPENSION ORAL at 12:39

## 2021-06-15 RX ADMIN — LEVOTHYROXINE SODIUM 112 MCG: 0.11 TABLET ORAL at 05:54

## 2021-06-15 RX ADMIN — Medication 10 ML: at 05:55

## 2021-06-15 RX ADMIN — NYSTATIN 500000 UNITS: 100000 SUSPENSION ORAL at 17:51

## 2021-06-15 RX ADMIN — METOPROLOL SUCCINATE 50 MG: 50 TABLET, EXTENDED RELEASE ORAL at 17:51

## 2021-06-15 RX ADMIN — ATORVASTATIN CALCIUM 20 MG: 20 TABLET, FILM COATED ORAL at 08:37

## 2021-06-15 RX ADMIN — ASPIRIN 81 MG: 81 TABLET, CHEWABLE ORAL at 08:36

## 2021-06-15 RX ADMIN — Medication 10 ML: at 21:32

## 2021-06-15 RX ADMIN — CEFTRIAXONE 1 G: 1 INJECTION, POWDER, FOR SOLUTION INTRAMUSCULAR; INTRAVENOUS at 17:51

## 2021-06-15 RX ADMIN — BUMETANIDE 0.5 MG: 0.25 INJECTION, SOLUTION INTRAMUSCULAR; INTRAVENOUS at 08:37

## 2021-06-15 NOTE — PROGRESS NOTES
1900- Bedside and Verbal shift change report given to Fani García (oncoming nurse) by April (offgoing nurse). Report included the following information SBAR, Intake/Output, Recent Results and Cardiac Rhythm A-fib. This patient was assisted with Intentional Toileting every 2 hours during this shift. Documentation of ambulation and output reflected on Flowsheet. 0730- Bedside and Verbal shift change report given to [facility] (oncoming nurse) by Fani García (offgoing nurse). Report included the following information SBAR, Intake/Output, Recent Results and Cardiac Rhythm A-fib.

## 2021-06-15 NOTE — WOUND CARE
Wound Consult:  New consult Visit. Chart reviewed. Consulted for skin assessment. Spoke with patients nurse,  April RN at bedside for assessment. Patient is resting on a hillrom bed with versacare mattress. Heels off loaded with pillows at end of visit. Patient is pleasant, cooperative; requires 1 assist to move side to side in bed. Patient moves easily in bed. Incontinent of moderate amount of thick brown stool. care given. purewick in use August score 16. Assessment:all wounds POA 
bilateral heels blanchable pink, skin intact Right lower buttocks 1x1cm area of rash like redness,blanchable, no open skin] Sacrum and left buttocks no redness noted. Wound Recommendations: 
Elevate heels for offloading. Zinc guard for incontinence Skin Care / PI Prevention Recommendations: 1. Minimize friction/shear: minimize layers of linen/pads under patient. 2. Off load pressure/reposition:   turn and reposition approximately every 2 hours; float heels with pillows or use off loading heel boots; waffle cushion for sitting; position wedge. 3. Manage Moisture - keep skin folds dry; incontinence skin care with incontinence wipes; Zinc guard barrier ointment; appropriate sized briefs  ; Emmie Fells in use to help contain urine. 4. Continue to monitor nutrition, pain, and skin risk scale, and skin assessment. Plan: 
Spoke with Dr. Carine Gonzalez regarding findings and proposed orders for treatment. We will continue to reassess   and as needed. Please re-consult should concerns arise despite continued skin/PI prevention measures. [facility], Wound / Ostomy Department Wound Healing Office [phone]

## 2021-06-15 NOTE — NURSE NAVIGATOR
Chart reviewed by Heart Failure Nurse Navigator. Heart Failure database completed. Patient with frequent admissions:    12/18/20 for acute on chronic diastolic HF  8/29/04 to 3/72/73 with acute on chronic diastolic  heart failure  7/67/50 to 5/28/21 with acute CVA and reduced EF of 20 to 25% on Echo  6/7/21 to 6/9/21 with acute CVA and improved EF of 55% by Echo    Palliative care has been consulted during admission in December 2020 and May 2021. Patient is [place] speaking with attentive family caregivers. She has participated on therapy after CVA and is open to [facility]. She has Cardiology and Palliative consults pending. She is admitted with metabolic encephalopathy, UTI, and acute on chronic HF. EF:  Recent Echo 6/8/21 with EF 55 to 60% with upper normal wall thickness. This was significantly improved from Echo of 5/23/21 with EF of 20 to 25%. ACEi/ARB/ARNi: Losartan 50 mg daily. BB: Metoprolol succinate 50 mg tablets given 1 tablet  3 times daily by daughter. Instructions on last discharge were for 3 50 mg tablets daily. Aldosterone Antagonist: **    Obstructive Sleep Apnea Screening:   STOP-BANG score:   Referred to Sleep Medicine:     CRT Not indicated. NYHA Functional Class **. Heart Failure Teach Back in Patient Education. Heart Failure Avoiding Triggers on Discharge Instructions. Cardiologist: Dr. Sadaf Alberto discharge follow up phone call to be made within 48-72 hours of discharge.

## 2021-06-15 NOTE — PROGRESS NOTES
0730 Bedside and Verbal shift change report given to April Esha Maddox RN (oncoming nurse) by Destiny Mane RN (offgoing nurse). Report included the following information SBAR and Kardex. This patient was assisted with Intentional Toileting every 2 hours during this shift. Documentation of ambulation and output reflected on Flowsheet. 1000 Patient cleaned of incontinent stool, purewick changed, zinc applied. Heels elevated on pillow. 1200  Patient cleaned of incontinent stool, purewick changed, zinc applied. Heels elevated on pillow. 1930 Bedside and Verbal shift change report given to April Esha Maddox RN (oncoming nurse) by Destiny Mane RN (offgoing nurse). Report included the following information SBAR and Kardex.

## 2021-06-15 NOTE — PROGRESS NOTES
Problem: Mobility Impaired (Adult and Pediatric)  Goal: *Acute Goals and Plan of Care (Insert Text)  Description: FUNCTIONAL STATUS PRIOR TO ADMISSION: Patient required moderate assistance for basic and instrumental ADLs. HOME SUPPORT PRIOR TO ADMISSION: The patient lived with family. Physical Therapy Goals  Initiated 6/15/2021  1. Patient will move from supine to sit and sit to supine , scoot up and down, and roll side to side in bed with supervision/set-up within 7 day(s). 2.  Patient will transfer from bed to chair and chair to bed with supervision/set-up using the least restrictive device within 7 day(s). 3.  Patient will perform sit to stand with supervision/set-up within 7 day(s). 4.  Patient will ambulate with minimal assistance/contact guard assist for 50 feet with the least restrictive device within 7 day(s). 5.  Patient will ascend/descend 4 stairs with  handrail(s) with moderate assistance  within 7 day(s). Outcome: Progressing Towards Goal   PHYSICAL THERAPY EVALUATION  Patient: Bao Abdalla (1 y.o. female)  Date: 6/15/2021  Primary Diagnosis: Encephalopathy acute [G93.40]        Precautions: fall       ASSESSMENT  Based on the objective data described below, the patient presents with difficulty with ambulation. Communicated with nurse cleared for therapy, family at bedside. Patient had multiple recent admission in this hospital. Last discharge to home last Wednesday, family reported patient was not able to ambulate when she went home last Wednesday. Patient supine on bed when received. Rolled on the edge of bed min assist, supine to sit min assist, sit to stand mod assist, sitting and standing balance fair has a posterior lean need verbal and tactile cues to lean forwards on the rolling walker.  Ambulate towards the recliner mod assist. No loss of balance OOB to chair as tolerated, Educate and instructed patient to continue active range of motion exercise on both legs while up on chair or on bed multiple times. Recommending rehab how ever family declining they just want HHPT  Recommend patient to be up on the chair at least 3 times a day every meal times as tolerated. Communicated with nurse who agreed to monitor patient. Current Level of Function Impacting Discharge (mobility/balance): mod assist with transfers and ambulation using a rolling walker    Functional Outcome Measure: The patient scored 25/100 on the barthel index outcome measure . Other factors to consider for discharge: fall     Patient will benefit from skilled therapy intervention to address the above noted impairments. PLAN :  Recommendations and Planned Interventions: bed mobility training, transfer training, gait training, therapeutic exercises, neuromuscular re-education, orthotic/prosthetic training, patient and family training/education, and therapeutic activities      Frequency/Duration: Patient will be followed by physical therapy:  5 times a week to address goals. Recommendation for discharge: (in order for the patient to meet his/her long term goals)  Therapy up to 5 days/week in rehab setting however family declined they just want patient to go home with HHPT    This discharge recommendation:  Has been made in collaboration with the attending provider and/or case management    IF patient discharges home will need the following DME: patient owns DME required for discharge         SUBJECTIVE:   Patient stated [address]     OBJECTIVE DATA SUMMARY:   HISTORY:    Past Medical History:   Diagnosis Date    BPPV (benign paroxysmal positional vertigo)     HTN (hypertension)     Hx of completed stroke     Hyperlipidemia     Hypothyroid     PAF (paroxysmal atrial fibrillation) (HCC)     Rheumatoid arthritis ([facility] [address]     Scoliosis      Past Surgical History:   Procedure Laterality Date    HX  SECTION      HX ORTHOPAEDIC      bunions removed x 2       Personal factors and/or comorbidities impacting plan of care:          EXAMINATION/PRESENTATION/DECISION MAKING:   Critical Behavior:  Neurologic State: Alert, Confused  Orientation Level: Oriented to person  Cognition: Follows commands     Hearing: Auditory  Auditory Impairment: None    Range Of Motion:  AROM: Within functional limits           PROM: Within functional limits           Strength:    Strength: Generally decreased, functional                    Tone & Sensation:                                  Coordination:  Coordination: Within functional limits  Vision:      Functional Mobility:  Bed Mobility:  Rolling: Minimum assistance  Supine to Sit: Minimum assistance  Sit to Supine: Minimum assistance  Scooting: Minimum assistance  Transfers:  Sit to Stand: Moderate assistance  Stand to Sit: Moderate assistance  Stand Pivot Transfers: Moderate assistance     Bed to Chair: Moderate assistance              Balance:   Sitting: Intact; High guard  Standing: Impaired;Pull to stand; With support  Standing - Static: Fair  Standing - Dynamic : Fair  Ambulation/Gait Training:  Distance (ft): 2 Feet (ft)  Assistive Device: Walker, rolling;Gait belt  Ambulation - Level of Assistance: Moderate assistance; Additional time     Gait Description (WDL): Exceptions to WDL  Gait Abnormalities: Path deviations; Step to gait        Base of Support: Widened     Speed/Amber: Fluctuations; Slow  Step Length: Right shortened;Left shortened                      Therapeutic Exercises:   Educate and instructed patient to continue active range of motion exercise on both legs while up on chair or on bed multiple times. Recommend patient to be up on the chair at least 3 times a day every meal times as tolerated. Functional Measure:  Barthel Index:    Bathin  Bladder: 5  Bowels: 5  Groomin  Dressin  Feedin  Mobility: 0  Stairs: 0  Toilet Use: 5  Transfer (Bed to Chair and Back): 5  Total: 25/100       The Barthel ADL Index: Guidelines  1.  The index should be used as a record of what a patient does, not as a record of what a patient could do. 2. The main aim is to establish degree of independence from any help, physical or verbal, however minor and for whatever reason. 3. The need for supervision renders the patient not independent. 4. A patient's performance should be established using the best available evidence. Asking the patient, friends/relatives and nurses are the usual sources, but direct observation and common sense are also important. However direct testing is not needed. 5. Usually the patient's performance over the preceding 24-48 hours is important, but occasionally longer periods will be relevant. 6. Middle categories imply that the patient supplies over 50 per cent of the effort. 7. Use of aids to be independent is allowed. Faviola Garza., Barthel, D.W. (1190). Functional evaluation: the Barthel Index. [address] [facility] (14)2. Willma Baptise michelle CODIE Holly, Carie Stephanie., Jennifer Strauss., [place], [address] (1999). Measuring the change indisability after inpatient rehabilitation; comparison of the responsiveness of the Barthel Index and Functional Kendall Measure. Journal of Neurology, Neurosurgery, and Psychiatry, 66(4), 153-671. Renetta Villalta, N.J.A, MIHAI Celeste.NEO, & Dany Germain MVioletA. (2004.) Assessment of post-stroke quality of life in cost-effectiveness studies: The usefulness of the Barthel Index and the EuroQoL-5D.  Quality of Life Research, 15, 177-20           Physical Therapy Evaluation Charge Determination   History Examination Presentation Decision-Making   HIGH Complexity :3+ comorbidities / personal factors will impact the outcome/ POC  HIGH Complexity : 4+ Standardized tests and measures addressing body structure, function, activity limitation and / or participation in recreation  HIGH Complexity : Unstable and unpredictable characteristics  Other outcome measures barthel index  HIGH       Based on the above components, the patient evaluation is determined to be of the following complexity level: HIGH     Pain Ratin/10    Activity Tolerance:   Good    After treatment patient left in no apparent distress:   Sitting in chair, Heels elevated for pressure relief, Call bell within reach, Caregiver / family present, and Side rails x 3    COMMUNICATION/EDUCATION:   The patients plan of care was discussed with: Registered nurse and Case management. Fall prevention education was provided and the patient/caregiver indicated understanding. Thank you for this referral.  Sidonie Babinski, PT,WCC.    Time Calculation: 28 mins

## 2021-06-15 NOTE — CONSULTS
Palliative Medicine Consult  [place]: [phone]    Patient Name: Sally Ball  YOB: 1930    Date of Initial Consult: 6/15/21  Reason for Consult: care decisions  Requesting Provider: Dr. Roland Alvarenga  Primary Care Physician: Taj Eliozndo MD     SUMMARY:   Sally Ball is a 80 y.o. 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 Current medical issues leading to Palliative Medicine involvement include: recurrent hospitalization, progressive decline    Ms. Castillo and her family are known to me from admissions in December and May. Since her stroke in May she has been increasingly difficult to care for at home due to day/night reversal and agitation. She was hospitalized again on 6/7 with another small acute area of stroke adjacent to the previous on the left side. She has residual dysarthria, dysphagia and left facial droop but continues to tolerate a pureed diet with nectar thick liquids. Psychosocial Hx- Born in [facility] but raised in [facility].  .  She had three children but son has passed away. [place] two daughters Shantell and Jose Armando Travis live close by to her and ensure she is cared for 24/7. PALLIATIVE DIAGNOSES:   1. Goals of care discussion  2. Left parietal CVA x 2  3. Frailty  4. Cardiac amyloidosis- LVEF improved to 55-60% on 6/8 TTE (from 20-25% in May)  5. DM type 2 with complications  6. Delusional infestation       PLAN:   1. Patient assessed. She is alert, appears comfortable w/o distress, tolerates RN care well, but participation in care discussions is limited by dysarthria and reduced cognition. 2. She is tolerating a pureed, nectar thick liquid diet quite well.    3. Palliative care LCSW Milli and I spoke with daughter Sukhdeep Smith by phone. She shared that her mother has had worsening agitation in the nights over the past month, persistently screaming and wanting her feet covered. In the past she struggled significantly with her fixed delusion about her skin. Trazodone was trialed but it takes over 3 hrs for her mother to sleep after taking and then she is hard to rouse the next day. Therapy has come to the house on a limited basis and Sukhdeep Smith doesn't feel her mother is progressing that well with therapy to date. Reviewed option for Hospice at home as we have in the past.  Discussed that this is the best service for her Mom at this time to help manage her anxiety and irregular sleep pattern, especially given limited benefit of therapy in the overall picture. Sukhdeep Smith agrees and feels ready for this service to start as she recognizes it will be quite difficult for her to leave the house for doctors appts and agrees she is no longer benefiting from hospitalization. She will confer with her sister and other family tonight. She is aware I am placing Hospice consult today in order for meeting to be arranged tomorrow. 4. Consult placed to case management for Hospice. Place of care to remain at home as she has 24/7 care in place. 5. Code status- DDNR is completed and on file from a past admission. 6. There are no acute symptom management needs at this time. 7. Thank you for the opportunity to participate in the [address]  8.  Communicated plan of care with: Palliative Avin[facility] YOU[facility] 192 Team including Dr. Nadiya Richards, [address] / TREATMENT PREFERENCES:     GOALS OF CARE:  Patient/Health Care Proxy Stated Goals: Comfort    TREATMENT PREFERENCES:   Code Status: DNR    Advance Care Planning:  [x] The [facility] Interdisciplinary Team has updated the ACP Navigator with Rojas and Patient Capacity      Primary Decision MakeLoida Sullivan - Daughter - [phone]    Primary Decision Maker: Rosendo Bozena Steinberg - [phone]     Advance Care Planning 6/15/2021   Patient's Healthcare Decision Maker is: Legal Next of Kin   Confirm Advance Directive None   Patient Would Like to Complete Advance Directive Unable   Does the patient have other document types Do Not Resuscitate       Medical Interventions: Limited additional interventions       Other:    As far as possible, the palliative care team has discussed with patient / health care proxy about goals of care / treatment preferences for patient. HISTORY:     History obtained from:  Daughter Bella Dials, chart review    CHIEF COMPLAINT: none at time of assessment    HPI/SUBJECTIVE:    The patient is:   [x] Verbal and participatory  [] Non-participatory due to:     Ms. Krzysztof Hope is lying comfortably today w/o complaints. Specifically she denies pain, sob, anxiety or general discomfort. Clinical Pain Assessment (nonverbal scale for severity on nonverbal patients):   Clinical Pain Assessment  Severity: 0          Duration: for how long has pt been experiencing pain (e.g., 2 days, 1 month, years)  Frequency: how often pain is an issue (e.g., several times per day, once every few days, constant)     FUNCTIONAL ASSESSMENT:     Palliative Performance Scale (PPS):  PPS: 40       PSYCHOSOCIAL/SPIRITUAL SCREENING:     Palliative IDT has assessed this patient for cultural preferences / practices and a referral made as appropriate to needs (Cultural Services, Patient Advocacy, Ethics, etc.)    Any spiritual / Episcopalian concerns:  [] Yes /  [x] No    Caregiver Burnout:  [] Yes /  [] No /  [x] No Caregiver Present      Anticipatory grief assessment:   [x] Normal  / [] Maladaptive       ESAS Anxiety: Anxiety: 0    ESAS Depression:          REVIEW OF SYSTEMS:     Positive and pertinent negative findings in ROS are noted above in HPI.   The following systems were [x] reviewed / [] unable to be reviewed as noted in HPI  Other findings are noted below.  Systems: constitutional, ears/nose/mouth/throat, respiratory, gastrointestinal, genitourinary, musculoskeletal, integumentary, neurologic, psychiatric, endocrine. Positive findings noted below. Modified ESAS Completed by: provider           Pain: 0   Anxiety: 0       Dyspnea: 0           Stool Occurrence(s): 1        PHYSICAL EXAM:     From RN flowsheet:  Wt Readings from Last 3 Encounters:   06/15/21 99 lb 13.9 oz (45.3 kg)   06/08/21 104 lb 15 oz (47.6 kg)   05/28/21 104 lb 3.2 oz (47.3 kg)     Blood pressure (!) 158/82, pulse 75, temperature 97.7 °F (36.5 °C), resp. rate 18, weight 99 lb 13.9 oz (45.3 kg), SpO2 97 %.     Pain Scale 1: Numeric (0 - 10)  Pain Intensity 1: 0                 Last bowel movement, if known:     Constitutional: Frail elderly woman lying in bed; appears comfortable  Eyes: sclerae anicteric  ENMT: no nasal discharge, moist mucous membranes  Cardiovascular: regular rhythm, distal pulses intact  Respiratory: breathing not labored, symmetric bs anteriorly  Gastrointestinal: soft non-tender, +bowel sounds  Musculoskeletal: no deformity, no tenderness to palpation  Skin: warm, dry  Neurologic: alert and oriented, left facial droop, + dysarthria but able to understand many phrases, follows commands  Psychiatric:  Normal affect, not agitated or restless         HISTORY:     Active Problems:    HTN (hypertension), benign (7/15/2011)      GERD (gastroesophageal reflux disease) (7/15/2011)      Hypothyroidism (7/15/2011)      Hyperlipidemia (8/15/2011)      DM type 2 causing vascular disease (HCC) ()      Diastolic CHF, acute on chronic ([facility] [address] (12/18/2020)      UTI (urinary tract infection) (6/14/2021)      Encephalopathy acute (6/14/2021)      Past Medical History:   Diagnosis Date    BPPV (benign paroxysmal positional vertigo)     HTN (hypertension)     Hx of completed stroke     Hyperlipidemia     Hypothyroid     PAF (paroxysmal atrial fibrillation) (HCC)     Rheumatoid arthritis ([address]     Scoliosis       Past Surgical History:   Procedure Laterality Date    HX  SECTION      HX ORTHOPAEDIC      bunions removed x 2      Family History   Problem Relation Age of Onset    Stroke Father       History reviewed, no pertinent family history.   Social History     Tobacco Use    Smoking status: Never Smoker    Smokeless tobacco: Never Used   Substance Use Topics    Alcohol use: No     Allergies   Allergen Reactions    Shellfish Containing Products Swelling      Current Facility-Administered Medications   Medication Dose Route Frequency    polyethylene glycol (MIRALAX) packet 17 g  17 g Oral BID    senna-docusate (PERICOLACE) 8.6-50 mg per tablet 1 Tablet  1 Tablet Oral DAILY    bisacodyL (DULCOLAX) suppository 10 mg  10 mg Rectal DAILY PRN    nystatin (MYCOSTATIN) 100,000 unit/mL oral suspension 500,000 Units  500,000 Units Oral QID    [START ON 2021] bumetanide (BUMEX) tablet 0.5 mg  0.5 mg Oral DAILY    apixaban (ELIQUIS) tablet 2.5 mg  2.5 mg Oral BID    aspirin chewable tablet 81 mg  81 mg Oral DAILY    atorvastatin (LIPITOR) tablet 20 mg  20 mg Oral DAILY    levothyroxine (SYNTHROID) tablet 112 mcg  112 mcg Oral ACB    losartan (COZAAR) tablet 50 mg  50 mg Oral DAILY    pantoprazole (PROTONIX) tablet 40 mg  40 mg Oral DAILY    sodium chloride (NS) flush 5-40 mL  5-40 mL IntraVENous Q8H    sodium chloride (NS) flush 5-40 mL  5-40 mL IntraVENous PRN    acetaminophen (TYLENOL) tablet 650 mg  650 mg Oral Q6H PRN    Or    acetaminophen (TYLENOL) suppository 650 mg  650 mg Rectal Q6H PRN    ondansetron (ZOFRAN ODT) tablet 4 mg  4 mg Oral Q8H PRN    Or    ondansetron (ZOFRAN) injection 4 mg  4 mg IntraVENous Q6H PRN    hydrALAZINE (APRESOLINE) 20 mg/mL injection 10 mg  10 mg IntraVENous Q6H PRN    labetaloL (NORMODYNE;TRANDATE) 20 mg/4 mL (5 mg/mL) injection 20 mg  20 mg IntraVENous Q2H PRN    cefTRIAXone (ROCEPHIN) 1 g in sterile water (preservative free) 10 mL IV syringe  1 g IntraVENous Q24H    metoprolol succinate (TOPROL-XL) XL tablet 50 mg  50 mg Oral TID WITH MEALS          LAB AND IMAGING FINDINGS:     Lab Results   Component Value Date/Time    WBC 7.4 06/15/2021 01:39 AM    HGB 10.5 (L) 06/15/2021 01:39 AM    PLATELET 599 70/67/0215 01:39 AM     Lab Results   Component Value Date/Time    Sodium 137 06/15/2021 01:39 AM    Potassium 3.6 06/15/2021 01:39 AM    Chloride 101 06/15/2021 01:39 AM    CO2 32 06/15/2021 01:39 AM    BUN 29 (H) 06/15/2021 01:39 AM    Creatinine 1.26 (H) 06/15/2021 01:39 AM    Calcium 9.2 06/15/2021 01:39 AM    Magnesium 2.4 06/14/2021 02:48 PM    Phosphorus 3.1 06/09/2021 03:34 AM      Lab Results   Component Value Date/Time    Alk. phosphatase 131 (H) 06/14/2021 02:48 PM    Protein, total 7.2 06/14/2021 02:48 PM    Albumin 2.6 (L) 06/14/2021 02:48 PM    Globulin 4.6 (H) 06/14/2021 02:48 PM     Lab Results   Component Value Date/Time    INR 1.2 (H) 05/22/2021 11:41 AM    Prothrombin time 12.6 (H) 05/22/2021 11:41 AM    aPTT 81.2 (H) 05/24/2021 08:40 AM      Lab Results   Component Value Date/Time    Iron 18 (L) 05/22/2021 11:41 AM    TIBC 480 (H) 05/22/2021 11:41 AM    Iron % saturation 4 (L) 05/22/2021 11:41 AM    Ferritin 19 (L) 05/22/2021 11:41 AM      No results found for: PH, PCO2, PO2  No components found for: [place]   Lab Results   Component Value Date/Time    CK 52 10/29/2013 10:45 AM    CK - MB 0.9 10/29/2013 10:45 AM                Total time:  70m  Counseling / coordination time, spent as noted above: 45m  > 50% counseling / coordination?: y    Prolonged service was provided for  []30 min   []75 min in face to face time in the presence of the patient, spent as noted above. Time Start:   Time End:   Note: this can only be billed with 41753 (initial) or 45845 (follow up). If multiple start / stop times, list each separately.

## 2021-06-15 NOTE — PROGRESS NOTES
Spiritual Care Assessment/Progress Note  [address]      NAME: Varsha Villagran      MRN: [id number]  AGE: 80 y.o. SEX: female  Buddhist Affiliation: Debo Kline   Language: English     6/15/2021     Total Time (in minutes): 6     Spiritual Assessment begun in SF 3 PROG CARE TELE 1 through conversation with:         [x]Patient        [] Family    [] Friend(s)        Reason for Consult: Palliative Care, Initial/Spiritual Assessment     Spiritual beliefs: (Please include comment if needed)     [x] Identifies with a mila tradition: Restoration per chart review        [] Supported by a mila community:            [] Claims no spiritual orientation:           [] Seeking spiritual identity:                [] Adheres to an individual form of spirituality:           [] Not able to assess:                           Identified resources for coping:      [] Prayer                               [] Music                  [] Guided Imagery     [] Family/friends                 [] Pet visits     [] Devotional reading                         [x] Unknown     [] Other:                                             Interventions offered during this visit: (See comments for more details)    Patient Interventions: Other (comment) (Unable to assess)           Plan of Care:     [] Support spiritual and/or cultural needs    [] Support AMD and/or advance care planning process      [] Support grieving process   [] Coordinate Rites and/or Rituals    [] Coordination with community clergy   [] No spiritual needs identified at this time   [] Detailed Plan of Care below (See Comments)  [] Make referral to Music Therapy  [] Make referral to Pet Therapy     [] Make referral to Addiction services  [] Make referral to [facility]  [] Make referral to Spiritual Care Partner  [] No future visits requested        [x] Follow up upon further referrals     Comments:  visited Mrs. Castillo for a palliative care initial spiritual assessment on the [facility] Tele unit. Mrs. Chucho Wise was being assessed by other providers at the time of the 's visit. 's are available for further support upon referral  Darlene Esteves. Mansi Mathew.      Paging Service: [phone])

## 2021-06-15 NOTE — PROGRESS NOTES
Reason for Re-Admission:   Acute metabolic encephalopathy, confusion, hallucinations. Hx CHF, CKD, HTN, afib, RA, CVA, hypothyroid. RUR Score:     35%/ high risk for readmission. PCP: First and Last name:  Naga Hernández MD     Name of Practice:   Are you a current patient: Yes/No:  yes   Approximate date of last visit:   unsure   Can you do a virtual visit with your PCP:  no             Resources/supports as identified by patient/family:   Family very supportive, caregiver assist with care. Top Challenges facing patient (as identified by patient/family and CM): Finances/Medication cost?      [place] Medicare/[facility] medicaid              Transportation? Per transport services or family. Support system or lack thereof? Family supportive                     Living arrangements? Lives with her daughter Evelyn Valentine           Self-care/ADLs/Cognition? Dependent wth ADLs          Current Advanced Directive/Advance Care Plan:  DNR      Healthcare Decision Maker:   Click here to complete [address] including selection of the Healthcare Decision Maker Relationship (ie \"Primary\")      Primary Decision MakerPolly Wil Hodge Daughter - [phone]    Primary Decision Maker: Eduardo Huangia - Daughter - [phone]    Payor Source Payor: [place] MEDICARE / Plan: Ted Cohen / Product Type: Medicare /                             Plan for utilizing home health:    Open to [address] [place] [address] for nursing, PT,OT, speech. Transition of Care Plan:         Chart reviewed, demographics verified. CM role and follow up discussed. Spoke with patient's daughter Evelyn Valentine via phone for CM assessment. Patient lives with her daughter Evleyn Valentine. Patient lives in a two story home with 5 steps to enter home and bed/bath on main level. Patient has prescription drug coverage, uses [facility] on [address].   Patient is dependent with ADLs. Caregiver assist during daytime M-F, family cares for her evenings and weekends. Current status:  Patient currently requiring medical management including IV antibiotics, IV bumex. Ongoing evaluation and monitoring. PLAN:  1. Monitor patient response to treatment and recommendations. 2. Medical management continues. 3. Patient is appropriate for resumption of home care services. 4. Patient transport home per transportation vs family at discharge. 5. CM to monitor clinical progress and disposition recommendations. Care Management Interventions  PCP Verified by CM: Yes (Dr. Segundo Chisholm)  Mode of Transport at Discharge:  Other (see comment) (family vs medicaid transport)  Transition of Care Consult (CM Consult): Discharge Planning  Physical Therapy Consult: Yes  Occupational Therapy Consult: Yes  Current Support Network: Lives with Caregiver  The Plan for Transition of Care is Related to the Following Treatment Goals : return home at DC  Discharge Location  Discharge Placement: Home with home health    Readmission Assessment  Number of days since last admission?: 1-7 days  Previous disposition: Home with Home Health  Who is being interviewed?: Caregiver  What was the patient's/caregiver's perception as to why they think they needed to return back to the hospital?: Other (Comment) (increased shortness of breath, confusion)  Did you visit your Primary Care Physician after you left the hospital, before you returned this time?: No  Why weren't you able to visit your PCP?: Other (Comment) (admitted to hospital)  Did you see a specialist, such as Cardiac, Pulmonary, Orthopedic Physician, etc. after you left the hospital?: No  Who advised the patient to return to the hospital?: Self-referral  Does the patient report anything that got in the way of taking their medications?: No  In our efforts to provide the best possible care to you and others like you, can you think of anything that we could have done to help you after you left the hospital the first time, so that you might not have needed to return so soon?: Other (Comment) (none)    Josette Sanon, RN, MSN, Care manager

## 2021-06-15 NOTE — ACP (ADVANCE CARE PLANNING)
Primary Decision MakerFseven Royal - Daughter - [phone]    Primary Decision Maker: Ricky Montesinos - Daughter - [phone]  Advance Care Planning 6/15/2021   Patient's Healthcare Decision Maker is: Legal Next of Kin   Confirm Advance Directive None   Patient Would Like to Complete Advance Directive Unable   Does the patient have other document types Do Not Resuscitate       Pt does not have AMD in place, is currently unable to complete; dtsakina Stinson and Peyton Conley are legal NOK/surrogate decision makers. Pt signed DDNR during December 2020 hospitalization; copy has been scanned into medical record. Family is now considering having hospice support in the home due to pt's overall decline and increased care needs.

## 2021-06-15 NOTE — ED NOTES
TRANSFER - OUT REPORT:    Verbal report given to Florentino Del Real RN (name) on Brittney Maya  being transferred to 3rd floor (unit) for routine progression of care       Report consisted of patients Situation, Background, Assessment and   Recommendations(SBAR). Information from the following report(s) SBAR, ED Summary, [facility] and Recent Results was reviewed with the receiving nurse. Lines:   Peripheral IV 06/14/21 Left;Upper Arm (Active)   Site Assessment Clean, dry, & intact 06/14/21 1446   Phlebitis Assessment 0 06/14/21 1446   Infiltration Assessment 0 06/14/21 1446   Dressing Status Clean, dry, & intact; New 06/14/21 1446   Dressing Type Transparent 06/14/21 1446   Hub Color/Line Status Pink;Flushed;Patent 06/14/21 1446   Action Taken Blood drawn 06/14/21 1446        Opportunity for questions and clarification was provided.       Patient transported with:   Monitor  Registered Nurse

## 2021-06-15 NOTE — PROGRESS NOTES
Yang Vazquez [facility] [place] [address]  [address] [place] [address], [address] [place] [address]  [phone]      Medical Progress Note      NAME: Mateus Hoang   :  10/12/1930  MRM:  [id number]    Date of service: 6/15/2021  7:12 AM       Assessment and Plan:   1. Encephalopathy acute (2021) likely due to UTI. Her delirium might worsen during her hospital stay. May require a sitter. Treat underlying cause and monitor clinically. 2.  UTI (urinary tract infection) (2021)(POA). Check urine culture and continue ceftriaxone empirically.     3.  Diastolic CHF, acute on chronic (HCC) (2020)/high proBNP. Recent echo with EF of 50 to 55%. Started low-dose of Bumex IV. Check I&O/daily weight. Cardiology consult.      4. Chronic atrial fibrillation ([address] (3/24/2021) POA: rate controlled. Continue Eliquis and Toprol.       5.  HTN (hypertension), benign (7/15/2011), not well controlled. Continue Toprol and metoprolol     6. GERD (gastroesophageal reflux disease) (7/15/2011). Continue PPI     7. Hypothyroidism (7/15/2011). Her recent TSH was high. Will repeat     8. Hyperlipidemia (8/15/2011). Statin     9. DM type 2 causing vascular disease ([address] Diet controlled     10. Hx of CVA. Had CVA twice in the past.  Continue aspirin. PT/OT evaluation    11. Thrush. Start nystatin    12. Constipation. Bowel regimen.       CODE STATUS: DNR(discussed with daughter)     In the past, there was a discussion about home hospice with the family, but that was changed as pt started to feel better. Will consult palliative care to discuss goal of care           Subjective:     Chief Complaint[de-identified] Patient was seen and examined as a follow up for confusion. Chart was reviewed. no events, looks more alert     ROS:  (bold if positive, if negative)    Tolerating PT  Tolerating Diet        Objective:     Last 24hrs VS reviewed since prior progress note.  Most recent are:    Visit Vitals  /81 (BP 1 Location: Right upper arm, BP Patient Position: At rest)   Pulse 80   Temp 98.3 °F (36.8 °C)   Resp 16   Wt 45.3 kg (99 lb 13.9 oz)   SpO2 98%   BMI 18.27 kg/m²     SpO2 Readings from Last 6 Encounters:   06/15/21 98%   06/09/21 100%   05/28/21 99%   04/05/21 99%   03/24/21 94%   02/02/21 94%            Intake/Output Summary (Last 24 hours) at 6/15/2021 6383  Last data filed at 6/15/2021 9571  Gross per 24 hour   Intake 130 ml   Output 250 ml   Net -120 ml        Physical Exam:    Gen:  Well-developed, well-nourished, in no acute distress  HEENT:  Pink conjunctivae, PERRL, hearing intact to voice, moist mucous membranes  Neck:  Supple, without masses, thyroid non-tender  Resp:  No accessory muscle use,  rales BL  Card:  No murmurs, normal S1, S2 without thrills, bruits or peripheral edema  Abd:  Soft, non-tender, non-distended, normoactive bowel sounds are present, no palpable organomegaly and no detectable hernias  Lymph:  No cervical or inguinal adenopathy  Musc:  No cyanosis or clubbing  Skin:  No rashes or ulcers, skin turgor is good  Neuro:  Cranial nerves are grossly intact, no focal motor weakness, follows commands appropriately  Psych:  Good insight, oriented to person, place and time, alert  __________________________________________________________________  Medications Reviewed: (see below)  Medications:     Current Facility-Administered Medications   Medication Dose Route Frequency    apixaban (ELIQUIS) tablet 2.5 mg  2.5 mg Oral BID    aspirin chewable tablet 81 mg  81 mg Oral DAILY    atorvastatin (LIPITOR) tablet 20 mg  20 mg Oral DAILY    levothyroxine (SYNTHROID) tablet 112 mcg  112 mcg Oral ACB    losartan (COZAAR) tablet 50 mg  50 mg Oral DAILY    pantoprazole (PROTONIX) tablet 40 mg  40 mg Oral DAILY    sodium chloride (NS) flush 5-40 mL  5-40 mL IntraVENous Q8H    sodium chloride (NS) flush 5-40 mL  5-40 mL IntraVENous PRN    acetaminophen (TYLENOL) tablet 650 mg  650 mg Oral Q6H PRN    Or    acetaminophen (TYLENOL) suppository 650 mg  650 mg Rectal Q6H PRN    polyethylene glycol (MIRALAX) packet 17 g  17 g Oral DAILY PRN    ondansetron (ZOFRAN ODT) tablet 4 mg  4 mg Oral Q8H PRN    Or    ondansetron (ZOFRAN) injection 4 mg  4 mg IntraVENous Q6H PRN    bumetanide (BUMEX) injection 0.5 mg  0.5 mg IntraVENous BID    hydrALAZINE (APRESOLINE) 20 mg/mL injection 10 mg  10 mg IntraVENous Q6H PRN    labetaloL (NORMODYNE;TRANDATE) 20 mg/4 mL (5 mg/mL) injection 20 mg  20 mg IntraVENous Q2H PRN    cefTRIAXone (ROCEPHIN) 1 g in sterile water (preservative free) 10 mL IV syringe  1 g IntraVENous Q24H    metoprolol succinate (TOPROL-XL) XL tablet 50 mg  50 mg Oral TID WITH MEALS        Lab Data Reviewed: (see below)  Lab Review:     Recent Labs     06/15/21  0139 06/14/21  1448   WBC 7.4 6.5   HGB 10.5* 10.5*   HCT 36.4 36.0    168     Recent Labs     06/15/21  0139 06/14/21  1448    134*   K 3.6 5.0    103   CO2 32 28   * 177*   BUN 29* 31*   CREA 1.26* 1.36*   CA 9.2 9.1   MG  --  2.4   ALB  --  2.6*   TBILI  --  0.6   ALT  --  77     Lab Results   Component Value Date/Time    Glucose (POC) 147 (H) 06/09/2021 12:24 PM    Glucose (POC) 118 (H) 06/09/2021 07:49 AM    Glucose (POC) 215 (H) 06/08/2021 09:14 PM    Glucose (POC) 169 (H) 06/08/2021 05:02 PM    Glucose (POC) 216 (H) 06/08/2021 11:26 AM     No results for input(s): PH, PCO2, PO2, HCO3, FIO2 in the last 72 hours. No results for input(s): INR, INREXT in the last 72 hours.   All Micro Results     Procedure Component Value Units Date/Time    CULTURE, URINE [[id number]] Collected: 06/14/21 1559    Order Status: Completed Specimen: Cath Urine Updated: 06/14/21 2326    CULTURE, URINE [[id number]]     Order Status: Canceled Specimen: Cath Urine     URINE CULTURE HOLD SAMPLE [[id number]] Collected: 06/14/21 1559    Order Status: Completed Specimen: Urine from Serum Updated: 06/14/21 1614     Urine culture hold       Urine on hold in Microbiology dept for 2 days. If unpreserved urine is submitted, it cannot be used for addtional testing after 24 hours, recollection will be required. I have reviewed notes of prior 24hr. Other pertinent lab: Total time spent with patient: 28 I personally reviewed chart, notes, data and current medications in the medical record. I have personally examined and treated the patient at bedside during this period.                  Care Plan discussed with: Patient, Nursing Staff and >50% of time spent in counseling and coordination of care    Discussed:  Care Plan    Prophylaxis:  eliquis    Disposition:  Home w/Family           ___________________________________________________    Attending Physician: Ephraim Sol MD

## 2021-06-15 NOTE — PROGRESS NOTES
Palliative Medicine      Code Status: DNR    Advance Care Planning:  Advance Care Planning 6/15/2021   Patient's Healthcare Decision Maker is: Legal Next of Kin   Confirm Advance Directive None   Patient Would Like to Complete Advance Directive Unable   Does the patient have other document types Do Not Resuscitate       Patient / Family Encounter Documentation    Participants (names): Pt, dtr Janis Vorababak by phone, Palliative Medicine (Dr. Deep Martinez, Brenda Middleton)    Narrative:  Pt is well known to Palliative Medicine from prior hospitalizations. Visited with pt mid-morning; no family present. Pt was awake in bed, speech was mostly garbled but occasional phrases were clear (ie, \"Leave the door open\" as team departed). Pt appeared comfortable, no signs of distress noted. Pt was last seen by Palliative team during 5/22-5/28 hospitalization, has had another admission in the meantime (6/7-6/9). Home health was arranged with [facility] though dtr shared that services did not start in a timely fashion following discharge from the hospital.    Spoke with dtr Janis Egan by phone, who shared that pt has days and nights confused, is up all night \"screaming\" for her feet to be covered. Dtr stated pt has a hospital bed but does not have a wheelchair. Dtr expressed concern re: getting pt out of the house to appts. Hospice has been presented as an option in the past but family was not ready to consider this; dtr stated today that she now thinks hospice might be the best option for pt. Dtr has some experience with hospice, shared that she was the caregiver for her uncle (pt's brother), who had support from hospice at the end of his life. Pt does not have AMD in place, is currently unable to complete; dtsakina Stinson and Janis Egan are legal NOK/surrogate decision makers. Pt signed DDNR during December 2020 hospitalization; copy has been scanned into medical record.     Psychosocial Issues Identified/ Resilience Factors:  Caregiver stress, dtr works during the day and is up all night tending to pt. Anticipatory grief, dtr was tearful, family is very devoted to pt but clearly having more and more difficulty caring for pt at home. No spiritual concerns identified. Goals of Care / Plan: Hospice consult has been placed; Care Manager will reach out to Opal Sherwood to discuss agency choices. Opal Sherwood to discuss with the rest of the family later today. Thank you for including Palliative Medicine in the care of Ms. Castillo.     Alonzo Lyons LCSW, [facility]  [phone])

## 2021-06-15 NOTE — PROGRESS NOTES
Problem: Self Care Deficits Care Plan (Adult)  Goal: *Acute Goals and Plan of Care (Insert Text)  Description: FUNCTIONAL STATUS PRIOR TO ADMISSION: Patient was CGA using a rolling walker for functional mobility. Patient required up to moderate assistance for basic and instrumental ADLs with dtr assist. Pt with recent CVA that had residual R side deficits but also noted L side deficits at baseline. HOME SUPPORT PRIOR TO ADMISSION: The patient lived with dtr to provide assistance    Occupational Therapy Goals  Initiated 6/15/2021  1. Patient will perform grooming with modified independence within 7 day(s). 2.  Patient will perform upper body dressing and bathing with supervision/set-up within 7 day(s). 3.  Patient will perform lower body dressing and bathing with minimal assistance within 7 day(s). 4.  Patient will perform toilet transfers with minimal assistance within 7 day(s). 5.  Patient will perform all aspects of toileting with minimal assistance within 7 day(s). 6.  Patient will participate in upper extremity therapeutic exercise/activities with supervision/set-up for 10 minutes within 7 day(s). 7.  Patient will utilize energy conservation techniques during functional activities with verbal cues within 7 day(s). Outcome: Progressing Towards Goal   OCCUPATIONAL THERAPY EVALUATION  Patient: Tyler Grimes (56 y.o. female)  Date: 6/15/2021  Primary Diagnosis: Encephalopathy acute [G93.40]        Precautions: fall       ASSESSMENT  Based on the objective data described below, the patient presents with decreased activity tolerance, generalized weakness, impaired coordination, and impaired balance following admission for acute encephalopathy with c/o SOB and confusion. Patient with multiple, recent hospital admissions, most recently for CVA. Patient with weakness and coordination impairments bilaterally.   Patient performed transfers with up to mod A and cues for improved technique; Patient with multiple instances of LOB primarily posteriorly. Patient engaged in ADLs. With fair tolerance. Patient would benefit from continued skilled OT to progress towards goals and improve overall independence. Current Level of Function Impacting Discharge (ADLs/self-care): Patient required min A for bed mobility and mod A for OOB transfers. Patient required setup to mod A for UB ADLs and max A for LB ADLs. Functional Outcome Measure: The patient scored Total: 20/100 on the Barthel Index outcome measure. Patient will benefit from skilled therapy intervention to address the above noted impairments. PLAN :  Recommendations and Planned Interventions: self care training, functional mobility training, therapeutic exercise, balance training, therapeutic activities, endurance activities, patient education, home safety training, and family training/education    Frequency/Duration: Patient will be followed by occupational therapy 3 times a week to address goals. Recommendation for discharge: (in order for the patient to meet his/her long term goals)  Therapy up to 5 days/week; Family wants to take patient; If they don't agree to rehab she will need home health and  supervision/assistance    This discharge recommendation:  Has been made in collaboration with the attending provider and/or case management    IF patient discharges home will need the following DME: none       SUBJECTIVE:   Patient agreeable to OT evaluation.     OBJECTIVE DATA SUMMARY:   HISTORY:   Past Medical History:   Diagnosis Date    BPPV (benign paroxysmal positional vertigo)     HTN (hypertension)     Hx of completed stroke     Hyperlipidemia     Hypothyroid     PAF (paroxysmal atrial fibrillation) (HCC)     Rheumatoid arthritis ([facility] [address]     Scoliosis      Past Surgical History:   Procedure Laterality Date    HX  SECTION      HX ORTHOPAEDIC      bunions removed x 2       Expanded or extensive additional review of patient history:     Home Situation  Home Environment: Private residence  # Steps to Enter: 6  One/Two Story Residence: Two story  # of Interior Steps: 14  Living Alone: No  Support Systems: Child(shyla)  Patient Expects to be Discharged to[de-identified] Other (comment)  Current DME Used/Available at Home: Walker    Hand dominance: Right    EXAMINATION OF PERFORMANCE DEFICITS:  Cognitive/Behavioral Status:  Neurologic State: Alert  Orientation Level: Oriented to person  Cognition: Follows commands  Perception: Appears intact  Perseveration: No perseveration noted  Safety/Judgement: Awareness of environment    Skin: Intact in the uppers    Edema: None noted in the uppers    Hearing: Auditory  Auditory Impairment: None    Vision/Perceptual:    Tracking: Able to track stimulus in all quadrants w/o difficulty         Diplopia: No    Acuity: Within Defined Limits         Range of Motion:  AROM: Within functional limits  PROM: Within functional limits    Strength:  Strength: Generally decreased, functional     Coordination:  Fine Motor Skills-Upper: Left Impaired;Right Impaired    Gross Motor Skills-Upper: Left Impaired;Right Impaired    Tone & Sensation:  Tone: normal  Sensation: intact    Balance:  Sitting: Impaired  Sitting - Static: Good (unsupported)  Sitting - Dynamic: Fair (occasional)  Standing: Impaired  Standing - Static: Fair  Standing - Dynamic : Poor    Functional Mobility and Transfers for ADLs:  Bed Mobility:  Rolling: Minimum assistance  Supine to Sit: Minimum assistance  Sit to Supine: Minimum assistance  Scooting: Minimum assistance    Transfers:  Sit to Stand: Moderate assistance  Stand to Sit: Moderate assistance  Bed to Chair: Moderate assistance  Bathroom Mobility: Moderate assistance  Toilet Transfer : Moderate assistance    ADL Assessment:  Feeding: Setup    Oral Facial Hygiene/Grooming: Minimum assistance    Bathing: Maximum assistance    Upper Body Dressing:  Moderate assistance    Lower Body Dressing: Maximum assistance    Toileting: Maximum assistance     Cognitive Retraining  Safety/Judgement: Awareness of environment    Functional Measure:  Barthel Index:    Bathin  Bladder: 5  Bowels: 5  Groomin  Dressin  Feedin  Mobility: 0  Stairs: 0  Toilet Use: 0  Transfer (Bed to Chair and Back): 5  Total: 20/100        The Barthel ADL Index: Guidelines  1. The index should be used as a record of what a patient does, not as a record of what a patient could do. 2. The main aim is to establish degree of independence from any help, physical or verbal, however minor and for whatever reason. 3. The need for supervision renders the patient not independent. 4. A patient's performance should be established using the best available evidence. Asking the patient, friends/relatives and nurses are the usual sources, but direct observation and common sense are also important. However direct testing is not needed. 5. Usually the patient's performance over the preceding 24-48 hours is important, but occasionally longer periods will be relevant. 6. Middle categories imply that the patient supplies over 50 per cent of the effort. 7. Use of aids to be independent is allowed. Martin Morris., Barthel, DVioletW. (2286). Functional evaluation: the Barthel Index. [facility] (14)2. CODIE Campoverde, Warren Grayson., Pantera Dumont., [place], [address] [place] [address] (). Measuring the change indisability after inpatient rehabilitation; comparison of the responsiveness of the Barthel Index and Functional [place] Measure. Journal of Neurology, Neurosurgery, and Psychiatry, 66(4), 704-131. Adrián Mayo, [email or web address], THERESA Celeste, & Kathleen Shore M.A. (2004.) Assessment of post-stroke quality of life in cost-effectiveness studies: The usefulness of the Barthel Index and the EuroQoL-5D.  Quality of Life Research, 15, 906-29         Occupational Therapy Evaluation Charge Determination   History Examination Decision-Making LOW Complexity : Brief history review  LOW Complexity : 1-3 performance deficits relating to physical, cognitive , or psychosocial skils that result in activity limitations and / or participation restrictions  LOW Complexity : No comorbidities that affect functional and no verbal or physical assistance needed to complete eval tasks       Based on the above components, the patient evaluation is determined to be of the following complexity level: LOW   Activity Tolerance:   Good    After treatment patient left in no apparent distress:    Sitting in chair, Call bell within reach, and Bed / chair alarm activated    COMMUNICATION/EDUCATION:   The patients plan of care was discussed with: Physical therapist, Registered nurse, and patient . Home safety education was provided and the patient/caregiver indicated understanding., Patient/family have participated as able in goal setting and plan of care. , and Patient/family agree to work toward stated goals and plan of care. This patients plan of care is appropriate for delegation to [facility].     Thank you for this referral.  Yoan Pratt OTR/L  Time Calculation: 25 mins

## 2021-06-15 NOTE — PROGRESS NOTES
1515 [facility] ED for report at this time. TRANSFER - IN REPORT:    Verbal report received from [facility] (name) on Mitzy Bahena  being received from ED (unit) for routine progression of care      Report consisted of patients Situation, Background, Assessment and   Recommendations(SBAR). Information from the following report(s) SBAR, ED Summary, Intake/Output and Recent Results was reviewed with the receiving nurse. Opportunity for questions and clarification was provided. Assessment completed upon patients arrival to unit and care assumed. This patient was assisted with Intentional Toileting every 2 hours during this shift. Documentation of ambulation and output reflected on Flowsheet. 0730- Bedside and Verbal shift change report given to April (oncoming nurse) by Carol Ceja (offgoing nurse). Report included the following information SBAR, Intake/Output, Recent Results and Cardiac Rhythm A-fib.

## 2021-06-15 NOTE — CONSULTS
Aston Mixon DO  [facility] [address], [address], [address] [place] [address]                                       Office [phone],CTW [phone]  Office [phone],VEJ [phone]      Date of  Admission: 6/14/2021  2:11 PM  PCP- Johanny Monzon MD    Mitzy Bahena is a 80 y.o. 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 Multiple admissions over the last several months. Patient has shown progressive failure to thrive. Long discussion back in February with primary care physician and family about entering hospice. At that time patient was having progressive iron deficiency anemia without clear etiology on endoscopy. We discontinued Eliquis therapy in order for her to regain cell counts. At that time her primary care physician discussed hospice with daughters. The daughter were interested in entering hospice, however patient began to regain more energy after discontinuing her Eliquis therapy. Therefore family decided not to pursue hospice. Unfortunately she suffered a stroke after discontinuing her Eliquis therapy. She is showing progressive failure to thrive at that time. Currently does not appear that she is in acute decompensated heart failure. NT proBNP chronically elevated due to her biatrial enlargement related to her cardiac amyloidosis. Renal function is demonstrating what appears to be acute kidney injury. Recommend to continue her home dose of Eliquis 2.5 mg twice daily. Recommend to continue low-dose p.o. diuretics. From a cardiovascular disease standpoint she has nonreversible cardiac amyloidosis.   Recommend to continue conservative management. I would again encourage patient to enter hospice for management of her severe chronic illnesses. It appears that the family is having increased difficulty caring for patient. Likely will need some form of nursing care either at home or within the facility in order to adequately care for patient. []    High complexity decision making was performed  []    Patient is at high-risk of decompensation with multiple organ involvement      I appreciate the opportunity to be involved in Ms. Garcia. See below note for details. Please do not hesitate to contact us with questions or concerns. Ino Monte,       Subjective:  Ivan Bermeo is a 80 y.o. female with complex medical history presents to the hospital what appears to be failure to thrive. Patient is Bengali-speaking and unable to supply any meaningful history. Her daughters are not available for further collateral information. Reviewed H&P. Patient has a history of cardiac amyloidosis, atrial fibrillation with recent acute CVA. Appears she has had a functional decline since her CVA.     Past Medical History:   Diagnosis Date    BPPV (benign paroxysmal positional vertigo)     HTN (hypertension)     Hx of completed stroke     Hyperlipidemia     Hypothyroid     PAF (paroxysmal atrial fibrillation) (HCC)     Rheumatoid arthritis ([facility] [address]     Scoliosis       Past Surgical History:   Procedure Laterality Date    HX  SECTION      HX ORTHOPAEDIC      bunions removed x 2     Allergies   Allergen Reactions    Shellfish Containing Products Swelling     Family History   Problem Relation Age of Onset    Stroke Father       Social History     Tobacco Use    Smoking status: Never Smoker    Smokeless tobacco: Never Used   Substance Use Topics    Alcohol use: No    Drug use: No          Medications:  Medications Prior to Admission   Medication Sig    metoprolol succinate (Toprol XL) 50 mg XL tablet Take 50 mg by mouth three (3) times daily (with meals).  atorvastatin (Lipitor) 20 mg tablet Take 20 mg by mouth daily.  levothyroxine (SYNTHROID) 112 mcg tablet Take 1 Tablet by mouth Daily (before breakfast) for 30 days. Indications: a condition with low thyroid hormone levels    aspirin 81 mg chewable tablet Take 1 Tablet by mouth daily for 30 days.  traZODone (DESYREL) 50 mg tablet Take 1 Tablet by mouth nightly for 30 days. Indications: insomnia    apixaban (ELIQUIS) 2.5 mg tablet Take 1 Tablet by mouth two (2) times a day.  ferrous gluconate 324 mg (37.5 mg iron) tablet Take 1 Tab by mouth Daily (before breakfast).  losartan (COZAAR) 50 mg tablet Take 50 mg by mouth daily.  pantoprazole (PROTONIX) 40 mg tablet Take 1 Tab by mouth daily.      Current Facility-Administered Medications   Medication Dose Route Frequency    polyethylene glycol (MIRALAX) packet 17 g  17 g Oral BID    senna-docusate (PERICOLACE) 8.6-50 mg per tablet 1 Tablet  1 Tablet Oral DAILY    bisacodyL (DULCOLAX) suppository 10 mg  10 mg Rectal DAILY PRN    nystatin (MYCOSTATIN) 100,000 unit/mL oral suspension 500,000 Units  500,000 Units Oral QID    [START ON 6/16/2021] bumetanide (BUMEX) tablet 0.5 mg  0.5 mg Oral DAILY    apixaban (ELIQUIS) tablet 2.5 mg  2.5 mg Oral BID    aspirin chewable tablet 81 mg  81 mg Oral DAILY    atorvastatin (LIPITOR) tablet 20 mg  20 mg Oral DAILY    levothyroxine (SYNTHROID) tablet 112 mcg  112 mcg Oral ACB    losartan (COZAAR) tablet 50 mg  50 mg Oral DAILY    pantoprazole (PROTONIX) tablet 40 mg  40 mg Oral DAILY    sodium chloride (NS) flush 5-40 mL  5-40 mL IntraVENous Q8H    sodium chloride (NS) flush 5-40 mL  5-40 mL IntraVENous PRN    acetaminophen (TYLENOL) tablet 650 mg  650 mg Oral Q6H PRN    Or    acetaminophen (TYLENOL) suppository 650 mg  650 mg Rectal Q6H PRN    ondansetron (ZOFRAN ODT) tablet 4 mg  4 mg Oral Q8H PRN    Or    ondansetron (ZOFRAN) injection 4 mg  4 mg IntraVENous Q6H PRN    hydrALAZINE (APRESOLINE) 20 mg/mL injection 10 mg  10 mg IntraVENous Q6H PRN    labetaloL (NORMODYNE;TRANDATE) 20 mg/4 mL (5 mg/mL) injection 20 mg  20 mg IntraVENous Q2H PRN    cefTRIAXone (ROCEPHIN) 1 g in sterile water (preservative free) 10 mL IV syringe  1 g IntraVENous Q24H    metoprolol succinate (TOPROL-XL) XL tablet 50 mg  50 mg Oral TID WITH MEALS         Review of Systems:  Unable to obtain      Physical Exam:  Visit Vitals  BP (!) 158/82   Pulse 75   Temp 97.7 °F (36.5 °C)   Resp 18   Wt 99 lb 13.9 oz (45.3 kg)   SpO2 97%   BMI 18.27 kg/m²           Gen: thin appearing elderly female, in no acute distress  HEENT:  Pink conjunctivae, hearing intact to voice, moist mucous membranes  Neck: Supple,No JVD, No Carotid Bruit  Resp: No accessory muscle use, Clear breath sounds, No rales or rhonchi  Card: Normal Rate,Regular Rythm,Normal S1, S2, No murmurs, rubs or gallop. No thrills. Abd:  Soft, non-tender, non-distended, normoactive bowel sounds are present   MSK: No cyanosis or clubbing  Skin: No rashes or ulcers  Neuro:  Cranial nerves are grossly intact, moving all four extremities, no focal deficit, follows commands appropriately  Psych:  Good insight, oriented to person, place and time, alert, Nml Affect  LE: No edema  Vascular:Distal Pulses 2+ and symmetric      06/07/21    ECHO ADULT FOLLOW-UP OR LIMITED 06/08/2021 6/8/2021    Interpretation Summary  · LV: Estimated LVEF is 55 - 60%. Normal cavity size and systolic function (ejection fraction normal). Upper normal wall thickness. · TV: Mild tricuspid valve regurgitation is present. · PV: Mild pulmonic valve regurgitation is present.     Signed by: Lucian Miller MD on 6/8/2021  4:45 PM        LABS:    Lab Results   Component Value Date/Time    WBC 7.4 06/15/2021 01:39 AM    HGB 10.5 (L) 06/15/2021 01:39 AM    HCT 36.4 06/15/2021 01:39 AM    PLATELET 410 46/60/2285 01:39 AM    MCV 71.7 (L) 06/15/2021 01:39 AM     Lab Results   Component Value Date/Time    Sodium 137 06/15/2021 01:39 AM    Potassium 3.6 06/15/2021 01:39 AM    Chloride 101 06/15/2021 01:39 AM    CO2 32 06/15/2021 01:39 AM    Anion gap 4 (L) 06/15/2021 01:39 AM    Glucose 143 (H) 06/15/2021 01:39 AM    BUN 29 (H) 06/15/2021 01:39 AM    Creatinine 1.26 (H) 06/15/2021 01:39 AM    BUN/Creatinine ratio 23 (H) 06/15/2021 01:39 AM    GFR est AA 48 (L) 06/15/2021 01:39 AM    GFR est non-AA 40 (L) 06/15/2021 01:39 AM    Calcium 9.2 06/15/2021 01:39 AM     Lab Results   Component Value Date/Time    CK 52 10/29/2013 10:45 AM    CK-MB Index 1.7 10/29/2013 10:45 AM    Troponin-I, Qt. <0.05 06/14/2021 02:48 PM     Lab Results   Component Value Date/Time    aPTT 81.2 (H) 05/24/2021 08:40 AM     Lab Results   Component Value Date/Time    INR 1.2 (H) 05/22/2021 11:41 AM    INR 1.1 08/14/2019 08:18 PM    INR 1.1 07/12/2016 08:19 AM    Prothrombin time 12.6 (H) 05/22/2021 11:41 AM    Prothrombin time 11.0 08/14/2019 08:18 PM    Prothrombin time 11.4 (H) 07/12/2016 08:19 AM           Lobito Trevino DO

## 2021-06-16 LAB
ANION GAP SERPL CALC-SCNC: 4 MMOL/L (ref 5–15)
BASOPHILS # BLD: 0.1 K/UL (ref 0–0.1)
BASOPHILS NFR BLD: 1 % (ref 0–1)
BUN SERPL-MCNC: 31 MG/DL (ref 6–20)
BUN/CREAT SERPL: 27 (ref 12–20)
CALCIUM SERPL-MCNC: 9.2 MG/DL (ref 8.5–10.1)
CHLORIDE SERPL-SCNC: 103 MMOL/L (ref 97–108)
CO2 SERPL-SCNC: 30 MMOL/L (ref 21–32)
CREAT SERPL-MCNC: 1.13 MG/DL (ref 0.55–1.02)
DIFFERENTIAL METHOD BLD: ABNORMAL
EOSINOPHIL # BLD: 0.1 K/UL (ref 0–0.4)
EOSINOPHIL NFR BLD: 2 % (ref 0–7)
ERYTHROCYTE [DISTWIDTH] IN BLOOD BY AUTOMATED COUNT: 21.8 % (ref 11.5–14.5)
GLUCOSE SERPL-MCNC: 101 MG/DL (ref 65–100)
HCT VFR BLD AUTO: 37.8 % (ref 35–47)
HGB BLD-MCNC: 10.7 G/DL (ref 11.5–16)
IMM GRANULOCYTES # BLD AUTO: 0 K/UL (ref 0–0.04)
IMM GRANULOCYTES NFR BLD AUTO: 0 % (ref 0–0.5)
LYMPHOCYTES # BLD: 2.2 K/UL (ref 0.8–3.5)
LYMPHOCYTES NFR BLD: 33 % (ref 12–49)
MAGNESIUM SERPL-MCNC: 2.5 MG/DL (ref 1.6–2.4)
MCH RBC QN AUTO: 20.5 PG (ref 26–34)
MCHC RBC AUTO-ENTMCNC: 28.3 G/DL (ref 30–36.5)
MCV RBC AUTO: 72.6 FL (ref 80–99)
MONOCYTES # BLD: 0.6 K/UL (ref 0–1)
MONOCYTES NFR BLD: 9 % (ref 5–13)
NEUTS SEG # BLD: 3.6 K/UL (ref 1.8–8)
NEUTS SEG NFR BLD: 55 % (ref 32–75)
NRBC # BLD: 0 K/UL (ref 0–0.01)
NRBC BLD-RTO: 0 PER 100 WBC
PLATELET # BLD AUTO: 177 K/UL (ref 150–400)
POTASSIUM SERPL-SCNC: 3.7 MMOL/L (ref 3.5–5.1)
RBC # BLD AUTO: 5.21 M/UL (ref 3.8–5.2)
RBC MORPH BLD: ABNORMAL
RBC MORPH BLD: ABNORMAL
SODIUM SERPL-SCNC: 137 MMOL/L (ref 136–145)
WBC # BLD AUTO: 6.6 K/UL (ref 3.6–11)

## 2021-06-16 PROCEDURE — 36415 COLL VENOUS BLD VENIPUNCTURE: CPT

## 2021-06-16 PROCEDURE — 74011250637 HC RX REV CODE- 250/637: Performed by: INTERNAL MEDICINE

## 2021-06-16 PROCEDURE — 85025 COMPLETE CBC W/AUTO DIFF WBC: CPT

## 2021-06-16 PROCEDURE — 74011250636 HC RX REV CODE- 250/636: Performed by: INTERNAL MEDICINE

## 2021-06-16 PROCEDURE — 83735 ASSAY OF MAGNESIUM: CPT

## 2021-06-16 PROCEDURE — 80048 BASIC METABOLIC PNL TOTAL CA: CPT

## 2021-06-16 PROCEDURE — 74011000250 HC RX REV CODE- 250: Performed by: INTERNAL MEDICINE

## 2021-06-16 PROCEDURE — 65660000000 HC RM CCU STEPDOWN

## 2021-06-16 PROCEDURE — 74011250637 HC RX REV CODE- 250/637: Performed by: STUDENT IN AN ORGANIZED HEALTH CARE EDUCATION/TRAINING PROGRAM

## 2021-06-16 RX ADMIN — Medication 10 ML: at 05:43

## 2021-06-16 RX ADMIN — Medication 10 ML: at 21:51

## 2021-06-16 RX ADMIN — APIXABAN 2.5 MG: 2.5 TABLET, FILM COATED ORAL at 09:18

## 2021-06-16 RX ADMIN — Medication 10 ML: at 13:39

## 2021-06-16 RX ADMIN — METOPROLOL SUCCINATE 50 MG: 50 TABLET, EXTENDED RELEASE ORAL at 12:44

## 2021-06-16 RX ADMIN — LEVOTHYROXINE SODIUM 112 MCG: 0.11 TABLET ORAL at 05:44

## 2021-06-16 RX ADMIN — BUMETANIDE 0.5 MG: 1 TABLET ORAL at 09:18

## 2021-06-16 RX ADMIN — DOCUSATE SODIUM 50MG AND SENNOSIDES 8.6MG 1 TABLET: 8.6; 5 TABLET, FILM COATED ORAL at 09:18

## 2021-06-16 RX ADMIN — LOSARTAN POTASSIUM 50 MG: 50 TABLET, FILM COATED ORAL at 09:18

## 2021-06-16 RX ADMIN — METOPROLOL SUCCINATE 50 MG: 50 TABLET, EXTENDED RELEASE ORAL at 09:18

## 2021-06-16 RX ADMIN — NYSTATIN 500000 UNITS: 100000 SUSPENSION ORAL at 21:51

## 2021-06-16 RX ADMIN — ASPIRIN 81 MG: 81 TABLET, CHEWABLE ORAL at 09:18

## 2021-06-16 RX ADMIN — PANTOPRAZOLE SODIUM 40 MG: 40 TABLET, DELAYED RELEASE ORAL at 09:18

## 2021-06-16 RX ADMIN — NYSTATIN 500000 UNITS: 100000 SUSPENSION ORAL at 17:00

## 2021-06-16 RX ADMIN — APIXABAN 2.5 MG: 2.5 TABLET, FILM COATED ORAL at 17:00

## 2021-06-16 RX ADMIN — CEFTRIAXONE 1 G: 1 INJECTION, POWDER, FOR SOLUTION INTRAMUSCULAR; INTRAVENOUS at 17:00

## 2021-06-16 RX ADMIN — LABETALOL HYDROCHLORIDE 20 MG: 5 INJECTION, SOLUTION INTRAVENOUS at 21:51

## 2021-06-16 RX ADMIN — ATORVASTATIN CALCIUM 20 MG: 20 TABLET, FILM COATED ORAL at 09:18

## 2021-06-16 NOTE — PROGRESS NOTES
Patient is high risk for RUR (36%). PCP follow up not scheduled, patient going home on Hospice care.     Birgit Mckeon RN, MSN/Care manager  [phone]

## 2021-06-16 NOTE — PROGRESS NOTES
Order received for Home Hospice, follow up discussed with patient's daughter Christelle Aguilera. Hospice provider list given to patient. Choice is [facility], referral sent via [employer]. [place] of Choice letter signed. Await response. Patient accepted by [facility]. AVS updated. Care Management Interventions  PCP Verified by CM: Yes (Dr. Dianna Kaur)  Mode of Transport at Discharge:  Other (see comment) (family vs medicaid transport)  Transition of Care Consult (CM Consult): [place]: No  Reason Outside [place]:  (patient choice)  Physical Therapy Consult: Yes  Occupational Therapy Consult: Yes  Current Support Network: Lives with Caregiver  The Plan for Transition of Care is Related to the Following Treatment Goals : return home at DC  The Patient and/or Patient Representative was Provided with a Choice of Provider and Agrees with the Discharge Plan?: (S) Yes  Name of the Patient Representative Who was Provided with a Choice of Provider and Agrees with the Discharge Plan: Alec Teixeira Christelle Aguilera, daughter  Freedom of Choice List was Provided with Basic Dialogue that Supports the Patient's Individualized Plan of Care/Goals, Treatment Preferences and Shares the Quality Data Associated with the Providers?: (S) Yes  Discharge Location  Discharge Placement: Home with hospice    Kathy Miller RN, MSN/Care manager

## 2021-06-16 NOTE — PROGRESS NOTES
Occupational Therapy Note:  Chart reviewed and spoke with nursing. Patient and family may discharge home with hospice potentially tomorrow. Will hold therapy today per MD request pending decisions. Will continue to follow.   Jayme Alaniz OTR/SHARAN

## 2021-06-16 NOTE — PROGRESS NOTES
0700 Bedside and Verbal shift change report given to 500 Gena Jean (oncoming nurse) by Bindu Lopez RN (offgoing nurse). Report included the following information SBAR, Kardex, ED Summary, Procedure Summary, Intake/Output, MAR, Recent Results and Cardiac Rhythm A fib/A flutter. This patient was assisted with Intentional Toileting every 2 hours during this shift. Documentation of ambulation and output reflected on Flowsheet. 1520 Notified by monitor tech of Pt heart rate dropping to the 30s and 40s. Pt heart rate currently in the 60s. Notified Dr. Betsy Eisenmenger to hold evening dose of Metoprolol. 1930 Bedside and Verbal shift change report given to [address] Twig[address]ard Elmore (oncoming nurse) by Valery Cano RN (offgoing nurse). Report included the following information SBAR, Kardex, ED Summary, Intake/Output, MAR, Recent Results and Cardiac Rhythm A fib/A flutter.

## 2021-06-16 NOTE — PROGRESS NOTES
Patient has [employer]. Stretcher transport scheduled for tomorrow, 6/17/21 with  at 10am.  Reference #[id number]. Patients daughter Sha Warren and [facility] notified of transport time.     Galo Matias, RN, MSN/Care manager  [phone]

## 2021-06-16 NOTE — PROGRESS NOTES
Attempted to work with the patient for Physical Therapy, chart reviewed and discussed with nurse and Dr. Suleman Frank advised to defer today patient may be going to home with hospice tomorrow. We will continue follow up with the patient while in the hospital thank you.

## 2021-06-16 NOTE — PROGRESS NOTES
[address] [place] [address]  [phone]      Medical Progress Note      NAME:         Bao Abdalla   :        10/12/1930  MRM:        [id number]    Date of service: 2021      Subjective: Patient has been seen and examined as a follow up for multiple medical issues. Chart, labs, diagnostics reviewed. Patient somnolent. Not able to give much hx. Discussed with her nurse and     Objective:    Vital Signs:    Visit Vitals  BP (!) 150/80 (BP 1 Location: Right upper arm, BP Patient Position: At rest)   Pulse 61   Temp 98.1 °F (36.7 °C)   Resp 18   Ht 5' 2\" (1.575 m)   Wt 50.3 kg (110 lb 12.8 oz)   SpO2 98%   BMI 20.27 kg/m²          Intake/Output Summary (Last 24 hours) at 2021 1530  Last data filed at 2021 1347  Gross per 24 hour   Intake 320 ml   Output 1800 ml   Net -1480 ml        Physical Examination:    General:   Weak and ill looking, not in any acute distress   Eyes:   pink conjunctivae, PERRLA with no discharge. ENT:   no ottorrhea or rhinorrhea with dry mucous membranes  Neck: no masses, thyroid non-tender and trachea central.  Pulm:  no accessory muscle use, decreased but clear breath sounds without crackles or wheezes  Card:  no JVD or murmurs, has atrial fibrillation, without thrills, bruits or peripheral edema  Abd:  Soft, non-tender, non-distended, normoactive bowel sounds   Musc:  No cyanosis, clubbing, atrophy or deformities. Skin:  No rashes, bruising or ulcers. Neuro: somnolent but does wake up, no facial droop.      Current Facility-Administered Medications   Medication Dose Route Frequency    polyethylene glycol (MIRALAX) packet 17 g  17 g Oral BID    senna-docusate (PERICOLACE) 8.6-50 mg per tablet 1 Tablet  1 Tablet Oral DAILY    bisacodyL (DULCOLAX) suppository 10 mg  10 mg Rectal DAILY PRN    nystatin (MYCOSTATIN) 100,000 unit/mL oral suspension 500,000 Units  500,000 Units Oral QID    bumetanide (BUMEX) tablet 0.5 mg  0.5 mg Oral DAILY    apixaban (ELIQUIS) tablet 2.5 mg  2.5 mg Oral BID    aspirin chewable tablet 81 mg  81 mg Oral DAILY    atorvastatin (LIPITOR) tablet 20 mg  20 mg Oral DAILY    levothyroxine (SYNTHROID) tablet 112 mcg  112 mcg Oral ACB    losartan (COZAAR) tablet 50 mg  50 mg Oral DAILY    pantoprazole (PROTONIX) tablet 40 mg  40 mg Oral DAILY    sodium chloride (NS) flush 5-40 mL  5-40 mL IntraVENous Q8H    sodium chloride (NS) flush 5-40 mL  5-40 mL IntraVENous PRN    acetaminophen (TYLENOL) tablet 650 mg  650 mg Oral Q6H PRN    Or    acetaminophen (TYLENOL) suppository 650 mg  650 mg Rectal Q6H PRN    ondansetron (ZOFRAN ODT) tablet 4 mg  4 mg Oral Q8H PRN    Or    ondansetron (ZOFRAN) injection 4 mg  4 mg IntraVENous Q6H PRN    hydrALAZINE (APRESOLINE) 20 mg/mL injection 10 mg  10 mg IntraVENous Q6H PRN    labetaloL (NORMODYNE;TRANDATE) 20 mg/4 mL (5 mg/mL) injection 20 mg  20 mg IntraVENous Q2H PRN    cefTRIAXone (ROCEPHIN) 1 g in sterile water (preservative free) 10 mL IV syringe  1 g IntraVENous Q24H    metoprolol succinate (TOPROL-XL) XL tablet 50 mg  50 mg Oral TID WITH MEALS        Laboratory data and review:    Recent Labs     06/16/21  0546 06/15/21  0139 06/14/21  1448   WBC 6.6 7.4 6.5   HGB 10.7* 10.5* 10.5*   HCT 37.8 36.4 36.0    184 168     Recent Labs     06/16/21  0546 06/15/21  0139 06/14/21  1448    137 134*   K 3.7 3.6 5.0    101 103   CO2 30 32 28   * 143* 177*   BUN 31* 29* 31*   CREA 1.13* 1.26* 1.36*   CA 9.2 9.2 9.1   MG 2.5*  --  2.4   ALB  --   --  2.6*   ALT  --   --  77     No components found for: [place]    Diagnostics: Imaging studies have been reviewed    Telemetry reviewed by me: atrial fib      Assessment and Plan:    Acute metabolic encephalopathy (7/04/6528) POA: likely due to underlying UTi, may have underlying dementia.  Has been progressively declining in recent past. Agitation worse at night. Now calm. Continue to treat UTi, Seroquel PRN. Supportive care. Home with hospice in AM    UTI (urinary tract infection) (6/14/2021)(POA) POA: Urine cultures pending. Continue IV Ceftriaxone and follow cultures     Diastolic CHF, acute on chronic (HCC) (12/18/2020) POA: Recent echo with EF of 50 to 55%. Continue Bumex. Appreciate cardiology consult     Chronic atrial fibrillation ([facility] [address] (3/24/2021) POA: rate controlled. Continue Eliquis and Toprol.       HTN (hypertension), benign (7/15/2011) POA: BP stable. Continue Losartan, Toprol     GERD (gastroesophageal reflux disease) (7/15/2011) POA: Continue PPi      Hypothyroidism (7/15/2011) POA: TSH 18. Continue Levothyroxine     Hyperlipidemia (8/15/2011) POA: Continue Lipitor     DM type 2 causing vascular disease ([facility] [address] POA: stable. Diet controlled     Hx of CVA.  Had CVA twice in the past.  Continue Asprin, Lipitor.  Going to home with hospice     Rheumatoid arthritis [facility]) / Physical debility (6/15/2021) POA: PT, OT as tolerated    Thrush: Continue nystatin     Constipation POA: Continue bowel regimen     CODE STATUS: DNR(discussed with daughter)    Total time spent for the patient's care: [address] discussed with: Care Manager and Nursing Staff    Discussed:  Care Plan and D/C Planning    Prophylaxis:  Eliquis    Anticipated Disposition:  home w/ hospice           ___________________________________________________    Attending Physician:   Jenna Varela MD

## 2021-06-17 VITALS
BODY MASS INDEX: 17.93 KG/M2 | HEART RATE: 84 BPM | RESPIRATION RATE: 20 BRPM | OXYGEN SATURATION: 96 % | SYSTOLIC BLOOD PRESSURE: 137 MMHG | TEMPERATURE: 97.9 F | WEIGHT: 97.44 LBS | DIASTOLIC BLOOD PRESSURE: 84 MMHG | HEIGHT: 62 IN

## 2021-06-17 LAB
BACTERIA SPEC CULT: ABNORMAL
BACTERIA SPEC CULT: ABNORMAL
CC UR VC: ABNORMAL
SERVICE CMNT-IMP: ABNORMAL

## 2021-06-17 PROCEDURE — 74011250637 HC RX REV CODE- 250/637: Performed by: STUDENT IN AN ORGANIZED HEALTH CARE EDUCATION/TRAINING PROGRAM

## 2021-06-17 PROCEDURE — 74011250637 HC RX REV CODE- 250/637: Performed by: INTERNAL MEDICINE

## 2021-06-17 PROCEDURE — 74011250636 HC RX REV CODE- 250/636: Performed by: INTERNAL MEDICINE

## 2021-06-17 PROCEDURE — 74011000250 HC RX REV CODE- 250: Performed by: INTERNAL MEDICINE

## 2021-06-17 RX ORDER — POLYETHYLENE GLYCOL 3350 17 G/17G
17 POWDER, FOR SOLUTION ORAL 2 TIMES DAILY
Qty: 40 PACKET | Refills: 0 | Status: SHIPPED | OUTPATIENT
Start: 2021-06-17 | End: 2021-07-07

## 2021-06-17 RX ORDER — CIPROFLOXACIN 500 MG/1
500 TABLET ORAL 2 TIMES DAILY
Qty: 4 TABLET | Refills: 0 | Status: SHIPPED | OUTPATIENT
Start: 2021-06-17 | End: 2021-06-19

## 2021-06-17 RX ORDER — BUMETANIDE 0.5 MG/1
0.5 TABLET ORAL DAILY
Qty: 30 TABLET | Refills: 0 | Status: SHIPPED | OUTPATIENT
Start: 2021-06-17 | End: 2021-07-17

## 2021-06-17 RX ADMIN — POLYETHYLENE GLYCOL 3350 17 G: 17 POWDER, FOR SOLUTION ORAL at 09:26

## 2021-06-17 RX ADMIN — APIXABAN 2.5 MG: 2.5 TABLET, FILM COATED ORAL at 09:27

## 2021-06-17 RX ADMIN — METOPROLOL SUCCINATE 50 MG: 50 TABLET, EXTENDED RELEASE ORAL at 09:26

## 2021-06-17 RX ADMIN — DOCUSATE SODIUM 50MG AND SENNOSIDES 8.6MG 1 TABLET: 8.6; 5 TABLET, FILM COATED ORAL at 09:26

## 2021-06-17 RX ADMIN — Medication 10 ML: at 06:14

## 2021-06-17 RX ADMIN — METOPROLOL SUCCINATE 50 MG: 50 TABLET, EXTENDED RELEASE ORAL at 12:12

## 2021-06-17 RX ADMIN — NYSTATIN 500000 UNITS: 100000 SUSPENSION ORAL at 09:27

## 2021-06-17 RX ADMIN — NYSTATIN 500000 UNITS: 100000 SUSPENSION ORAL at 12:12

## 2021-06-17 RX ADMIN — BUMETANIDE 0.5 MG: 1 TABLET ORAL at 09:26

## 2021-06-17 RX ADMIN — ATORVASTATIN CALCIUM 20 MG: 20 TABLET, FILM COATED ORAL at 09:27

## 2021-06-17 RX ADMIN — PANTOPRAZOLE SODIUM 40 MG: 40 TABLET, DELAYED RELEASE ORAL at 09:26

## 2021-06-17 RX ADMIN — CEFTRIAXONE 1 G: 1 INJECTION, POWDER, FOR SOLUTION INTRAMUSCULAR; INTRAVENOUS at 09:25

## 2021-06-17 RX ADMIN — ASPIRIN 81 MG: 81 TABLET, CHEWABLE ORAL at 09:26

## 2021-06-17 RX ADMIN — LEVOTHYROXINE SODIUM 112 MCG: 0.11 TABLET ORAL at 09:26

## 2021-06-17 RX ADMIN — LOSARTAN POTASSIUM 50 MG: 50 TABLET, FILM COATED ORAL at 09:26

## 2021-06-17 NOTE — PROGRESS NOTES
Pharmacist Discharge Medication Reconciliation    Discharge Provider:  Dr Angelita Garcia     Discharge Medications:      My Medications        START taking these medications        Instructions Each Dose to Equal Morning Noon Evening Bedtime   bumetanide 0.5 mg tablet  Commonly known as: [address] next dose is: 6/18/21 am      Take 1 Tablet by mouth daily for 30 days. 0.5 mg         ciprofloxacin HCl 500 mg tablet  Commonly known as: CIPRO  Your next dose is: 6/17/21 pm      Take 1 Tablet by mouth two (2) times a day for 2 days. Start taking 6/18   500 mg         polyethylene glycol 17 gram packet  Commonly known as: MIRALAX  Your next dose is: 6/17/21 pm      Take 1 Packet by mouth two (2) times a day for 20 days. Indications: constipation   17 g                CONTINUE taking these medications        Instructions Each Dose to Equal Morning Noon Evening Bedtime   apixaban 2.5 mg tablet  Commonly known as: ELIQUIS  Your next dose is: 6/1721 pm      Take 1 Tablet by mouth two (2) times a day. 2.5 mg         aspirin 81 mg chewable tablet  Your next dose is: 6/18/21 am      Take 1 Tablet by mouth daily for 30 days. 81 mg         levothyroxine 112 mcg tablet  Commonly known as: SYNTHROID  Your next dose is: 6/18/21 am      Take 1 Tablet by mouth Daily (before breakfast) for 30 days. Indications: a condition with low thyroid hormone levels   112 mcg         Lipitor 20 mg tablet  Generic drug: atorvastatin  Your next dose is: 6/18/21 am      Take 20 mg by mouth daily. 20 mg         losartan 50 mg tablet  Commonly known as: COZAAR  Your next dose is: 6/18/21 am      Take 50 mg by mouth daily. 50 mg         pantoprazole 40 mg tablet  Commonly known as: Protonix  Your next dose is: 6/18/21 am      Take 1 Tab by mouth daily. 40 mg         Toprol XL 50 mg XL tablet  Generic drug: metoprolol succinate  Your next dose is: 6/17/21 12pm      Take 50 mg by mouth three (3) times daily (with meals).    50 mg         traZODone 50 mg tablet  Commonly known as: DESYREL  Your next dose is: 6/17/21 bedtime      Take 1 Tablet by mouth nightly for 30 days.  Indications: insomnia   50 mg                STOP taking these medications      ferrous gluconate 324 mg (37.5 mg iron) tablet                  Where to Get Your Medications        These medications were sent to [facility], [place]      Phone: [phone]   bumetanide 0.5 mg tablet  ciprofloxacin HCl 500 mg tablet  polyethylene glycol 17 gram packet         The patient's chart, MAR, and AVS were reviewed by   Jarad Campos,   Contact: [phone]

## 2021-06-17 NOTE — PROGRESS NOTES
CM Discharge note:    Patient ready for discharge to home with Hospice, [facility] to follow. Patient will be transported home via ambulance transport set up by [facility]. Home with family assist and Hospice care.     Jermaine Navas RN, MSN/Care manager

## 2021-06-17 NOTE — PROGRESS NOTES
Problem: Falls - Risk of  Goal: *Absence of Falls  Description: Document Kandice Perry Fall Risk and appropriate interventions in the flowsheet.   Outcome: Progressing Towards Goal  Note: Fall Risk Interventions:  Mobility Interventions: Bed/chair exit alarm, Patient to call before getting OOB    Mentation Interventions: Adequate sleep, hydration, pain control, Bed/chair exit alarm, Door open when patient unattended    Medication Interventions: Bed/chair exit alarm, Patient to call before getting OOB, Teach patient to arise slowly    Elimination Interventions: Bed/chair exit alarm, Call light in reach

## 2021-06-17 NOTE — PROGRESS NOTES
Transportation still has not arrived for patient . Call placed to [facility] transportation at [phone] who transferred me to [facility] transport. Patient's  time has been moved to 12noon today, unit not notified of change. Await transport for discharge.     Janes Delacruz RN, MSN/Care manager  [phone]

## 2021-06-17 NOTE — PROGRESS NOTES
1639 Bedside and Verbal shift change report given to Amberly Cohn RN (oncoming nurse) by Adeline Jackson RN (offgoing nurse). Report included the following information SBAR and Kardex. This patient was assisted with Intentional Toileting every 2 hours during this shift. Documentation of ambulation and output reflected on Flowsheet. [address] discharge with Lac[place]. Verbalized understanding, no questions. Left with belongings and discharge instructions. Esigned discharge instructions. [phone] Patient left with transportation. Had noon Toprol xl and nystatin. Informed daughter Stan Pinto of medications given before discharge, verbalized understanding.

## 2021-06-17 NOTE — DISCHARGE INSTRUCTIONS
Patient Education        Avoiding Triggers With Heart Failure: Care Instructions  Your Care Instructions     Triggers are anything that make your heart failure flare up. A flare-up is also called \"sudden heart failure\" or \"acute heart failure. \" When you have a flare-up, fluid builds up in your lungs, and you have problems breathing. You might need to go to the hospital. By watching for changes in your condition and avoiding triggers, you can prevent heart failure flare-ups. Follow-up care is a key part of your treatment and safety. Be sure to make and go to all appointments, and call your doctor if you are having problems. It's also a good idea to know your test results and keep a list of the medicines you take. How can you care for yourself at home? Watch for changes in your weight and condition  · Weigh yourself without clothing at the same time each day. Record your weight. Call your doctor if you have sudden weight gain, such as more than 2 to 3 pounds in a day or 5 pounds in a week. (Your doctor may suggest a different range of weight gain.) A sudden weight gain may mean that your heart failure is getting worse. · Keep a daily record of your symptoms. Write down any changes in how you feel, such as new shortness of breath, cough, or problems eating. Also record if your ankles are more swollen than usual and if you feel more tired than usual. Note anything that you ate or did that could have triggered these changes. Limit sodium  Sodium causes your body to hold on to extra water. This may cause your heart failure symptoms to get worse. People get most of their sodium from processed foods. Fast food and restaurant meals also tend to be very high in sodium. · Your doctor may suggest that you limit sodium. Your doctor can tell you how much sodium is right for you. This includes limiting sodium in cooked and packaged foods. · Read food labels on cans and food packages.  They tell you how much sodium you get in one serving. Check the serving size. If you eat more than one serving, you are getting more sodium. · Be aware that sodium can come in forms other than salt, including monosodium glutamate (MSG), sodium citrate, and sodium bicarbonate (baking soda). MSG is often added to Asian food. You can sometimes ask for food without MSG or salt. · Slowly reducing salt will help you adjust to the taste. Take the salt shaker off the table. · Flavor your food with garlic, lemon juice, onion, vinegar, herbs, and spices instead of salt. Do not use soy sauce, steak sauce, onion salt, garlic salt, mustard, or ketchup on your food, unless it is labeled \"low-sodium\" or \"low-salt. \"  · Make your own salad dressings, sauces, and ketchup without adding salt. · Use fresh or frozen ingredients, instead of canned ones, whenever you can. Choose low-sodium canned goods. · Eat less processed food and food from restaurants, including fast food. Exercise as directed  Moderate, regular exercise is very good for your heart. It improves your blood flow and helps control your weight. But too much exercise can stress your heart and cause a heart failure flare-up. · Check with your doctor before you start an exercise program.  · Walking is an easy way to get exercise. Start out slowly. Gradually increase the length and pace of your walk. Swimming, riding a bike, and using a treadmill are also good forms of exercise. · When you exercise, watch for signs that your heart is working too hard. You are pushing yourself too hard if you cannot talk while you are exercising. If you become short of breath or dizzy or have chest pain, stop, sit down, and rest.  · Do not exercise when you do not feel well. Take medicines correctly  · Take your medicines exactly as prescribed. Call your doctor if you think you are having a problem with your medicine. · Make a list of all the medicines you take.  Include those prescribed to you by other doctors and any over-the-counter medicines, vitamins, or supplements you take. Take this list with you when you go to any doctor. · Take your medicines at the same time every day. It may help you to post a list of all the medicines you take every day and what time of day you take them. · Make taking your medicine as simple as you can. Plan times to take your medicines when you are doing other things, such as eating a meal or getting ready for bed. This will make it easier to remember to take your medicines. · Get organized. Use helpful tools, such as daily or weekly pill containers. When should you call for help? Call 911 if you have symptoms of sudden heart failure such as:    · You have severe trouble breathing.     · You cough up pink, foamy mucus.     · You have a new irregular or rapid heartbeat. Call your doctor now or seek immediate medical care if:    · You have new or increased shortness of breath.     · You are dizzy or lightheaded, or you feel like you may faint.     · You have sudden weight gain, such as more than 2 to 3 pounds in a day or 5 pounds in a week. (Your doctor may suggest a different range of weight gain.)     · You have increased swelling in your legs, ankles, or feet.     · You are suddenly so tired or weak that you cannot do your usual activities. Watch closely for changes in your health, and be sure to contact your doctor if you develop new symptoms. Where can you learn more? Go to http://www.gray.com/  Enter V089 in the search box to learn more about \"Avoiding Triggers With Heart Failure: Care Instructions. \"  Current as of: August 31, 2020               Content Version: 12.8  © 1363-3225 [employer]. Care instructions adapted under license by [employer] (which disclaims liability or warranty for this information).  If you have questions about a medical condition or this instruction, always ask your healthcare professional. [employer] disclaims any warranty or liability for your use of this information. HOSPITALIST DISCHARGE INSTRUCTIONS    NAME: Tim Castanon   :  10/12/1930   MRN:  [id number]     Date:     2021    ADMIT DATE: 2021     DISCHARGE DATE: 2021     PRINCIPAL ADMITTING DIAGNOSIS:  Encephalopathy acute [G93.40]    DISCHARGE DIAGNOSES:  Principal Problem:    Acute metabolic encephalopathy ()    UTI (urinary tract infection) (2021)    HTN (hypertension), benign (7/15/2011)    GERD (gastroesophageal reflux disease) (7/15/2011)    Hypothyroidism (7/15/2011)    Hyperlipidemia (8/15/2011)    Rheumatoid arthritis (HCC)     Hx of completed stroke     DM type 2 causing vascular disease (HCC)     Diastolic CHF, chronic ([facility] (2020)    Chronic atrial fibrillation ([facility] (3/24/2021)    Physical debility (6/15/2021)    MEDICATIONS:    · It is important that medications are taken exactly as they are prescribed on the discharge medication instructions and keep them your  in the bottles provided by the pharmacist.   · Keep a list of the medication names, dosages, and times to be taken at all times. · Do not take other medications without consulting your doctor.      Recommended diet:  Comfort feeding    Recommended activity: Activity as tolerated    Post discharge care:    Notify follow up health care provider or return to the emergency department if you cannot get hold of your doctor if you feel worse or experience symptoms similar to those that brought you to hospital    Follow-up Information     Follow up With Specialties Details Why [address] [facility], [address] [place] [address] [address]    Jacoby Powers MD Internal Medicine  For follow up and review as needed [phone] [phone]            Information obtained by :  I understand that if any problems occur once I am at home I am to contact my physician and I understand and acknowledge receipt of the instructions indicated above.                                                                                                                                            Physician's or R.N.'s Signature                                                                  Date/Time                                                                                                                                              Patient or Representative Signature                                                          Date/Time

## 2021-06-17 NOTE — DISCHARGE SUMMARY
[facility] Vaz[facility] [address]  [address] [facility], [address] [place] [address]  Tel: [phone]    Physician Discharge Summary    Patient ID:    Mitzy Bahena  Age:              80 y.o.    : 10/12/1930  MRN:             [id number]     PCP: Johanny Monzon MD     Date of Admission: 2021    Date of Discharge:  2021    Principal admission Diagnosis:   Encephalopathy acute [G93.40]    Discharge Diagnoses:  Principal Problem:    Acute metabolic encephalopathy ()    UTI (urinary tract infection) (2021)    HTN (hypertension), benign (7/15/2011)    GERD (gastroesophageal reflux disease) (7/15/2011)    Hypothyroidism (7/15/2011)    Hyperlipidemia (8/15/2011)    Rheumatoid arthritis ([address]     Hx of completed stroke     DM type 2 causing vascular disease (HCC)     Diastolic CHF, chronic ([address] (2020)    Chronic atrial fibrillation ([address] (3/24/2021)    Physical debility (6/15/2021)    Hospital Course:     Ms. Mcrae  is a 80 y.o. admitted to [facility] and treated for the following:    Acute metabolic encephalopathy () POA: likely due to underlying UTi, may have underlying dementia. Has been progressively declining in recent past. Agitation worse at night. She has remained calm and stable. Continue to treat UTi, Trazodone. Supportive care. Being discharged home with hospice. UTI (urinary tract infection) (2021)(POA) POA: Urine cultures isolated E coli and Klebsiella. Has been on IV Ceftriaxone x 3 days and will continue with Ciprofloxacin to complete a 5 day course       Diastolic CHF, acute on chronic (HCC) (2020) POA: Recent echo with EF of 50 to 55%. Seen by cardiology. Continue low dose Bumex for comfort. Chronic atrial fibrillation ([address] (3/24/2021) POA: rate controlled. Continue Eliquis and Toprol. HTN (hypertension), benign (7/15/2011) POA: BP stable.  Continue Losartan, Toprol      GERD (gastroesophageal reflux disease) (7/15/2011) POA: Continue PPi      Hypothyroidism (7/15/2011) POA: TSH 18. Continue Levothyroxine      Hyperlipidemia (8/15/2011) POA: Continue Lipitor      DM type 2 causing vascular disease ([facility] POA: stable. Diet controlled     Hx of CVA. Had CVA twice in the past.  Continue Asprin, Lipitor. Rheumatoid arthritis ([facility] / Physical debility (6/15/2021) POA: supportive care, ambulate as tolerated     Constipation POA: Continue bowel regimen      CODE STATUS: DNR    Discharge Exam:    Visit Vitals  /84 (BP 1 Location: Right arm, BP Patient Position: At rest)   Pulse 84   Temp 97.9 °F (36.6 °C)   Resp 20   Ht 5' 2\" (1.575 m)   Wt 44.2 kg (97 lb 7.1 oz)   SpO2 96%   BMI 17.82 kg/m²        Patient has been seen and examined. General: well looking and in no acute distress  Pulm: clear breath sounds without wheezes  Card: no murmurs, normal S1, S2 without thrills, bruits   Abd:    soft, non-tender, normoactive bowel sounds  Skin: no rashes and skin turgor is good  Neuro: awake, alert and has a non focal     Activity: Activity as tolerated    Diet: Comfort feeding    Current Discharge Medication List        START taking these medications    Details   bumetanide (BUMEX) 0.5 mg tablet Take 1 Tablet by mouth daily for 30 days. Qty: 30 Tablet, Refills: 0      polyethylene glycol (MIRALAX) 17 gram packet Take 1 Packet by mouth two (2) times a day for 20 days. Indications: constipation  Qty: 40 Packet, Refills: 0      ciprofloxacin HCl (CIPRO) 500 mg tablet Take 1 Tablet by mouth two (2) times a day for 2 days. Start taking 6/18  Qty: 4 Tablet, Refills: 0           CONTINUE these medications which have NOT CHANGED    Details   metoprolol succinate (Toprol XL) 50 mg XL tablet Take 50 mg by mouth three (3) times daily (with meals). atorvastatin (Lipitor) 20 mg tablet Take 20 mg by mouth daily.       levothyroxine (SYNTHROID) 112 mcg tablet Take 1 Tablet by mouth Daily (before breakfast) for 30 days. Indications: a condition with low thyroid hormone levels  Qty: 30 Tablet, Refills: 0      aspirin 81 mg chewable tablet Take 1 Tablet by mouth daily for 30 days. Qty: 30 Tablet, Refills: 0      traZODone (DESYREL) 50 mg tablet Take 1 Tablet by mouth nightly for 30 days. Indications: insomnia  Qty: 30 Tablet, Refills: 0      apixaban (ELIQUIS) 2.5 mg tablet Take 1 Tablet by mouth two (2) times a day. Qty: 60 Tablet, Refills: 0      losartan (COZAAR) 50 mg tablet Take 50 mg by mouth daily. pantoprazole (PROTONIX) 40 mg tablet Take 1 Tab by mouth daily. Qty: 30 Tab, Refills: 1    Associated Diagnoses: Elevated cholesterol           STOP taking these medications       ferrous gluconate 324 mg (37.5 mg iron) tablet Comments:   Reason for Stopping: Follow-up Information       Follow up With Specialties Details Why [address] [facility], [address] [place] [address] [address]    Kimberly Giles MD Internal Medicine  For follow up and review as needed [address]  [phone]              Follow-up tests or labs: As noted above if any. Discharge Condition: Stable    Disposition: home with hospice    Time taken to arrange discharge:  35 minutes.     Signed:  Chris Flores MD     [facility]  6/17/2021   7:53 AM

## 2021-06-18 ENCOUNTER — PATIENT OUTREACH (OUTPATIENT)
Dept: CASE MANAGEMENT | Age: 86
End: 2021-06-18

## 2021-06-18 LAB
ATRIAL RATE: 394 BPM
CALCULATED R AXIS, ECG10: -52 DEGREES
CALCULATED T AXIS, ECG11: -18 DEGREES
DIAGNOSIS, 93000: NORMAL
Q-T INTERVAL, ECG07: 420 MS
QRS DURATION, ECG06: 82 MS
QTC CALCULATION (BEZET), ECG08: 443 MS
VENTRICULAR RATE, ECG03: 67 BPM

## 2021-06-18 NOTE — PROGRESS NOTES
Care Transitions Nurse Note:    Confirmed [facility] scheduled to admit patient under services today. Goals        Post Hospitalization     Prevent complications post hospitalization. 03/25/21  CTN spoke to daughter Josiah Way to review discharge instructions/red falgs to prevent readmission    Appts:    PCP--appt made while IP for 3/30 at 9:10, daughter aware and will take pt    Cards Dr Raj Mane scheduled appt  Monday 4/5/21  11:00. Per Miracle at [facility], this is the soonest appt available. Josiah [address] notified of date /time. GI Dr WILKES---CTN called MD office, they will call CTN back after confirming they can accept insurance. Dana ---per [facility] office, they have pt scheduled to start services today 3/25/21     Per Josiah Way patient is doing well, denies SOB or CP this morning.  CTN reviewed medication changes and new meds, per Josiah Way pt picked up new meds yesterday.  CTN reviewed avoiding constipation with taking iron, instructed her to get OC stool softener if patient c/o difficulty having BM. Encouraged ambulation and drinking water.  CTN reviewed daily weights and instructed daughter to call Dr Mindi Sellers office to report gains of greater than 3lbs/day and 5 lbs/week. Watch for edema in feet and ankles . [facility] understanding.  Review low sodium diet, foods to avoid, reading labels to kep intake less than 2000mg day. Azra Dove understanding.  Instructed Sinai to take pt BP and HR twice a day and to call Dr Mindi Sellers with concerns/abnormal readings.  Dispatch health information provided. 1316 update---CTN has not heard back from from GI office yet to obtain appt. CTN called daughter Josiah Way to see if MD office called her directly, LM on VM. ---mkrw    03/26/21  CTN received call back from Dr Derrell Mcgarry office that they do not accept pt insurance. CTN called [facility], they will accept appt and informed CTN that any balance will be pt responsibility.   Appt scheduled for 4/21/2021 telehealth Dr Michelle Campbell 10:45. Text message with link. Per chart notes:  Iron def anemia: s/p 1u RBCs and IV iron. Hgb stable on discharge. GI recommended EGD/colon if stool blood positive. Continue to hold Eliquis for now. CTN tried to reach Veronique Drew again, call went direcly to . CTN will call back later today. ---mkr    UPDATE:    CTN received call from daughter Sinai--CTN gave her appts information for Dr Will Roach and Dr Michelle Campbell, GI, explaining that Dr Lashanda Winchester was unable to take appt but that Dr Glez [place] office will bill pt for any balance that insurance does no cover. Veronique Drew agreeable to this. CTN will follow up next week---mkrw    06/01/21  CTN unable to reach patient or family on phone, LM on both VM. Patient will need the following appts:    PCP Dr Ivania Beckford contact PCP office , was informed that patient already attended appt this morning 6/1/21. Neuro Dr Ibis Espino ---3 weeks    Dr Will Roach, cards---6/16 at 11:20    [email or web address] ---per PENRITH staff patient [facility] 5/29/2021 06/02/21  CTN unable to reach family on phone again today, LM on both VM again. CTN will call again next week if no call received---mkrw    06/09/21  CTN received notification that patient is readmitted at this time for CVA. CTN has been unable to reach family after leaving several VM. Will follow up after patient discharge to attempt to reach family again---mkrw    06/10/21  CTN unable tor each patient or either daughter on phone , LVM requesting return call. CTN contacted [email or web address]  to confirm [facility] orders, office states they have patient on schedule to be seen today. Patient has following appts:    PCP 6/15 at 10:40  Neurologist Dr Baptiste---3 weeks follow up  Cards 6/18 at 11:20    Patient was seen by palliative on last admission 5/27/2021:  Goals are clear to make attempts at therapy and receive speech therapy at home with [facility] once cleared for discharge.   Had a lengthy conversation on Tuesday and daughters, niece provide 24/7 care for her already and are quite in tune with her care needs and quality of life. They are willing to entertain a transition to [facility] care should she stop benefiting or participating with therapy at home. They are aware of options    CTN will readdress gaols if family is reached---CTN has not been successful with engaging family to return calls. ---ivan    6/18/21- readmission to [facility]- discharged home with [facility] admission scheduled for today. Office will let CTN know if family declines services with them.   LLC

## 2021-06-23 ENCOUNTER — PATIENT OUTREACH (OUTPATIENT)
Dept: CASE MANAGEMENT | Age: 86
End: 2021-06-23

## 2021-06-23 NOTE — PROGRESS NOTES
Patient has graduated from the Bundle program on 6/22/2021. Care management goals have been completed. Patient was not referred to the [facility] team for further management. Patient is currently enrolled in Hospice care with [facility]. Goals Addressed                 This Visit's Progress     COMPLETED: Prevent complications post hospitalization. 03/25/21  CTN spoke to arvin Pinto to review discharge instructions/red falgs to prevent readmission    Appts:    PCP--appt made while IP for 3/30 at 9:10, daughter aware and will take pt    Cards Dr Florina Rivera scheduled appt  Monday 4/5/21  11:00. Per Miracle at [facility], this is the soonest appt available. Deethjostin Pinto notified of date /time. GI Dr WILKES---CTN called MD office, they will call CTN back after confirming they can accept insurance. [place] ---per [facility] office, they have pt scheduled to start services today 3/25/21     Per Stan Pinto patient is doing well, denies SOB or CP this morning.  CTN reviewed medication changes and new meds, per Stan Pinto pt picked up new meds yesterday.  CTN reviewed avoiding constipation with taking iron, instructed her to get OC stool softener if patient c/o difficulty having BM. Encouraged ambulation and drinking water.  CTN reviewed daily weights and instructed daughter to call Dr Jami Norman office to report gains of greater than 3lbs/day and 5 lbs/week. Watch for edema in feet and ankles . [employer] understanding.  Review low sodium diet, foods to avoid, reading labels to kep intake less than 2000mg day. [facility] understanding.  Instructed Sinai to take pt BP and HR twice a day and to call Dr Jami Norman with concerns/abnormal readings.  Dispatch health information provided. 1316 update---CTN has not heard back from from GI office yet to obtain appt. CTN called daughter Stan Pinto to see if MD office called her directly, LM on VM. ---mkrw    03/26/21  CTN received call back from Dr Gilbert Melvin office that they do not accept pt insurance. CTN called [facility], they will accept appt and informed CTN that any balance will be pt responsibility. Appt scheduled for 4/21/2021 telehealth Dr Mahsa Adams 10:45. Text message with link. Per chart notes:  Iron def anemia: s/p 1u RBCs and IV iron. Hgb stable on discharge. GI recommended EGD/colon if stool blood positive. Continue to hold Eliquis for now. CTN tried to reach Josiah [address] again, call went direcly to . CTN will call back later today. ---r    UPDATE:    CTN received call from daughter Sinai--CTN gave her appts information for Dr Mnidi Sellers and Dr Mahsa Adams, GI, explaining that Dr Derrell Mcgarry was unable to take appt but that Dr ELLIS [facility] office will bill pt for any balance that insurance does no cover. Josiah Way agreeable to this. CTN will follow up next week---mkrw    06/01/21  CTN unable to reach patient or family on phone, LM on both VM. Patient will need the following appts:    PCP Dr Saint Hough contact PCP office , was informed that patient already attended appt this morning 6/1/21. Neuro Dr Shannan Cohne ---3 weeks    Dr Mindi Sellers, cards---6/16 at 11:20    [facility] ---per [employer] staff patient [facility]29/[facility] 06/02/21  CTN unable to reach family on phone again today, LM on both VM again. CTN will call again next week if no call received---mkrw    06/09/21  CTN received notification that patient is readmitted at this time for CVA. CTN has been unable to reach family after leaving several VM. Will follow up after patient discharge to attempt to reach family again---mkrw    06/10/21  CTN unable tor each patient or either daughter on phone , LVM requesting return call. CTN contacted [facility]  to confirm [facility] orders, office states they have patient on schedule to be seen today.     Patient has following appts:    PCP 6/15 at 10:40  Neurologist Dr Baptiste---3 weeks follow up  Cards 6/18 at 11:20    Patient was seen by palliative on last admission 5/27/2021:  Goals are clear to make attempts at therapy and receive speech therapy at home with [facility] once cleared for discharge. Had a lengthy conversation on Tuesday and daughters, niece provide 24/7 care for her already and are quite in tune with her care needs and quality of life. They are willing to entertain a transition to [facility] care should she stop benefiting or participating with therapy at home. They are aware of options    CTN will readdress gaols if family is reached---CTN has not been successful with engaging family to return calls. ---mkrw    6/18/21- readmission to [facility]- discharged home with [facility] admission scheduled for today. Office will let CTN know if family declines services with them. [facility]     06/23/21  CTN confirmed that patient is currently enrolled in home hospice with [facility]. End of CHF Bundle period today---mkrw              Patient has Care Transition Nurse's contact information for any further questions, concerns, or needs. Patients upcoming visits:  No future appointments.

## 2021-08-21 ENCOUNTER — HOSPITAL ENCOUNTER (EMERGENCY)
Age: 86
Discharge: HOME | End: 2021-08-21
Attending: EMERGENCY MEDICINE
Payer: MEDICARE

## 2021-08-21 ENCOUNTER — HOSPICE ADMISSION (OUTPATIENT)
Dept: HOSPICE | Facility: HOSPICE | Age: 86
End: 2021-08-21

## 2021-08-21 VITALS
HEART RATE: 75 BPM | TEMPERATURE: 97.9 F | SYSTOLIC BLOOD PRESSURE: 97 MMHG | BODY MASS INDEX: 16.13 KG/M2 | DIASTOLIC BLOOD PRESSURE: 50 MMHG | WEIGHT: 80 LBS | OXYGEN SATURATION: 98 % | RESPIRATION RATE: 12 BRPM | HEIGHT: 59 IN

## 2021-08-21 DIAGNOSIS — L89.150 PRESSURE INJURY OF SACRAL REGION, UNSTAGEABLE (HCC): Primary | ICD-10-CM

## 2021-08-21 PROCEDURE — 99285 EMERGENCY DEPT VISIT HI MDM: CPT

## 2021-08-21 NOTE — ED PROVIDER NOTES
Patient is a 69-year-old woman on hospice care due to advanced heart failure. Her family brings her into the emergency department because she has sacral decubitus ulcer that has been gradually worsening and they feel like they are not able to adequately care for the patient at home. They report that she has been declining and at this point is barely eating and not interacting with family members. As she was being transported to the hospital she became lethargic and unresponsive. The history is provided by a relative. The history is limited by the condition of the patient. Wound Check          Past Medical History:   Diagnosis Date    BPPV (benign paroxysmal positional vertigo)     HTN (hypertension)     Hx of completed stroke     Hyperlipidemia     Hypothyroid     PAF (paroxysmal atrial fibrillation) (HCC)     Rheumatoid arthritis ([facility] [address]     Scoliosis        Past Surgical History:   Procedure Laterality Date    HX  SECTION      HX ORTHOPAEDIC      bunions removed x 2         Family History:   Problem Relation Age of Onset    Stroke Father        Social History     Socioeconomic History    Marital status:      Spouse name: Not on file    Number of children: Not on file    Years of education: Not on file    Highest education level: Not on file   Occupational History    Not on file   Tobacco Use    Smoking status: Never Smoker    Smokeless tobacco: Never Used   Substance and Sexual Activity    Alcohol use: No    Drug use: No    Sexual activity: Never   Other Topics Concern    Not on file   Social History Narrative    Not on file     Social Determinants of Health     Financial Resource Strain:     Difficulty of Paying Living Expenses:    Food Insecurity:     Worried About Running Out of Food in the Last Year:     Ran Out of Food in the Last Year:    Transportation Needs:     Lack of Transportation (Medical):      Lack of Transportation (Non-Medical):    Physical Activity:  Days of Exercise per Week:     Minutes of Exercise per Session:    Stress:     Feeling of Stress :    Social Connections:     Frequency of Communication with Friends and Family:     Frequency of Social Gatherings with Friends and Family:     Attends Nondenominational Services:     Active Member of Clubs or Organizations:     Attends Club or Organization Meetings:     Marital Status:    Intimate Partner Violence:     Fear of Current or Ex-Partner:     Emotionally Abused:     Physically Abused:     Sexually Abused: ALLERGIES: Shellfish containing products    Review of Systems   Unable to perform ROS: Patient unresponsive       There were no vitals filed for this visit. Physical Exam  Vitals and nursing note reviewed. Constitutional:       Appearance: She is cachectic. She is ill-appearing. HENT:      Head: Normocephalic and atraumatic. Cardiovascular:      Rate and Rhythm: Normal rate. Rhythm irregular. Pulmonary:      Breath sounds: Transmitted upper airway sounds present. Rhonchi and rales present. Abdominal:      General: Abdomen is flat. Palpations: Abdomen is soft. Skin:     Comments: Deep sacral ulcer with foul-smelling discharge and bloody and purulent drainage   Neurological:      Mental Status: She is unresponsive. MDM       Procedures      I had an extensive discussion with the family about the care plan and family would like to transition the patient to comfort measures only. The patient met with case management and the  who will facilitate admission to inpatient hospice. Family expressed understanding and agreed with the plan. Patient will be admitted to the inpatient hospice service for additional care. The patient did not qualify for inpatient hospice and patient was discharged home in the care of home hospice.   The patient and the family expressed understanding and will work with social work to add additional services and support as the patient enters his final stage in her life. Patient and family expressed understanding and agreed with the plan.

## 2021-08-21 NOTE — ED TRIAGE NOTES
Pt brought to ER with bedsores.  Pt is currently a Hospice pt and is noted to have agonal breathing and decreased consciousness on arrival.

## 2021-08-21 NOTE — PROGRESS NOTES
Spiritual Care Assessment/Progress Note  [facility]      NAME: Mariluz Asencio      MRN: [id number]  AGE: 80 y.o. SEX: female  Mandaeism Affiliation: Álvaro Valentino   Language: English     8/21/2021     Total Time (in minutes): 17     Spiritual Assessment begun in [facility] EMERGENCY DEPT through conversation with:         []Patient        [] Family    [] Friend(s)        Reason for Consult: End-of-life support     Spiritual beliefs: (Please include comment if needed)     [] Identifies with a mila tradition:         [] Supported by a mila community:            [] Claims no spiritual orientation:           [] Seeking spiritual identity:                [] Adheres to an individual form of spirituality:           [x] Not able to assess:                           Identified resources for coping:      [] Prayer                               [] Music                  [] Guided Imagery     [] Family/friends                 [] Pet visits     [] Devotional reading                         [x] Unknown     [] Other:                                              Interventions offered during this visit: (See comments for more details)    Patient Interventions: Other (comment) (Unable to assess)           Plan of Care:     [] Support spiritual and/or cultural needs    [] Support AMD and/or advance care planning process      [] Support grieving process   [] Coordinate Rites and/or Rituals    [] Coordination with community clergy   [] No spiritual needs identified at this time   [] Detailed Plan of Care below (See Comments)  [] Make referral to Music Therapy  [] Make referral to Pet Therapy     [] Make referral to Addiction services  [] Make referral to [facility]  [] Make referral to Spiritual Care Partner  [] No future visits requested        [x] Follow up upon further referrals     Comments:  responded to a request to visit with Mrs. Castillo for an end-of-life visit. Mrs. Castillo was lying in bed and appeared to resting comfortably, with the nurse providing care at the bedside. In consulting with the nurse, family just stepped out into the waiting room to make some phone calls. After a few minutes  met with Mrs. Castillo's daughter, Shantell, outside of her moms room.  introduced herself and extended support. Shantell was tearful as she spoke about her mother's continued decline and her nearing end-of-life. Shantell shared that she doesn't not want her mother to suffer and wants her to be at peace. As  was speaking with Shantell, case management called to speak to Shantell.  disengaged in the conversation at this time. 's are available for further support upon referral  Darlene Esteves. Suresh Farrell.      Paging Service: [phone])

## 2021-08-22 NOTE — HOSPICE
[facility]  [phone]     Patient Name: Keri Guaman  YOB: 1930  Age: 80 y.o. [facility] RN Note:  Hospice consult received, chart reviewed. Asked to evaluate patient for inpatient hospice due to family reports of change in mentation and their need for additional help in the home. Patient does not meet inpatient criteria. Patient on service with another hospice agency. Per Shantell ramires and Clement Hatch they have no concerns with current provider. CM to contact their hospice provider and update on today's ED visit. Patient to be rerouted back home. Thank you for the opportunity to be of service to this Ms. Castillo and her daughters.     Nury Moreno RN  Clinical Liaison  [phone]

## 2022-03-18 PROBLEM — R53.81 PHYSICAL DEBILITY: Status: ACTIVE | Noted: 2021-06-15

## 2022-03-18 PROBLEM — N39.0 UTI (URINARY TRACT INFECTION): Status: ACTIVE | Noted: 2021-06-14

## 2022-03-18 PROBLEM — G93.40 ENCEPHALOPATHY ACUTE: Status: ACTIVE | Noted: 2021-06-14

## 2022-03-19 PROBLEM — D50.9 IRON DEFICIENCY ANEMIA: Status: ACTIVE | Noted: 2021-03-24

## 2022-03-19 PROBLEM — I50.32 DIASTOLIC CHF, CHRONIC (HCC): Status: ACTIVE | Noted: 2020-12-18

## 2022-03-19 PROBLEM — I63.9 ACUTE CVA (CEREBROVASCULAR ACCIDENT) (HCC): Status: ACTIVE | Noted: 2021-05-22

## 2022-03-19 PROBLEM — R79.89 ELEVATED LFTS: Status: ACTIVE | Noted: 2021-06-08

## 2022-03-19 PROBLEM — J90 PLEURAL EFFUSION: Status: ACTIVE | Noted: 2020-12-19

## 2022-03-19 PROBLEM — I48.20 CHRONIC ATRIAL FIBRILLATION (HCC): Status: ACTIVE | Noted: 2021-03-24

## 2022-03-19 PROBLEM — E87.6 HYPOKALEMIA DUE TO LOSS OF POTASSIUM: Status: ACTIVE | Noted: 2020-12-18

## 2022-03-19 PROBLEM — R29.810 FACIAL DROOP: Status: ACTIVE | Noted: 2021-06-08

## 2022-03-19 PROBLEM — G93.41 ACUTE METABOLIC ENCEPHALOPATHY: Status: ACTIVE | Noted: 2021-06-15

## 2023-05-10 RX ORDER — PANTOPRAZOLE SODIUM 40 MG/1
TABLET, DELAYED RELEASE ORAL DAILY
COMMUNITY
Start: 2014-07-07

## 2023-05-10 RX ORDER — LOSARTAN POTASSIUM 50 MG/1
50 TABLET ORAL DAILY
COMMUNITY

## 2023-05-10 RX ORDER — ATORVASTATIN CALCIUM 20 MG/1
20 TABLET, FILM COATED ORAL DAILY
COMMUNITY

## 2023-05-10 RX ORDER — METOPROLOL SUCCINATE 50 MG/1
TABLET, EXTENDED RELEASE ORAL
COMMUNITY